# Patient Record
Sex: FEMALE | Race: BLACK OR AFRICAN AMERICAN | Employment: OTHER | URBAN - METROPOLITAN AREA
[De-identification: names, ages, dates, MRNs, and addresses within clinical notes are randomized per-mention and may not be internally consistent; named-entity substitution may affect disease eponyms.]

---

## 2017-02-24 ENCOUNTER — OFFICE VISIT (OUTPATIENT)
Dept: PRIMARY CARE CLINIC | Age: 57
End: 2017-02-24

## 2017-02-24 VITALS
HEART RATE: 89 BPM | OXYGEN SATURATION: 99 % | BODY MASS INDEX: 29.58 KG/M2 | SYSTOLIC BLOOD PRESSURE: 112 MMHG | WEIGHT: 167 LBS | DIASTOLIC BLOOD PRESSURE: 81 MMHG

## 2017-02-24 DIAGNOSIS — R11.0 NAUSEA: Primary | ICD-10-CM

## 2017-02-24 DIAGNOSIS — J45.40 MODERATE PERSISTENT ASTHMA WITHOUT COMPLICATION: ICD-10-CM

## 2017-02-24 DIAGNOSIS — E55.9 VITAMIN D DEFICIENCY: ICD-10-CM

## 2017-02-24 DIAGNOSIS — E87.6 HYPOKALEMIA: ICD-10-CM

## 2017-02-24 DIAGNOSIS — F17.201 TOBACCO ABUSE, IN REMISSION: ICD-10-CM

## 2017-02-24 DIAGNOSIS — N76.0 ACUTE VAGINITIS: ICD-10-CM

## 2017-02-24 PROCEDURE — 99214 OFFICE O/P EST MOD 30 MIN: CPT | Performed by: FAMILY MEDICINE

## 2017-02-24 RX ORDER — POTASSIUM CHLORIDE 20 MEQ/1
TABLET, EXTENDED RELEASE ORAL
Qty: 30 TABLET | Refills: 3 | Status: SHIPPED | OUTPATIENT
Start: 2017-02-24 | End: 2017-07-18 | Stop reason: SDUPTHER

## 2017-02-24 RX ORDER — ONDANSETRON 4 MG/1
4 TABLET, FILM COATED ORAL EVERY 12 HOURS PRN
Qty: 20 TABLET | Refills: 1 | Status: SHIPPED | OUTPATIENT
Start: 2017-02-24 | End: 2017-05-19 | Stop reason: SDUPTHER

## 2017-02-24 RX ORDER — ERGOCALCIFEROL 1.25 MG/1
CAPSULE ORAL
Qty: 8 CAPSULE | Refills: 2 | Status: SHIPPED | OUTPATIENT
Start: 2017-02-24 | End: 2017-08-17 | Stop reason: SDUPTHER

## 2017-02-24 ASSESSMENT — ENCOUNTER SYMPTOMS
TROUBLE SWALLOWING: 0
EYE REDNESS: 0
ABDOMINAL DISTENTION: 0
RHINORRHEA: 0
COUGH: 0
APNEA: 0
HEARTBURN: 0
DIARRHEA: 0
SHORTNESS OF BREATH: 0
STRIDOR: 0
RECTAL PAIN: 0
COLOR CHANGE: 0
EYE ITCHING: 0
CHOKING: 0
CHEST TIGHTNESS: 0
BLOOD IN STOOL: 0
EYE PAIN: 0
NAUSEA: 0
VOMITING: 0
BACK PAIN: 0
EYE DISCHARGE: 0
CONSTIPATION: 0
SINUS PRESSURE: 0
SORE THROAT: 0
PHOTOPHOBIA: 0
WHEEZING: 0

## 2017-02-28 DIAGNOSIS — E87.6 HYPOKALEMIA: ICD-10-CM

## 2017-02-28 LAB
ANION GAP SERPL CALCULATED.3IONS-SCNC: 18 MMOL/L (ref 3–16)
BUN BLDV-MCNC: 9 MG/DL (ref 7–20)
CALCIUM SERPL-MCNC: 9 MG/DL (ref 8.3–10.6)
CHLORIDE BLD-SCNC: 101 MMOL/L (ref 99–110)
CO2: 22 MMOL/L (ref 21–32)
CREAT SERPL-MCNC: 0.7 MG/DL (ref 0.6–1.1)
GFR AFRICAN AMERICAN: >60
GFR NON-AFRICAN AMERICAN: >60
GLUCOSE BLD-MCNC: 73 MG/DL (ref 70–99)
POTASSIUM SERPL-SCNC: 4.1 MMOL/L (ref 3.5–5.1)
SODIUM BLD-SCNC: 141 MMOL/L (ref 136–145)

## 2017-04-07 ENCOUNTER — OFFICE VISIT (OUTPATIENT)
Dept: PRIMARY CARE CLINIC | Age: 57
End: 2017-04-07

## 2017-04-07 VITALS
HEART RATE: 94 BPM | HEIGHT: 62 IN | OXYGEN SATURATION: 99 % | BODY MASS INDEX: 29.81 KG/M2 | SYSTOLIC BLOOD PRESSURE: 114 MMHG | TEMPERATURE: 97.6 F | WEIGHT: 162 LBS | DIASTOLIC BLOOD PRESSURE: 75 MMHG

## 2017-04-07 DIAGNOSIS — J30.9 ALLERGIC RHINITIS, UNSPECIFIED ALLERGIC RHINITIS TRIGGER, UNSPECIFIED RHINITIS SEASONALITY: Primary | ICD-10-CM

## 2017-04-07 PROCEDURE — 99213 OFFICE O/P EST LOW 20 MIN: CPT | Performed by: FAMILY MEDICINE

## 2017-04-07 RX ORDER — FLUTICASONE PROPIONATE 50 MCG
1 SPRAY, SUSPENSION (ML) NASAL DAILY
Qty: 1 BOTTLE | Refills: 3 | Status: SHIPPED | OUTPATIENT
Start: 2017-04-07 | End: 2017-04-19 | Stop reason: SDUPTHER

## 2017-04-07 ASSESSMENT — ENCOUNTER SYMPTOMS
NAUSEA: 0
SINUS PRESSURE: 0
EYE ITCHING: 0
ABDOMINAL DISTENTION: 0
COUGH: 0
PHOTOPHOBIA: 0
COLOR CHANGE: 0
BACK PAIN: 0
RECTAL PAIN: 0
EYE REDNESS: 0
SHORTNESS OF BREATH: 0
APNEA: 0
TROUBLE SWALLOWING: 0
VOMITING: 0
BLOOD IN STOOL: 0
SORE THROAT: 0
CHEST TIGHTNESS: 0
CHOKING: 0
CONSTIPATION: 0
STRIDOR: 0
DIARRHEA: 0
EYE PAIN: 0
RHINORRHEA: 1
EYE DISCHARGE: 0
WHEEZING: 0

## 2017-04-07 ASSESSMENT — PATIENT HEALTH QUESTIONNAIRE - PHQ9
2. FEELING DOWN, DEPRESSED OR HOPELESS: 0
SUM OF ALL RESPONSES TO PHQ QUESTIONS 1-9: 0
1. LITTLE INTEREST OR PLEASURE IN DOING THINGS: 0
SUM OF ALL RESPONSES TO PHQ9 QUESTIONS 1 & 2: 0

## 2017-04-19 RX ORDER — FLUTICASONE PROPIONATE 50 MCG
1 SPRAY, SUSPENSION (ML) NASAL DAILY
Qty: 1 BOTTLE | Refills: 3 | Status: SHIPPED | OUTPATIENT
Start: 2017-04-19 | End: 2018-02-02 | Stop reason: SDUPTHER

## 2017-04-19 RX ORDER — FLUTICASONE PROPIONATE 50 MCG
1 SPRAY, SUSPENSION (ML) NASAL DAILY
Qty: 1 BOTTLE | Refills: 3 | Status: CANCELLED | OUTPATIENT
Start: 2017-04-19

## 2017-05-18 DIAGNOSIS — R11.0 NAUSEA: ICD-10-CM

## 2017-05-19 ENCOUNTER — OFFICE VISIT (OUTPATIENT)
Dept: PRIMARY CARE CLINIC | Age: 57
End: 2017-05-19

## 2017-05-19 VITALS
SYSTOLIC BLOOD PRESSURE: 104 MMHG | OXYGEN SATURATION: 99 % | HEIGHT: 62 IN | HEART RATE: 90 BPM | WEIGHT: 156.8 LBS | BODY MASS INDEX: 28.85 KG/M2 | DIASTOLIC BLOOD PRESSURE: 79 MMHG

## 2017-05-19 DIAGNOSIS — R11.0 NAUSEA: ICD-10-CM

## 2017-05-19 DIAGNOSIS — N39.0 URINARY TRACT INFECTION, SITE UNSPECIFIED: Primary | ICD-10-CM

## 2017-05-19 LAB
BACTERIA: ABNORMAL /HPF
BILIRUBIN URINE: NEGATIVE
BLOOD, URINE: ABNORMAL
CLARITY: ABNORMAL
COLOR: ABNORMAL
EPITHELIAL CELLS, UA: 2 /HPF (ref 0–5)
GLUCOSE URINE: NEGATIVE MG/DL
HYALINE CASTS: 4 /LPF (ref 0–8)
KETONES, URINE: NEGATIVE MG/DL
LEUKOCYTE ESTERASE, URINE: ABNORMAL
MICROSCOPIC EXAMINATION: YES
NITRITE, URINE: POSITIVE
PH UA: 6
PROTEIN UA: 100 MG/DL
RBC UA: 30 /HPF (ref 0–4)
SPECIFIC GRAVITY UA: 1.02
URINE TYPE: ABNORMAL
UROBILINOGEN, URINE: 0.2 E.U./DL
WBC UA: 676 /HPF (ref 0–5)

## 2017-05-19 PROCEDURE — 99214 OFFICE O/P EST MOD 30 MIN: CPT | Performed by: FAMILY MEDICINE

## 2017-05-19 RX ORDER — ONDANSETRON 4 MG/1
4 TABLET, FILM COATED ORAL EVERY 12 HOURS PRN
Qty: 20 TABLET | Refills: 1 | Status: SHIPPED | OUTPATIENT
Start: 2017-05-19 | End: 2017-09-23 | Stop reason: SDUPTHER

## 2017-05-19 RX ORDER — NITROFURANTOIN MACROCRYSTALS 100 MG/1
CAPSULE ORAL
Qty: 20 CAPSULE | Refills: 0 | Status: SHIPPED | OUTPATIENT
Start: 2017-05-19 | End: 2018-01-12

## 2017-05-19 RX ORDER — ONDANSETRON 4 MG/1
4 TABLET, FILM COATED ORAL EVERY 12 HOURS PRN
Qty: 20 TABLET | Refills: 1 | Status: SHIPPED | OUTPATIENT
Start: 2017-05-19 | End: 2017-05-19 | Stop reason: SDUPTHER

## 2017-05-19 ASSESSMENT — ENCOUNTER SYMPTOMS
COLOR CHANGE: 0
RECTAL PAIN: 0
EYE ITCHING: 0
EYE PAIN: 0
PHOTOPHOBIA: 0
CHOKING: 0
COUGH: 0
CONSTIPATION: 0
TROUBLE SWALLOWING: 0
APNEA: 0
VOMITING: 0
RHINORRHEA: 0
NAUSEA: 0
SINUS PRESSURE: 0
SHORTNESS OF BREATH: 0
SORE THROAT: 0
EYE DISCHARGE: 0
DIARRHEA: 0
WHEEZING: 0
ABDOMINAL DISTENTION: 0
EYE REDNESS: 0
STRIDOR: 0
CHEST TIGHTNESS: 0
BACK PAIN: 0
BLOOD IN STOOL: 0

## 2017-05-21 LAB
ORGANISM: ABNORMAL
URINE CULTURE, ROUTINE: ABNORMAL

## 2017-06-07 RX ORDER — HYDROCHLOROTHIAZIDE 25 MG/1
TABLET ORAL
Qty: 30 TABLET | Refills: 5 | Status: SHIPPED | OUTPATIENT
Start: 2017-06-07 | End: 2018-01-12 | Stop reason: SDUPTHER

## 2017-06-27 RX ORDER — MONTELUKAST SODIUM 10 MG/1
10 TABLET ORAL NIGHTLY
Qty: 30 TABLET | Refills: 6 | Status: SHIPPED | OUTPATIENT
Start: 2017-06-27 | End: 2018-01-12 | Stop reason: SDUPTHER

## 2017-07-18 DIAGNOSIS — E87.6 HYPOKALEMIA: ICD-10-CM

## 2017-07-19 RX ORDER — POTASSIUM CHLORIDE 1500 MG/1
TABLET, EXTENDED RELEASE ORAL
Qty: 30 TABLET | Refills: 3 | Status: SHIPPED | OUTPATIENT
Start: 2017-07-19 | End: 2017-11-18 | Stop reason: SDUPTHER

## 2017-08-15 ENCOUNTER — TELEPHONE (OUTPATIENT)
Dept: PRIMARY CARE CLINIC | Age: 57
End: 2017-08-15

## 2017-08-17 ENCOUNTER — OFFICE VISIT (OUTPATIENT)
Dept: PRIMARY CARE CLINIC | Age: 57
End: 2017-08-17

## 2017-08-17 VITALS
TEMPERATURE: 97.3 F | WEIGHT: 153 LBS | OXYGEN SATURATION: 100 % | HEART RATE: 79 BPM | BODY MASS INDEX: 28.16 KG/M2 | HEIGHT: 62 IN | SYSTOLIC BLOOD PRESSURE: 107 MMHG | RESPIRATION RATE: 18 BRPM | DIASTOLIC BLOOD PRESSURE: 72 MMHG

## 2017-08-17 DIAGNOSIS — E55.9 VITAMIN D DEFICIENCY: ICD-10-CM

## 2017-08-17 DIAGNOSIS — B96.89 ACUTE BACTERIAL SINUSITIS: Primary | ICD-10-CM

## 2017-08-17 DIAGNOSIS — J01.90 ACUTE BACTERIAL SINUSITIS: Primary | ICD-10-CM

## 2017-08-17 PROCEDURE — 99212 OFFICE O/P EST SF 10 MIN: CPT | Performed by: NURSE PRACTITIONER

## 2017-08-17 RX ORDER — AMOXICILLIN AND CLAVULANATE POTASSIUM 875; 125 MG/1; MG/1
1 TABLET, FILM COATED ORAL 2 TIMES DAILY
Qty: 20 TABLET | Refills: 0 | Status: SHIPPED | OUTPATIENT
Start: 2017-08-17 | End: 2017-08-27

## 2017-08-17 RX ORDER — ERGOCALCIFEROL 1.25 MG/1
CAPSULE ORAL
Qty: 8 CAPSULE | Refills: 2 | Status: SHIPPED | OUTPATIENT
Start: 2017-08-17 | End: 2017-11-11 | Stop reason: SDUPTHER

## 2017-08-17 RX ORDER — PROMETHAZINE HYDROCHLORIDE AND CODEINE PHOSPHATE 6.25; 1 MG/5ML; MG/5ML
5 SYRUP ORAL EVERY 4 HOURS PRN
Qty: 120 ML | Refills: 0 | Status: SHIPPED | OUTPATIENT
Start: 2017-08-17 | End: 2017-08-27 | Stop reason: SDUPTHER

## 2017-08-17 RX ORDER — ALBUTEROL SULFATE 90 UG/1
2 AEROSOL, METERED RESPIRATORY (INHALATION) EVERY 6 HOURS PRN
Qty: 1 INHALER | Refills: 5 | Status: SHIPPED | OUTPATIENT
Start: 2017-08-17 | End: 2018-04-27 | Stop reason: SDUPTHER

## 2017-08-17 ASSESSMENT — ENCOUNTER SYMPTOMS
TROUBLE SWALLOWING: 0
STRIDOR: 0
FACIAL SWELLING: 0
SORE THROAT: 1
RHINORRHEA: 0
EYE REDNESS: 0
EYE DISCHARGE: 0
CHOKING: 0
SHORTNESS OF BREATH: 0
EYE ITCHING: 0
CHEST TIGHTNESS: 0
COUGH: 1
SINUS PRESSURE: 1
PHOTOPHOBIA: 0
APNEA: 0
NAUSEA: 0
WHEEZING: 0
VOICE CHANGE: 0
COLOR CHANGE: 0
VOMITING: 0
EYE PAIN: 0

## 2017-08-21 RX ORDER — LISINOPRIL 40 MG/1
TABLET ORAL
Qty: 30 TABLET | Refills: 6 | Status: CANCELLED | OUTPATIENT
Start: 2017-08-21

## 2017-08-25 DIAGNOSIS — I10 ESSENTIAL HYPERTENSION: Primary | ICD-10-CM

## 2017-08-25 RX ORDER — LISINOPRIL 40 MG/1
TABLET ORAL
Qty: 30 TABLET | Refills: 6 | Status: SHIPPED | OUTPATIENT
Start: 2017-08-25 | End: 2018-01-12 | Stop reason: SDUPTHER

## 2017-08-29 RX ORDER — PROMETHAZINE HYDROCHLORIDE AND CODEINE PHOSPHATE 6.25; 1 MG/5ML; MG/5ML
SYRUP ORAL
Qty: 120 ML | Refills: 0 | Status: SHIPPED | OUTPATIENT
Start: 2017-08-29 | End: 2018-04-27 | Stop reason: ALTCHOICE

## 2017-09-23 DIAGNOSIS — R11.0 NAUSEA: ICD-10-CM

## 2017-09-26 RX ORDER — ONDANSETRON 4 MG/1
TABLET, FILM COATED ORAL
Qty: 20 TABLET | Refills: 1 | Status: SHIPPED | OUTPATIENT
Start: 2017-09-26 | End: 2017-11-16 | Stop reason: SDUPTHER

## 2017-10-29 RX ORDER — OMEPRAZOLE 40 MG/1
40 CAPSULE, DELAYED RELEASE ORAL DAILY
Qty: 30 CAPSULE | Refills: 5 | Status: SHIPPED | OUTPATIENT
Start: 2017-10-29 | End: 2017-10-29 | Stop reason: SDUPTHER

## 2017-11-11 DIAGNOSIS — E55.9 VITAMIN D DEFICIENCY: ICD-10-CM

## 2017-11-13 RX ORDER — ERGOCALCIFEROL 1.25 MG/1
CAPSULE ORAL
Qty: 8 CAPSULE | Refills: 2 | Status: SHIPPED | OUTPATIENT
Start: 2017-11-13 | End: 2018-01-12 | Stop reason: SDUPTHER

## 2017-11-16 DIAGNOSIS — R11.0 NAUSEA: ICD-10-CM

## 2017-11-18 DIAGNOSIS — E87.6 HYPOKALEMIA: ICD-10-CM

## 2017-11-21 NOTE — TELEPHONE ENCOUNTER
Pt called in for refill of Klor-con (last refill 07/19/17) and Zofran ( last refill 09/26/17) yesterday. She still has not gotten the refill. [facility]       LOV:  08-17--17   No future visit    [facility]ACY:  [facility] [address]  Phone:   [phone]

## 2017-11-22 RX ORDER — POTASSIUM CHLORIDE 1500 MG/1
TABLET, EXTENDED RELEASE ORAL
Qty: 30 TABLET | Refills: 3 | Status: SHIPPED | OUTPATIENT
Start: 2017-11-22 | End: 2018-01-12 | Stop reason: SDUPTHER

## 2017-11-22 RX ORDER — ONDANSETRON 4 MG/1
TABLET, FILM COATED ORAL
Qty: 20 TABLET | Refills: 1 | Status: SHIPPED | OUTPATIENT
Start: 2017-11-22 | End: 2018-01-12 | Stop reason: SDUPTHER

## 2018-01-10 DIAGNOSIS — R11.0 NAUSEA: ICD-10-CM

## 2018-01-10 RX ORDER — ONDANSETRON 4 MG/1
TABLET, FILM COATED ORAL
Qty: 20 TABLET | Refills: 1 | OUTPATIENT
Start: 2018-01-10

## 2018-01-10 NOTE — TELEPHONE ENCOUNTER
I spoke with pt. She states she take 1 zofran everyday for nausea. She states she had gastric bypass several years ago, had hernia repair last January and has been taking ever since.   Pt hasn't seen Dr. Sindi Jordan since 5/2017 so appt scheduled for Friday 1/12/18

## 2018-01-10 NOTE — TELEPHONE ENCOUNTER
Vanessa Cummins is in need of a refill on her Zofran and she has one pill left. She only received 20 pills not 30 according to label on bottle.     Vanessa Cummins - [address]

## 2018-01-12 ENCOUNTER — OFFICE VISIT (OUTPATIENT)
Dept: PRIMARY CARE CLINIC | Age: 58
End: 2018-01-12

## 2018-01-12 VITALS
HEART RATE: 88 BPM | DIASTOLIC BLOOD PRESSURE: 71 MMHG | BODY MASS INDEX: 28.16 KG/M2 | TEMPERATURE: 98.1 F | HEIGHT: 62 IN | OXYGEN SATURATION: 99 % | WEIGHT: 153 LBS | SYSTOLIC BLOOD PRESSURE: 103 MMHG

## 2018-01-12 DIAGNOSIS — J45.40 MODERATE PERSISTENT ASTHMA WITHOUT COMPLICATION: ICD-10-CM

## 2018-01-12 DIAGNOSIS — K21.9 GASTROESOPHAGEAL REFLUX DISEASE WITHOUT ESOPHAGITIS: ICD-10-CM

## 2018-01-12 DIAGNOSIS — I10 ESSENTIAL HYPERTENSION: ICD-10-CM

## 2018-01-12 DIAGNOSIS — R11.0 NAUSEA: ICD-10-CM

## 2018-01-12 DIAGNOSIS — Z23 NEED FOR VACCINATION FOR STREP PNEUMONIAE: ICD-10-CM

## 2018-01-12 DIAGNOSIS — E55.9 VITAMIN D DEFICIENCY: Primary | ICD-10-CM

## 2018-01-12 DIAGNOSIS — E87.6 HYPOKALEMIA: ICD-10-CM

## 2018-01-12 DIAGNOSIS — Z23 NEEDS FLU SHOT: ICD-10-CM

## 2018-01-12 PROCEDURE — 99214 OFFICE O/P EST MOD 30 MIN: CPT | Performed by: FAMILY MEDICINE

## 2018-01-12 PROCEDURE — 90732 PPSV23 VACC 2 YRS+ SUBQ/IM: CPT | Performed by: FAMILY MEDICINE

## 2018-01-12 PROCEDURE — 90471 IMMUNIZATION ADMIN: CPT | Performed by: FAMILY MEDICINE

## 2018-01-12 PROCEDURE — 90472 IMMUNIZATION ADMIN EACH ADD: CPT | Performed by: FAMILY MEDICINE

## 2018-01-12 PROCEDURE — 90630 INFLUENZA, QUADV, 18-64 YRS, ID, PF, MICRO INJ, 0.1ML (FLUZONE QUADV, PF): CPT | Performed by: FAMILY MEDICINE

## 2018-01-12 RX ORDER — BUDESONIDE AND FORMOTEROL FUMARATE DIHYDRATE 80; 4.5 UG/1; UG/1
2 AEROSOL RESPIRATORY (INHALATION) DAILY
Qty: 1 INHALER | Refills: 5 | Status: SHIPPED | OUTPATIENT
Start: 2018-01-12 | End: 2018-09-30 | Stop reason: SDUPTHER

## 2018-01-12 RX ORDER — HYDROCHLOROTHIAZIDE 25 MG/1
TABLET ORAL
Qty: 30 TABLET | Refills: 5 | Status: SHIPPED | OUTPATIENT
Start: 2018-01-12 | End: 2018-08-06 | Stop reason: SDUPTHER

## 2018-01-12 RX ORDER — ONDANSETRON 4 MG/1
TABLET, FILM COATED ORAL
Qty: 30 TABLET | Refills: 12 | Status: SHIPPED | OUTPATIENT
Start: 2018-01-12 | End: 2020-02-19 | Stop reason: SDUPTHER

## 2018-01-12 RX ORDER — MONTELUKAST SODIUM 10 MG/1
10 TABLET ORAL NIGHTLY
Qty: 30 TABLET | Refills: 6 | Status: SHIPPED | OUTPATIENT
Start: 2018-01-12 | End: 2018-09-04 | Stop reason: SDUPTHER

## 2018-01-12 RX ORDER — ESTRADIOL 10 UG/1
TABLET VAGINAL
COMMUNITY
Start: 2017-12-23 | End: 2018-10-16

## 2018-01-12 RX ORDER — LISINOPRIL 40 MG/1
TABLET ORAL
Qty: 30 TABLET | Refills: 6 | Status: SHIPPED | OUTPATIENT
Start: 2018-01-12 | End: 2018-10-30 | Stop reason: SDUPTHER

## 2018-01-12 RX ORDER — POTASSIUM CHLORIDE 20 MEQ/1
TABLET, EXTENDED RELEASE ORAL
Qty: 30 TABLET | Refills: 5 | Status: SHIPPED | OUTPATIENT
Start: 2018-01-12 | End: 2018-01-19 | Stop reason: DRUGHIGH

## 2018-01-12 RX ORDER — ALBUTEROL SULFATE 90 UG/1
2 AEROSOL, METERED RESPIRATORY (INHALATION) EVERY 6 HOURS PRN
Qty: 1 INHALER | Refills: 5 | Status: SHIPPED | OUTPATIENT
Start: 2018-01-12 | End: 2019-05-20 | Stop reason: SDUPTHER

## 2018-01-12 RX ORDER — OMEPRAZOLE 40 MG/1
CAPSULE, DELAYED RELEASE ORAL
Qty: 90 CAPSULE | Refills: 2 | Status: SHIPPED | OUTPATIENT
Start: 2018-01-12 | End: 2019-02-25 | Stop reason: SDUPTHER

## 2018-01-12 ASSESSMENT — ENCOUNTER SYMPTOMS
COUGH: 0
PHOTOPHOBIA: 0
SHORTNESS OF BREATH: 0
DIFFICULTY BREATHING: 0
GLOBUS SENSATION: 0
ABDOMINAL DISTENTION: 0
WATER BRASH: 0
SINUS PRESSURE: 0
COLOR CHANGE: 0
EYE PAIN: 0
EYE ITCHING: 0
EYE REDNESS: 0
BELCHING: 0
WHEEZING: 0
DIARRHEA: 0
RHINORRHEA: 0
NAUSEA: 1
CHEST TIGHTNESS: 0
STRIDOR: 0
ABDOMINAL PAIN: 0
HEARTBURN: 0
BACK PAIN: 0
TROUBLE SWALLOWING: 0
HOARSE VOICE: 0
APNEA: 0
HEMOPTYSIS: 0
EYE DISCHARGE: 0
RECTAL PAIN: 0
BLURRED VISION: 0
SPUTUM PRODUCTION: 0
ORTHOPNEA: 0
SORE THROAT: 0
VOMITING: 0
CONSTIPATION: 0
CHOKING: 0
FREQUENT THROAT CLEARING: 0
BLOOD IN STOOL: 0

## 2018-01-12 NOTE — PATIENT INSTRUCTIONS
Patient Education          influenza virus vaccine (injection)  Pronunciation:  in machelle VILLASEÑORK seen  Brand:  Afluria [id number], Afluria Preservative-Free [id number], Fluad 1145-5575, Fluarix Quadrivalent 6742-7096, Flublok 4307-1664, Flucelvax 6610-4526, FluLaval Preservative-Free Quadrivalent 1214-8009, FluLaval Quadrivalent 0774-2833, Fluvirin 0250-2665, Fluvirin Preservative-Free 1434-2907, Fluzone High-Dose 3149-2995, Fluzone Preservative-Free Pediatric Quadrivalent 2593-0112, Fluzone Preservative-Free Quadrivalent 9714-1166, Fluzone Quadrivalent 6227-0277, Fluzone Quadrivalent Intradermal 6726-8153  What is the most important information I should know about this vaccine? The injectable influenza virus vaccine (flu shot) is a \"killed virus\" vaccine. Influenza virus vaccine is also available in a nasal spray form, which is a \"live virus\" vaccine. This medication guide addresses only the injectable form of this vaccine. Becoming infected with influenza is much more dangerous to your health than receiving this vaccine. However, like any medicine, this vaccine can cause side effects but the risk of serious side effects is extremely low. What is influenza virus vaccine? Influenza virus (commonly known as \"the flu\") is a serious disease caused by a virus. Influenza virus can spread from one person to another through small droplets of saliva that are expelled into the air when an infected person coughs or sneezes. The virus can also be passed through contact with objects the infected person has touched, such as a door handle or other surfaces. Influenza virus vaccine is used to prevent infection caused by influenza virus. The vaccine is redeveloped each year to contain specific strains of inactivated (killed) flu virus that are recommended by public health officials for that year. The injectable influenza virus vaccine (flu shot) is a \"killed virus\" vaccine.  Influenza virus vaccine is also available shot during any trimester of pregnancy to protect themselves and their  babies from flu. The nasal spray form of influenza vaccine is not recommended for use in pregnant women. It is not known whether influenza virus vaccine passes into breast milk or if it could harm a nursing baby. Do not receive this vaccine without telling your doctor if you are breast-feeding a baby. This vaccine should not be given to a child younger than 7 months old. How is this vaccine given? Some brands of this vaccine are made for use in adults and not in children. Your child's doctor can recommend the best influenza virus vaccine for your child. This vaccine is given as an injection (shot) into a muscle. You will receive this injection in a doctor's office or other clinic setting. You should receive a flu vaccine every year. Your immunity will gradually decrease over the 12 months after you receive the influenza virus vaccine. Children receiving this vaccine may need a booster shot one month after receiving the first vaccine. The influenza virus vaccine is usually given in October or November. Some people may need to have their vaccines earlier or later. Follow your doctor's instructions. Your doctor may recommend treating fever and pain with an aspirin-free pain reliever such as acetaminophen (Tylenol) or ibuprofen (Motrin, Advil, and others) when the shot is given and for the next 24 hours. Follow the label directions or your doctor's instructions about how much of this medicine to give your child. It is especially important to prevent fever from occurring in a child who has a seizure disorder such as epilepsy. What happens if I miss a dose? Since flu shots are usually given only one time per year, you will most likely not be on a dosing schedule. Call your doctor if you forget to receive your yearly flu shot in October or November.   If your child misses a booster dose of this vaccine, call your doctor for blood thinner such as warfarin, Coumadin. Also tell the doctor if you have recently received drugs or treatments that can weaken the immune system, including:  · an oral, nasal, inhaled, or injectable steroid medicine;  · medications to treat psoriasis, rheumatoid arthritis, or other autoimmune disorders--azathioprine, etanercept, leflunomide, and others; or  · medicines to treat or prevent organ transplant rejection--basiliximab, cyclosporine, muromonab-CD3, mycophenolate mofetil, sirolimus, tacrolimus. If you are using any of these medications, you may not be able to receive the vaccine, or may need to wait until the other treatments are finished. This list is not complete. Other drugs may affect influenza virus injectable vaccine, including prescription and over-the-counter medicines, vitamins, and herbal products. Not all possible interactions are listed in this medication guide. Where can I get more information? Your doctor or pharmacist can provide more information about this vaccine. Additional information is available from your local health department or the Centers for Disease Control and Prevention. Remember, keep this and all other medicines out of the reach of children, never share your medicines with others, and use this medication only for the indication prescribed. Every effort has been made to ensure that the information provided by [facility] is accurate, up-to-date, and complete, but no guarantee is made to that effect. Drug information contained herein may be time sensitive. [facility] information has been compiled for use by healthcare practitioners and consumers in the [place] and therefore [employer] does not warrant that uses outside of the [place] are appropriate, unless specifically indicated otherwise. [facility]'s drug information does not endorse drugs, diagnose patients or recommend therapy.  [employer]'s drug information is an informational resource designed to as hemophilia, or easy bruising. The timing and number of PPSV doses you receive will depend on whether you have any of these other conditions:  · cancer, leukemia, lymphoma, or multiple myeloma;  · HIV or AIDS;  · sickle cell disease;  · a kidney condition called nephrotic syndrome;  · a history of organ or bone marrow transplant;  · if you are receiving chemotherapy;  · if you have been using steroid medication for a long period of time;  · if you are scheduled to have your spleen removed (splenectomy); or  · if you have received a pneumococcal vaccine within the past 3 to 5 years. You can still receive a vaccine if you have a cold or fever. In the case of a more severe illness with a fever or any type of infection, wait until you get better before receiving this vaccine. Vaccines may be harmful to an unborn baby and generally should not be given to a pregnant woman. However, not vaccinating the mother could be more harmful to the baby if the mother becomes infected with a disease that this vaccine could prevent. Your doctor will decide whether you should receive this vaccine, especially if you have a high risk of infection with pneumococcal disease. It is not known whether PPSV passes into breast milk or if it could harm a nursing baby. Do not use this medication without telling your doctor if you are breast-feeding a baby. How is this vaccine given? PPSV is given as an injection (shot) under the skin or into a muscle of your arm or thigh. You will receive this injection in a doctor's office or other clinic setting. PPSV is usually given as a routine vaccination in adults who are 72 years and older.   PPSV may also be given to people between the ages 3and 59years old who have:  · heart disease, lung disease, or diabetes;  · a cerebrospinal fluid leak, or a cochlear implant (an electronic hearing device);  · alcoholism or liver disease (including cirrhosis);  · sickle cell disease or a disorder of the spleen;  · a weak immune system caused by HIV, AIDS, cancer, kidney failure, organ transplantation, or a damaged spleen; or  · a weak immune system caused by taking steroids or receiving chemotherapy or radiation treatment. PPSV may also be given to people between the ages 23and 59years old who smoke or have asthma. PPSV should be given at least 2 weeks before the start of any treatment that can weaken your immune system. PPSV is also given at least 2 weeks before you undergo a splenectomy (surgical removal of the spleen). The timing of this vaccination is very important for it to be effective. Follow your doctor's instructions. Your doctor may recommend treating fever and pain with an aspirin-free pain reliever such as acetaminophen (Tylenol) or ibuprofen (Motrin, Advil, and others) when the shot is given and for the next 24 hours. Follow the label directions or your doctor's instructions about how much of this medicine to take. If your doctor has prescribed an antibiotic (such as penicillin) to help prevent infection with pneumococcal bacteria, do not stop using the antibiotic after you receive the PPSV. Take the antibiotic for the entire length of time prescribed by your doctor. Most people receive only one PPSV shot during their lifetime. However, people in certain age groups or with certain disease conditions that put them at risk of infection may need to receive more than one vaccine. Before receiving this vaccine, tell your doctor if you have received a pneumococcal vaccine within the past 3 to 5 years. What happens if I miss a dose? Since PPSV is usually given only one time, you will most likely not be on a dosing schedule. If you are receiving a repeat PPSV shot, be sure to tell your doctor if it has been less than 5 years since you last received a pneumococcal vaccine. What happens if I overdose? An overdose of this vaccine is not likely to occur.   What should I avoid before or after receiving effect. Drug information contained herein may be time sensitive. [employer] information has been compiled for use by healthcare practitioners and consumers in the [place] and therefore [employer] does not warrant that uses outside of the [place] are appropriate, unless specifically indicated otherwise. [facility]'s drug information does not endorse drugs, diagnose patients or recommend therapy. [facility] drug information is an informational resource designed to assist licensed healthcare practitioners in caring for their patients and/or to serve consumers viewing this service as a supplement to, and not a substitute for, the expertise, skill, knowledge and judgment of healthcare practitioners. The absence of a warning for a given drug or drug combination in no way should be construed to indicate that the drug or drug combination is safe, effective or appropriate for any given patient. [facility] does not assume any responsibility for any aspect of healthcare administered with the aid of information [facility] provides. The information contained herein is not intended to cover all possible uses, directions, precautions, warnings, drug interactions, allergic reactions, or adverse effects. If you have questions about the drugs you are taking, check with your doctor, nurse or pharmacist.  Copyright 2363-4459 [address]: 5.02. Revision date: 10/17/2012. Care instructions adapted under license by [facility] ([facility]). If you have questions about a medical condition or this instruction, always ask your healthcare professional. [place] [address] any warranty or liability for your use of this information.

## 2018-01-17 DIAGNOSIS — E55.9 VITAMIN D DEFICIENCY: ICD-10-CM

## 2018-01-17 DIAGNOSIS — I10 ESSENTIAL HYPERTENSION: ICD-10-CM

## 2018-01-17 DIAGNOSIS — E87.6 HYPOKALEMIA: ICD-10-CM

## 2018-01-17 LAB
ANION GAP SERPL CALCULATED.3IONS-SCNC: 17 MMOL/L (ref 3–16)
BUN BLDV-MCNC: 9 MG/DL (ref 7–20)
CALCIUM SERPL-MCNC: 9.3 MG/DL (ref 8.3–10.6)
CHLORIDE BLD-SCNC: 100 MMOL/L (ref 99–110)
CO2: 23 MMOL/L (ref 21–32)
CREAT SERPL-MCNC: 0.7 MG/DL (ref 0.6–1.1)
GFR AFRICAN AMERICAN: >60
GFR NON-AFRICAN AMERICAN: >60
GLUCOSE BLD-MCNC: 84 MG/DL (ref 70–99)
POTASSIUM SERPL-SCNC: 3.4 MMOL/L (ref 3.5–5.1)
SODIUM BLD-SCNC: 140 MMOL/L (ref 136–145)

## 2018-01-19 DIAGNOSIS — E87.6 HYPOKALEMIA: ICD-10-CM

## 2018-01-19 DIAGNOSIS — I10 ESSENTIAL HYPERTENSION: ICD-10-CM

## 2018-01-19 RX ORDER — POTASSIUM CHLORIDE 20 MEQ/1
TABLET, EXTENDED RELEASE ORAL
Qty: 60 TABLET | Refills: 5 | Status: SHIPPED | OUTPATIENT
Start: 2018-01-19 | End: 2018-08-07 | Stop reason: SDUPTHER

## 2018-01-20 LAB
VITAMIN D2 AND D3, TOTAL: 61.4 NG/ML (ref 30–80)
VITAMIN D2, 25 HYDROXY: 57.2 NG/ML
VITAMIN D3,25 HYDROXY: 4.2 NG/ML

## 2018-02-02 ENCOUNTER — OFFICE VISIT (OUTPATIENT)
Dept: PRIMARY CARE CLINIC | Age: 58
End: 2018-02-02

## 2018-02-02 VITALS
TEMPERATURE: 97.4 F | SYSTOLIC BLOOD PRESSURE: 130 MMHG | OXYGEN SATURATION: 98 % | WEIGHT: 155 LBS | HEART RATE: 80 BPM | HEIGHT: 62 IN | BODY MASS INDEX: 28.52 KG/M2 | DIASTOLIC BLOOD PRESSURE: 80 MMHG

## 2018-02-02 DIAGNOSIS — J45.40 MODERATE PERSISTENT ASTHMA WITHOUT COMPLICATION: ICD-10-CM

## 2018-02-02 DIAGNOSIS — J30.9 ALLERGIC RHINITIS, UNSPECIFIED CHRONICITY, UNSPECIFIED SEASONALITY, UNSPECIFIED TRIGGER: Primary | ICD-10-CM

## 2018-02-02 DIAGNOSIS — E87.6 HYPOKALEMIA: ICD-10-CM

## 2018-02-02 LAB
ANION GAP SERPL CALCULATED.3IONS-SCNC: 13 MMOL/L (ref 3–16)
BUN BLDV-MCNC: 17 MG/DL (ref 7–20)
CALCIUM SERPL-MCNC: 9.2 MG/DL (ref 8.3–10.6)
CHLORIDE BLD-SCNC: 103 MMOL/L (ref 99–110)
CO2: 26 MMOL/L (ref 21–32)
CREAT SERPL-MCNC: 0.7 MG/DL (ref 0.6–1.1)
GFR AFRICAN AMERICAN: >60
GFR NON-AFRICAN AMERICAN: >60
GLUCOSE BLD-MCNC: 89 MG/DL (ref 70–99)
POTASSIUM SERPL-SCNC: 4 MMOL/L (ref 3.5–5.1)
SODIUM BLD-SCNC: 142 MMOL/L (ref 136–145)

## 2018-02-02 PROCEDURE — 99213 OFFICE O/P EST LOW 20 MIN: CPT | Performed by: FAMILY MEDICINE

## 2018-02-02 RX ORDER — FLUTICASONE PROPIONATE 50 MCG
1 SPRAY, SUSPENSION (ML) NASAL DAILY
Qty: 1 BOTTLE | Refills: 3 | Status: SHIPPED | OUTPATIENT
Start: 2018-02-02 | End: 2019-10-30 | Stop reason: SDUPTHER

## 2018-02-02 ASSESSMENT — ENCOUNTER SYMPTOMS
DIARRHEA: 0
SORE THROAT: 0
CHEST TIGHTNESS: 0
STRIDOR: 0
EYE ITCHING: 0
BACK PAIN: 0
WHEEZING: 0
VOMITING: 0
NAUSEA: 0
BLOOD IN STOOL: 0
CHOKING: 0
COUGH: 0
EYE DISCHARGE: 0
RECTAL PAIN: 0
ABDOMINAL DISTENTION: 0
SHORTNESS OF BREATH: 0
RHINORRHEA: 0
EYE REDNESS: 0
PHOTOPHOBIA: 0
TROUBLE SWALLOWING: 0
APNEA: 0
EYE PAIN: 0
SINUS PRESSURE: 0
COLOR CHANGE: 0
CONSTIPATION: 0

## 2018-02-02 NOTE — PROGRESS NOTES
nervous/anxious and is not hyperactive. Objective:   Physical Exam   Constitutional: She is oriented to person, place, and time. She appears well-developed and well-nourished. No distress. HENT:   Head: Normocephalic and atraumatic. Right Ear: External ear normal.   Left Ear: External ear normal.   Nose: Nose normal.   Mouth/Throat: Oropharynx is clear and moist. No oropharyngeal exudate. Eyes: Conjunctivae and EOM are normal. Pupils are equal, round, and reactive to light. Left eye exhibits no discharge. No scleral icterus. Neck: Normal range of motion. Neck supple. No JVD present. No tracheal deviation present. No thyromegaly present. Cardiovascular: Normal rate, regular rhythm, normal heart sounds and intact distal pulses. Exam reveals no gallop and no friction rub. No murmur heard. Pulmonary/Chest: Effort normal and breath sounds normal. No stridor. No respiratory distress. She has no wheezes. She has no rales. She exhibits no tenderness. Abdominal: Soft. Bowel sounds are normal. She exhibits no distension and no mass. There is no tenderness. There is no rebound and no guarding. Musculoskeletal: Normal range of motion. She exhibits no edema or tenderness. Lymphadenopathy:     She has no cervical adenopathy. Neurological: She is alert and oriented to person, place, and time. She has normal reflexes. No cranial nerve deficit. Coordination normal.   Skin: Skin is warm and dry. No rash noted. She is not diaphoretic. No erythema. No pallor. Psychiatric: She has a normal mood and affect. Her behavior is normal. Judgment and thought content normal.   Nursing note and vitals reviewed. Assessment:      1. Allergic rhinitis, unspecified chronicity, unspecified seasonality, unspecified trigger  sxs controlled   refil  - fluticasone (FLONASE) 50 MCG/ACT nasal spray; 1 spray by Nasal route daily  Dispense: 1 Bottle; Refill: 3    2.  Hypokalemia  Last K 3.4  On replacement  Check K today  3.  Moderate persistent asthma without complication  Give co pay card symbicort  Needs to get albuterol inhaler    Pt apparently has not met deductible yet          Plan:          See me in 3 months

## 2018-02-05 DIAGNOSIS — E55.9 VITAMIN D DEFICIENCY: ICD-10-CM

## 2018-02-06 DIAGNOSIS — E55.9 VITAMIN D DEFICIENCY: ICD-10-CM

## 2018-02-06 RX ORDER — ERGOCALCIFEROL 1.25 MG/1
50000 CAPSULE ORAL
Qty: 4 CAPSULE | Refills: 3 | Status: SHIPPED | OUTPATIENT
Start: 2018-02-06 | End: 2018-08-13 | Stop reason: ALTCHOICE

## 2018-02-06 RX ORDER — ERGOCALCIFEROL 1.25 MG/1
CAPSULE ORAL
Qty: 8 CAPSULE | Refills: 2 | OUTPATIENT
Start: 2018-02-06

## 2018-04-27 ENCOUNTER — OFFICE VISIT (OUTPATIENT)
Dept: PRIMARY CARE CLINIC | Age: 58
End: 2018-04-27

## 2018-04-27 VITALS
OXYGEN SATURATION: 98 % | DIASTOLIC BLOOD PRESSURE: 80 MMHG | SYSTOLIC BLOOD PRESSURE: 121 MMHG | BODY MASS INDEX: 29.81 KG/M2 | TEMPERATURE: 97.4 F | HEIGHT: 62 IN | HEART RATE: 94 BPM | WEIGHT: 162 LBS

## 2018-04-27 DIAGNOSIS — K59.00 CONSTIPATION, UNSPECIFIED CONSTIPATION TYPE: ICD-10-CM

## 2018-04-27 DIAGNOSIS — Z23 NEED FOR HEPATITIS A VACCINATION: ICD-10-CM

## 2018-04-27 DIAGNOSIS — Z23 NEED FOR HEPATITIS B VACCINATION: ICD-10-CM

## 2018-04-27 DIAGNOSIS — Z78.0 MENOPAUSE: ICD-10-CM

## 2018-04-27 DIAGNOSIS — I10 ESSENTIAL HYPERTENSION: Primary | ICD-10-CM

## 2018-04-27 PROCEDURE — 90471 IMMUNIZATION ADMIN: CPT | Performed by: FAMILY MEDICINE

## 2018-04-27 PROCEDURE — 90472 IMMUNIZATION ADMIN EACH ADD: CPT | Performed by: FAMILY MEDICINE

## 2018-04-27 PROCEDURE — 90632 HEPA VACCINE ADULT IM: CPT | Performed by: FAMILY MEDICINE

## 2018-04-27 PROCEDURE — 99214 OFFICE O/P EST MOD 30 MIN: CPT | Performed by: FAMILY MEDICINE

## 2018-04-27 PROCEDURE — 90746 HEPB VACCINE 3 DOSE ADULT IM: CPT | Performed by: FAMILY MEDICINE

## 2018-04-27 RX ORDER — CONJUGATED ESTROGENS 0.62 MG/1
1 TABLET, FILM COATED ORAL DAILY
COMMUNITY
Start: 2018-04-17 | End: 2018-10-16

## 2018-04-27 RX ORDER — POLYETHYLENE GLYCOL 3350 17 G/17G
17 POWDER, FOR SOLUTION ORAL DAILY
Qty: 510 G | Refills: 5 | Status: SHIPPED | OUTPATIENT
Start: 2018-04-27 | End: 2018-05-27

## 2018-04-27 ASSESSMENT — ENCOUNTER SYMPTOMS
BACK PAIN: 0
CHEST TIGHTNESS: 0
BLURRED VISION: 0
BLOOD IN STOOL: 0
CONSTIPATION: 0
EYE ITCHING: 0
WHEEZING: 0
EYE DISCHARGE: 0
ABDOMINAL DISTENTION: 0
RECTAL PAIN: 0
ORTHOPNEA: 0
SORE THROAT: 0
APNEA: 0
TROUBLE SWALLOWING: 0
COUGH: 0
RHINORRHEA: 0
COLOR CHANGE: 0
DIARRHEA: 0
PHOTOPHOBIA: 0
SINUS PRESSURE: 0
CHOKING: 0
EYE REDNESS: 0
SHORTNESS OF BREATH: 0
EYE PAIN: 0
STRIDOR: 0

## 2018-04-27 ASSESSMENT — PATIENT HEALTH QUESTIONNAIRE - PHQ9
1. LITTLE INTEREST OR PLEASURE IN DOING THINGS: 0
SUM OF ALL RESPONSES TO PHQ9 QUESTIONS 1 & 2: 0
2. FEELING DOWN, DEPRESSED OR HOPELESS: 0
SUM OF ALL RESPONSES TO PHQ QUESTIONS 1-9: 0

## 2018-04-30 ASSESSMENT — ENCOUNTER SYMPTOMS
BLOATING: 0
ABDOMINAL PAIN: 0
HEMATOCHEZIA: 0
FLATUS: 0

## 2018-05-10 RX ORDER — CELECOXIB 100 MG/1
100 CAPSULE ORAL DAILY
Qty: 30 CAPSULE | Refills: 5 | Status: SHIPPED | OUTPATIENT
Start: 2018-05-10 | End: 2020-12-28 | Stop reason: ALTCHOICE

## 2018-05-11 DIAGNOSIS — I10 ESSENTIAL HYPERTENSION: ICD-10-CM

## 2018-05-11 DIAGNOSIS — K59.00 CONSTIPATION, UNSPECIFIED CONSTIPATION TYPE: ICD-10-CM

## 2018-05-11 LAB
A/G RATIO: 1.7 (ref 1.1–2.2)
ALBUMIN SERPL-MCNC: 3.9 G/DL (ref 3.4–5)
ALP BLD-CCNC: 64 U/L (ref 40–129)
ALT SERPL-CCNC: 23 U/L (ref 10–40)
ANION GAP SERPL CALCULATED.3IONS-SCNC: 14 MMOL/L (ref 3–16)
AST SERPL-CCNC: 27 U/L (ref 15–37)
BASOPHILS ABSOLUTE: 0.1 K/UL (ref 0–0.2)
BASOPHILS RELATIVE PERCENT: 1.1 %
BILIRUB SERPL-MCNC: 0.4 MG/DL (ref 0–1)
BILIRUBIN URINE: NEGATIVE
BLOOD, URINE: NEGATIVE
BUN BLDV-MCNC: 10 MG/DL (ref 7–20)
CALCIUM SERPL-MCNC: 8.9 MG/DL (ref 8.3–10.6)
CHLORIDE BLD-SCNC: 104 MMOL/L (ref 99–110)
CLARITY: ABNORMAL
CO2: 23 MMOL/L (ref 21–32)
COLOR: ABNORMAL
CREAT SERPL-MCNC: 0.6 MG/DL (ref 0.6–1.1)
EOSINOPHILS ABSOLUTE: 0.1 K/UL (ref 0–0.6)
EOSINOPHILS RELATIVE PERCENT: 2.8 %
EPITHELIAL CELLS, UA: 7 /HPF (ref 0–5)
GFR AFRICAN AMERICAN: >60
GFR NON-AFRICAN AMERICAN: >60
GLOBULIN: 2.3 G/DL
GLUCOSE BLD-MCNC: 82 MG/DL (ref 70–99)
GLUCOSE URINE: NEGATIVE MG/DL
HCT VFR BLD CALC: 36.1 % (ref 36–48)
HEMOGLOBIN: 11.9 G/DL (ref 12–16)
HYALINE CASTS: 2 /LPF (ref 0–8)
KETONES, URINE: NEGATIVE MG/DL
LEUKOCYTE ESTERASE, URINE: NEGATIVE
LYMPHOCYTES ABSOLUTE: 2.7 K/UL (ref 1–5.1)
LYMPHOCYTES RELATIVE PERCENT: 55.3 %
MCH RBC QN AUTO: 28.7 PG (ref 26–34)
MCHC RBC AUTO-ENTMCNC: 32.9 G/DL (ref 31–36)
MCV RBC AUTO: 87.2 FL (ref 80–100)
MICROSCOPIC EXAMINATION: YES
MONOCYTES ABSOLUTE: 0.2 K/UL (ref 0–1.3)
MONOCYTES RELATIVE PERCENT: 4.6 %
NEUTROPHILS ABSOLUTE: 1.8 K/UL (ref 1.7–7.7)
NEUTROPHILS RELATIVE PERCENT: 36.2 %
NITRITE, URINE: NEGATIVE
PDW BLD-RTO: 15.9 % (ref 12.4–15.4)
PH UA: 6
PLATELET # BLD: 255 K/UL (ref 135–450)
PMV BLD AUTO: 7.7 FL (ref 5–10.5)
POTASSIUM SERPL-SCNC: 3.9 MMOL/L (ref 3.5–5.1)
PROTEIN UA: NEGATIVE MG/DL
RBC # BLD: 4.14 M/UL (ref 4–5.2)
RBC UA: 4 /HPF (ref 0–4)
SODIUM BLD-SCNC: 141 MMOL/L (ref 136–145)
SPECIFIC GRAVITY UA: 1.02
TOTAL PROTEIN: 6.2 G/DL (ref 6.4–8.2)
URINE TYPE: ABNORMAL
UROBILINOGEN, URINE: 0.2 E.U./DL
WBC # BLD: 4.9 K/UL (ref 4–11)
WBC UA: 1 /HPF (ref 0–5)

## 2018-05-12 ENCOUNTER — TELEPHONE (OUTPATIENT)
Dept: PRIMARY CARE CLINIC | Age: 58
End: 2018-05-12

## 2018-05-17 ENCOUNTER — TELEPHONE (OUTPATIENT)
Dept: PRIMARY CARE CLINIC | Age: 58
End: 2018-05-17

## 2018-05-29 ENCOUNTER — TELEPHONE (OUTPATIENT)
Dept: PRIMARY CARE CLINIC | Age: 58
End: 2018-05-29

## 2018-08-06 DIAGNOSIS — I10 ESSENTIAL HYPERTENSION: ICD-10-CM

## 2018-08-06 RX ORDER — HYDROCHLOROTHIAZIDE 25 MG/1
TABLET ORAL
Qty: 30 TABLET | Refills: 0 | Status: SHIPPED | OUTPATIENT
Start: 2018-08-06 | End: 2018-09-04 | Stop reason: SDUPTHER

## 2018-08-06 NOTE — TELEPHONE ENCOUNTER
Requested Prescriptions     Pending Prescriptions Disp Refills    hydrochlorothiazide (HYDRODIURIL) 25 MG tablet [Pharmacy Med Name: HYDROCHLOROTHIAZIDE 25 MG TAB] 30 tablet 0     Sig: TAKE 1 TABLET BY MOUTH DAILY.      Last ov 4/27/18

## 2018-08-07 DIAGNOSIS — E87.6 HYPOKALEMIA: ICD-10-CM

## 2018-08-07 DIAGNOSIS — I10 ESSENTIAL HYPERTENSION: ICD-10-CM

## 2018-08-07 RX ORDER — POTASSIUM CHLORIDE 20 MEQ/1
TABLET, EXTENDED RELEASE ORAL
Qty: 60 TABLET | Refills: 5 | Status: SHIPPED | OUTPATIENT
Start: 2018-08-07 | End: 2019-05-20 | Stop reason: SDUPTHER

## 2018-08-13 ENCOUNTER — OFFICE VISIT (OUTPATIENT)
Dept: PRIMARY CARE CLINIC | Age: 58
End: 2018-08-13

## 2018-08-13 VITALS
DIASTOLIC BLOOD PRESSURE: 80 MMHG | RESPIRATION RATE: 18 BRPM | BODY MASS INDEX: 30.18 KG/M2 | HEART RATE: 54 BPM | TEMPERATURE: 97.7 F | SYSTOLIC BLOOD PRESSURE: 128 MMHG | OXYGEN SATURATION: 98 % | WEIGHT: 165 LBS

## 2018-08-13 DIAGNOSIS — Z11.59 NEED FOR HEPATITIS C SCREENING TEST: ICD-10-CM

## 2018-08-13 DIAGNOSIS — Z11.4 SCREENING FOR HIV (HUMAN IMMUNODEFICIENCY VIRUS): ICD-10-CM

## 2018-08-13 DIAGNOSIS — Z23 NEED FOR SHINGLES VACCINE: ICD-10-CM

## 2018-08-13 DIAGNOSIS — Z13.220 SCREENING, LIPID: ICD-10-CM

## 2018-08-13 DIAGNOSIS — I10 ESSENTIAL HYPERTENSION: Primary | ICD-10-CM

## 2018-08-13 DIAGNOSIS — I10 ESSENTIAL HYPERTENSION: ICD-10-CM

## 2018-08-13 PROBLEM — K45.8 INTERNAL HERNIA: Status: ACTIVE | Noted: 2017-01-20

## 2018-08-13 PROBLEM — R07.9 CHEST PAIN: Status: ACTIVE | Noted: 2018-08-13

## 2018-08-13 LAB
A/G RATIO: 1.5 (ref 1.1–2.2)
ALBUMIN SERPL-MCNC: 4 G/DL (ref 3.4–5)
ALP BLD-CCNC: 76 U/L (ref 40–129)
ALT SERPL-CCNC: 26 U/L (ref 10–40)
ANION GAP SERPL CALCULATED.3IONS-SCNC: 13 MMOL/L (ref 3–16)
AST SERPL-CCNC: 33 U/L (ref 15–37)
BILIRUB SERPL-MCNC: <0.2 MG/DL (ref 0–1)
BUN BLDV-MCNC: 15 MG/DL (ref 7–20)
CALCIUM SERPL-MCNC: 9.1 MG/DL (ref 8.3–10.6)
CHLORIDE BLD-SCNC: 110 MMOL/L (ref 99–110)
CO2: 22 MMOL/L (ref 21–32)
CREAT SERPL-MCNC: 0.7 MG/DL (ref 0.6–1.1)
GFR AFRICAN AMERICAN: >60
GFR NON-AFRICAN AMERICAN: >60
GLOBULIN: 2.6 G/DL
GLUCOSE BLD-MCNC: 82 MG/DL (ref 70–99)
HEPATITIS C ANTIBODY INTERPRETATION: NORMAL
POTASSIUM SERPL-SCNC: 4.9 MMOL/L (ref 3.5–5.1)
SODIUM BLD-SCNC: 145 MMOL/L (ref 136–145)
TOTAL PROTEIN: 6.6 G/DL (ref 6.4–8.2)

## 2018-08-13 PROCEDURE — 99213 OFFICE O/P EST LOW 20 MIN: CPT | Performed by: FAMILY MEDICINE

## 2018-08-13 ASSESSMENT — ENCOUNTER SYMPTOMS
RECTAL PAIN: 0
NAUSEA: 0
WHEEZING: 0
DIARRHEA: 0
ABDOMINAL DISTENTION: 0
COUGH: 0
SINUS PRESSURE: 0
BACK PAIN: 0
ORTHOPNEA: 0
BLURRED VISION: 0
SORE THROAT: 0
VOMITING: 0
EYE ITCHING: 0
TROUBLE SWALLOWING: 0
CONSTIPATION: 0
CHEST TIGHTNESS: 0
BLOOD IN STOOL: 0
APNEA: 0
SHORTNESS OF BREATH: 0
STRIDOR: 0
CHOKING: 0
EYE PAIN: 0
EYE REDNESS: 0
COLOR CHANGE: 0
RHINORRHEA: 0
PHOTOPHOBIA: 0
EYE DISCHARGE: 0

## 2018-08-13 NOTE — PROGRESS NOTES
pelvic pain, urgency, vaginal bleeding and vaginal pain. Musculoskeletal: Negative for arthralgias, back pain, gait problem, joint swelling, myalgias, neck pain and neck stiffness. Skin: Negative for color change, pallor, rash and wound. Neurological: Negative for dizziness, tremors, seizures, syncope, facial asymmetry, speech difficulty, weakness, light-headedness, numbness and headaches. Hematological: Negative for adenopathy. Does not bruise/bleed easily. Psychiatric/Behavioral: Negative for agitation, behavioral problems, confusion, decreased concentration, dysphoric mood, hallucinations, self-injury, sleep disturbance and suicidal ideas. The patient is not nervous/anxious and is not hyperactive. Objective:   Physical Exam   Constitutional: She is oriented to person, place, and time. She appears well-developed and well-nourished. No distress. HENT:   Head: Normocephalic and atraumatic. Right Ear: External ear normal.   Left Ear: External ear normal.   Nose: Nose normal.   Mouth/Throat: Oropharynx is clear and moist. No oropharyngeal exudate. Eyes: Pupils are equal, round, and reactive to light. Conjunctivae and EOM are normal. Left eye exhibits no discharge. No scleral icterus. Neck: Normal range of motion. Neck supple. No JVD present. No tracheal deviation present. No thyromegaly present. Cardiovascular: Normal rate, regular rhythm, normal heart sounds and intact distal pulses. Exam reveals no gallop and no friction rub. No murmur heard. Pulmonary/Chest: Effort normal and breath sounds normal. No stridor. No respiratory distress. She has no wheezes. She has no rales. She exhibits no tenderness. Abdominal: Soft. Bowel sounds are normal. She exhibits no distension and no mass. There is no tenderness. There is no rebound and no guarding. Musculoskeletal: Normal range of motion. She exhibits no edema or tenderness. Lymphadenopathy:     She has no cervical adenopathy. Neurological: She is alert and oriented to person, place, and time. She has normal reflexes. No cranial nerve deficit. Coordination normal.   Skin: Skin is warm and dry. No rash noted. She is not diaphoretic. No erythema. No pallor. Psychiatric: She has a normal mood and affect. Her behavior is normal. Judgment and thought content normal.   Nursing note and vitals reviewed. Assessment:      1. Essential hypertension  BP is at goal with current  Treatment  Will check her renal status  And cont with current bp meds  Comprehensive metabolic profine    2. Screening, lipid      3. Need for hepatitis C screening test    - Hepatitis C Antibody; Future    4. Screening for HIV (human immunodeficiency virus)    - HIV Screen; Future    5. Need for shingles vaccine    - zoster recombinant adjuvanted vaccine (SHINGRIX) 50 MCG SUSR injection;  Inject 0.5 mLs into the muscle once for 1 dose 2 doses  by 6 months  Dispense: 0.5 mL; Refill: 0          Plan:         see me in 3 months     Teressa Cantu MD

## 2018-08-14 LAB
HIV AG/AB: NORMAL
HIV ANTIGEN: NORMAL
HIV-1 ANTIBODY: NORMAL
HIV-2 AB: NORMAL

## 2018-09-02 DIAGNOSIS — J45.40 MODERATE PERSISTENT ASTHMA WITHOUT COMPLICATION: ICD-10-CM

## 2018-09-02 RX ORDER — MONTELUKAST SODIUM 10 MG/1
10 TABLET ORAL NIGHTLY
Qty: 30 TABLET | Refills: 6 | Status: CANCELLED | OUTPATIENT
Start: 2018-09-02

## 2018-09-04 DIAGNOSIS — J45.40 MODERATE PERSISTENT ASTHMA WITHOUT COMPLICATION: ICD-10-CM

## 2018-09-04 DIAGNOSIS — I10 ESSENTIAL HYPERTENSION: ICD-10-CM

## 2018-09-04 RX ORDER — MONTELUKAST SODIUM 10 MG/1
10 TABLET ORAL NIGHTLY
Qty: 30 TABLET | Refills: 6 | Status: SHIPPED | OUTPATIENT
Start: 2018-09-04 | End: 2019-04-10 | Stop reason: SDUPTHER

## 2018-09-04 RX ORDER — HYDROCHLOROTHIAZIDE 25 MG/1
TABLET ORAL
Qty: 30 TABLET | Refills: 5 | Status: SHIPPED | OUTPATIENT
Start: 2018-09-04 | End: 2019-02-27 | Stop reason: SDUPTHER

## 2018-09-19 DIAGNOSIS — R05.9 COUGH: Primary | ICD-10-CM

## 2018-09-19 RX ORDER — BENZONATATE 100 MG/1
100 CAPSULE ORAL 3 TIMES DAILY PRN
Qty: 40 CAPSULE | Refills: 0 | Status: SHIPPED | OUTPATIENT
Start: 2018-09-19 | End: 2018-09-26

## 2018-09-30 DIAGNOSIS — J45.40 MODERATE PERSISTENT ASTHMA WITHOUT COMPLICATION: ICD-10-CM

## 2018-10-01 RX ORDER — DILTIAZEM HYDROCHLORIDE 60 MG/1
2 TABLET, FILM COATED ORAL DAILY
Qty: 10.2 INHALER | Refills: 5 | Status: SHIPPED | OUTPATIENT
Start: 2018-10-01 | End: 2018-10-16

## 2018-10-15 NOTE — TELEPHONE ENCOUNTER
About a month ago pt was out of town and developed sore throat, cough and fever. She was prescribed tessalon perles over the phone. The feveris gone and sore throat gone, but the cough is still keeping her up at night. She would like something else for the cough. Pt said she will be in town until Friday. [facility]  [address] PATIENT:  [phone]    Please let pt know.

## 2018-10-16 ENCOUNTER — APPOINTMENT (OUTPATIENT)
Dept: GENERAL RADIOLOGY | Age: 58
End: 2018-10-16
Payer: COMMERCIAL

## 2018-10-16 ENCOUNTER — APPOINTMENT (OUTPATIENT)
Dept: CT IMAGING | Age: 58
End: 2018-10-16
Payer: COMMERCIAL

## 2018-10-16 ENCOUNTER — HOSPITAL ENCOUNTER (EMERGENCY)
Age: 58
Discharge: HOME | End: 2018-10-16
Attending: EMERGENCY MEDICINE
Payer: COMMERCIAL

## 2018-10-16 VITALS
BODY MASS INDEX: 31.81 KG/M2 | HEART RATE: 98 BPM | TEMPERATURE: 98.2 F | SYSTOLIC BLOOD PRESSURE: 138 MMHG | DIASTOLIC BLOOD PRESSURE: 82 MMHG | WEIGHT: 172.84 LBS | OXYGEN SATURATION: 95 % | RESPIRATION RATE: 18 BRPM | HEIGHT: 62 IN

## 2018-10-16 DIAGNOSIS — J06.9 ACUTE UPPER RESPIRATORY INFECTION: Primary | ICD-10-CM

## 2018-10-16 DIAGNOSIS — R05.9 COUGH: ICD-10-CM

## 2018-10-16 LAB
A/G RATIO: 1.6 (ref 1.1–2.2)
ALBUMIN SERPL-MCNC: 3.9 G/DL (ref 3.4–5)
ALP BLD-CCNC: 70 U/L (ref 40–129)
ALT SERPL-CCNC: 23 U/L (ref 10–40)
ANION GAP SERPL CALCULATED.3IONS-SCNC: 12 MMOL/L (ref 3–16)
AST SERPL-CCNC: 27 U/L (ref 15–37)
BASOPHILS ABSOLUTE: 0 K/UL (ref 0–0.2)
BASOPHILS RELATIVE PERCENT: 0.6 %
BILIRUB SERPL-MCNC: 0.3 MG/DL (ref 0–1)
BUN BLDV-MCNC: 14 MG/DL (ref 7–20)
CALCIUM SERPL-MCNC: 9.4 MG/DL (ref 8.3–10.6)
CHLORIDE BLD-SCNC: 105 MMOL/L (ref 99–110)
CO2: 26 MMOL/L (ref 21–32)
CREAT SERPL-MCNC: 0.6 MG/DL (ref 0.6–1.1)
D DIMER: 273 NG/ML DDU (ref 0–229)
EOSINOPHILS ABSOLUTE: 0.1 K/UL (ref 0–0.6)
EOSINOPHILS RELATIVE PERCENT: 1.6 %
GFR AFRICAN AMERICAN: >60
GFR NON-AFRICAN AMERICAN: >60
GLOBULIN: 2.5 G/DL
GLUCOSE BLD-MCNC: 82 MG/DL (ref 70–99)
HCT VFR BLD CALC: 39.7 % (ref 36–48)
HEMOGLOBIN: 13 G/DL (ref 12–16)
LYMPHOCYTES ABSOLUTE: 2.3 K/UL (ref 1–5.1)
LYMPHOCYTES RELATIVE PERCENT: 49.1 %
MCH RBC QN AUTO: 28.7 PG (ref 26–34)
MCHC RBC AUTO-ENTMCNC: 32.7 G/DL (ref 31–36)
MCV RBC AUTO: 87.8 FL (ref 80–100)
MONOCYTES ABSOLUTE: 0.2 K/UL (ref 0–1.3)
MONOCYTES RELATIVE PERCENT: 5.2 %
NEUTROPHILS ABSOLUTE: 2.1 K/UL (ref 1.7–7.7)
NEUTROPHILS RELATIVE PERCENT: 43.5 %
PDW BLD-RTO: 17.9 % (ref 12.4–15.4)
PLATELET # BLD: 251 K/UL (ref 135–450)
PMV BLD AUTO: 7.3 FL (ref 5–10.5)
POTASSIUM REFLEX MAGNESIUM: 3.6 MMOL/L (ref 3.5–5.1)
PRO-BNP: 59 PG/ML (ref 0–124)
RBC # BLD: 4.52 M/UL (ref 4–5.2)
SODIUM BLD-SCNC: 143 MMOL/L (ref 136–145)
TOTAL PROTEIN: 6.4 G/DL (ref 6.4–8.2)
TROPONIN: <0.01 NG/ML
WBC # BLD: 4.7 K/UL (ref 4–11)

## 2018-10-16 PROCEDURE — 85025 COMPLETE CBC W/AUTO DIFF WBC: CPT

## 2018-10-16 PROCEDURE — 99285 EMERGENCY DEPT VISIT HI MDM: CPT

## 2018-10-16 PROCEDURE — 80053 COMPREHEN METABOLIC PANEL: CPT

## 2018-10-16 PROCEDURE — 94762 N-INVAS EAR/PLS OXIMTRY CONT: CPT

## 2018-10-16 PROCEDURE — 85379 FIBRIN DEGRADATION QUANT: CPT

## 2018-10-16 PROCEDURE — 6360000004 HC RX CONTRAST MEDICATION: Performed by: PHYSICIAN ASSISTANT

## 2018-10-16 PROCEDURE — 71260 CT THORAX DX C+: CPT

## 2018-10-16 PROCEDURE — 93005 ELECTROCARDIOGRAM TRACING: CPT | Performed by: PHYSICIAN ASSISTANT

## 2018-10-16 PROCEDURE — 96374 THER/PROPH/DIAG INJ IV PUSH: CPT

## 2018-10-16 PROCEDURE — 96375 TX/PRO/DX INJ NEW DRUG ADDON: CPT

## 2018-10-16 PROCEDURE — 84484 ASSAY OF TROPONIN QUANT: CPT

## 2018-10-16 PROCEDURE — 6360000002 HC RX W HCPCS: Performed by: PHYSICIAN ASSISTANT

## 2018-10-16 PROCEDURE — 94664 DEMO&/EVAL PT USE INHALER: CPT

## 2018-10-16 PROCEDURE — 94640 AIRWAY INHALATION TREATMENT: CPT

## 2018-10-16 PROCEDURE — 71046 X-RAY EXAM CHEST 2 VIEWS: CPT

## 2018-10-16 PROCEDURE — 83880 ASSAY OF NATRIURETIC PEPTIDE: CPT

## 2018-10-16 RX ORDER — ALBUTEROL SULFATE 2.5 MG/3ML
5 SOLUTION RESPIRATORY (INHALATION) ONCE
Status: COMPLETED | OUTPATIENT
Start: 2018-10-16 | End: 2018-10-16

## 2018-10-16 RX ORDER — AZITHROMYCIN 250 MG/1
TABLET, FILM COATED ORAL
Qty: 1 PACKET | Refills: 0 | Status: SHIPPED | OUTPATIENT
Start: 2018-10-16 | End: 2018-10-26

## 2018-10-16 RX ORDER — GUAIFENESIN AND CODEINE PHOSPHATE 100; 10 MG/5ML; MG/5ML
5 SOLUTION ORAL 3 TIMES DAILY PRN
Qty: 100 ML | Refills: 0 | Status: SHIPPED | OUTPATIENT
Start: 2018-10-16 | End: 2018-10-23

## 2018-10-16 RX ORDER — DIPHENHYDRAMINE HYDROCHLORIDE 50 MG/ML
50 INJECTION INTRAMUSCULAR; INTRAVENOUS ONCE
Status: COMPLETED | OUTPATIENT
Start: 2018-10-16 | End: 2018-10-16

## 2018-10-16 RX ORDER — METHYLPREDNISOLONE SODIUM SUCCINATE 40 MG/ML
40 INJECTION, POWDER, LYOPHILIZED, FOR SOLUTION INTRAMUSCULAR; INTRAVENOUS ONCE
Status: COMPLETED | OUTPATIENT
Start: 2018-10-16 | End: 2018-10-16

## 2018-10-16 RX ADMIN — DIPHENHYDRAMINE HYDROCHLORIDE 50 MG: 50 INJECTION, SOLUTION INTRAMUSCULAR; INTRAVENOUS at 09:21

## 2018-10-16 RX ADMIN — ALBUTEROL SULFATE 5 MG: 2.5 SOLUTION RESPIRATORY (INHALATION) at 08:48

## 2018-10-16 RX ADMIN — METHYLPREDNISOLONE SODIUM SUCCINATE 40 MG: 40 INJECTION, POWDER, FOR SOLUTION INTRAMUSCULAR; INTRAVENOUS at 09:22

## 2018-10-16 RX ADMIN — IOPAMIDOL 75 ML: 755 INJECTION, SOLUTION INTRAVENOUS at 09:49

## 2018-10-16 NOTE — ED PROVIDER NOTES
1 CAPSULE BY MOUTH DAILY 30 capsule 5    EPINEPHrine (EPIPEN) 0.3 MG/0.3ML DELPHINE injection Use as directed for allergic reaction 2 Device 3       ALLERGIES    Allergies   Allergen Reactions    Shellfish-Derived Products Shortness Of Breath and Palpitations       FAMILY AND SOCIAL HISTORY    Family History   Problem Relation Age of Onset    Diabetes Father     Rheum Arthritis Neg Hx     Osteoarthritis Neg Hx     Asthma Neg Hx     Breast Cancer Neg Hx     Cancer Neg Hx     Heart Failure Neg Hx     High Cholesterol Neg Hx     Hypertension Neg Hx     Migraines Neg Hx     Ovarian Cancer Neg Hx     Rashes/Skin Problems Neg Hx     Seizures Neg Hx     Stroke Neg Hx     Thyroid Disease Neg Hx      Social History     Social History    Marital status:      Spouse name: N/A    Number of children: 1    Years of education: N/A     Occupational History    /[employer]      Social History Main Topics    Smoking status: Former Smoker     Types: Cigarettes     Quit date: 7/1/1980    Smokeless tobacco: Former User     Quit date: 12/10/1994    Alcohol use 0.0 oz/week      Comment: socially-wine    Drug use: No    Sexual activity: Yes     Partners: Male     Other Topics Concern    None     Social History Narrative    None       PHYSICAL EXAM    VITAL SIGNS: /82   Pulse 98   Temp 98.2 °F (36.8 °C) (Oral)   Resp 18   Ht 5' 2\" (1.575 m)   Wt 172 lb 13.5 oz (78.4 kg)   SpO2 95%   BMI 31.61 kg/m²   Constitutional:  Well developed, well nourished, no acute distress  Eyes: Sclera nonicteric, conjunctiva normal   Ears: bilateral ear canal appears nonerythematous and the ipsilateral TM appears grey and nonbulging, the mastoid and pinna are without redness or swelling   Throat/Face:  nonerythematous throat, no exudates, no trismus  Neck: Supple, no enlarged tonsillar LAD  Respiratory:  Lungs with mild expiratory wheezing bilaterally, no retractions  Cardiovascular:  Regular rate, no murmurs  Musculoskeletal:  No edema, normal gait  Neurologic: Awake alert and oriented, and no slurred speech  Integument:  Skin is warm and dry, no rash    RADIOLOGY/PROCEDURES    CT CHEST PULMONARY EMBOLISM W CONTRAST   Final Result   No evidence of pulmonary embolism or acute pulmonary abnormality. XR CHEST STANDARD (2 VW)   Final Result   No acute cardiopulmonary disease. ED COURSE & MEDICAL DECISION MAKING    See chart for details of medications given during the ED stay. Differential diagnoses: Airway Obstruction, Epiglottitis, Retropharyngeal Abscess, Parapharyngeal Abscess, Pneumonia, Hypoxemia, Dehydration, other. Patient is afebrile and nontoxic in appearance. Labs show no leukocytosis or anemia, there are no concerning electrolyte abnormalities, her troponin is not elevated, her BNP is 59, her d-dimer slightly elevated at 273. Plain films as above, Showed no acute cardiopulmonary process. Due to the elevated d-dimer and tachycardia, CT scan is ordered and shows no evidence for acute pulmonary emboli. She has an allergy to shellfish listed, but record review indicates that she has a CT with contrast in the past (August 2013) without any adverse reaction. I did discuss this with her, we discussed that she does not remember getting premedicated in the past but would not be opposed to being premedicated this time. I did premedicate her with Solu-Medrol and Benadryl, and she had no adverse reaction to IV contrast. Please see attending physician's note for EKG interpretation. She feels improved after the nebulized albuterol. She will be discharged home with prescription for Robitussin with codeine, she is given sedation precautions related to this. She will also be given broad-spectrum coverage with a Z-Gunnar. I instructed the patient to follow up as an outpatient in 1-2 days. I instructed the patient to return to the ED immediately for any new or worsening symptoms.   The patient verbalizes understanding. I have evaluated this patient. My supervising physician Also saw and evaluated this patient, please see his note for additional details regarding this patient encounter. FINAL IMPRESSION    1. Acute upper respiratory infection    2.  Cough        PLAN  Outpatient treatment, discharge instructions, and follow-up (see EMR)    (Please note that this note was completed with a voice recognition program.  Every attempt was made to edit the dictations, but inevitably there remain words that are mis-transcribed.)       Felecia Mckay  10/16/18 1119

## 2018-10-18 LAB
EKG ATRIAL RATE: 95 BPM
EKG DIAGNOSIS: NORMAL
EKG P AXIS: 50 DEGREES
EKG P-R INTERVAL: 134 MS
EKG Q-T INTERVAL: 370 MS
EKG QRS DURATION: 76 MS
EKG QTC CALCULATION (BAZETT): 464 MS
EKG R AXIS: 66 DEGREES
EKG T AXIS: 68 DEGREES
EKG VENTRICULAR RATE: 95 BPM

## 2018-10-30 DIAGNOSIS — I10 ESSENTIAL HYPERTENSION: ICD-10-CM

## 2018-10-31 RX ORDER — LISINOPRIL 40 MG/1
TABLET ORAL
Qty: 30 TABLET | Refills: 3 | Status: SHIPPED | OUTPATIENT
Start: 2018-10-31 | End: 2018-11-08 | Stop reason: SINTOL

## 2018-11-08 ENCOUNTER — TELEPHONE (OUTPATIENT)
Dept: PRIMARY CARE CLINIC | Age: 58
End: 2018-11-08

## 2018-11-08 DIAGNOSIS — B36.9 FUNGAL DERMATITIS: ICD-10-CM

## 2018-11-08 DIAGNOSIS — I10 ESSENTIAL HYPERTENSION: Primary | ICD-10-CM

## 2018-11-08 RX ORDER — LOSARTAN POTASSIUM 100 MG/1
100 TABLET ORAL DAILY
Qty: 90 TABLET | Refills: 1 | Status: SHIPPED | OUTPATIENT
Start: 2018-11-08 | End: 2019-01-11 | Stop reason: SINTOL

## 2018-11-08 RX ORDER — CLOTRIMAZOLE AND BETAMETHASONE DIPROPIONATE 10; .64 MG/G; MG/G
CREAM TOPICAL
Qty: 15 G | Refills: 1 | Status: SHIPPED | OUTPATIENT
Start: 2018-11-08 | End: 2020-02-19 | Stop reason: SDUPTHER

## 2018-11-08 NOTE — TELEPHONE ENCOUNTER
Dry cough for week.s, no lip swelling or resp sxs. o  Acei.noticed ringworm abdomen. Stop acei  Start osartan      Add lotrisone ce for fungal infect.  She will be out of town until dec 2018

## 2018-11-27 RX ORDER — ERGOCALCIFEROL 1.25 MG/1
CAPSULE ORAL
Qty: 4 CAPSULE | Refills: 1 | Status: SHIPPED | OUTPATIENT
Start: 2018-11-27 | End: 2019-01-11 | Stop reason: SDUPTHER

## 2018-11-30 RX ORDER — ONDANSETRON 4 MG/1
TABLET, ORALLY DISINTEGRATING ORAL
Qty: 30 TABLET | Refills: 5 | Status: SHIPPED | OUTPATIENT
Start: 2018-11-30 | End: 2019-05-17 | Stop reason: SDUPTHER

## 2019-01-11 ENCOUNTER — OFFICE VISIT (OUTPATIENT)
Dept: PRIMARY CARE CLINIC | Age: 59
End: 2019-01-11
Payer: COMMERCIAL

## 2019-01-11 VITALS
WEIGHT: 172.6 LBS | RESPIRATION RATE: 16 BRPM | TEMPERATURE: 97 F | BODY MASS INDEX: 31.76 KG/M2 | HEIGHT: 62 IN | HEART RATE: 85 BPM | DIASTOLIC BLOOD PRESSURE: 89 MMHG | OXYGEN SATURATION: 98 % | SYSTOLIC BLOOD PRESSURE: 129 MMHG

## 2019-01-11 DIAGNOSIS — R52 ACHING: ICD-10-CM

## 2019-01-11 DIAGNOSIS — T78.40XA ALLERGIC REACTION, INITIAL ENCOUNTER: ICD-10-CM

## 2019-01-11 DIAGNOSIS — I10 ESSENTIAL HYPERTENSION: ICD-10-CM

## 2019-01-11 DIAGNOSIS — R52 ACHING: Primary | ICD-10-CM

## 2019-01-11 LAB
A/G RATIO: 1.5 (ref 1.1–2.2)
ALBUMIN SERPL-MCNC: 4.2 G/DL (ref 3.4–5)
ALP BLD-CCNC: 81 U/L (ref 40–129)
ALT SERPL-CCNC: 25 U/L (ref 10–40)
ANION GAP SERPL CALCULATED.3IONS-SCNC: 14 MMOL/L (ref 3–16)
AST SERPL-CCNC: 34 U/L (ref 15–37)
BILIRUB SERPL-MCNC: <0.2 MG/DL (ref 0–1)
BUN BLDV-MCNC: 11 MG/DL (ref 7–20)
CALCIUM SERPL-MCNC: 9.4 MG/DL (ref 8.3–10.6)
CHLORIDE BLD-SCNC: 106 MMOL/L (ref 99–110)
CO2: 24 MMOL/L (ref 21–32)
CREAT SERPL-MCNC: 0.7 MG/DL (ref 0.6–1.1)
GFR AFRICAN AMERICAN: >60
GFR NON-AFRICAN AMERICAN: >60
GLOBULIN: 2.8 G/DL
GLUCOSE BLD-MCNC: 99 MG/DL (ref 70–99)
HCT VFR BLD CALC: 41.9 % (ref 36–48)
HEMOGLOBIN: 13.8 G/DL (ref 12–16)
MCH RBC QN AUTO: 30.3 PG (ref 26–34)
MCHC RBC AUTO-ENTMCNC: 33 G/DL (ref 31–36)
MCV RBC AUTO: 92 FL (ref 80–100)
PDW BLD-RTO: 14 % (ref 12.4–15.4)
PLATELET # BLD: 235 K/UL (ref 135–450)
PMV BLD AUTO: 8.3 FL (ref 5–10.5)
POTASSIUM SERPL-SCNC: 3.4 MMOL/L (ref 3.5–5.1)
RBC # BLD: 4.56 M/UL (ref 4–5.2)
SODIUM BLD-SCNC: 144 MMOL/L (ref 136–145)
TOTAL CK: 127 U/L (ref 26–192)
TOTAL PROTEIN: 7 G/DL (ref 6.4–8.2)
WBC # BLD: 5.4 K/UL (ref 4–11)

## 2019-01-11 PROCEDURE — 99214 OFFICE O/P EST MOD 30 MIN: CPT | Performed by: FAMILY MEDICINE

## 2019-01-11 RX ORDER — OMEPRAZOLE 40 MG/1
40 CAPSULE, DELAYED RELEASE ORAL
COMMUNITY
End: 2019-01-31 | Stop reason: SDUPTHER

## 2019-01-11 RX ORDER — AMLODIPINE BESYLATE 5 MG/1
TABLET ORAL
Qty: 30 TABLET | Refills: 2 | Status: SHIPPED | OUTPATIENT
Start: 2019-01-11 | End: 2019-02-25 | Stop reason: DRUGHIGH

## 2019-01-11 RX ORDER — ONDANSETRON 4 MG/1
4 TABLET, ORALLY DISINTEGRATING ORAL
COMMUNITY
Start: 2017-02-19 | End: 2019-01-31 | Stop reason: SDUPTHER

## 2019-01-11 RX ORDER — MONTELUKAST SODIUM 10 MG/1
10 TABLET ORAL
COMMUNITY
End: 2019-01-31 | Stop reason: SDUPTHER

## 2019-01-11 RX ORDER — LISINOPRIL 40 MG/1
40 TABLET ORAL
COMMUNITY
End: 2019-01-11 | Stop reason: SINTOL

## 2019-01-11 RX ORDER — FEXOFENADINE HCL 180 MG/1
180 TABLET ORAL DAILY
Qty: 30 TABLET | Refills: 5 | Status: SHIPPED | OUTPATIENT
Start: 2019-01-11 | End: 2019-02-10

## 2019-01-11 RX ORDER — FLUTICASONE PROPIONATE 50 MCG
1 SPRAY, SUSPENSION (ML) NASAL
COMMUNITY
End: 2019-01-31 | Stop reason: SDUPTHER

## 2019-01-11 RX ORDER — HYDROCHLOROTHIAZIDE 25 MG/1
25 TABLET ORAL
COMMUNITY
End: 2019-01-31 | Stop reason: SDUPTHER

## 2019-01-11 RX ORDER — ESTRADIOL 10 UG/1
INSERT VAGINAL
COMMUNITY
Start: 2018-01-16 | End: 2019-08-23 | Stop reason: ALTCHOICE

## 2019-01-11 RX ORDER — FLUCONAZOLE 150 MG/1
TABLET ORAL
COMMUNITY
Start: 2017-06-20 | End: 2019-08-23 | Stop reason: ALTCHOICE

## 2019-01-11 RX ORDER — ERGOCALCIFEROL 1.25 MG/1
CAPSULE ORAL
COMMUNITY
Start: 2017-02-17 | End: 2019-07-06 | Stop reason: SDUPTHER

## 2019-01-11 RX ORDER — ACETAMINOPHEN 500 MG
1000 TABLET ORAL PRN
COMMUNITY
Start: 2017-01-21 | End: 2020-12-28 | Stop reason: ALTCHOICE

## 2019-01-11 RX ORDER — ALBUTEROL SULFATE 90 UG/1
2 AEROSOL, METERED RESPIRATORY (INHALATION)
COMMUNITY
End: 2019-01-31 | Stop reason: SDUPTHER

## 2019-01-11 ASSESSMENT — ENCOUNTER SYMPTOMS
CONSTIPATION: 0
SORE THROAT: 0
COUGH: 0
EYE PAIN: 0
BACK PAIN: 0
CHEST TIGHTNESS: 0
VOMITING: 0
PHOTOPHOBIA: 0
RECTAL PAIN: 0
SINUS PRESSURE: 0
DIARRHEA: 0
STRIDOR: 0
SWOLLEN GLANDS: 0
SHORTNESS OF BREATH: 0
RHINORRHEA: 0
EYE REDNESS: 0
APNEA: 0
EYE DISCHARGE: 0
NAUSEA: 0
BLOOD IN STOOL: 0
CHOKING: 0
WHEEZING: 0
TROUBLE SWALLOWING: 0
VISUAL CHANGE: 0
ABDOMINAL DISTENTION: 0
EYE ITCHING: 0
COLOR CHANGE: 0

## 2019-01-12 LAB — SEDIMENTATION RATE, ERYTHROCYTE: 10 MM/HR (ref 0–30)

## 2019-01-18 ENCOUNTER — TELEPHONE (OUTPATIENT)
Dept: PRIMARY CARE CLINIC | Age: 59
End: 2019-01-18

## 2019-01-31 ENCOUNTER — TELEPHONE (OUTPATIENT)
Dept: PRIMARY CARE CLINIC | Age: 59
End: 2019-01-31

## 2019-01-31 RX ORDER — BUTALBITAL, ACETAMINOPHEN AND CAFFEINE 50; 325; 40 MG/1; MG/1; MG/1
TABLET ORAL
Qty: 30 TABLET | Refills: 1 | Status: SHIPPED | OUTPATIENT
Start: 2019-01-31 | End: 2019-05-20 | Stop reason: SDUPTHER

## 2019-02-25 ENCOUNTER — TELEPHONE (OUTPATIENT)
Dept: PRIMARY CARE CLINIC | Age: 59
End: 2019-02-25

## 2019-02-25 DIAGNOSIS — K21.9 GASTROESOPHAGEAL REFLUX DISEASE WITHOUT ESOPHAGITIS: ICD-10-CM

## 2019-02-25 RX ORDER — TRIAMCINOLONE ACETONIDE 1 MG/G
CREAM TOPICAL
Qty: 45 G | Refills: 0 | Status: SHIPPED | OUTPATIENT
Start: 2019-02-25 | End: 2019-05-20 | Stop reason: SDUPTHER

## 2019-02-25 RX ORDER — OMEPRAZOLE 40 MG/1
CAPSULE, DELAYED RELEASE ORAL
Qty: 90 CAPSULE | Refills: 0 | Status: SHIPPED | OUTPATIENT
Start: 2019-02-25 | End: 2019-05-17 | Stop reason: SDUPTHER

## 2019-02-25 RX ORDER — AMLODIPINE BESYLATE 10 MG/1
TABLET ORAL
Qty: 90 TABLET | Refills: 1 | Status: SHIPPED | OUTPATIENT
Start: 2019-02-25 | End: 2019-05-20 | Stop reason: SDUPTHER

## 2019-02-27 DIAGNOSIS — I10 ESSENTIAL HYPERTENSION: ICD-10-CM

## 2019-02-27 RX ORDER — HYDROCHLOROTHIAZIDE 25 MG/1
TABLET ORAL
Qty: 30 TABLET | Refills: 1 | Status: SHIPPED | OUTPATIENT
Start: 2019-02-27 | End: 2019-05-17 | Stop reason: SDUPTHER

## 2019-04-10 DIAGNOSIS — J45.40 MODERATE PERSISTENT ASTHMA WITHOUT COMPLICATION: ICD-10-CM

## 2019-04-10 RX ORDER — MONTELUKAST SODIUM 10 MG/1
TABLET ORAL
Qty: 30 TABLET | Refills: 2 | Status: SHIPPED | OUTPATIENT
Start: 2019-04-10 | End: 2019-05-17 | Stop reason: SDUPTHER

## 2019-05-17 DIAGNOSIS — K21.9 GASTROESOPHAGEAL REFLUX DISEASE WITHOUT ESOPHAGITIS: ICD-10-CM

## 2019-05-17 DIAGNOSIS — I10 ESSENTIAL HYPERTENSION: ICD-10-CM

## 2019-05-17 DIAGNOSIS — E87.6 HYPOKALEMIA: ICD-10-CM

## 2019-05-17 DIAGNOSIS — J45.40 MODERATE PERSISTENT ASTHMA WITHOUT COMPLICATION: ICD-10-CM

## 2019-05-17 NOTE — TELEPHONE ENCOUNTER
Patient requesting a medication refill.   Medication montelukast (SINGULAIR) 10 MG tablet    hydrochlorothiazide (HYDRODIURIL) 25 MG tablet    omeprazole (PRILOSEC) 40 MG delayed release capsule    amLODIPine (NORVASC) 10 MG tablet  triamcinolone (KENALOG) 0.1 % cream  butalbital-acetaminophen-caffeine (FIORICET, ESGIC) -40 MG per tablet  acetaminophen (TYLENOL) 500 MG tablet   fluconazole (DIFLUCAN) 150 MG tablet  ondansetron (ZOFRAN-ODT) 4 MG disintegrating tablet  potassium chloride (KLOR-CON M20) 20 MEQ extended release tablet  albuterol sulfate HFA (PROAIR HFA) 108 (90 Base) MCG/ACT inhaler  ibuprofen (ADVIL;MOTRIN) 800 MG tablet  fluticasone-salmeterol (ADVAIR) 500-50 MCG/DOSE diskus inhaler  EPINEPHrine (EPIPEN) 0.3 MG/0.3ML DELPHINE injection           Pharmacy: [facility]/pharmacy [address] [place] [address] Jocelyn Munozhaydee [phone]  Last office visit: 01/11/2019  Next office visit: Visit date not found      Verified meds with pt

## 2019-05-20 RX ORDER — TRIAMCINOLONE ACETONIDE 1 MG/G
CREAM TOPICAL
Qty: 45 G | Refills: 0 | Status: SHIPPED | OUTPATIENT
Start: 2019-05-20 | End: 2020-05-20

## 2019-05-20 RX ORDER — IBUPROFEN 800 MG/1
TABLET ORAL
Qty: 100 TABLET | Refills: 3 | Status: SHIPPED | OUTPATIENT
Start: 2019-05-20 | End: 2020-05-09

## 2019-05-20 RX ORDER — BUTALBITAL, ACETAMINOPHEN AND CAFFEINE 50; 325; 40 MG/1; MG/1; MG/1
TABLET ORAL
Qty: 30 TABLET | Refills: 1 | Status: SHIPPED | OUTPATIENT
Start: 2019-05-20 | End: 2020-02-19 | Stop reason: SDUPTHER

## 2019-05-20 RX ORDER — MONTELUKAST SODIUM 10 MG/1
TABLET ORAL
Qty: 30 TABLET | Refills: 2 | Status: SHIPPED | OUTPATIENT
Start: 2019-05-20 | End: 2019-08-23 | Stop reason: SDUPTHER

## 2019-05-20 RX ORDER — EPINEPHRINE 0.3 MG/.3ML
0.3 INJECTION SUBCUTANEOUS ONCE
Qty: 0.3 ML | Refills: 0 | Status: SHIPPED | OUTPATIENT
Start: 2019-05-20 | End: 2019-09-24 | Stop reason: SDUPTHER

## 2019-05-20 RX ORDER — ONDANSETRON 4 MG/1
TABLET, ORALLY DISINTEGRATING ORAL
Qty: 30 TABLET | Refills: 5 | Status: SHIPPED | OUTPATIENT
Start: 2019-05-20 | End: 2019-12-29

## 2019-05-20 RX ORDER — POTASSIUM CHLORIDE 20 MEQ/1
TABLET, EXTENDED RELEASE ORAL
Qty: 60 TABLET | Refills: 5 | Status: SHIPPED | OUTPATIENT
Start: 2019-05-20 | End: 2020-01-06

## 2019-05-20 RX ORDER — HYDROCHLOROTHIAZIDE 25 MG/1
TABLET ORAL
Qty: 30 TABLET | Refills: 1 | Status: SHIPPED | OUTPATIENT
Start: 2019-05-20 | End: 2019-06-20 | Stop reason: SDUPTHER

## 2019-05-20 RX ORDER — AMLODIPINE BESYLATE 10 MG/1
TABLET ORAL
Qty: 90 TABLET | Refills: 1 | Status: SHIPPED | OUTPATIENT
Start: 2019-05-20 | End: 2020-01-30

## 2019-05-20 RX ORDER — OMEPRAZOLE 40 MG/1
CAPSULE, DELAYED RELEASE ORAL
Qty: 90 CAPSULE | Refills: 1 | Status: SHIPPED | OUTPATIENT
Start: 2019-05-20 | End: 2019-11-29 | Stop reason: SDUPTHER

## 2019-05-20 RX ORDER — ALBUTEROL SULFATE 90 UG/1
2 AEROSOL, METERED RESPIRATORY (INHALATION) EVERY 6 HOURS PRN
Qty: 1 INHALER | Refills: 5 | Status: SHIPPED | OUTPATIENT
Start: 2019-05-20 | End: 2019-08-23 | Stop reason: SDUPTHER

## 2019-06-03 ENCOUNTER — TELEPHONE (OUTPATIENT)
Dept: PRIMARY CARE CLINIC | Age: 59
End: 2019-06-03

## 2019-06-18 ENCOUNTER — TELEPHONE (OUTPATIENT)
Dept: PRIMARY CARE CLINIC | Age: 59
End: 2019-06-18

## 2019-06-18 NOTE — TELEPHONE ENCOUNTER
Patient called to see if she could get in for a F/U this week with PCP, Harikal Leisure a lot and was Diagnosed with Pleurisy a few weeks ago, still has bad cough. Please advise if she can be fit in.

## 2019-06-20 DIAGNOSIS — I10 ESSENTIAL HYPERTENSION: ICD-10-CM

## 2019-06-21 ENCOUNTER — HOSPITAL ENCOUNTER (OUTPATIENT)
Dept: CT IMAGING | Age: 59
Discharge: HOME | End: 2019-06-21
Payer: COMMERCIAL

## 2019-06-21 ENCOUNTER — HOSPITAL ENCOUNTER (OUTPATIENT)
Age: 59
Discharge: HOME | End: 2019-06-21
Payer: COMMERCIAL

## 2019-06-21 ENCOUNTER — OFFICE VISIT (OUTPATIENT)
Dept: PRIMARY CARE CLINIC | Age: 59
End: 2019-06-21
Payer: COMMERCIAL

## 2019-06-21 ENCOUNTER — TELEPHONE (OUTPATIENT)
Dept: PRIMARY CARE CLINIC | Age: 59
End: 2019-06-21

## 2019-06-21 VITALS
DIASTOLIC BLOOD PRESSURE: 79 MMHG | OXYGEN SATURATION: 99 % | WEIGHT: 179 LBS | HEART RATE: 105 BPM | SYSTOLIC BLOOD PRESSURE: 114 MMHG | HEIGHT: 62 IN | BODY MASS INDEX: 32.94 KG/M2

## 2019-06-21 DIAGNOSIS — R05.9 COUGH: ICD-10-CM

## 2019-06-21 DIAGNOSIS — R00.0 TACHYCARDIA: ICD-10-CM

## 2019-06-21 DIAGNOSIS — R06.02 SOB (SHORTNESS OF BREATH): ICD-10-CM

## 2019-06-21 DIAGNOSIS — I25.2 OLD MI (MYOCARDIAL INFARCTION): ICD-10-CM

## 2019-06-21 DIAGNOSIS — R06.02 SOB (SHORTNESS OF BREATH): Primary | ICD-10-CM

## 2019-06-21 LAB
A/G RATIO: 1.3 (ref 1.1–2.2)
ALBUMIN SERPL-MCNC: 3.8 G/DL (ref 3.4–5)
ALP BLD-CCNC: 84 U/L (ref 40–129)
ALT SERPL-CCNC: 26 U/L (ref 10–40)
ANION GAP SERPL CALCULATED.3IONS-SCNC: 12 MMOL/L (ref 3–16)
AST SERPL-CCNC: 26 U/L (ref 15–37)
BILIRUB SERPL-MCNC: 0.3 MG/DL (ref 0–1)
BUN BLDV-MCNC: 14 MG/DL (ref 7–20)
CALCIUM SERPL-MCNC: 8.8 MG/DL (ref 8.3–10.6)
CHLORIDE BLD-SCNC: 105 MMOL/L (ref 99–110)
CO2: 21 MMOL/L (ref 21–32)
CREAT SERPL-MCNC: 0.9 MG/DL (ref 0.6–1.1)
GFR AFRICAN AMERICAN: >60
GFR NON-AFRICAN AMERICAN: >60
GLOBULIN: 3 G/DL
GLUCOSE BLD-MCNC: 87 MG/DL (ref 70–99)
HCT VFR BLD CALC: 37.7 % (ref 36–48)
HEMOGLOBIN: 12.3 G/DL (ref 12–16)
MCH RBC QN AUTO: 30 PG (ref 26–34)
MCHC RBC AUTO-ENTMCNC: 32.6 G/DL (ref 31–36)
MCV RBC AUTO: 91.9 FL (ref 80–100)
PDW BLD-RTO: 14 % (ref 12.4–15.4)
PLATELET # BLD: 271 K/UL (ref 135–450)
PMV BLD AUTO: 7.2 FL (ref 5–10.5)
POTASSIUM SERPL-SCNC: 3.5 MMOL/L (ref 3.5–5.1)
RBC # BLD: 4.1 M/UL (ref 4–5.2)
SODIUM BLD-SCNC: 138 MMOL/L (ref 136–145)
TOTAL PROTEIN: 6.8 G/DL (ref 6.4–8.2)
TSH SERPL DL<=0.05 MIU/L-ACNC: 1.59 UIU/ML (ref 0.27–4.2)
WBC # BLD: 10 K/UL (ref 4–11)

## 2019-06-21 PROCEDURE — 84443 ASSAY THYROID STIM HORMONE: CPT

## 2019-06-21 PROCEDURE — 36415 COLL VENOUS BLD VENIPUNCTURE: CPT

## 2019-06-21 PROCEDURE — 6360000004 HC RX CONTRAST MEDICATION: Performed by: FAMILY MEDICINE

## 2019-06-21 PROCEDURE — 93000 ELECTROCARDIOGRAM COMPLETE: CPT | Performed by: FAMILY MEDICINE

## 2019-06-21 PROCEDURE — 71260 CT THORAX DX C+: CPT

## 2019-06-21 PROCEDURE — 85027 COMPLETE CBC AUTOMATED: CPT

## 2019-06-21 PROCEDURE — 99214 OFFICE O/P EST MOD 30 MIN: CPT | Performed by: FAMILY MEDICINE

## 2019-06-21 PROCEDURE — 80053 COMPREHEN METABOLIC PANEL: CPT

## 2019-06-21 RX ORDER — CITALOPRAM 10 MG/1
10 TABLET ORAL DAILY
COMMUNITY
End: 2019-09-24

## 2019-06-21 RX ORDER — CLEMASTINE FUMARATE 2.68 MG
2.68 TABLET ORAL
COMMUNITY
Start: 2019-06-05 | End: 2019-08-23 | Stop reason: ALTCHOICE

## 2019-06-21 RX ORDER — HYDROCODONE POLISTIREX AND CHLORPHENIRAMINE POLISTIREX 10; 8 MG/5ML; MG/5ML
5 SUSPENSION, EXTENDED RELEASE ORAL EVERY 12 HOURS PRN
Qty: 60 ML | Refills: 0 | Status: SHIPPED | OUTPATIENT
Start: 2019-06-21 | End: 2019-06-27

## 2019-06-21 RX ADMIN — IOPAMIDOL 75 ML: 755 INJECTION, SOLUTION INTRAVENOUS at 14:14

## 2019-06-21 ASSESSMENT — ENCOUNTER SYMPTOMS
RECTAL PAIN: 0
BLOOD IN STOOL: 0
EYE PAIN: 0
APNEA: 0
CONSTIPATION: 0
CHEST TIGHTNESS: 0
EYE ITCHING: 0
SHORTNESS OF BREATH: 0
PHOTOPHOBIA: 0
CHOKING: 0
COLOR CHANGE: 0
ABDOMINAL DISTENTION: 0
SORE THROAT: 0
COUGH: 0
NAUSEA: 0
BACK PAIN: 0
DIARRHEA: 0
EYE DISCHARGE: 0
VOMITING: 0
TROUBLE SWALLOWING: 0
EYE REDNESS: 0
SINUS PRESSURE: 0
STRIDOR: 0
WHEEZING: 0
RHINORRHEA: 0

## 2019-06-21 ASSESSMENT — PATIENT HEALTH QUESTIONNAIRE - PHQ9
SUM OF ALL RESPONSES TO PHQ QUESTIONS 1-9: 0
SUM OF ALL RESPONSES TO PHQ9 QUESTIONS 1 & 2: 0
SUM OF ALL RESPONSES TO PHQ QUESTIONS 1-9: 0
1. LITTLE INTEREST OR PLEASURE IN DOING THINGS: 0
2. FEELING DOWN, DEPRESSED OR HOPELESS: 0

## 2019-06-21 NOTE — PROGRESS NOTES
Subjective:      Patient ID: John Schmidt is a 66 y.o. female. Cough and congestion  HPI 67 y/o female c/o congestion, chest congestion, sore throat, diarrhea , vomiting 3 weeks. Seen in er in [facility], given 5 days tapered prednisone and cough and clemastine fum.  No real improvement    Seen in urgent [place] 2 days ago  cxr taken, told she has bronchitis and enlarged heart(official reading no acute cardiopulmonary disease)  Placed septra ds & benzonatate capules not seem to be helping at this time    Currently continues to cough, no fever, appetite ok, does get sob after walking a couple  Of blocks  No relieving factors at this time    No chest pain  She admits to some palpitations  Travels constantly  No current leg pain    Under treatment for current sxs for 3 weeks with no improvement    Recent cxr done 2 days ago was normal    Past Medical History:   Diagnosis Date    Allergic rhinitis     Arthritis     left thumb    Asthma 4/10/2012    Bilateral carpal tunnel syndrome 2016    Depression     Dysmenorrhea     Fibroid     GERD (gastroesophageal reflux disease)     Headache(784.0) 4/10/2012    Hyperlipidemia     Hypertension     S/P gastric bypass     Status post coronary angiogram 2015    Wears glasses      Social History     Socioeconomic History    Marital status:      Spouse name: Not on file    Number of children: 1    Years of education: Not on file    Highest education level: Not on file   Occupational History    Occupation: /Cook   Social Needs    Financial resource strain: Not on file    Food insecurity:     Worry: Not on file     Inability: Not on file   iAgree needs:     Medical: Not on file     Non-medical: Not on file   Tobacco Use    Smoking status: Former Smoker     Packs/day: 0.25     Years: 5.00     Pack years: 1.25     Types: Cigarettes     Last attempt to quit: 1980     Years since quittin.9    Smokeless tobacco: Former User     Quit date: 12/10/1994   Substance and Sexual Activity    Alcohol use: Yes     Alcohol/week: 0.0 oz     Comment: socially-wine    Drug use: No    Sexual activity: Yes     Partners: Male   Lifestyle    Physical activity:     Days per week: Not on file     Minutes per session: Not on file    Stress: Not on file   Relationships    Social connections:     Talks on phone: Not on file     Gets together: Not on file     Attends Voodoo service: Not on file     Active member of club or organization: Not on file     Attends meetings of clubs or organizations: Not on file     Relationship status: Not on file    Intimate partner violence:     Fear of current or ex partner: Not on file     Emotionally abused: Not on file     Physically abused: Not on file     Forced sexual activity: Not on file   Other Topics Concern    Not on file   Social History Narrative    Not on file     Past Surgical History:   Procedure Laterality Date    BREAST SURGERY      Breast reduction    CARPAL TUNNEL RELEASE Left 2016     Left carpal tunnel release      CARPAL TUNNEL RELEASE Right 10/06/2016     SECTION      x1    COLONOSCOPY      HYSTERECTOMY      complete-ovaries out         Review of Systems   Constitutional: Negative for activity change, appetite change, chills, diaphoresis, fatigue and fever. HENT: Negative for congestion, dental problem, drooling, ear discharge, ear pain, hearing loss, mouth sores, nosebleeds, postnasal drip, rhinorrhea, sinus pressure, sneezing, sore throat, tinnitus and trouble swallowing. Eyes: Negative for photophobia, pain, discharge, redness, itching and visual disturbance. Respiratory: Negative for apnea, cough, choking, chest tightness, shortness of breath, wheezing and stridor. Cardiovascular: Negative for chest pain, palpitations and leg swelling.    Gastrointestinal: Negative for abdominal distention, blood in stool, constipation, diarrhea, nausea, rectal pain and vomiting. Genitourinary: Negative for decreased urine volume, difficulty urinating, dyspareunia, dysuria, enuresis, flank pain, frequency, genital sores, hematuria, menstrual problem, pelvic pain, urgency, vaginal bleeding and vaginal pain. Musculoskeletal: Negative for arthralgias, back pain, gait problem, joint swelling, myalgias, neck pain and neck stiffness. Skin: Negative for color change, pallor, rash and wound. Neurological: Negative for dizziness, tremors, seizures, syncope, facial asymmetry, speech difficulty, weakness, light-headedness, numbness and headaches. Hematological: Negative for adenopathy. Does not bruise/bleed easily. Psychiatric/Behavioral: Negative for agitation, behavioral problems, confusion, decreased concentration, dysphoric mood, hallucinations, self-injury, sleep disturbance and suicidal ideas. The patient is not nervous/anxious and is not hyperactive. Objective:   Physical Exam   Constitutional: She is oriented to person, place, and time. She appears well-developed and well-nourished. No distress. HENT:   Head: Normocephalic and atraumatic. Right Ear: External ear normal.   Left Ear: External ear normal.   Nose: Nose normal.   Mouth/Throat: Oropharynx is clear and moist. No oropharyngeal exudate. Eyes: Pupils are equal, round, and reactive to light. Conjunctivae and EOM are normal. Left eye exhibits no discharge. No scleral icterus. Neck: Normal range of motion. Neck supple. No JVD present. No tracheal deviation present. No thyromegaly present. Cardiovascular: Normal rate, regular rhythm, normal heart sounds and intact distal pulses. Exam reveals no gallop and no friction rub. No murmur heard. Pulmonary/Chest: Effort normal and breath sounds normal. No stridor. No respiratory distress. She has no wheezes. She has no rales. She exhibits no tenderness. Abdominal: Soft. Bowel sounds are normal. She exhibits no distension and no mass.  There is no tenderness. There is no rebound and no guarding. Musculoskeletal: Normal range of motion. She exhibits no edema or tenderness. Lymphadenopathy:     She has no cervical adenopathy. Neurological: She is alert and oriented to person, place, and time. She has normal reflexes. She displays normal reflexes. No cranial nerve deficit. Coordination normal.   Skin: Skin is warm and dry. No rash noted. She is not diaphoretic. No erythema. No pallor. Psychiatric: She has a normal mood and affect. Her behavior is normal. Judgment and thought content normal.       Assessment:      1. SOB (shortness of breath)  This patient has had sob for 3 weeks duration. 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 Tachycardia    - EKG 12 Lead sinus tachycardia poor R wave progression, consider old anterior infarct  - CBC; Future  - Comprehensive Metabolic Panel; Future  - TSH without Reflex; Future  - CT CHEST PULMONARY EMBOLISM W CONTRAST;  Future          Plan:              Karlee Don MD

## 2019-06-22 RX ORDER — HYDROCHLOROTHIAZIDE 25 MG/1
TABLET ORAL
Qty: 30 TABLET | Refills: 0 | Status: SHIPPED | OUTPATIENT
Start: 2019-06-22 | End: 2019-07-19 | Stop reason: SDUPTHER

## 2019-06-27 ENCOUNTER — TELEPHONE (OUTPATIENT)
Dept: PRIMARY CARE CLINIC | Age: 59
End: 2019-06-27

## 2019-06-27 NOTE — TELEPHONE ENCOUNTER
Pt requesting a return phone call. Stated that she is having the same symptoms that she was seen for on Friday 6/21/2019. Pt stated that her cough is becoming worse.

## 2019-07-08 RX ORDER — ERGOCALCIFEROL 1.25 MG/1
CAPSULE ORAL
Qty: 3 CAPSULE | Refills: 1 | Status: SHIPPED | OUTPATIENT
Start: 2019-07-08 | End: 2019-09-24 | Stop reason: ALTCHOICE

## 2019-08-23 ENCOUNTER — OFFICE VISIT (OUTPATIENT)
Dept: PRIMARY CARE CLINIC | Age: 59
End: 2019-08-23
Payer: COMMERCIAL

## 2019-08-23 VITALS
HEIGHT: 62 IN | OXYGEN SATURATION: 98 % | SYSTOLIC BLOOD PRESSURE: 108 MMHG | DIASTOLIC BLOOD PRESSURE: 78 MMHG | WEIGHT: 183.6 LBS | HEART RATE: 97 BPM | BODY MASS INDEX: 33.79 KG/M2

## 2019-08-23 DIAGNOSIS — R05.3 CHRONIC COUGH: Primary | ICD-10-CM

## 2019-08-23 DIAGNOSIS — J45.40 MODERATE PERSISTENT ASTHMA WITHOUT COMPLICATION: ICD-10-CM

## 2019-08-23 PROCEDURE — 99213 OFFICE O/P EST LOW 20 MIN: CPT | Performed by: FAMILY MEDICINE

## 2019-08-23 RX ORDER — PREDNISONE 10 MG/1
TABLET ORAL
Refills: 0 | COMMUNITY
Start: 2019-08-14 | End: 2019-09-24

## 2019-08-23 RX ORDER — PROMETHAZINE HYDROCHLORIDE 6.25 MG/5ML
25 SYRUP ORAL
COMMUNITY
Start: 2019-08-14 | End: 2020-12-28

## 2019-08-23 RX ORDER — ALBUTEROL SULFATE 90 UG/1
2 AEROSOL, METERED RESPIRATORY (INHALATION) EVERY 6 HOURS PRN
Qty: 1 INHALER | Refills: 5 | Status: SHIPPED | OUTPATIENT
Start: 2019-08-23 | End: 2020-03-31 | Stop reason: SDUPTHER

## 2019-08-23 RX ORDER — MONTELUKAST SODIUM 10 MG/1
TABLET ORAL
Qty: 30 TABLET | Refills: 2 | Status: SHIPPED | OUTPATIENT
Start: 2019-08-23 | End: 2019-10-16 | Stop reason: SDUPTHER

## 2019-08-23 ASSESSMENT — ENCOUNTER SYMPTOMS
CHOKING: 0
BACK PAIN: 0
PHOTOPHOBIA: 0
EYE ITCHING: 0
COUGH: 1
CONSTIPATION: 0
ABDOMINAL DISTENTION: 0
SINUS PRESSURE: 0
APNEA: 0
NAUSEA: 0
RECTAL PAIN: 0
EYE REDNESS: 0
COLOR CHANGE: 0
STRIDOR: 0
EYE DISCHARGE: 0
WHEEZING: 0
EYE PAIN: 0
CHEST TIGHTNESS: 0
VOMITING: 0
DIARRHEA: 0
RHINORRHEA: 0
SORE THROAT: 0
BLOOD IN STOOL: 0
SHORTNESS OF BREATH: 0
TROUBLE SWALLOWING: 0

## 2019-09-05 DIAGNOSIS — J45.909 UNCOMPLICATED ASTHMA, UNSPECIFIED ASTHMA SEVERITY, UNSPECIFIED WHETHER PERSISTENT: Primary | ICD-10-CM

## 2019-09-13 ENCOUNTER — HOSPITAL ENCOUNTER (OUTPATIENT)
Dept: PULMONOLOGY | Age: 59
Discharge: HOME | End: 2019-09-13
Payer: COMMERCIAL

## 2019-09-13 DIAGNOSIS — J45.909 UNCOMPLICATED ASTHMA, UNSPECIFIED ASTHMA SEVERITY, UNSPECIFIED WHETHER PERSISTENT: ICD-10-CM

## 2019-09-13 PROCEDURE — 94640 AIRWAY INHALATION TREATMENT: CPT

## 2019-09-13 PROCEDURE — 94060 EVALUATION OF WHEEZING: CPT | Performed by: INTERNAL MEDICINE

## 2019-09-13 PROCEDURE — 6370000000 HC RX 637 (ALT 250 FOR IP): Performed by: INTERNAL MEDICINE

## 2019-09-13 PROCEDURE — 94729 DIFFUSING CAPACITY: CPT

## 2019-09-13 PROCEDURE — 94060 EVALUATION OF WHEEZING: CPT

## 2019-09-13 PROCEDURE — 94726 PLETHYSMOGRAPHY LUNG VOLUMES: CPT

## 2019-09-13 RX ORDER — ALBUTEROL SULFATE 90 UG/1
4 AEROSOL, METERED RESPIRATORY (INHALATION) ONCE
Status: COMPLETED | OUTPATIENT
Start: 2019-09-13 | End: 2019-09-13

## 2019-09-13 RX ADMIN — ALBUTEROL SULFATE 4 PUFF: 90 AEROSOL, METERED RESPIRATORY (INHALATION) at 10:32

## 2019-09-13 NOTE — PROCEDURES
Reason for PFT:  Uncomplicated asthma    Spirometry  1. Spirometry shows moderately severe obstructive defect. 2. The FVC is diminished suggesting a possible restrictive defect; suggest lung volumes if clinically indicated. Lung Volumes  3. Patient was unable to perform lung volumes. Bronchodilator  1. There is a response to bronchodilator. Flow-Volume Loop  4. The flow-volume loop is normal.    Diffusing Capacity  5. Unable to be completed. Overall Interpretation  Moderately-severe obstruction is present    Unable to perform lung volumes    There is a bronchodilator response present    Diffusion capacity was unable to be completed    FEV1 is 1.11 L at 51% predicted. FVC is 1.68 L at 62% predicted    FEV1/FVC ratio is 66    Moderately severe obstruction with bronchodilator response. Flows significantly improve with bronchodilator but do not normalize. Unable to perform lung volumes or diffusing capacity. Findings likely consistent with asthma. Correlate clinically. OBSTRUCTION % Predicted FEV1   MILD >70%   MODERATE 60-69%   MODERATELY-SEVERE 50-59%   SEVERE 35-49%   VERY SEVERE <35%         RESTRICTION % Predicted TLC   MILD Less than LLN but > than 70%   MODERATE 60-69%   MODERATELY-SEVERE <60%                 DIFFUSION CAPACITY DL,CO % Pred   MILD >60% AND < LLN   MODERATE 40-60%   SEVERE <40%       PFT data will be scanned into the media tab in [facility].     Shayla Dexter [facility] Pulmonology

## 2019-09-16 LAB
DLCO %PRED: 86 %
DLCO PRED: NORMAL ML/MIN/MMHG
DLCO/VA %PRED: NORMAL %
DLCO/VA PRED: NORMAL ML/MIN/MMHG
DLCO/VA: NORMAL ML/MIN/MMHG
DLCO: NORMAL ML/MIN/MMHG
EXPIRATORY TIME-POST: NORMAL SEC
EXPIRATORY TIME: NORMAL SEC
FEF 25-75% %CHNG: NORMAL
FEF 25-75% %PRED-POST: NORMAL %
FEF 25-75% %PRED-PRE: NORMAL L/SEC
FEF 25-75% PRED: NORMAL L/SEC
FEF 25-75%-POST: NORMAL L/SEC
FEF 25-75%-PRE: NORMAL L/SEC
FEV1 %PRED-POST: 77 %
FEV1 %PRED-PRE: 51 %
FEV1 PRED: NORMAL L
FEV1-POST: NORMAL L
FEV1-PRE: NORMAL L
FEV1/FVC %PRED-POST: NORMAL %
FEV1/FVC %PRED-PRE: NORMAL %
FEV1/FVC PRED: NORMAL %
FEV1/FVC-POST: 94 %
FEV1/FVC-PRE: 82 %
FVC %PRED-POST: NORMAL L
FVC %PRED-PRE: NORMAL %
FVC PRED: NORMAL L
FVC-POST: NORMAL L
FVC-PRE: NORMAL L
GAW %PRED: NORMAL %
GAW PRED: NORMAL L/S/CMH2O
GAW: NORMAL L/S/CMH2O
IC %PRED: NORMAL %
IC PRED: NORMAL L
IC: NORMAL L
MEP: NORMAL
MIP: NORMAL
MVV %PRED-PRE: NORMAL %
MVV PRED: NORMAL L/MIN
MVV-PRE: NORMAL L/MIN
PEF %PRED-POST: NORMAL %
PEF %PRED-PRE: NORMAL L/SEC
PEF PRED: NORMAL L/SEC
PEF%CHNG: NORMAL
PEF-POST: NORMAL L/SEC
PEF-PRE: NORMAL L/SEC
RAW %PRED: NORMAL %
RAW PRED: NORMAL CMH2O/L/S
RAW: NORMAL CMH2O/L/S
RV %PRED: NORMAL %
RV PRED: NORMAL L
RV: NORMAL L
SVC %PRED: NORMAL %
SVC PRED: NORMAL L
SVC: NORMAL L
TLC %PRED: 0 %
TLC PRED: NORMAL L
TLC: NORMAL L
VA %PRED: NORMAL %
VA PRED: NORMAL L
VA: NORMAL L
VTG %PRED: NORMAL %
VTG PRED: NORMAL L
VTG: NORMAL L

## 2019-09-16 ASSESSMENT — PULMONARY FUNCTION TESTS
FEV1/FVC_PRE: 82
FEV1_PERCENT_PREDICTED_PRE: 51
FEV1/FVC_POST: 94
FEV1_PERCENT_PREDICTED_POST: 77

## 2019-09-24 ENCOUNTER — OFFICE VISIT (OUTPATIENT)
Dept: PULMONOLOGY | Age: 59
End: 2019-09-24
Payer: COMMERCIAL

## 2019-09-24 VITALS
OXYGEN SATURATION: 98 % | TEMPERATURE: 98.3 F | WEIGHT: 183.6 LBS | HEART RATE: 89 BPM | RESPIRATION RATE: 16 BRPM | BODY MASS INDEX: 33.79 KG/M2 | DIASTOLIC BLOOD PRESSURE: 80 MMHG | SYSTOLIC BLOOD PRESSURE: 124 MMHG | HEIGHT: 62 IN

## 2019-09-24 DIAGNOSIS — J44.9 ASTHMA-CHRONIC OBSTRUCTIVE PULMONARY DISEASE OVERLAP SYNDROME (HCC): Primary | ICD-10-CM

## 2019-09-24 DIAGNOSIS — Z72.0 TOBACCO ABUSE: ICD-10-CM

## 2019-09-24 DIAGNOSIS — J45.909 UNCOMPLICATED ASTHMA, UNSPECIFIED ASTHMA SEVERITY, UNSPECIFIED WHETHER PERSISTENT: ICD-10-CM

## 2019-09-24 DIAGNOSIS — R05.3 CHRONIC COUGH: ICD-10-CM

## 2019-09-24 LAB
BASOPHILS ABSOLUTE: 0 K/UL (ref 0–0.2)
BASOPHILS RELATIVE PERCENT: 0.7 %
EOSINOPHILS ABSOLUTE: 0 K/UL (ref 0–0.6)
EOSINOPHILS RELATIVE PERCENT: 0.8 %
HCT VFR BLD CALC: 40.4 % (ref 36–48)
HEMOGLOBIN: 13.2 G/DL (ref 12–16)
LYMPHOCYTES ABSOLUTE: 2.4 K/UL (ref 1–5.1)
LYMPHOCYTES RELATIVE PERCENT: 42.9 %
MCH RBC QN AUTO: 30.4 PG (ref 26–34)
MCHC RBC AUTO-ENTMCNC: 32.6 G/DL (ref 31–36)
MCV RBC AUTO: 93.1 FL (ref 80–100)
MONOCYTES ABSOLUTE: 0.3 K/UL (ref 0–1.3)
MONOCYTES RELATIVE PERCENT: 4.9 %
NEUTROPHILS ABSOLUTE: 2.8 K/UL (ref 1.7–7.7)
NEUTROPHILS RELATIVE PERCENT: 50.7 %
PDW BLD-RTO: 14.4 % (ref 12.4–15.4)
PLATELET # BLD: 246 K/UL (ref 135–450)
PMV BLD AUTO: 8.1 FL (ref 5–10.5)
RBC # BLD: 4.33 M/UL (ref 4–5.2)
WBC # BLD: 5.6 K/UL (ref 4–11)

## 2019-09-24 PROCEDURE — 99244 OFF/OP CNSLTJ NEW/EST MOD 40: CPT | Performed by: INTERNAL MEDICINE

## 2019-09-24 RX ORDER — BUDESONIDE AND FORMOTEROL FUMARATE DIHYDRATE 160; 4.5 UG/1; UG/1
2 AEROSOL RESPIRATORY (INHALATION) 2 TIMES DAILY
COMMUNITY
End: 2019-09-27 | Stop reason: SDUPTHER

## 2019-09-24 RX ORDER — BENZONATATE 100 MG/1
100 CAPSULE ORAL 3 TIMES DAILY PRN
Qty: 90 CAPSULE | Refills: 3 | Status: SHIPPED | OUTPATIENT
Start: 2019-09-24 | End: 2020-03-18

## 2019-09-24 RX ORDER — ALBUTEROL SULFATE 90 UG/1
2 AEROSOL, METERED RESPIRATORY (INHALATION) EVERY 6 HOURS PRN
Qty: 1 INHALER | Refills: 5 | Status: SHIPPED | OUTPATIENT
Start: 2019-09-24 | End: 2021-01-25

## 2019-09-24 RX ORDER — FLUTICASONE FUROATE AND VILANTEROL 200; 25 UG/1; UG/1
1 POWDER RESPIRATORY (INHALATION) DAILY
Qty: 1 EACH | Refills: 0 | Status: CANCELLED | COMMUNITY
Start: 2019-09-24

## 2019-09-24 NOTE — ASSESSMENT & PLAN NOTE
-Many possible etiologies. Given her underlying asthma-COPD most likely due to poorly controlled reactive airway disease. Will address this first and foremost.  -She has already discontinued ACE inhibitor  -denies post nasal drip, infrequent heartburn.

## 2019-09-24 NOTE — PROGRESS NOTES
differently: 1 spray by Nasal route as needed ) 1 Bottle 3    ondansetron (ZOFRAN) 4 MG tablet TAKE 1 TABLET BY MOUTH EVERY 12 HOURS AS NEEDED or nausea or vomiting 30 tablet 12    Multiple Vitamins-Minerals (THERAPEUTIC MULTIVITAMIN-MINERALS) tablet Take 1 tablet by mouth daily      EPINEPHrine (EPIPEN) 0.3 MG/0.3ML DELPIHNE injection Use as directed for allergic reaction 2 Device 3     No current facility-administered medications for this visit. Data Reviewed:   CBC and Renal reviewed  Last CBC  Lab Results   Component Value Date    WBC 10.0 06/21/2019    RBC 4.10 06/21/2019    HGB 12.3 06/21/2019    MCV 91.9 06/21/2019     06/21/2019     Last Renal  Lab Results   Component Value Date     06/21/2019    K 3.5 06/21/2019    K 3.6 10/16/2018     06/21/2019    CO2 21 06/21/2019    CO2 24 01/11/2019    CO2 26 10/16/2018    BUN 14 06/21/2019    CREATININE 0.9 06/21/2019    GLUCOSE 87 06/21/2019    CALCIUM 8.8 06/21/2019       Last ABG  POC Blood Gas: No results found for: POCPH, POCPCO2, POCPO2, POCHCO3, NBEA, EAJP9ZCR  No results for input(s): PH, PCO2, PO2, HCO3, BE, O2SAT in the last 72 hours. Radiology Review:  Pertinent images / reports were reviewed as a part of this visit. CT Chest w/ contrast: Images reviewed personally by me, interpretation as follows:  -No acute airspace disease bilaterally. No evidence of emphysema or masses. There is no pleural effusion. CTPA:   Results for orders placed during the hospital encounter of 06/21/19   CT CHEST PULMONARY EMBOLISM W CONTRAST    Narrative EXAMINATION:  CTA OF THE CHEST 6/21/2019 2:13 pm    TECHNIQUE:  CTA of the chest was performed after the administration of intravenous  contrast.  Multiplanar reformatted images are provided for review. MIP  images are provided for review.  Dose modulation, iterative reconstruction,  and/or weight based adjustment of the mA/kV was utilized to reduce the  radiation dose to as low as reasonably achievable. COMPARISON:  10/16/2018    HISTORY:  ORDERING SYSTEM PROVIDED HISTORY: SOB (shortness of breath)  TECHNOLOGIST PROVIDED HISTORY:  Reason for exam:->sob for 3 weeks, cough for 3 weeks, normal chest xray,  constantly over the road truck riding  Ordering Physician Provided Reason for Exam: sob x 3 weeks, driving in the  car alot lately, normal cxr  Acuity: Acute  Type of Exam: Initial    FINDINGS:  Pulmonary Arteries: Pulmonary arteries are adequately opacified for  evaluation. No evidence of intraluminal filling defect to suggest pulmonary  embolism. Main pulmonary artery is normal in caliber. Mediastinum: No evidence of mediastinal lymphadenopathy. The heart and  pericardium demonstrate no acute abnormality. There is no acute abnormality  of the thoracic aorta. Lungs/pleura: The lungs are without acute process. No focal consolidation or  pulmonary edema. No evidence of pleural effusion or pneumothorax. Upper Abdomen: Limited images of the upper abdomen are unremarkable. Soft Tissues/Bones: No acute bone or soft tissue abnormality. Impression No evidence of pulmonary embolism or acute pulmonary abnormality. CXR PA/LAT:   Results for orders placed in visit on 08/14/19   XR CHEST STANDARD (2 VW)    Narrative Site: [facility] #: [id number] #: [id number]: Carolynjaylen Robertscrystal #: [de-identified] #: [id number] #: [id number]: XR CHEST PA AND LATERALExam Date/Time: 08/14/2019 08:10 AMAdmitting Diagnosis: coughReason for Exam: cough  Dictated by: Lupe Walker KRISTA: 08/14/2019 08:27 AMT: This document is confidential medical information. Unauthorized disclosure or use of this information is prohibited by law. If you are not the intended recipient of this document, please advise us   by calling immediately [phone].   Impression/Conclusion below    HISTORY:   cough chest pain, sob  COMPARISON: CXR 4/26/2015  NOTE:  If there are questions about the overlap syndrome. She seems to favor the asthma side of the spectrum, but given her extensive smoking history cannot rule out some degree of underlying chronic airflow obstruction.  -Agree with twice daily Symbicort, Singulair  -We will check CBC with differential to evaluate for eosinophilia  -Respiratory allergy profile to evaluate for IgE and possible asthma triggers.  -We will prescribe her a rescue inhaler to be used as needed. She should carry this with her we have discussed this. Relevant Medications    budesonide-formoterol (SYMBICORT) 160-4.5 MCG/ACT AERO    albuterol sulfate  (90 Base) MCG/ACT inhaler    benzonatate (TESSALON PERLES) 100 MG capsule       Other    Tobacco abuse     -Current everyday smoker. Unfortunately she started smoking when she was about 41years old and smoked for a pack a day until recently she has cut down to 4 cigarettes a day. -We have discussed the risk of continue to smoke especially in the setting of underlying reactive airways disease and the likelihood that continual smoke inhalation is irritating her airways leading to worsening cough. Chronic cough     -Many possible etiologies. Given her underlying asthma-COPD most likely due to poorly controlled reactive airway disease. Will address this first and foremost.  -She has already discontinued ACE inhibitor  -denies post nasal drip, infrequent heartburn. Relevant Medications    benzonatate (TESSALON PERLES) 100 MG capsule               This note was transcribed using [id number] Datezr. Please disregard any translational errors.     Angelo Galvin [address] [place] [address] Pulmonary, Sleep and Critical Care

## 2019-09-27 LAB
ALLERGEN ASPERGILLUS FUMIGATUS IGE: 0.24 KU/L
ALLERGEN BERMUDA GRASS IGE: 0.54 KU/L
ALLERGEN BIRCH IGE: 0.21 KU/L
ALLERGEN CAT DANDER IGE: 0.3 KU/L
ALLERGEN COMMON SHORT RAGWEED IGE: 2.25 KU/L
ALLERGEN COTTONWOOD: 0.64 KU/L
ALLERGEN DOG DANDER IGE: 0.16 KU/L
ALLERGEN ELM IGE: 0.66 KU/L
ALLERGEN FUNGI/MOLD M.RACEMOSUS IGE: 0.21 KU/L
ALLERGEN GERMAN COCKROACH IGE: 3.37 KU/L
ALLERGEN HORMODENDRUM HORDEI IGE: <0.1 KU/L
ALLERGEN MAPLE/BOX ELDER IGE: 0.32 KU/L
ALLERGEN MITE DUST FARINAE IGE: 3.5 KU/L
ALLERGEN MITE DUST PTERONYSSINUS IGE: 2.8 KU/L
ALLERGEN MOUNTAIN CEDAR: 0.35 KU/L
ALLERGEN MOUSE EPITHELIA IGE: <0.1 KU/L
ALLERGEN OAK TREE IGE: 0.58 KU/L
ALLERGEN PECAN TREE IGE: 0.24 KU/L
ALLERGEN PENICILLIUM NOTATUM: <0.1 KU/L
ALLERGEN ROUGH PIGWEED (W14) IGE: 0.37 KU/L
ALLERGEN RUSSIAN THISTLE IGE: 0.63 KU/L
ALLERGEN SEE NOTE: ABNORMAL
ALLERGEN SHEEP SORREL (W18) IGE: 0.42 KU/L
ALLERGEN TIMOTHY GRASS: 2.79 KU/L
ALLERGEN TREE SYCAMORE: 0.47 KU/L
ALLERGEN WALNUT TREE IGE: 0.89 KU/L
ALLERGEN WHITE MULBERRY TREE, IGE: <0.1 KU/L
ALLERGEN, TREE, WHITE ASH IGE: 2.83 KU/L
IGE: 301 KU/L

## 2019-09-27 RX ORDER — BUDESONIDE AND FORMOTEROL FUMARATE DIHYDRATE 160; 4.5 UG/1; UG/1
2 AEROSOL RESPIRATORY (INHALATION) 2 TIMES DAILY
Qty: 1 INHALER | Refills: 0 | COMMUNITY
Start: 2019-09-27 | End: 2021-05-10

## 2019-10-07 ENCOUNTER — TELEPHONE (OUTPATIENT)
Dept: PRIMARY CARE CLINIC | Age: 59
End: 2019-10-07

## 2019-10-16 DIAGNOSIS — R05.3 CHRONIC COUGH: ICD-10-CM

## 2019-10-16 DIAGNOSIS — J45.40 MODERATE PERSISTENT ASTHMA WITHOUT COMPLICATION: ICD-10-CM

## 2019-10-16 RX ORDER — MONTELUKAST SODIUM 10 MG/1
TABLET ORAL
Qty: 30 TABLET | Refills: 2 | Status: SHIPPED | OUTPATIENT
Start: 2019-10-16 | End: 2019-10-23

## 2019-10-23 ENCOUNTER — TELEPHONE (OUTPATIENT)
Dept: PRIMARY CARE CLINIC | Age: 59
End: 2019-10-23

## 2019-10-23 DIAGNOSIS — R05.3 CHRONIC COUGH: ICD-10-CM

## 2019-10-23 DIAGNOSIS — J45.40 MODERATE PERSISTENT ASTHMA WITHOUT COMPLICATION: ICD-10-CM

## 2019-10-23 RX ORDER — MONTELUKAST SODIUM 10 MG/1
TABLET ORAL
Qty: 30 TABLET | Refills: 2 | Status: SHIPPED | OUTPATIENT
Start: 2019-10-23 | End: 2020-01-16 | Stop reason: SDUPTHER

## 2019-10-23 RX ORDER — CLEMASTINE FUMARATE 2.68 MG
2.68 TABLET ORAL DAILY
Qty: 60 TABLET | Refills: 2 | Status: SHIPPED | OUTPATIENT
Start: 2019-10-23 | End: 2019-10-28

## 2019-10-28 RX ORDER — FEXOFENADINE HCL 180 MG/1
180 TABLET ORAL DAILY
Qty: 30 TABLET | Refills: 0 | Status: SHIPPED | OUTPATIENT
Start: 2019-10-28 | End: 2019-11-27

## 2019-10-30 DIAGNOSIS — J30.9 ALLERGIC RHINITIS: ICD-10-CM

## 2019-10-30 RX ORDER — FLUTICASONE PROPIONATE 50 MCG
SPRAY, SUSPENSION (ML) NASAL
Qty: 16 G | Refills: 1 | Status: SHIPPED | OUTPATIENT
Start: 2019-10-30 | End: 2020-01-08

## 2019-11-01 ENCOUNTER — TELEPHONE (OUTPATIENT)
Dept: PULMONOLOGY | Age: 59
End: 2019-11-01

## 2019-11-01 DIAGNOSIS — J45.909 UNCOMPLICATED ASTHMA, UNSPECIFIED ASTHMA SEVERITY, UNSPECIFIED WHETHER PERSISTENT: Primary | ICD-10-CM

## 2019-11-02 ENCOUNTER — TELEPHONE (OUTPATIENT)
Dept: PRIMARY CARE CLINIC | Age: 59
End: 2019-11-02

## 2019-11-04 ENCOUNTER — TELEPHONE (OUTPATIENT)
Dept: PRIMARY CARE CLINIC | Age: 59
End: 2019-11-04

## 2019-11-04 DIAGNOSIS — J45.41 MODERATE PERSISTENT ASTHMA WITH ACUTE EXACERBATION: Primary | ICD-10-CM

## 2019-11-04 RX ORDER — PREDNISONE 20 MG/1
40 TABLET ORAL DAILY
Qty: 10 TABLET | Refills: 0 | Status: SHIPPED | OUTPATIENT
Start: 2019-11-04 | End: 2019-11-09

## 2019-11-04 RX ORDER — METHYLPREDNISOLONE 4 MG/1
TABLET ORAL
Qty: 21 TABLET | Refills: 0 | Status: SHIPPED | OUTPATIENT
Start: 2019-11-04 | End: 2020-02-19 | Stop reason: ALTCHOICE

## 2019-11-04 RX ORDER — AZITHROMYCIN 250 MG/1
TABLET, FILM COATED ORAL
Qty: 1 PACKET | Refills: 0 | Status: SHIPPED | OUTPATIENT
Start: 2019-11-04 | End: 2020-02-19 | Stop reason: ALTCHOICE

## 2019-11-29 DIAGNOSIS — K21.9 GASTROESOPHAGEAL REFLUX DISEASE WITHOUT ESOPHAGITIS: ICD-10-CM

## 2019-11-29 RX ORDER — OMEPRAZOLE 40 MG/1
CAPSULE, DELAYED RELEASE ORAL
Qty: 90 CAPSULE | Refills: 1 | Status: SHIPPED | OUTPATIENT
Start: 2019-11-29 | End: 2020-02-19 | Stop reason: SDUPTHER

## 2019-12-29 RX ORDER — ONDANSETRON 4 MG/1
TABLET, ORALLY DISINTEGRATING ORAL
Qty: 30 TABLET | Refills: 5 | Status: SHIPPED | OUTPATIENT
Start: 2019-12-29 | End: 2020-12-28

## 2020-01-06 RX ORDER — POTASSIUM CHLORIDE 20 MEQ/1
TABLET, EXTENDED RELEASE ORAL
Qty: 180 TABLET | Refills: 1 | Status: SHIPPED | OUTPATIENT
Start: 2020-01-06 | End: 2020-07-30

## 2020-01-08 RX ORDER — FLUTICASONE PROPIONATE 50 MCG
SPRAY, SUSPENSION (ML) NASAL
Qty: 1 BOTTLE | Refills: 1 | Status: SHIPPED | OUTPATIENT
Start: 2020-01-08 | End: 2020-02-19 | Stop reason: SDUPTHER

## 2020-01-16 ENCOUNTER — OFFICE VISIT (OUTPATIENT)
Dept: PRIMARY CARE CLINIC | Age: 60
End: 2020-01-16
Payer: COMMERCIAL

## 2020-01-16 VITALS
OXYGEN SATURATION: 99 % | TEMPERATURE: 97.2 F | HEIGHT: 62 IN | DIASTOLIC BLOOD PRESSURE: 87 MMHG | BODY MASS INDEX: 34.78 KG/M2 | SYSTOLIC BLOOD PRESSURE: 129 MMHG | WEIGHT: 189 LBS | HEART RATE: 71 BPM

## 2020-01-16 PROBLEM — F41.9 ANXIETY: Status: ACTIVE | Noted: 2020-01-16

## 2020-01-16 PROBLEM — J45.40 MODERATE PERSISTENT ASTHMA WITHOUT COMPLICATION: Status: ACTIVE | Noted: 2020-01-16

## 2020-01-16 PROBLEM — R21 RASH: Status: ACTIVE | Noted: 2020-01-16

## 2020-01-16 PROCEDURE — 99214 OFFICE O/P EST MOD 30 MIN: CPT | Performed by: INTERNAL MEDICINE

## 2020-01-16 RX ORDER — MONTELUKAST SODIUM 10 MG/1
TABLET ORAL
Qty: 30 TABLET | Refills: 2 | Status: SHIPPED | OUTPATIENT
Start: 2020-01-16 | End: 2020-02-19 | Stop reason: SDUPTHER

## 2020-01-16 RX ORDER — HYDROCHLOROTHIAZIDE 25 MG/1
TABLET ORAL
Qty: 30 TABLET | Refills: 5 | Status: SHIPPED | OUTPATIENT
Start: 2020-01-16 | End: 2020-01-16 | Stop reason: SDUPTHER

## 2020-01-16 RX ORDER — MONTELUKAST SODIUM 10 MG/1
TABLET ORAL
Qty: 90 TABLET | Refills: 0 | Status: SHIPPED | OUTPATIENT
Start: 2020-01-16 | End: 2020-02-19 | Stop reason: SDUPTHER

## 2020-01-16 RX ORDER — GRISEOFULVIN 500 MG/1
500 TABLET ORAL DAILY
Qty: 30 TABLET | Refills: 0 | Status: SHIPPED | OUTPATIENT
Start: 2020-01-16 | End: 2020-02-15

## 2020-01-16 RX ORDER — HYDROCHLOROTHIAZIDE 25 MG/1
TABLET ORAL
Qty: 90 TABLET | Refills: 1 | Status: SHIPPED | OUTPATIENT
Start: 2020-01-16 | End: 2020-02-19 | Stop reason: SDUPTHER

## 2020-01-16 RX ORDER — PAROXETINE HYDROCHLORIDE 20 MG/1
20 TABLET, FILM COATED ORAL DAILY
Qty: 30 TABLET | Refills: 3 | Status: SHIPPED | OUTPATIENT
Start: 2020-01-16 | End: 2020-05-13

## 2020-01-16 ASSESSMENT — ENCOUNTER SYMPTOMS
EYE DISCHARGE: 0
EYES NEGATIVE: 1
RESPIRATORY NEGATIVE: 1

## 2020-01-16 ASSESSMENT — PATIENT HEALTH QUESTIONNAIRE - PHQ9
2. FEELING DOWN, DEPRESSED OR HOPELESS: 0
SUM OF ALL RESPONSES TO PHQ QUESTIONS 1-9: 0
SUM OF ALL RESPONSES TO PHQ QUESTIONS 1-9: 0
SUM OF ALL RESPONSES TO PHQ9 QUESTIONS 1 & 2: 0
1. LITTLE INTEREST OR PLEASURE IN DOING THINGS: 0

## 2020-01-16 NOTE — PROGRESS NOTES
Praveen Montalvo  YOB: 1960    Date of Service:  1/16/2020    Chief Complaint   Patient presents with    Anxiety    Memory Loss    Sweats       Patient is here for worsening anxiety and for a rash on her left elbow. Her requested medications will be renewed. See orders below. Rash   This is a new problem. The problem is unchanged. Location: Elbow. The rash is characterized by dryness and itchiness. It is unknown if there was an exposure to a precipitant. Past treatments include nothing. The treatment provided no relief. Hypertension   This is a chronic problem. The current episode started more than 1 year ago. The problem is unchanged. The problem is controlled.        Allergies   Allergen Reactions    Shellfish Allergy Anaphylaxis    Shellfish-Derived Products Shortness Of Breath and Palpitations    Lisinopril      Dry cough, no lip swelling or resp. sxs     Outpatient Medications Marked as Taking for the 1/16/20 encounter (Office Visit) with Bobo Turner MD   Medication Sig Dispense Refill    hydrochlorothiazide (HYDRODIURIL) 25 MG tablet 1 daily 30 tablet 5    montelukast (SINGULAIR) 10 MG tablet TAKE 1 TABLET BY MOUTH EVERY DAY AT NIGHT 30 tablet 2    PARoxetine (PAXIL) 20 MG tablet Take 1 tablet by mouth daily 30 tablet 3    griseofulvin (GRIFULVIN V) 500 MG tablet Take 1 tablet by mouth daily 30 tablet 0    fluticasone (FLONASE) 50 MCG/ACT nasal spray INHALE 1 SPRAY NASALLY EVERY DAY 1 Bottle 1    potassium chloride (KLOR-CON M20) 20 MEQ extended release tablet TAKE 1 TABLET BY MOUTH TWICE A  tablet 1    ondansetron (ZOFRAN-ODT) 4 MG disintegrating tablet TAKE 1 TABLET BY MOUTH EVERY 12 HOURS AS NEEDED OR NAUSEA OR VOMITING 30 tablet 5    omeprazole (PRILOSEC) 40 MG delayed release capsule TAKE 1 CAPSULE BY MOUTH EVERY DAY 90 capsule 1    azithromycin (ZITHROMAX) 250 MG tablet Take 2 tabs (500 mg) on Day 1, and take 1 tab (250 mg) on days 2 through 5. 1 Hepatitis A Adult (Vaqta) 2018    Hepatitis B Adult (Engerix-B) 2018    Influenza Vaccine, unspecified formulation 2017    Influenza Virus Vaccine 10/16/2013, 2017, 2019    Influenza, Intradermal, Preservative free 12/10/2014    Influenza, Intradermal, Quadrivalent, Preservative Free 2018    PPD Test 2012    Pneumococcal Polysaccharide (Gdrriwqve41) 2017, 2018    Td, unspecified formulation 2016    Zoster Recombinant (Shingrix) 2018       Past Medical History:   Diagnosis Date    Allergic rhinitis     Anxiety 2020    Arthritis     left thumb    Asthma 4/10/2012    Bilateral carpal tunnel syndrome 2016    Depression     Dysmenorrhea     Fibroid     GERD (gastroesophageal reflux disease)     Headache(784.0) 4/10/2012    Hyperlipidemia     Hypertension     S/P gastric bypass     Status post coronary angiogram 2015    Wears glasses      Past Surgical History:   Procedure Laterality Date    BREAST SURGERY      Breast reduction    CARPAL TUNNEL RELEASE Left 2016     Left carpal tunnel release      CARPAL TUNNEL RELEASE Right 10/06/2016     SECTION      x1    COLONOSCOPY      HYSTERECTOMY      complete-ovaries out     Family History   Problem Relation Age of Onset    Diabetes Father     Rheum Arthritis Neg Hx     Osteoarthritis Neg Hx     Asthma Neg Hx     Breast Cancer Neg Hx     Cancer Neg Hx     Heart Failure Neg Hx     High Cholesterol Neg Hx     Hypertension Neg Hx     Migraines Neg Hx     Ovarian Cancer Neg Hx     Rashes/Skin Problems Neg Hx     Seizures Neg Hx     Stroke Neg Hx     Thyroid Disease Neg Hx        Review of Systems:  Review of Systems   Constitutional: Negative for activity change and appetite change. HENT: Negative. Eyes: Negative. Negative for discharge. Respiratory: Negative. Genitourinary: Negative for difficulty urinating.    Musculoskeletal: Negative for arthralgias. Skin: Positive for rash. Neurological: Negative. Psychiatric/Behavioral: Negative. Negative for agitation and confusion. The patient is not nervous/anxious. All other systems reviewed and are negative. Vitals:    01/16/20 1020   BP: 129/87   Pulse: 71   Temp: 97.2 °F (36.2 °C)   TempSrc: Oral   SpO2: 99%   Weight: 189 lb (85.7 kg)   Height: 5' 2\" (1.575 m)     Body mass index is 34.57 kg/m². Wt Readings from Last 3 Encounters:   01/16/20 189 lb (85.7 kg)   09/24/19 183 lb 9.6 oz (83.3 kg)   08/23/19 183 lb 9.6 oz (83.3 kg)     BP Readings from Last 3 Encounters:   01/16/20 129/87   09/24/19 124/80   08/23/19 108/78         Physical Exam  Constitutional:       Appearance: She is well-developed. HENT:      Head: Normocephalic and atraumatic. Eyes:      Conjunctiva/sclera: Conjunctivae normal.      Pupils: Pupils are equal, round, and reactive to light. Neck:      Musculoskeletal: Normal range of motion and neck supple. Cardiovascular:      Rate and Rhythm: Normal rate and regular rhythm. Heart sounds: Normal heart sounds. Pulmonary:      Effort: Pulmonary effort is normal.      Breath sounds: Normal breath sounds. Musculoskeletal: Normal range of motion. Skin:     General: Skin is warm and dry. Findings: Rash present. Comments: Faint ringworm rash on the   Neurological:      Mental Status: She is alert and oriented to person, place, and time. Deep Tendon Reflexes: Reflexes are normal and symmetric. Psychiatric:         Mood and Affect: Mood normal.         Behavior: Behavior normal.         Thought Content:  Thought content normal.         Judgment: Judgment normal.         Lab Review   Orders Only on 09/24/2019   Component Date Value    IgE 09/24/2019 301*    Cat Dander IgE 09/24/2019 0.30     Dog Dander IgE 09/24/2019 0.16     Mouse Epithelial 09/24/2019 <0.10     Aspergillus fumigatus IgE 09/24/2019 0.24     Hormodendrum Hordei IgE 09/24/2019 <0.10     Allergen Fungi/Mold, M. * 09/24/2019 0.21     ALLERGEN PENICILLIUM NOT* 09/24/2019 <0.10     Bermuda Grass IgE 09/24/2019 0.54*    ALLERGEN MERLY GRASS 09/24/2019 2.79*    Cockroach IgE 09/24/2019 3.37*    D. farinae IgE 09/24/2019 3.50*    Mite Dust Pteronyssinus * 09/24/2019 2.80*    ALLERGEN BIRCH IgE 09/24/2019 0.21     Box Elder IgE 09/24/2019 0.32     ALLERGEN COTTONWOOD IGE 09/24/2019 0.64*    Elm IgE 09/24/2019 0.66*    ALLERGEN MOUNTAIN CEDAR 09/24/2019 0.35*    [place] Tree IgE 09/24/2019 0.58*    Pecan Tree IgE 09/24/2019 0.24     Allergen Tree [place] 09/24/2019 0.47*    [place] Tree IgE 09/24/2019 0.89*    Allergen, Tree, White As* 09/24/2019 2.83*    Allergen White [place] * 09/24/2019 <0.10     Common-Short Ragweed IgE 09/24/2019 2.25*    Rough Pigweed  IgE 09/24/2019 0.37*    Russian Thistle IgE 09/24/2019 0.63*    Sheep Sorrel IgE 09/24/2019 0.42*    ALLERGEN SEE NOTE 09/24/2019 See Note     WBC 09/24/2019 5.6     RBC 09/24/2019 4.33     Hemoglobin 09/24/2019 13.2     Hematocrit 09/24/2019 40.4     MCV 09/24/2019 93.1     MCH 09/24/2019 30.4     MCHC 09/24/2019 32.6     RDW 09/24/2019 14.4     Platelets 66/14/8330 246     MPV 09/24/2019 8.1     Neutrophils % 09/24/2019 50.7     Lymphocytes % 09/24/2019 42.9     Monocytes % 09/24/2019 4.9     Eosinophils % 09/24/2019 0.8     Basophils % 09/24/2019 0.7     Neutrophils Absolute 09/24/2019 2.8     Lymphocytes Absolute 09/24/2019 2.4     Monocytes Absolute 09/24/2019 0.3     Eosinophils Absolute 09/24/2019 0.0     Basophils Absolute 09/24/2019 0.0    Hospital Outpatient Visit on 09/13/2019   Component Date Value    FEV1 %Pred-Pre 09/16/2019 51     FEV1 %Pred-Post 09/16/2019 77     FEV1/FVC-Pre 09/16/2019 82     FEV1/FVC-Post 09/16/2019 94     DLCO %Pred 09/16/2019 86     TLC %Pred 47/33/4718 0.0      notapplicable      Health Maintenance   Topic Date Due    DTaP/Tdap/Td vaccine (1 - Tdap) 12/06/1973    Cervical cancer screen  09/08/2017    Lipid screen  03/26/2018    Potassium monitoring  06/21/2020    Creatinine monitoring  06/21/2020    Breast cancer screen  09/30/2021    Colon cancer screen colonoscopy  01/11/2026    Flu vaccine  Completed    Shingles Vaccine  Completed    Pneumococcal 0-64 years Vaccine  Completed    Hepatitis C screen  Completed    HIV screen  Completed          Assessment/Plan:    Rash  Ringworm left elbow. Start on griseofulvin as directed    Hypertension  Renew current medications    Anxiety  Started on Paxil 20 mg daily for general anxiety disorder      1. Essential hypertension    - hydrochlorothiazide (HYDRODIURIL) 25 MG tablet; 1 daily  Dispense: 30 tablet; Refill: 5    2. Moderate persistent asthma without complication    - montelukast (SINGULAIR) 10 MG tablet; TAKE 1 TABLET BY MOUTH EVERY DAY AT NIGHT  Dispense: 30 tablet; Refill: 2    3. Chronic cough    - montelukast (SINGULAIR) 10 MG tablet; TAKE 1 TABLET BY MOUTH EVERY DAY AT NIGHT  Dispense: 30 tablet; Refill: 2    4. Rash      5. Anxiety         No follow-ups on file.

## 2020-01-16 NOTE — PATIENT INSTRUCTIONS
Take the griseofulvin as directed for ringworm on your left elbow. Start on Paxil (paroxetine) at night for anxiety. The Singulair and hydrochlorothiazide have been refilled. Follow-up with Dr. Cookie Rasmussen in 1 month to monitor progress.

## 2020-01-30 RX ORDER — AMLODIPINE BESYLATE 10 MG/1
TABLET ORAL
Qty: 90 TABLET | Refills: 0 | Status: SHIPPED | OUTPATIENT
Start: 2020-01-30 | End: 2020-02-19 | Stop reason: SDUPTHER

## 2020-02-08 RX ORDER — FLUCONAZOLE 150 MG/1
150 TABLET ORAL ONCE
Qty: 1 TABLET | Refills: 0 | Status: SHIPPED | OUTPATIENT
Start: 2020-02-08 | End: 2020-02-08

## 2020-02-19 ENCOUNTER — OFFICE VISIT (OUTPATIENT)
Dept: PRIMARY CARE CLINIC | Age: 60
End: 2020-02-19
Payer: COMMERCIAL

## 2020-02-19 VITALS
SYSTOLIC BLOOD PRESSURE: 127 MMHG | HEART RATE: 86 BPM | HEIGHT: 62 IN | WEIGHT: 187.8 LBS | BODY MASS INDEX: 34.56 KG/M2 | OXYGEN SATURATION: 99 % | DIASTOLIC BLOOD PRESSURE: 85 MMHG

## 2020-02-19 LAB
A/G RATIO: 2.3 (ref 1.1–2.2)
ALBUMIN SERPL-MCNC: 4.5 G/DL (ref 3.4–5)
ALP BLD-CCNC: 103 U/L (ref 40–129)
ALT SERPL-CCNC: 28 U/L (ref 10–40)
ANION GAP SERPL CALCULATED.3IONS-SCNC: 15 MMOL/L (ref 3–16)
AST SERPL-CCNC: 34 U/L (ref 15–37)
BILIRUB SERPL-MCNC: <0.2 MG/DL (ref 0–1)
BUN BLDV-MCNC: 12 MG/DL (ref 7–20)
CALCIUM SERPL-MCNC: 9.6 MG/DL (ref 8.3–10.6)
CHLORIDE BLD-SCNC: 105 MMOL/L (ref 99–110)
CHOLESTEROL, TOTAL: 185 MG/DL (ref 0–199)
CO2: 24 MMOL/L (ref 21–32)
CREAT SERPL-MCNC: 0.7 MG/DL (ref 0.6–1.1)
GFR AFRICAN AMERICAN: >60
GFR NON-AFRICAN AMERICAN: >60
GLOBULIN: 2 G/DL
GLUCOSE BLD-MCNC: 111 MG/DL (ref 70–99)
HDLC SERPL-MCNC: 73 MG/DL (ref 40–60)
LDL CHOLESTEROL CALCULATED: 71 MG/DL
POTASSIUM SERPL-SCNC: 3.7 MMOL/L (ref 3.5–5.1)
SODIUM BLD-SCNC: 144 MMOL/L (ref 136–145)
TOTAL PROTEIN: 6.5 G/DL (ref 6.4–8.2)
TRIGL SERPL-MCNC: 207 MG/DL (ref 0–150)
VLDLC SERPL CALC-MCNC: 41 MG/DL

## 2020-02-19 PROCEDURE — 99214 OFFICE O/P EST MOD 30 MIN: CPT | Performed by: FAMILY MEDICINE

## 2020-02-19 RX ORDER — BUTALBITAL, ACETAMINOPHEN AND CAFFEINE 50; 325; 40 MG/1; MG/1; MG/1
TABLET ORAL
Qty: 30 TABLET | Refills: 1 | Status: SHIPPED | OUTPATIENT
Start: 2020-02-19 | End: 2020-10-02

## 2020-02-19 RX ORDER — CLOTRIMAZOLE AND BETAMETHASONE DIPROPIONATE 10; .64 MG/G; MG/G
CREAM TOPICAL
Qty: 15 G | Refills: 1 | Status: SHIPPED | OUTPATIENT
Start: 2020-02-19 | End: 2020-04-24

## 2020-02-19 RX ORDER — MONTELUKAST SODIUM 10 MG/1
TABLET ORAL
Qty: 90 TABLET | Refills: 0 | Status: SHIPPED | OUTPATIENT
Start: 2020-02-19 | End: 2020-04-17 | Stop reason: SDUPTHER

## 2020-02-19 RX ORDER — HYDROCHLOROTHIAZIDE 25 MG/1
TABLET ORAL
Qty: 90 TABLET | Refills: 1 | Status: SHIPPED | OUTPATIENT
Start: 2020-02-19 | End: 2021-01-25

## 2020-02-19 RX ORDER — AMLODIPINE BESYLATE 10 MG/1
TABLET ORAL
Qty: 90 TABLET | Refills: 0 | Status: SHIPPED | OUTPATIENT
Start: 2020-02-19 | End: 2020-07-15

## 2020-02-19 RX ORDER — FLUTICASONE PROPIONATE 50 MCG
SPRAY, SUSPENSION (ML) NASAL
Qty: 1 BOTTLE | Refills: 1 | Status: SHIPPED | OUTPATIENT
Start: 2020-02-19 | End: 2020-04-23

## 2020-02-19 RX ORDER — OMEPRAZOLE 40 MG/1
CAPSULE, DELAYED RELEASE ORAL
Qty: 90 CAPSULE | Refills: 1 | Status: SHIPPED | OUTPATIENT
Start: 2020-02-19 | End: 2020-10-20

## 2020-02-19 RX ORDER — ONDANSETRON 4 MG/1
TABLET, FILM COATED ORAL
Qty: 30 TABLET | Refills: 12 | Status: SHIPPED | OUTPATIENT
Start: 2020-02-19 | End: 2020-12-03 | Stop reason: SDUPTHER

## 2020-02-19 ASSESSMENT — ENCOUNTER SYMPTOMS
SHORTNESS OF BREATH: 0
DIFFICULTY BREATHING: 0
EYE PAIN: 0
HEMOPTYSIS: 0
WHEEZING: 0
BLURRED VISION: 0
ORTHOPNEA: 0
SPUTUM PRODUCTION: 0
FREQUENT THROAT CLEARING: 0
RHINORRHEA: 0
HOARSE VOICE: 0
COUGH: 0
NAIL CHANGES: 0
DIARRHEA: 0
HEARTBURN: 0
TROUBLE SWALLOWING: 0
CHEST TIGHTNESS: 0
SORE THROAT: 0

## 2020-02-19 NOTE — PROGRESS NOTES
Subjective:      Patient ID: Clifford Petit is a 64 y.o. female. Fu for multiple stable problems   Hypertension   This is a chronic problem. The current episode started more than 1 year ago. The problem is controlled. Pertinent negatives include no anxiety, blurred vision, chest pain, headaches, malaise/fatigue, neck pain, orthopnea, palpitations, peripheral edema, PND, shortness of breath or sweats. There are no associated agents to hypertension. Past treatments include diuretics and calcium channel blockers. The current treatment provides significant improvement. There are no compliance problems. There is no history of angina, kidney disease, CAD/MI, CVA, heart failure, left ventricular hypertrophy, PVD or retinopathy. There is no history of chronic renal disease, coarctation of the aorta, hyperaldosteronism, hypercortisolism, hyperparathyroidism, a hypertension causing med, pheochromocytoma, renovascular disease, sleep apnea or a thyroid problem. Asthma   There is no chest tightness, cough, difficulty breathing, frequent throat clearing, hemoptysis, hoarse voice, shortness of breath, sputum production or wheezing. This is a chronic problem. The current episode started more than 1 year ago. The problem occurs intermittently. Pertinent negatives include no chest pain, dyspnea on exertion, ear congestion, ear pain, fever, headaches, heartburn, malaise/fatigue, myalgias, nasal congestion, orthopnea, PND, postnasal drip, rhinorrhea, sneezing, sore throat, sweats, trouble swallowing or weight loss. Her symptoms are aggravated by strenuous activity. Her symptoms are alleviated by beta-agonist, steroid inhaler and leukotriene antagonist. She reports significant improvement on treatment. Her past medical history is significant for asthma and bronchitis. There is no history of bronchiectasis, COPD, emphysema or pneumonia. Rash   This is a chronic problem. The current episode started more than 1 month ago.  The problem has been waxing and waning since onset. Location: left forearm. Pertinent negatives include no anorexia, congestion, cough, diarrhea, eye pain, facial edema, fatigue, fever, joint pain, nail changes, rhinorrhea, shortness of breath or sore throat. Treatments tried: lotrisone cream. The treatment provided significant relief. Her past medical history is significant for asthma. There is no history of allergies or varicella.     gerd sxs, allergic rhinitis, nausea, headaches, are stable  Review of Systems   Constitutional: Negative for fatigue, fever, malaise/fatigue and weight loss. HENT: Negative for congestion, ear pain, hoarse voice, postnasal drip, rhinorrhea, sneezing, sore throat and trouble swallowing. Eyes: Negative for blurred vision and pain. Respiratory: Negative for cough, hemoptysis, sputum production, shortness of breath and wheezing. Cardiovascular: Negative for chest pain, dyspnea on exertion, palpitations, orthopnea and PND. Gastrointestinal: Negative for anorexia, diarrhea and heartburn. Musculoskeletal: Negative for joint pain, myalgias and neck pain. Skin: Positive for rash. Negative for nail changes. Neurological: Negative for headaches. Objective:   Physical Exam  Vitals signs and nursing note reviewed. Constitutional:       General: She is not in acute distress. Appearance: She is well-developed. She is not diaphoretic. HENT:      Head: Normocephalic and atraumatic. Right Ear: External ear normal.      Left Ear: External ear normal.      Nose: Nose normal.      Mouth/Throat:      Pharynx: No oropharyngeal exudate. Eyes:      General: No scleral icterus. Left eye: No discharge. Conjunctiva/sclera: Conjunctivae normal.      Pupils: Pupils are equal, round, and reactive to light. Neck:      Musculoskeletal: Normal range of motion and neck supple. Thyroid: No thyromegaly. Vascular: No JVD. Trachea: No tracheal deviation. Cardiovascular:      Rate and Rhythm: Normal rate and regular rhythm. Heart sounds: Normal heart sounds. No murmur. No friction rub. No gallop. Pulmonary:      Effort: Pulmonary effort is normal. No respiratory distress. Breath sounds: Normal breath sounds. No stridor. No wheezing or rales. Chest:      Chest wall: No tenderness. Abdominal:      General: Bowel sounds are normal. There is no distension. Palpations: Abdomen is soft. There is no mass. Tenderness: There is no abdominal tenderness. There is no guarding or rebound. Musculoskeletal: Normal range of motion. General: No tenderness. Lymphadenopathy:      Cervical: No cervical adenopathy. Skin:     General: Skin is warm and dry. Coloration: Skin is not pale. Findings: No erythema or rash. Comments: Ringworm rash left elbow   Neurological:      Mental Status: She is alert and oriented to person, place, and time. Cranial Nerves: No cranial nerve deficit. Coordination: Coordination normal.      Deep Tendon Reflexes: Reflexes are normal and symmetric. Reflexes normal.   Psychiatric:         Behavior: Behavior normal.         Thought Content: Thought content normal.         Judgment: Judgment normal.         Assessment:      1. Essential hypertension  BP is at goal <140/90. Will continue current medication and low salt diet. Has not had recent renal function testing  Low salt diet  refill  - amLODIPine (NORVASC) 10 MG tablet; TAKE 1 TABLET BY MOUTH EVERY DAY  Dispense: 90 tablet; Refill: 0  - hydroCHLOROthiazide (HYDRODIURIL) 25 MG tablet; TAKE 1 TABLET BY MOUTH EVERY DAY  Dispense: 90 tablet; Refill: 1  ck  - Basic Metabolic Panel; Future    2. Moderate persistent asthma without complication  sxs controlled with  Montelukast, and her current inhalers  - montelukast (SINGULAIR) 10 MG tablet; TAKE 1 TABLET BY MOUTH EVERY DAY EVERY NIGHT  Dispense: 90 tablet; Refill: 0    3.  Skin rash  Rash improved

## 2020-03-18 RX ORDER — BENZONATATE 100 MG/1
CAPSULE ORAL
Qty: 90 CAPSULE | Refills: 3 | Status: SHIPPED | OUTPATIENT
Start: 2020-03-18 | End: 2020-10-29 | Stop reason: SDUPTHER

## 2020-03-31 ENCOUNTER — TELEPHONE (OUTPATIENT)
Dept: PRIMARY CARE CLINIC | Age: 60
End: 2020-03-31

## 2020-03-31 RX ORDER — ALBUTEROL SULFATE 90 UG/1
2 AEROSOL, METERED RESPIRATORY (INHALATION) EVERY 6 HOURS PRN
Qty: 1 INHALER | Refills: 5 | Status: SHIPPED | OUTPATIENT
Start: 2020-03-31 | End: 2021-12-20 | Stop reason: SDUPTHER

## 2020-03-31 NOTE — TELEPHONE ENCOUNTER
Patient called to say that the pharmacy stated that the current RX for her inhaler is not being paid for thru her insurance    They advised that she get another Rx for the 108 mcg generic inhaler. Please give the patient a call for any further information if needed.

## 2020-04-17 ENCOUNTER — TELEPHONE (OUTPATIENT)
Dept: PRIMARY CARE CLINIC | Age: 60
End: 2020-04-17

## 2020-04-17 RX ORDER — MONTELUKAST SODIUM 10 MG/1
TABLET ORAL
Qty: 90 TABLET | Refills: 0 | Status: SHIPPED | OUTPATIENT
Start: 2020-04-17 | End: 2020-05-14

## 2020-04-23 ENCOUNTER — TELEPHONE (OUTPATIENT)
Dept: PRIMARY CARE CLINIC | Age: 60
End: 2020-04-23

## 2020-04-23 ENCOUNTER — NURSE TRIAGE (OUTPATIENT)
Dept: OTHER | Facility: CLINIC | Age: 60
End: 2020-04-23

## 2020-04-23 RX ORDER — FLUTICASONE PROPIONATE 50 MCG
SPRAY, SUSPENSION (ML) NASAL
Qty: 1 BOTTLE | Refills: 1 | Status: SHIPPED | OUTPATIENT
Start: 2020-04-23 | End: 2020-06-30

## 2020-04-23 NOTE — TELEPHONE ENCOUNTER
\"Describe the rash. \" (e.g., spots, blisters, raised areas, skin peeling, scaly)      Scattered on back, forearm. Raised/ red. No pus noted  2. SIZE: \"How big are the spots? \" (e.g., tip of pen, eraser, coin; inches, centimeters)      Size of peas  3. LOCATION: \"Where is the rash located? \"      Back and forearms with 1 spot on belly and 2 spots on legs  4. COLOR: \"What color is the rash? \" (Note: It is difficult to assess rash color in people with darker-colored skin. When this situation occurs, simply ask the caller to describe what they see.)      Red/ no pus  5. ONSET: \"When did the rash begin? \"      2-3 weeks  6. FEVER: \"Do you have a fever? \" If so, ask: \"What is your temperature, how was it measured, and when did it start? \"      none  7. ITCHING: \"Does the rash itch? \" If so, ask: \"How bad is the itch? \" (Scale 1-10; or mild, moderate, severe)      Yes itching and burnng 20/10  8. CAUSE: \"What do you think is causing the rash?\"      ? ?shingles  9. MEDICATION FACTORS: \"Have you started any new medications within the last 2 weeks? \" (e.g., antibiotics)       none  10. OTHER SYMPTOMS: \"Do you have any other symptoms? \" (e.g., dizziness, headache, sore throat, joint pain)        Cough after asthma attack yesterday  11. PREGNANCY: \"Is there any chance you are pregnant? \" \"When was your last menstrual period? \"        no    Protocols used: COUGH-ADULT-OH, RASH OR REDNESS - [facility]    Call received through [employer]. Patient called c/o cough for past 24 hrs, but during call noted bigger concern of growing rash that has been present for 2+ weeks. On her back, her forearms and now her stomach and legs. She reports this rash hurts/ itches and is very painful. No pus noted but states they look like blisters. Will follow up with PCP within 24 hours  Care instructions given per disposition via protocol. Discussed using the [employer] virtual visit, coupon code: UIUU0213.   Assisted patient in finding the

## 2020-04-24 ENCOUNTER — OFFICE VISIT (OUTPATIENT)
Dept: FAMILY MEDICINE CLINIC | Age: 60
End: 2020-04-24
Payer: COMMERCIAL

## 2020-04-24 VITALS
BODY MASS INDEX: 34.04 KG/M2 | DIASTOLIC BLOOD PRESSURE: 70 MMHG | SYSTOLIC BLOOD PRESSURE: 134 MMHG | HEIGHT: 62 IN | WEIGHT: 185 LBS

## 2020-04-24 PROCEDURE — 99213 OFFICE O/P EST LOW 20 MIN: CPT | Performed by: FAMILY MEDICINE

## 2020-04-24 RX ORDER — CHLORHEXIDINE GLUCONATE 2% 2 G/100ML
SOLUTION TOPICAL
Qty: 250 ML | Refills: 1 | Status: SHIPPED | OUTPATIENT
Start: 2020-04-24 | End: 2021-12-20 | Stop reason: SDUPTHER

## 2020-04-24 RX ORDER — PREDNISONE 10 MG/1
10 TABLET ORAL DAILY
Qty: 18 TABLET | Refills: 0 | Status: SHIPPED | OUTPATIENT
Start: 2020-04-24 | End: 2020-12-15

## 2020-04-24 RX ORDER — HYDROXYZINE HYDROCHLORIDE 25 MG/1
25 TABLET, FILM COATED ORAL EVERY 8 HOURS PRN
Qty: 30 TABLET | Refills: 0 | Status: SHIPPED | OUTPATIENT
Start: 2020-04-24 | End: 2020-05-04

## 2020-04-24 NOTE — LETTER
[address] [facility] [address] [address]  Phone: [phone]  Fax: [phone]    Erna He DO        April 24, 2020     Patient: Kal Camacho   YOB: 1960   Date of Visit: 4/24/2020       To Whom it May Concern:    Inder Puentes was seen in my clinic on 4/24/2020. She may return to work on 4/27/20. If you have any questions or concerns, please don't hesitate to call.     Sincerely,         Erna He DO

## 2020-04-26 ASSESSMENT — ENCOUNTER SYMPTOMS
VOMITING: 0
SORE THROAT: 0
DIARRHEA: 0
COUGH: 0
SHORTNESS OF BREATH: 0
NAIL CHANGES: 0
RHINORRHEA: 0
EYE PAIN: 0

## 2020-04-26 NOTE — PROGRESS NOTES
Subjective:      Patient ID: Jeremy Horn is a 64 y.o. female. Rash   This is a new problem. The current episode started 1 to 4 weeks ago. The problem has been gradually worsening since onset. The affected locations include the torso, left arm and right arm. The rash is characterized by itchiness and redness. She was exposed to nothing. Pertinent negatives include no anorexia, congestion, cough, diarrhea, eye pain, facial edema, fatigue, fever, joint pain, nail changes, rhinorrhea, shortness of breath, sore throat or vomiting. Past treatments include anti-itch cream and antihistamine. The treatment provided no relief. Review of Systems   Constitutional: Negative for fatigue and fever. HENT: Negative for congestion, rhinorrhea and sore throat. Eyes: Negative for pain. Respiratory: Negative for cough and shortness of breath. Gastrointestinal: Negative for anorexia, diarrhea and vomiting. Musculoskeletal: Negative for joint pain. Skin: Positive for rash. Negative for nail changes.      YOB: 1960    Date of Visit:  4/24/2020    Allergies   Allergen Reactions    Shellfish Allergy Anaphylaxis    Shellfish-Derived Products Shortness Of Breath and Palpitations    Lisinopril      Dry cough, no lip swelling or resp. sxs       Outpatient Medications Marked as Taking for the 4/24/20 encounter (Office Visit) with Fatemeh Silverman, DO   Medication Sig Dispense Refill    predniSONE (DELTASONE) 10 MG tablet Take 1 tablet by mouth daily 3 pills q days 1-3  2 pills q days 4-6 1 pill q days 7-9 18 tablet 0    hydrOXYzine (ATARAX) 25 MG tablet Take 1 tablet by mouth every 8 hours as needed for Itching 30 tablet 0    Chlorhexidine Gluconate 2 % SOLN Apply to skin once daily prn 250 mL 1    fluticasone (FLONASE) 50 MCG/ACT nasal spray INHALE 1 SPRAY NASALLY EVERY DAY 1 Bottle 1    montelukast (SINGULAIR) 10 MG tablet TAKE 1 TABLET BY MOUTH EVERY DAY EVERY NIGHT 90 tablet 0    albuterol for allergic reaction 2 Device 3       Vitals:    04/24/20 1407   BP: 134/70   Weight: 185 lb (83.9 kg)   Height: 5' 2\" (1.575 m)     Body mass index is 33.84 kg/m². Wt Readings from Last 3 Encounters:   04/24/20 185 lb (83.9 kg)   02/19/20 187 lb 12.8 oz (85.2 kg)   01/16/20 189 lb (85.7 kg)     BP Readings from Last 3 Encounters:   04/24/20 134/70   02/19/20 127/85   01/16/20 129/87       Objective:   Physical Exam  Constitutional:       General: She is not in acute distress. Appearance: She is well-developed. HENT:      Head: Normocephalic. Skin:     Findings: Rash present. Comments: Upper back and upper arms with red raised bumps and several excoriated area   Neurological:      Mental Status: She is alert and oriented to person, place, and time. Psychiatric:         Behavior: Behavior normal.         Thought Content: Thought content normal.         Judgment: Judgment normal.         Assessment:      Assessment/plan;  Tarun Phan was seen today for rash. Diagnoses and all orders for this visit:    Contact dermatitis, unspecified contact dermatitis type, unspecified trigger  -     predniSONE (DELTASONE) 10 MG tablet; Take 1 tablet by mouth daily 3 pills q days 1-3  2 pills q days 4-6 1 pill q days 7-9  -     hydrOXYzine (ATARAX) 25 MG tablet;  Take 1 tablet by mouth every 8 hours as needed for Itching  -     Chlorhexidine Gluconate 2 % SOLN; Apply to skin once daily prn    followup with her pcp if not improving   Tank Chen DO

## 2020-05-09 RX ORDER — IBUPROFEN 800 MG/1
TABLET ORAL
Qty: 100 TABLET | Refills: 3 | Status: SHIPPED | OUTPATIENT
Start: 2020-05-09 | End: 2020-12-28 | Stop reason: ALTCHOICE

## 2020-05-13 RX ORDER — PAROXETINE HYDROCHLORIDE 20 MG/1
TABLET, FILM COATED ORAL
Qty: 30 TABLET | Refills: 3 | Status: SHIPPED | OUTPATIENT
Start: 2020-05-13 | End: 2020-12-28 | Stop reason: ALTCHOICE

## 2020-05-14 RX ORDER — MONTELUKAST SODIUM 10 MG/1
TABLET ORAL
Qty: 90 TABLET | Refills: 0 | Status: SHIPPED | OUTPATIENT
Start: 2020-05-14 | End: 2020-05-20 | Stop reason: SDUPTHER

## 2020-06-10 ENCOUNTER — NURSE TRIAGE (OUTPATIENT)
Dept: OTHER | Facility: CLINIC | Age: 60
End: 2020-06-10

## 2020-06-11 ENCOUNTER — TELEPHONE (OUTPATIENT)
Dept: PRIMARY CARE CLINIC | Age: 60
End: 2020-06-11

## 2020-06-15 ENCOUNTER — TELEPHONE (OUTPATIENT)
Dept: PRIMARY CARE CLINIC | Age: 60
End: 2020-06-15

## 2020-06-23 ENCOUNTER — HOSPITAL ENCOUNTER (EMERGENCY)
Age: 60
Discharge: LEFT BEFORE DISCHARGE | End: 2020-06-23
Payer: COMMERCIAL

## 2020-07-15 RX ORDER — AMLODIPINE BESYLATE 10 MG/1
TABLET ORAL
Qty: 30 TABLET | Refills: 2 | Status: SHIPPED | OUTPATIENT
Start: 2020-07-15 | End: 2020-09-28

## 2020-07-30 RX ORDER — POTASSIUM CHLORIDE 20 MEQ/1
TABLET, EXTENDED RELEASE ORAL
Qty: 60 TABLET | Refills: 5 | Status: SHIPPED | OUTPATIENT
Start: 2020-07-30 | End: 2021-03-08

## 2020-08-06 ENCOUNTER — TELEPHONE (OUTPATIENT)
Dept: PRIMARY CARE CLINIC | Age: 60
End: 2020-08-06

## 2020-08-06 NOTE — TELEPHONE ENCOUNTER
Filippo Galarzal   2020 3:44 PM   Telephone   MRN:  <[id number]>   Description: Female : 1960  Provider: Sharif Tam MD  [facility]    Call Documentation     Chucho Gallardo MA at 2020  3:44 PM     Status: Signed       Patient states she has yeast infection onset yesterday please send something to the pharmacy           Arrange video visit, need more information.

## 2020-09-04 ENCOUNTER — TELEPHONE (OUTPATIENT)
Dept: PRIMARY CARE CLINIC | Age: 60
End: 2020-09-04

## 2020-09-04 RX ORDER — MECLIZINE HCL 12.5 MG/1
12.5 TABLET ORAL 3 TIMES DAILY PRN
Qty: 15 TABLET | Refills: 0 | Status: SHIPPED | OUTPATIENT
Start: 2020-09-04 | End: 2020-10-04

## 2020-09-04 NOTE — TELEPHONE ENCOUNTER
Pt states that Dr Elena Zavala wants her to come in for a follow within the next 2 weeks, however there are no in the office opening until 10/5/20. Please advise if pt needs to be seen sooner then 10/5/20.  thanks    Caryl: [phone]

## 2020-09-04 NOTE — PROGRESS NOTES
Recent ER visit dizzy, headaaches    Told to get meds for same  And with PCP       Add meclizine ,make apppointment.   Spoke with patient

## 2020-09-21 ENCOUNTER — TELEPHONE (OUTPATIENT)
Dept: PRIMARY CARE CLINIC | Age: 60
End: 2020-09-21

## 2020-09-21 NOTE — TELEPHONE ENCOUNTER
----- Message from Twin Dolan sent at 9/21/2020  8:19 AM EDT -----  Subject: Message to Provider    QUESTIONS  Information for Provider? Pt request a call back from Marques Zaidi to discuss a   back to work note   it has to be specific per pt. That's all she would disclose. Please   advise.   ---------------------------------------------------------------------------  --------------  CALL BACK INFO  What is the best way for the office to contact you? OK to leave message on   voicemail  Preferred Call Back Phone Number? [phone]  ---------------------------------------------------------------------------  --------------  SCRIPT ANSWERS  Relationship to Patient?  Self

## 2020-09-28 RX ORDER — AMLODIPINE BESYLATE 10 MG/1
TABLET ORAL
Qty: 30 TABLET | Refills: 2 | Status: SHIPPED | OUTPATIENT
Start: 2020-09-28 | End: 2020-12-16

## 2020-10-02 RX ORDER — BUTALBITAL, ACETAMINOPHEN AND CAFFEINE 50; 325; 40 MG/1; MG/1; MG/1
TABLET ORAL
Qty: 30 TABLET | Refills: 1 | Status: SHIPPED | OUTPATIENT
Start: 2020-10-02 | End: 2021-04-15

## 2020-10-02 NOTE — TELEPHONE ENCOUNTER
Medication:   Requested Prescriptions     Pending Prescriptions Disp Refills    butalbital-acetaminophen-caffeine (FIORICET, ESGIC) -40 MG per tablet [Pharmacy Med Name: IGNZBF-RCKGUJIC-AWSB -40] 30 tablet 1     Sig: TAKE 1 TABLET BY MOUTH EVERY 4 HOURS AS NEEDED FOR PAIN        Last Filled:      Patient Phone Number: [phone] (home) [phone] (work)    Last appt: 6/11/2020   Next appt: 10/5/2020    Last OARRS:   RX Monitoring 8/17/2017   Attestation The Prescription Monitoring Report for this patient was reviewed today.

## 2020-10-02 NOTE — PROGRESS NOTES
Patient called requesting medication for yeast infection. No history of diabetes. Symptoms of that you know itching and sharp pains that she says she gets occasionally if she gets a lot of acid like orange juice and recently she is been trying to eat more healthy exercising going to the gym. She is not having a discharge and having a lot of menopausal symptoms in hot flashes. I explained that it might not be a yeast infection it would not be good to take the medication without going to her GYN to be thoroughly evaluated. Patient agrees to make an appointment with GYN as soon as possible since no pelvic exam for over two years. Referral given to GYN at [facility]. Will call in one Diflucan tablet. Good

## 2020-10-05 ENCOUNTER — OFFICE VISIT (OUTPATIENT)
Dept: PRIMARY CARE CLINIC | Age: 60
End: 2020-10-05
Payer: COMMERCIAL

## 2020-10-05 VITALS
BODY MASS INDEX: 34.78 KG/M2 | HEART RATE: 89 BPM | DIASTOLIC BLOOD PRESSURE: 96 MMHG | SYSTOLIC BLOOD PRESSURE: 146 MMHG | WEIGHT: 189 LBS | TEMPERATURE: 97 F | HEIGHT: 62 IN

## 2020-10-05 PROBLEM — Z98.84 GASTRIC BYPASS STATUS FOR OBESITY: Status: ACTIVE | Noted: 2020-10-05

## 2020-10-05 PROBLEM — K21.9 GERD (GASTROESOPHAGEAL REFLUX DISEASE): Status: ACTIVE | Noted: 2020-10-05

## 2020-10-05 PROCEDURE — 99214 OFFICE O/P EST MOD 30 MIN: CPT | Performed by: FAMILY MEDICINE

## 2020-10-05 RX ORDER — AMOXICILLIN 500 MG/1
TABLET, FILM COATED ORAL
Qty: 30 TABLET | Refills: 0 | Status: SHIPPED | OUTPATIENT
Start: 2020-10-05 | End: 2020-12-28 | Stop reason: ALTCHOICE

## 2020-10-05 RX ORDER — HYDRALAZINE HYDROCHLORIDE 50 MG/1
TABLET, FILM COATED ORAL
Qty: 60 TABLET | Refills: 3 | Status: SHIPPED | OUTPATIENT
Start: 2020-10-05 | End: 2021-04-09

## 2020-10-05 RX ORDER — HYDROXYZINE HYDROCHLORIDE 25 MG/1
25 TABLET, FILM COATED ORAL EVERY 8 HOURS PRN
Qty: 30 TABLET | Refills: 2 | Status: SHIPPED | OUTPATIENT
Start: 2020-10-05 | End: 2020-10-15

## 2020-10-05 RX ORDER — TRIAMCINOLONE ACETONIDE 1 MG/G
CREAM TOPICAL
Qty: 28.4 G | Refills: 1 | Status: SHIPPED | OUTPATIENT
Start: 2020-10-05 | End: 2021-07-05

## 2020-10-05 ASSESSMENT — ENCOUNTER SYMPTOMS
VOMITING: 0
EYE REDNESS: 0
RHINORRHEA: 0
ABDOMINAL DISTENTION: 0
EYE ITCHING: 0
SHORTNESS OF BREATH: 0
BACK PAIN: 0
STRIDOR: 0
ORTHOPNEA: 0
EYE PAIN: 0
WHEEZING: 0
TROUBLE SWALLOWING: 0
COLOR CHANGE: 0
BLOOD IN STOOL: 0
SINUS PRESSURE: 0
DIARRHEA: 0
RECTAL PAIN: 0
PHOTOPHOBIA: 0
NAUSEA: 0
EYE DISCHARGE: 0
SORE THROAT: 0
CHEST TIGHTNESS: 0
CHOKING: 0
CONSTIPATION: 0
APNEA: 0
BLURRED VISION: 0
COUGH: 0

## 2020-10-19 NOTE — TELEPHONE ENCOUNTER
Medication:   Requested Prescriptions     Pending Prescriptions Disp Refills    omeprazole (PRILOSEC) 40 MG delayed release capsule [Pharmacy Med Name: OMEPRAZOLE DR 40 MG CAPSULE] 30 capsule 5     Sig: TAKE 1 CAPSULE BY MOUTH EVERY DAY        Last Filled:      Patient Phone Number: [phone] (home) [phone] (work)    Last appt: 10/5/2020   Next appt: Visit date not found    Last OARRS:   RX Monitoring 8/17/2017   Attestation The Prescription Monitoring Report for this patient was reviewed today.

## 2020-10-20 RX ORDER — OMEPRAZOLE 40 MG/1
CAPSULE, DELAYED RELEASE ORAL
Qty: 30 CAPSULE | Refills: 5 | Status: SHIPPED | OUTPATIENT
Start: 2020-10-20 | End: 2021-05-07

## 2020-10-22 NOTE — TELEPHONE ENCOUNTER
Patient's pharmacy sent an RX approval for Benzonatate 100 mg capsule. Medication was denied with the reason patient needs an appt.

## 2020-10-29 ENCOUNTER — TELEPHONE (OUTPATIENT)
Dept: PRIMARY CARE CLINIC | Age: 60
End: 2020-10-29

## 2020-10-29 RX ORDER — BENZONATATE 100 MG/1
100 CAPSULE ORAL 3 TIMES DAILY PRN
Qty: 90 CAPSULE | Refills: 3 | Status: SHIPPED | OUTPATIENT
Start: 2020-10-29 | End: 2021-03-22

## 2020-10-29 NOTE — TELEPHONE ENCOUNTER
Medication:   Requested Prescriptions     Pending Prescriptions Disp Refills    benzonatate (TESSALON) 100 MG capsule 90 capsule 3        Last Filled:      Patient Phone Number: [phone] (home) [phone] (work)    Last appt: 10/5/2020   Next appt: Visit date not found    Last OARRS:   RX Monitoring 8/17/2017   Attestation The Prescription Monitoring Report for this patient was reviewed today.

## 2020-10-29 NOTE — TELEPHONE ENCOUNTER
----- Message from Maame Dye sent at 10/28/2020  6:39 PM EDT -----  Subject: Refill Request    QUESTIONS  Name of Medication? benzonatate (TESSALON) 100 MG capsule  Patient-reported dosage and instructions? TAKE 1 CAPSULE BY MOUTH THREE   TIMES A DAY AS NEEDED FOR COUGH  How many days do you have left? 0  Preferred Pharmacy? [id number]- [address] [facility] [phone]  Pharmacy phone number (if available)? [phone]  Additional Information for Provider?   ---------------------------------------------------------------------------  --------------  CALL BACK INFO  What is the best way for the office to contact you? OK to leave message on   voicemail  Preferred Call Back Phone Number?  [phone]

## 2020-10-31 RX ORDER — ERGOCALCIFEROL 1.25 MG/1
CAPSULE ORAL
Qty: 4 CAPSULE | Refills: 3 | Status: SHIPPED | OUTPATIENT
Start: 2020-10-31 | End: 2021-11-17

## 2020-11-25 ENCOUNTER — TELEPHONE (OUTPATIENT)
Dept: PRIMARY CARE CLINIC | Age: 60
End: 2020-11-25

## 2020-11-25 ENCOUNTER — OFFICE VISIT (OUTPATIENT)
Dept: PRIMARY CARE CLINIC | Age: 60
End: 2020-11-25
Payer: COMMERCIAL

## 2020-11-25 ENCOUNTER — HOSPITAL ENCOUNTER (OUTPATIENT)
Dept: CT IMAGING | Age: 60
Discharge: HOME | End: 2020-11-25
Payer: COMMERCIAL

## 2020-11-25 VITALS
SYSTOLIC BLOOD PRESSURE: 130 MMHG | HEART RATE: 76 BPM | TEMPERATURE: 96.4 F | WEIGHT: 194 LBS | DIASTOLIC BLOOD PRESSURE: 80 MMHG | BODY MASS INDEX: 35.7 KG/M2 | HEIGHT: 62 IN | OXYGEN SATURATION: 98 %

## 2020-11-25 PROCEDURE — 93000 ELECTROCARDIOGRAM COMPLETE: CPT | Performed by: FAMILY MEDICINE

## 2020-11-25 PROCEDURE — 99214 OFFICE O/P EST MOD 30 MIN: CPT | Performed by: FAMILY MEDICINE

## 2020-11-25 PROCEDURE — 70450 CT HEAD/BRAIN W/O DYE: CPT

## 2020-11-25 ASSESSMENT — ENCOUNTER SYMPTOMS
EYE PAIN: 0
APNEA: 0
NAUSEA: 0
WHEEZING: 0
BACK PAIN: 0
EYE DISCHARGE: 0
DIARRHEA: 0
CHOKING: 0
RECTAL PAIN: 0
VOMITING: 0
CONSTIPATION: 0
SORE THROAT: 0
CHEST TIGHTNESS: 0
TROUBLE SWALLOWING: 0
COLOR CHANGE: 0
ABDOMINAL DISTENTION: 0
EYE ITCHING: 0
PHOTOPHOBIA: 0
COUGH: 0
SINUS PRESSURE: 0
BLOOD IN STOOL: 0
STRIDOR: 0
RHINORRHEA: 0
SHORTNESS OF BREATH: 0
EYE REDNESS: 0

## 2020-11-25 NOTE — PROGRESS NOTES
Subjective:      Patient ID: Sophia Doherty is a 64 y.o. female. 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. She has been having blurred vision, dizzy spells, lightheadedness since  Sept. 2020. Bilateral blurred vision especially when ever she is dizzy. Gets vertigo and room feels like it is spinning also. Gets diplopia. The episodes seem  Like they are getting worse. Some times turning the head can cause dizzy spells. She has noted laps in her memory at work. she gets head aches intermittently  At times she makes frequent mistakes when taking and sending messages  On the phone at work. Sometimes she can be walking along and feels like  She will fall \"until I catch myself\". Did actually fall 1 1/2 months ago no apparent injuries. She has been treated in past for vertigo.     Did have recent eye exam at [facility]  Has dry eyes only and has eye drops    She knows today's date/year  Oriented x 3 person,place, thing        Recent wt gain    Wt Readings from Last 3 Encounters:   11/25/20 194 lb (88 kg)   10/05/20 189 lb (85.7 kg)   06/11/20 185 lb (83.9 kg)         Past Medical History:   Diagnosis Date    Allergic rhinitis     Anxiety 1/16/2020    Arthritis     left thumb    Asthma 4/10/2012    Bilateral carpal tunnel syndrome 8/19/2016    Depression     Dysmenorrhea     Fibroid     GERD (gastroesophageal reflux disease)     Headache(784.0) 4/10/2012    Hyperlipidemia     Hypertension     S/P gastric bypass     Status post coronary angiogram  Negative cardiac cath 2015 2015    Wears glasses      Social History     Socioeconomic History    Marital status:      Spouse name: Not on file    Number of children: 1    Years of education: Not on file    Highest education level: Not on file   Occupational History    Occupation: /Cook   Social Needs    Financial resource strain: Not on file   Waldo-Megan insecurity     Worry: Not on file     Inability: Not on file    Transportation needs     Medical: Not on file     Non-medical: Not on file   Tobacco Use    Smoking status: Former Smoker     Packs/day: 0.25     Years: 5.00     Pack years: 1.25     Types: Cigarettes     Last attempt to quit: 1980     Years since quittin.4    Smokeless tobacco: Former User     Quit date: 12/10/1994   Substance and Sexual Activity    Alcohol use: Yes     Alcohol/week: 0.0 standard drinks     Comment: socially-wine    Drug use: No    Sexual activity: Yes     Partners: Male   Lifestyle    Physical activity     Days per week: Not on file     Minutes per session: Not on file    Stress: Not on file   Relationships    Social connections     Talks on phone: Not on file     Gets together: Not on file     Attends Congregational service: Not on file     Active member of club or organization: Not on file     Attends meetings of clubs or organizations: Not on file     Relationship status: Not on file    Intimate partner violence     Fear of current or ex partner: Not on file     Emotionally abused: Not on file     Physically abused: Not on file     Forced sexual activity: Not on file   Other Topics Concern    Not on file   Social History Narrative    Not on file     Past Surgical History:   Procedure Laterality Date    BREAST SURGERY      Breast reduction    CARPAL TUNNEL RELEASE Left 2016     Left carpal tunnel release      CARPAL TUNNEL RELEASE Right 10/06/2016     SECTION      x1    COLONOSCOPY      HYSTERECTOMY      complete-ovaries out           Review of Systems   Constitutional: Negative for activity change, appetite change, chills, diaphoresis, fatigue and fever. HENT: Negative for congestion, dental problem, drooling, ear discharge, ear pain, hearing loss, mouth sores, nosebleeds, postnasal drip, rhinorrhea, sinus pressure, sneezing, sore throat, tinnitus and trouble swallowing.     Eyes: Negative for photophobia, pain, discharge, redness, itching and visual disturbance. Respiratory: Negative for apnea, cough, choking, chest tightness, shortness of breath, wheezing and stridor. Cardiovascular: Negative for chest pain, palpitations and leg swelling. Gastrointestinal: Negative for abdominal distention, blood in stool, constipation, diarrhea, nausea, rectal pain and vomiting. Genitourinary: Negative for decreased urine volume, difficulty urinating, dyspareunia, dysuria, enuresis, flank pain, frequency, genital sores, hematuria, menstrual problem, pelvic pain, urgency, vaginal bleeding and vaginal pain. Musculoskeletal: Negative for arthralgias, back pain, gait problem, joint swelling, myalgias, neck pain and neck stiffness. Skin: Negative for color change, pallor, rash and wound. Neurological: Positive for dizziness, light-headedness and headaches. Negative for tremors, seizures, syncope, facial asymmetry, speech difficulty, weakness and numbness. Hematological: Negative for adenopathy. Does not bruise/bleed easily. Psychiatric/Behavioral: Negative for agitation, behavioral problems, confusion, decreased concentration, dysphoric mood, hallucinations, self-injury, sleep disturbance and suicidal ideas. The patient is not nervous/anxious and is not hyperactive. Objective:   Physical Exam    Assessment:      1. Essential hypertension  Re ck bp improved  Ck labs imaging CT initially  - CBC; Future  - Comprehensive Metabolic Panel; Future  - Lipid, Fasting; Future  - CT HEAD WO CONTRAST    2. Dizziness  R/o stroke, TIA  - CBC; Future  - Comprehensive Metabolic Panel; Future  - C-REACTIVE PROTEIN; Future  - SEDIMENTATION RATE; Future  - EKG 12 Lead  - CT HEAD WO CONTRAST    3. Blurred vision  R/o Stroke , TIA  - CBC; Future  - Comprehensive Metabolic Panel; Future  - C-REACTIVE PROTEIN; Future  - SEDIMENTATION RATE; Future  - CT HEAD WO CONTRAST    4. Memory change  - CBC;  Future  - Comprehensive Metabolic Panel; Future  - C-REACTIVE PROTEIN; Future  - SEDIMENTATION RATE;  Future  - CT HEAD WO CONTRAST          Plan:   Discussed with pt, will need further imaging likely  If no source for problems identified on CT head  Including echo , mri    She is on ASA  bp improved on rechecking    ekg SR, poss pulmonary disease         Amarjit Rios MD

## 2020-11-27 ENCOUNTER — TELEPHONE (OUTPATIENT)
Dept: PRIMARY CARE CLINIC | Age: 60
End: 2020-11-27

## 2020-11-27 NOTE — TELEPHONE ENCOUNTER
----- Message from Jackie Cleaning sent at 11/25/2020  5:40 PM EST -----  Subject: Message to Provider    QUESTIONS  Information for Provider? [facility] [facility] expert instructed pt that a message was sent   to the office for Friday 111/27 and also instructed pt to leave a message   for the physician through [address] and explained that it is possible still   working on messages for the evening.   ---------------------------------------------------------------------------  --------------  Dale Pichardo INFO  What is the best way for the office to contact you? OK to leave message on   voicemail  Preferred Call Back Phone Number? [phone]  ---------------------------------------------------------------------------  --------------  SCRIPT ANSWERS  Relationship to Patient?  Self

## 2020-11-27 NOTE — TELEPHONE ENCOUNTER
Printed result and reviewed with Dr Maria L Roger. He states that he already spoke with both the radiologist and the patient. No further action required.

## 2020-11-30 DIAGNOSIS — H53.8 BLURRED VISION: ICD-10-CM

## 2020-11-30 DIAGNOSIS — R42 DIZZINESS: ICD-10-CM

## 2020-11-30 DIAGNOSIS — R41.3 MEMORY CHANGE: ICD-10-CM

## 2020-11-30 DIAGNOSIS — I10 ESSENTIAL HYPERTENSION: ICD-10-CM

## 2020-11-30 LAB
A/G RATIO: 1.6 (ref 1.1–2.2)
ALBUMIN SERPL-MCNC: 4.1 G/DL (ref 3.4–5)
ALP BLD-CCNC: 104 U/L (ref 40–129)
ALT SERPL-CCNC: 35 U/L (ref 10–40)
ANION GAP SERPL CALCULATED.3IONS-SCNC: 11 MMOL/L (ref 3–16)
AST SERPL-CCNC: 27 U/L (ref 15–37)
BILIRUB SERPL-MCNC: 0.3 MG/DL (ref 0–1)
BUN BLDV-MCNC: 12 MG/DL (ref 7–20)
C-REACTIVE PROTEIN: 2.5 MG/L (ref 0–5.1)
CALCIUM SERPL-MCNC: 9 MG/DL (ref 8.3–10.6)
CHLORIDE BLD-SCNC: 106 MMOL/L (ref 99–110)
CHOLESTEROL, FASTING: 184 MG/DL (ref 0–199)
CO2: 26 MMOL/L (ref 21–32)
CREAT SERPL-MCNC: 0.7 MG/DL (ref 0.6–1.1)
GFR AFRICAN AMERICAN: >60
GFR NON-AFRICAN AMERICAN: >60
GLOBULIN: 2.6 G/DL
GLUCOSE BLD-MCNC: 93 MG/DL (ref 70–99)
HCT VFR BLD CALC: 41.9 % (ref 36–48)
HDLC SERPL-MCNC: 66 MG/DL (ref 40–60)
HEMOGLOBIN: 13.6 G/DL (ref 12–16)
LDL CHOLESTEROL CALCULATED: 77 MG/DL
MCH RBC QN AUTO: 30 PG (ref 26–34)
MCHC RBC AUTO-ENTMCNC: 32.4 G/DL (ref 31–36)
MCV RBC AUTO: 92.5 FL (ref 80–100)
PDW BLD-RTO: 14.4 % (ref 12.4–15.4)
PLATELET # BLD: 268 K/UL (ref 135–450)
PMV BLD AUTO: 8.3 FL (ref 5–10.5)
POTASSIUM SERPL-SCNC: 3.4 MMOL/L (ref 3.5–5.1)
RBC # BLD: 4.53 M/UL (ref 4–5.2)
SEDIMENTATION RATE, ERYTHROCYTE: 7 MM/HR (ref 0–30)
SODIUM BLD-SCNC: 143 MMOL/L (ref 136–145)
TOTAL PROTEIN: 6.7 G/DL (ref 6.4–8.2)
TRIGLYCERIDE, FASTING: 204 MG/DL (ref 0–150)
VLDLC SERPL CALC-MCNC: 41 MG/DL
WBC # BLD: 5.6 K/UL (ref 4–11)

## 2020-12-03 ENCOUNTER — TELEPHONE (OUTPATIENT)
Dept: PRIMARY CARE CLINIC | Age: 60
End: 2020-12-03

## 2020-12-03 RX ORDER — ONDANSETRON 4 MG/1
TABLET, FILM COATED ORAL
Qty: 30 TABLET | Refills: 12 | Status: SHIPPED | OUTPATIENT
Start: 2020-12-03 | End: 2021-12-20 | Stop reason: SDUPTHER

## 2020-12-03 NOTE — TELEPHONE ENCOUNTER
Pt needs ondansetron. Pt said pharmacy says she does not have refills. Pt states someone on our office said they were going to call the pharmacy because our office says pt does have refills. Pt does not know who she spoke to in our office. Please return her call.

## 2020-12-07 ENCOUNTER — HOSPITAL ENCOUNTER (OUTPATIENT)
Dept: CT IMAGING | Age: 60
Discharge: HOME | End: 2020-12-07
Payer: COMMERCIAL

## 2020-12-07 PROCEDURE — 70498 CT ANGIOGRAPHY NECK: CPT

## 2020-12-07 PROCEDURE — 6360000004 HC RX CONTRAST MEDICATION: Performed by: FAMILY MEDICINE

## 2020-12-07 PROCEDURE — 70450 CT HEAD/BRAIN W/O DYE: CPT

## 2020-12-07 RX ADMIN — IOPAMIDOL 75 ML: 755 INJECTION, SOLUTION INTRAVENOUS at 08:20

## 2020-12-08 ENCOUNTER — TELEPHONE (OUTPATIENT)
Dept: PRIMARY CARE CLINIC | Age: 60
End: 2020-12-08

## 2020-12-08 NOTE — TELEPHONE ENCOUNTER
----- Message from Praveen Soler sent at 12/8/2020  9:58 AM EST -----  Subject: Message to Provider    QUESTIONS  Information for Provider? Patient has an order for an MRI brain scan w/   contrast. She needs this corrected to [facility] brain scan with or without   contrast\" She has an appt with an neurologist(12/15) and needs this done   ASAP.  ---------------------------------------------------------------------------  --------------  CALL BACK INFO  What is the best way for the office to contact you? OK to leave message on   voicemail  Preferred Call Back Phone Number? [phone]  ---------------------------------------------------------------------------  --------------  SCRIPT ANSWERS  Relationship to Patient?  Self

## 2020-12-11 ENCOUNTER — NURSE TRIAGE (OUTPATIENT)
Dept: OTHER | Facility: CLINIC | Age: 60
End: 2020-12-11

## 2020-12-11 ENCOUNTER — TELEPHONE (OUTPATIENT)
Dept: PRIMARY CARE CLINIC | Age: 60
End: 2020-12-11

## 2020-12-11 ENCOUNTER — HOSPITAL ENCOUNTER (OUTPATIENT)
Dept: MRI IMAGING | Age: 60
Discharge: HOME | End: 2020-12-11
Payer: COMMERCIAL

## 2020-12-11 ENCOUNTER — OFFICE VISIT (OUTPATIENT)
Dept: PRIMARY CARE CLINIC | Age: 60
End: 2020-12-11
Payer: COMMERCIAL

## 2020-12-11 VITALS
OXYGEN SATURATION: 98 % | HEIGHT: 62 IN | BODY MASS INDEX: 36.07 KG/M2 | HEART RATE: 88 BPM | SYSTOLIC BLOOD PRESSURE: 134 MMHG | DIASTOLIC BLOOD PRESSURE: 89 MMHG | TEMPERATURE: 97.4 F | WEIGHT: 196 LBS

## 2020-12-11 PROCEDURE — 6360000004 HC RX CONTRAST MEDICATION: Performed by: FAMILY MEDICINE

## 2020-12-11 PROCEDURE — A9577 INJ MULTIHANCE: HCPCS | Performed by: FAMILY MEDICINE

## 2020-12-11 PROCEDURE — 70553 MRI BRAIN STEM W/O & W/DYE: CPT

## 2020-12-11 PROCEDURE — 99214 OFFICE O/P EST MOD 30 MIN: CPT | Performed by: FAMILY MEDICINE

## 2020-12-11 RX ORDER — LORAZEPAM 1 MG/1
TABLET ORAL
Qty: 6 TABLET | Refills: 0 | Status: SHIPPED | OUTPATIENT
Start: 2020-12-11 | End: 2020-12-28 | Stop reason: ALTCHOICE

## 2020-12-11 RX ADMIN — GADOBENATE DIMEGLUMINE 17 ML: 529 INJECTION, SOLUTION INTRAVENOUS at 07:59

## 2020-12-11 ASSESSMENT — ENCOUNTER SYMPTOMS
APNEA: 0
PHOTOPHOBIA: 0
SORE THROAT: 0
SHORTNESS OF BREATH: 0
CONSTIPATION: 0
DIARRHEA: 0
ABDOMINAL DISTENTION: 0
EYE REDNESS: 0
BLOOD IN STOOL: 0
COLOR CHANGE: 0
EYE ITCHING: 0
STRIDOR: 0
VOMITING: 0
EYE DISCHARGE: 0
WHEEZING: 0
NAUSEA: 0
RECTAL PAIN: 0
SINUS PRESSURE: 0
TROUBLE SWALLOWING: 0
RHINORRHEA: 0
CHEST TIGHTNESS: 0
EYE PAIN: 0
COUGH: 0
CHOKING: 0
BACK PAIN: 0

## 2020-12-11 NOTE — TELEPHONE ENCOUNTER
Reason for Disposition   SEVERE headache, sudden-onset (i.e., reaching maximum intensity within seconds to 1 hour)    Answer Assessment - Initial Assessment Questions  1. LOCATION: \"Where does it hurt? \"       Front of head- more on the left. Pt reports a \"wave of pain\"     2. ONSET: \"When did the headache start? \" (Minutes, hours or days)       Started after pt had her MRI at 0720 this morning    3. PATTERN: \"Does the pain come and go, or has it been constant since it started? \"      Constant    4. SEVERITY: \"How bad is the pain? \" and \"What does it keep you from doing? \"  (e.g., Scale 1-10; mild, moderate, or severe)    - MILD (1-3): doesn't interfere with normal activities     - MODERATE (4-7): interferes with normal activities or awakens from sleep     - SEVERE (8-10): excruciating pain, unable to do any normal activities         Severe    5. RECURRENT SYMPTOM: \"Have you ever had headaches before? \" If so, ask: \"When was the last time? \" and \"What happened that time? \"       Yes- pt hasmigraines    6. CAUSE: \"What do you think is causing the headache? \"      Possibly related to MRI this morning    7. MIGRAINE: \"Have you been diagnosed with migraine headaches? \" If so, ask: \"Is this headache similar? \"       Yes - pt reports she has taken her migraine medication and it is not helping. Butyl 50/325/40 pt took 45 minutes ago    8. HEAD INJURY: \"Has there been any recent injury to the head? \"       Denies    9. OTHER SYMPTOMS: \"Do you have any other symptoms? \" (fever, stiff neck, eye pain, sore throat, cold symptoms)      Sweating, pt reports she is experiencing vertigo \"rocking\" (part of reason for MRI),nausea    10. PREGNANCY: \"Is there any chance you are pregnant? \" \"When was your last menstrual period? \"        Not asked. Pt reports that she had an MRI this morning for vertigo, memory loss, blurred vision and was given dye and had to fast since midnight. Pt has had some orange juice since getting home.     Protocols used:  HEADACHE-ADULT-[place]

## 2020-12-11 NOTE — PROGRESS NOTES
Subjective:      Patient ID: Sharon Loja is a 64 y.o. female. Did have headaches and nausea after she had MRI brain earlier  HPI 65 y/o female fu  She did have nausea/headaches after her mri of brain earlier  Today has taken butabarbital which helped a little. She is anxious and nervous     She has not had double vision since last visit  She has had occasional dizzy spells since last visit. She has not fallen since last visit    She has gained weight 50 lb over past 3 weeks due to in activity(stopped going   To gyn due to Covid)     And always feels sleepy in daytime/SNORES PER  COMMENTS  Goes to work  12:30 pm  Get off 10:30 pm  Goes to bed 11:00 pm  Falls to sleep 12 midnight      No radiation information found for this patient    Narrative    EXAMINATION:    MRI OF THE BRAIN WITHOUT AND WITH CONTRAST  12/11/2020 7:10 am         TECHNIQUE:    Multiplanar multisequence MRI of the head/brain was performed without and    with the administration of intravenous contrast.         COMPARISON:    None.         HISTORY:    ORDERING SYSTEM PROVIDED HISTORY: Vertigo    TECHNOLOGIST PROVIDED HISTORY:    Reason for Exam: She has been having blurred vision, dizzy spells,    lightheadedness since Sept. 2020. Bilateral blurred vision especially when    ever she is dizzy. Gets vertigo and room feels like it is spinning also. Gets    diplopia. The episodes seem. Like they are getting worse. Some times turning    the head can cause dizzy spells. She has noted laps in her memory at work. she    gets head aches intermittently. At times she makes frequent mistakes when    taking and sending messages. On the phone at work. Sometimes she can be    walking along and feels like. She will fall \"until I catch myself\".  Did    actually fall 1 1/2 months ago no apparent. injuries. She has been treated in    past for vertigo.     Acuity: Acute    Type of Exam: Initial         FINDINGS:    INTRACRANIAL STRUCTURES/VENTRICLES: Mega Salguero are no areas of restricted    diffusion identified to suggest an acute infarct. Dean Temitope is no acute    intracranial hemorrhage.  No mass effect or midline shift is present. There    are multiple foci of abnormal increased T2/FLAIR signal intensity within the    periventricular, subcortical and deep white matter of both hemispheres. There is no abnormal enhancement.  There is no cerebellopontine angle mass.         There is no ventriculomegaly or abnormal extra-axial fluid collection    present.  The proximal portions of the [place] demonstrate normal    flow voids.         There is a suggestion of a hypoenhancing lesion within the left half of the    pituitary gland measuring approximately 6 mm.  There is no suprasellar    extension.  Cavernous sinuses are normal in appearance.  The optic chiasm is    normal.         ORBITS: Limited evaluation of the orbits is unremarkable.         SINUSES: The paranasal sinuses and mastoid air cells are clear.         BONES/SOFT TISSUES: Bone marrow signal intensity is normal.              Impression    1. No acute intracranial process identified. 2. Mild chronic small vessel ischemic changes. 3. Possible 6 mm pituitary microadenoma.  A dedicated MRI of the pituitary    with and without contrast is recommended for further evaluation.                      Review of Systems   Constitutional: Negative for activity change, appetite change, chills, diaphoresis, fatigue and fever. HENT: Negative for congestion, dental problem, drooling, ear discharge, ear pain, hearing loss, mouth sores, nosebleeds, postnasal drip, rhinorrhea, sinus pressure, sneezing, sore throat, tinnitus and trouble swallowing. Eyes: Negative for photophobia, pain, discharge, redness, itching and visual disturbance. Respiratory: Negative for apnea, cough, choking, chest tightness, shortness of breath, wheezing and stridor.     Cardiovascular: Negative for chest pain, palpitations and leg swelling. Gastrointestinal: Negative for abdominal distention, blood in stool, constipation, diarrhea, nausea, rectal pain and vomiting. Genitourinary: Negative for decreased urine volume, difficulty urinating, dyspareunia, dysuria, enuresis, flank pain, frequency, genital sores, hematuria, menstrual problem, pelvic pain, urgency, vaginal bleeding and vaginal pain. Musculoskeletal: Negative for arthralgias, back pain, gait problem, joint swelling, myalgias, neck pain and neck stiffness. Skin: Negative for color change, pallor, rash and wound. Neurological: Positive for headaches. Negative for dizziness, tremors, seizures, syncope, facial asymmetry, speech difficulty, weakness, light-headedness and numbness. Hematological: Negative for adenopathy. Does not bruise/bleed easily. Psychiatric/Behavioral: Negative for agitation, behavioral problems, confusion, decreased concentration, dysphoric mood, hallucinations, self-injury, sleep disturbance and suicidal ideas. The patient is not nervous/anxious and is not hyperactive. Objective:   Physical Exam  Constitutional:       General: She is not in acute distress. Appearance: She is well-developed. She is not diaphoretic. HENT:      Head: Normocephalic and atraumatic. Right Ear: External ear normal.      Left Ear: External ear normal.      Nose: Nose normal.      Mouth/Throat:      Pharynx: No oropharyngeal exudate. Eyes:      General: No scleral icterus. Left eye: No discharge. Conjunctiva/sclera: Conjunctivae normal.      Pupils: Pupils are equal, round, and reactive to light. Neck:      Musculoskeletal: Normal range of motion and neck supple. Thyroid: No thyromegaly. Vascular: No JVD. Trachea: No tracheal deviation. Cardiovascular:      Rate and Rhythm: Normal rate and regular rhythm. Heart sounds: Normal heart sounds. No murmur. No friction rub. No gallop.     Pulmonary:      Effort: Pulmonary effort is normal. No respiratory distress. Breath sounds: Normal breath sounds. No stridor. No wheezing or rales. Chest:      Chest wall: No tenderness. Abdominal:      General: Bowel sounds are normal. There is no distension. Palpations: Abdomen is soft. There is no mass. Tenderness: There is no abdominal tenderness. There is no guarding or rebound. Musculoskeletal: Normal range of motion. General: No tenderness. Lymphadenopathy:      Cervical: No cervical adenopathy. Skin:     General: Skin is warm and dry. Coloration: Skin is not pale. Findings: No erythema or rash. Neurological:      Mental Status: She is alert and oriented to person, place, and time. Cranial Nerves: No cranial nerve deficit. Coordination: Coordination normal.      Deep Tendon Reflexes: Reflexes are normal and symmetric. Reflexes normal.   Psychiatric:         Behavior: Behavior normal.         Thought Content: Thought content normal.         Judgment: Judgment normal.         Assessment:     1. FEDERICO (obstructive sleep apnea)  Needs sleep study  - Maninder Felder MD, Sleep Medicine, [facility]    2. Pituitary mass [facility])  Will see neurology first  - MRI PITUITARY W WO CONTRAST; Future    3. Abnormal brain MRI  Will see neurology first  - LORazepam (ATIVAN) 1 MG tablet; Take one tab every 6 hrs as needed for anxiety  Dispense: 6 tablet; Refill: 0  - MRI PITUITARY W WO CONTRAST; Future    4. Anxiety  Short term use  - LORazepam (ATIVAN) 1 MG tablet; Take one tab every 6 hrs as needed for anxiety  Dispense: 6 tablet; Refill: 0    5. Weight gain  Discussed exercise, decrease food portions    - TSH without Reflex;  Future  - HEMOGLOBIN A1C; Future            Plan:      Tylenol for headaches        Ketty Franks MD

## 2020-12-15 ENCOUNTER — OFFICE VISIT (OUTPATIENT)
Dept: NEUROLOGY | Age: 60
End: 2020-12-15
Payer: COMMERCIAL

## 2020-12-15 VITALS
BODY MASS INDEX: 34.96 KG/M2 | SYSTOLIC BLOOD PRESSURE: 143 MMHG | DIASTOLIC BLOOD PRESSURE: 95 MMHG | HEIGHT: 62 IN | WEIGHT: 190 LBS | HEART RATE: 83 BPM

## 2020-12-15 PROCEDURE — 99205 OFFICE O/P NEW HI 60 MIN: CPT | Performed by: PSYCHIATRY & NEUROLOGY

## 2020-12-15 RX ORDER — TOPIRAMATE 25 MG/1
TABLET ORAL
Qty: 60 TABLET | Refills: 1 | Status: SHIPPED | OUTPATIENT
Start: 2020-12-15 | End: 2021-02-15 | Stop reason: SDUPTHER

## 2020-12-15 NOTE — PROGRESS NOTES
NEUROLOGY CONSULTATION     Chief Complaint   Patient presents with   [facility] Patient     dr Cari Haskins for mental confusion, eyes are crossing alot and writes information down wrong, having increase in migraines. happening for a while now but more frequently       HISTORY OF PRESENT ILLNESS :    Jose Cruz Silva is a 64 y.o. female who is referred by Dr. Agus London   History was obtained from patient  Patient was referred for evaluation of several neurological symptoms. Patient states that she started having headaches about 2 years ago. Now recently they have become more frequent and sometimes she gets a daily headache. She takes butalbital for these headaches. Headaches are described as throbbing. Bright light and loud noise may make it worse. She has visual scintillating scotoma with these headaches. She also has nausea with these headaches. Patient also complains of confusion and memory impairment. Sometimes she would write down the wrong things. She feels foggy at times. MRI brain was recently done and suggested a possible pituitary adenoma. Patient snores at night. She does not feel rested when she wakes up in the morning and takes daytime naps. She has generalized fatigue. Patient had gastric bypass surgery done more than 20 years ago. Comorbidities include hypertension. Patient smokes 1 or 2 cigarettes a day. Patient states that she has had episodic diplopia. The 2 images are one about the other. Sometimes closing 1 eye would not resolve the diplopia. She was seen at the [facility] and they were not able to find any definite diagnosis.     REVIEW OF SYSTEMS    Constitutional:  []   Chills   [x]  Fatigue   []  Fevers   []  Malaise   []  Weight loss     [] Denies all of the above    Eyes:  [x]  Double vision   [x]  Blurry vision     [] Denies all of the above    Ears, nose, mouth, throat, and face:   [] Hearing loss    []   Hoarseness      [x]  Snoring    [x]  Tinnitus       [] Denies all of the above     Respiratory:   []  Cough    []  Shortness of breath         [x] Denies all of the above     Cardiovascular:   []  Chest pain    []  Exertional chest pressure/discomfort           [x] Palpitations    []  Syncope     [] Denies all of the above    Gastrointestinal:   []  Abdominal pain   []  Constipation    []  Diarrhea    []   Dysphagia                      [x] Denies all of the above    Genitourinary:      []  Frequency   []  Hematuria     []  Urinary incontinence           [x] Denies all of the above     Hematologic/lymphatic:  []  Bleeding    []  Easy bruising   []  Anemia  [x] Denies all of the above     Musculoskeletal:   [] Back pain       []  Myalgias    [x]  Neck pain           [] Denies all of the above    Neurological: As noted in HPI    Behavioral/Psych:   [x] Anxiety    []  Depression     []  Mood swings     [] Denies all of the above     Endocrine:   []  Temperature intolerance     [x] Fatigue      [] Denies all of the above     Allergic/Immunologic:   [x] Hay fever    [] Denies all of the above     Past Medical History:   Diagnosis Date    Allergic rhinitis     Anxiety 1/16/2020    Arthritis     left thumb    Asthma 4/10/2012    Bilateral carpal tunnel syndrome 8/19/2016    Depression     Dysmenorrhea     Fibroid     GERD (gastroesophageal reflux disease)     Headache(784.0) 4/10/2012    Hyperlipidemia     Hypertension     S/P gastric bypass     Status post coronary angiogram 2015    Wears glasses      Family History   Problem Relation Age of Onset    Diabetes Father     Rheum Arthritis Neg Hx     Osteoarthritis Neg Hx     Asthma Neg Hx     Breast Cancer Neg Hx     Cancer Neg Hx     Heart Failure Neg Hx     High Cholesterol Neg Hx     Hypertension Neg Hx     Migraines Neg Hx     Ovarian Cancer Neg Hx     Rashes/Skin Problems Neg Hx     Seizures Neg Hx     Stroke Neg Hx     Thyroid Disease Neg Hx      Social History     Socioeconomic History    Marital status:      Spouse name: None    Number of children: 1    Years of education: None    Highest education level: None   Occupational History    Occupation: /Cook   Social Needs    Financial resource strain: None    Food insecurity     Worry: None     Inability: None    Transportation needs     Medical: None     Non-medical: None   Tobacco Use    Smoking status: Former Smoker     Packs/day: 0.25     Years: 5.00     Pack years: 1.25     Types: Cigarettes     Quit date: 1980     Years since quittin.4    Smokeless tobacco: Former User     Quit date: 12/10/1994   Substance and Sexual Activity    Alcohol use: Yes     Alcohol/week: 0.0 standard drinks     Comment: socially-wine    Drug use: No    Sexual activity: Yes     Partners: Male   Lifestyle    Physical activity     Days per week: None     Minutes per session: None    Stress: None   Relationships    Social connections     Talks on phone: None     Gets together: None     Attends Shinto service: None     Active member of club or organization: None     Attends meetings of clubs or organizations: None     Relationship status: None    Intimate partner violence     Fear of current or ex partner: None     Emotionally abused: None     Physically abused: None     Forced sexual activity: None   Other Topics Concern    None   Social History Narrative    None       PHYSICAL EXAMINATION:  BP (!) 143/95   Pulse 83   Ht 5' 2\" (1.575 m)   Wt 190 lb (86.2 kg)   BMI 34.75 kg/m²   Appearance: Well appearing, well nourished and in no distress  Mental Status Exam: Patient is alert, oriented to person, place and time.    Recent and remote memory is normal  Fund of Knowledge is normal  Attention/concentration is normal.   Speech : No dysarthria  Language : No aphasia  Funduscopic Exam: sharp disc margins  Cranial Nerves:   II: Visual fields:  Full to confrontation  III: Pupils:  equal, round, reactive to light  III,IV,VI: Extra Ocular Movements are intact. No nystagmus  V: Facial sensation is intact to pin prick and light touch  VII: Facial strength and movements: intact and symmetric smile,cheek puffing and eyebrow elevation  VIII: Hearing:  Intact to finger rub bilaterally  IX: Palate  elevation is symmetric  XI: Shoulder shrug is intact  XII: Tongue movements are normal  Motor:  Muscle tone and bulk are normal.   Strength is symmetrical 5/5 in all four extremities. Sensory: Intact to light touch and  pin prick in all four extremities  Coordination:  Normal  Finger to Nose and Heel to Shin bilaterally    . Reflexes:  DTR +2 and symmetric bilaterally  Plantar response: Flexor bilaterally  Gait: Gait and station is normal.   Romberg: negative  Vascular: No carotid bruit bilaterally        DATA:  LABS:  General Labs:    CBC:   Lab Results   Component Value Date    WBC 5.6 11/30/2020    RBC 4.53 11/30/2020    HGB 13.6 11/30/2020    HCT 41.9 11/30/2020    MCV 92.5 11/30/2020    MCH 30.0 11/30/2020    MCHC 32.4 11/30/2020    RDW 14.4 11/30/2020     11/30/2020    MPV 8.3 11/30/2020     BMP:    Lab Results   Component Value Date     11/30/2020    K 3.4 11/30/2020    K 3.6 10/16/2018     11/30/2020    CO2 26 11/30/2020    BUN 12 11/30/2020    LABALBU 4.1 11/30/2020    CREATININE 0.7 11/30/2020    CALCIUM 9.0 11/30/2020    GFRAA >60 11/30/2020    GFRAA >60 03/26/2013    LABGLOM >60 11/30/2020    GLUCOSE 93 11/30/2020     RADIOLOGY REVIEW:  I have reviewed radiology image(s) and reports(s) of: MRI brain    IMPRESSION :  1.  Pituitary adenoma. -MRI brain images were independently reviewed with the patient. There seems to be a small 6 mm pituitary adenoma present. I agree with plans to get a dedicated MRI of the pituitary gland. If it confirms this lesion patient may need endocrinology evaluation as well as neurosurgical evaluation.     2.  Migraine

## 2020-12-16 RX ORDER — AMLODIPINE BESYLATE 10 MG/1
TABLET ORAL
Qty: 30 TABLET | Refills: 2 | Status: SHIPPED | OUTPATIENT
Start: 2020-12-16 | End: 2021-04-05

## 2020-12-18 ENCOUNTER — TELEPHONE (OUTPATIENT)
Dept: PRIMARY CARE CLINIC | Age: 60
End: 2020-12-18

## 2020-12-18 RX ORDER — MONTELUKAST SODIUM 10 MG/1
TABLET ORAL
Qty: 30 TABLET | Refills: 5 | Status: SHIPPED | OUTPATIENT
Start: 2020-12-18 | End: 2021-06-29

## 2020-12-18 NOTE — TELEPHONE ENCOUNTER
Patient has UTI  It's a shooting pain could you call in something to [facility] on [address]

## 2020-12-18 NOTE — TELEPHONE ENCOUNTER
----- Message from Susanne Liu sent at 12/18/2020  7:27 AM EST -----  Subject: Message to Provider    QUESTIONS  Information for Provider? Patient called to advise that she has a yeast   infection & is requesting Dr. Markel Mcleod to call in a prescription for her.   ---------------------------------------------------------------------------  --------------  [address]  What is the best way for the office to contact you? OK to leave message on   voicemail  Preferred Call Back Phone Number? [phone]  ---------------------------------------------------------------------------  --------------  SCRIPT ANSWERS  Relationship to Patient?  Self

## 2020-12-19 RX ORDER — FLUCONAZOLE 150 MG/1
150 TABLET ORAL
Qty: 2 TABLET | Refills: 0 | Status: SHIPPED | OUTPATIENT
Start: 2020-12-19 | End: 2020-12-25

## 2020-12-21 ENCOUNTER — OFFICE VISIT (OUTPATIENT)
Dept: SLEEP MEDICINE | Age: 60
End: 2020-12-21
Payer: COMMERCIAL

## 2020-12-21 VITALS
HEIGHT: 62 IN | HEART RATE: 88 BPM | BODY MASS INDEX: 35.88 KG/M2 | DIASTOLIC BLOOD PRESSURE: 78 MMHG | RESPIRATION RATE: 16 BRPM | WEIGHT: 195 LBS | TEMPERATURE: 98.4 F | SYSTOLIC BLOOD PRESSURE: 122 MMHG | OXYGEN SATURATION: 97 %

## 2020-12-21 PROCEDURE — 99204 OFFICE O/P NEW MOD 45 MIN: CPT | Performed by: PSYCHIATRY & NEUROLOGY

## 2020-12-21 ASSESSMENT — SLEEP AND FATIGUE QUESTIONNAIRES
NECK CIRCUMFERENCE (INCHES): 13.5
ESS TOTAL SCORE: 11
HOW LIKELY ARE YOU TO NOD OFF OR FALL ASLEEP WHILE SITTING AND TALKING TO SOMEONE: 0
HOW LIKELY ARE YOU TO NOD OFF OR FALL ASLEEP WHILE SITTING QUIETLY AFTER LUNCH WITHOUT ALCOHOL: 0
HOW LIKELY ARE YOU TO NOD OFF OR FALL ASLEEP WHILE SITTING INACTIVE IN A PUBLIC PLACE: 2
HOW LIKELY ARE YOU TO NOD OFF OR FALL ASLEEP WHILE SITTING AND READING: 3
HOW LIKELY ARE YOU TO NOD OFF OR FALL ASLEEP WHILE LYING DOWN TO REST IN THE AFTERNOON WHEN CIRCUMSTANCES PERMIT: 3
HOW LIKELY ARE YOU TO NOD OFF OR FALL ASLEEP IN A CAR, WHILE STOPPED FOR A FEW MINUTES IN TRAFFIC: 0
HOW LIKELY ARE YOU TO NOD OFF OR FALL ASLEEP WHILE WATCHING TV: 2
HOW LIKELY ARE YOU TO NOD OFF OR FALL ASLEEP WHEN YOU ARE A PASSENGER IN A CAR FOR AN HOUR WITHOUT A BREAK: 1

## 2020-12-21 ASSESSMENT — ENCOUNTER SYMPTOMS
EYES NEGATIVE: 1
WHEEZING: 1
ALLERGIC/IMMUNOLOGIC NEGATIVE: 1
COUGH: 1
CHOKING: 1
GASTROINTESTINAL NEGATIVE: 1
APNEA: 1

## 2020-12-21 NOTE — PROGRESS NOTES
MD DAVE Duarte Board Certified in Sleep Medicine  Certified [facility] Sleep Medicine  Board Certified in Neurology [address]  1000 [facility] [address] [facility], R Kei Gomez [address]  [address] [facility]2[address] [facility]  [address], [address]           [address]  382 [facility] [address] [phone]    Subjective:     Patient ID: Klaudia Madrigal is a 64 y.o. female. Chief Complaint   Patient presents with    Sleep Apnea       HPI:        Klaudia Madrigal is a 64 y.o. female referred by Dr. Jayshree Lezama for a sleep evaluation. She complains of snoring, snorting, choking, periods of not breathing, tossing and turning, decreased concentration, excessive daytime sleepiness, feels sleepy during the day, take naps during the day but she denies knees buckling with laughing, completely or partially paralyzed while falling asleep or waking up, noisy environment, uncomfortable room temperature, uncomfortable bedding. Symptoms began several years ago, gradually worsening since that time. The patient's bed-partner confirmed the snoring and stopped breathing at night  SLEEP SCHEDULE: Goes to bed around 11:30 PM in the weekdays and 9:30 PM in the weekends. It usually takes the patient 120 minutes to fall asleep. The patient gets up 2-3 per night to go to the bathroom. The Patient finally gets up at 4:30 AM during the weekdays and 4:40 AM in the weekends. patient wakes up with dry mouth and sometimes morning headache. . the headache usually dull headache lasts 30-60 minutes. The patient has restless sleep with frequent arousals in addition to the Patient has significant daytime sleepiness. The Patient scored Total score: 11 on [place] State Sleepiness Scale ( more than 10 is indicative of daytime sleepiness)and 39 in fatigue scale ( more than 36 is indicative of daytime fatigue). The patient takes daily nap for 180 minutes and usually is not refreshing nap, sleeps between     Previous evaluation and treatment has included- none. Insomnia with sleep initiation and maintaining, usually takes the patient 2-3 hours to fall in sleep, wakes up 0ne times from   minutes eachtime to fall back in sleep, usually stays in bed trying to fall in sleep, this might happened 7 nights a week. The Patient has been obese for many years and tried, has gained 50 in the last 5 years, S/P Gastric sleeve 20 years ago. DOT/CDL - N/A  FAA/'keila - N/A  The patient HTN and asthma are stable. Previous Report(s) Reviewed: historical medical records       Social History     Socioeconomic History    Marital status:      Spouse name: Not on file    Number of children: 1    Years of education: Not on file    Highest education level: Not on file   Occupational History    Occupation: /Cook   Social Needs    Financial resource strain: Not on file    Food insecurity     Worry: Not on file     Inability: Not on file   mYwindow needs     Medical: Not on file     Non-medical: Not on file   Tobacco Use    Smoking status: Former Smoker     Packs/day: 0.25     Years: 5.00     Pack years: 1.25     Types: Cigarettes     Quit date: 1980     Years since quittin.5    Smokeless tobacco: Former User     Quit date: 12/10/1994   Substance and Sexual Activity    Alcohol use:  Yes     Alcohol/week: 0.0 standard drinks     Comment: socially-wine    Drug use: No    Sexual activity: Yes     Partners: Male   Lifestyle    Physical activity     Days per week: Not on file Minutes per session: Not on file    Stress: Not on file   Relationships    Social connections     Talks on phone: Not on file     Gets together: Not on file     Attends Confucianist service: Not on file     Active member of club or organization: Not on file     Attends meetings of clubs or organizations: Not on file     Relationship status: Not on file    Intimate partner violence     Fear of current or ex partner: Not on file     Emotionally abused: Not on file     Physically abused: Not on file     Forced sexual activity: Not on file   Other Topics Concern    Not on file   Social History Narrative    Not on file       Prior to Admission medications    Medication Sig Start Date End Date Taking? Authorizing Provider   fluconazole (DIFLUCAN) 150 MG tablet Take 1 tablet by mouth every 72 hours for 6 days 12/19/20 12/25/20 Yes Nidia Quiroz MD   montelukast (SINGULAIR) 10 MG tablet TAKE 1 TABLET BY MOUTH EVERY DAY 12/18/20  Yes Nidia Quiroz MD   amLODIPine (NORVASC) 10 MG tablet TAKE 1 TABLET BY MOUTH EVERY DAY 12/16/20  Yes Nidia Quiroz MD   topiramate (TOPAMAX) 25 MG tablet Take 1 tablet daily for 1 week and then 1 tablet by mouth twice daily 12/15/20  Yes Konrad Whiting MD   LORazepam (ATIVAN) 1 MG tablet Take one tab every 6 hrs as needed for anxiety 12/11/20 1/11/21 Yes Nidia Quiroz MD   ondansetron (ZOFRAN) 4 MG tablet TAKE 1 TABLET BY MOUTH EVERY 12 HOURS AS NEEDED or nausea or vomiting 12/3/20  Yes Nidia Quiroz MD   vitamin D (ERGOCALCIFEROL) 1.25 MG (56497 UT) CAPS capsule TAKE 1 CAPSULE BY MOUTH EVERY 30 DAYS 10/31/20  Yes Nidia Quiroz MD   benzonatate (TESSALON) 100 MG capsule Take 1 capsule by mouth 3 times daily as needed for Cough 10/29/20  Yes Nidia Quiroz MD   omeprazole (PRILOSEC) 40 MG delayed release capsule TAKE 1 CAPSULE BY MOUTH EVERY DAY 10/20/20  Yes Nidia Quiroz MD   triamcinolone (KENALOG) 0.1 % cream Apply topically 2 times daily.  10/5/20  Yes Nidia Quiroz MD Amoxicillin 500 MG TABS Take one tablet 3 times a day for 10 days 10/5/20  Yes William Mackay MD   hydrALAZINE (APRESOLINE) 50 MG tablet One tab twice a day 10/5/20  Yes William Mackay MD   butalbital-acetaminophen-caffeine (FIORICET, ESGIC) -40 MG per tablet TAKE 1 TABLET BY MOUTH EVERY 4 HOURS AS NEEDED FOR PAIN 10/2/20  Yes JAYSON Corado   potassium chloride (KLOR-CON M20) 20 MEQ extended release tablet TAKE 1 TABLET BY MOUTH TWICE A DAY 7/30/20  Yes William Mackay MD   fluticasone [facility]) 50 MCG/ACT nasal spray INHALE 1 SPRAY NASALLY EVERY DAY 6/30/20  Yes William Mackay MD   Nebulizers (NEBULIZER COMPRESSOR) MISC Use every 6 hours as needed with duoneb 6/11/20  Yes William Mackay MD   budesonide (PULMICORT) 0.5 MG/2ML nebulizer suspension Take 2 mLs by nebulization 2 times daily 6/11/20  Yes William Mackay MD   albuterol sulfate HFA (VENTOLIN HFA) 108 (90 Base) MCG/ACT inhaler Inhale 2 puffs into the lungs 4 times daily as needed for Wheezing 6/11/20  Yes William Mackay MD   PARoxetine (PAXIL) 20 MG tablet TAKE 1 TABLET BY MOUTH EVERY DAY 5/13/20  Yes William Mackay MD   ibuprofen (ADVIL;MOTRIN) 800 MG tablet TAKE ONE TABLET 3 TIMES A DAY AS NEEDED FOR PAIN 5/9/20  Yes William Mackay MD   Chlorhexidine Gluconate 2 % SOLN Apply to skin once daily prn 4/24/20  Yes Fransisco Yuan DO   albuterol sulfate HFA (PROAIR HFA) 108 (90 Base) MCG/ACT inhaler Inhale 2 puffs into the lungs every 6 hours as needed for Wheezing 3/31/20  Yes William Mackay MD   hydroCHLOROthiazide (HYDRODIURIL) 25 MG tablet TAKE 1 TABLET BY MOUTH EVERY DAY 2/19/20  Yes William Mackay MD   ondansetron (ZOFRAN-ODT) 4 MG disintegrating tablet TAKE 1 TABLET BY MOUTH EVERY 12 HOURS AS NEEDED OR NAUSEA OR VOMITING 12/29/19  Yes William Mackay MD   budesonide-formoterol [facility]) 160-4.5 MCG/ACT AERO Inhale 2 puffs into the lungs 2 times daily 9/27/19  Yes Etta Harrell MD  S/P gastric bypass     Status post coronary angiogram     Wears glasses        Past Surgical History:   Procedure Laterality Date    BREAST SURGERY      Breast reduction    CARPAL TUNNEL RELEASE Left 2016     Left carpal tunnel release      CARPAL TUNNEL RELEASE Right 10/06/2016     SECTION      x1    COLONOSCOPY      HYSTERECTOMY      complete-ovaries out       Family History   Problem Relation Age of Onset    Diabetes Father     Rheum Arthritis Neg Hx     Osteoarthritis Neg Hx     Asthma Neg Hx     Breast Cancer Neg Hx     Cancer Neg Hx     Heart Failure Neg Hx     High Cholesterol Neg Hx     Hypertension Neg Hx     Migraines Neg Hx     Ovarian Cancer Neg Hx     Rashes/Skin Problems Neg Hx     Seizures Neg Hx     Stroke Neg Hx     Thyroid Disease Neg Hx        Review of Systems   Constitutional: Positive for diaphoresis and fatigue. HENT: Positive for congestion. Eyes: Negative. Respiratory: Positive for apnea, cough, choking and wheezing. Cardiovascular: Negative. Gastrointestinal: Negative. Genitourinary: Positive for frequency. Musculoskeletal: Positive for arthralgias and myalgias. Skin: Negative. Allergic/Immunologic: Negative. Neurological: Positive for headaches (AM). Hematological: Negative. Psychiatric/Behavioral: Positive for agitation and dysphoric mood. The patient is nervous/anxious. Objective:     Vitals:  Weight BMI Neck circumference    Wt Readings from Last 3 Encounters:   20 195 lb (88.5 kg)   12/15/20 190 lb (86.2 kg)   20 196 lb (88.9 kg)    Body mass index is 35.67 kg/m².  Neck circumference: 13.5     BP HR SaO2   BP Readings from Last 3 Encounters:   20 122/78   12/15/20 (!) 143/95   20 134/89    Pulse Readings from Last 3 Encounters:   20 88   12/15/20 83   20 88    SpO2 Readings from Last 3 Encounters:   20 97%   20 98%   20 98% The mandibular molar Class :   [x]1 []2 []3      Mallampati I Airway Classification:   []1 []2 []3 [x]4        Physical Exam  Vitals signs and nursing note reviewed. Constitutional:       Appearance: Normal appearance. HENT:      Head: Atraumatic. Nose: Nose normal.      Mouth/Throat:      Comments: Mallampati class 4, no retrognathia or hypognathia , normal airflow in bilateral nostrils, no septum deviation , crowded oropharynx with low soft palate, high arched hard palate,no tonsils enlargement. Neck:      Musculoskeletal: Normal range of motion and neck supple. Cardiovascular:      Rate and Rhythm: Normal rate and regular rhythm. Heart sounds: Normal heart sounds. Pulmonary:      Effort: Pulmonary effort is normal.      Breath sounds: Normal breath sounds. Musculoskeletal: Normal range of motion. Skin:     General: Skin is warm. Neurological:      General: No focal deficit present. Psychiatric:         Mood and Affect: Mood normal.         Assessment:    Obstructive sleep apnea especially with snoring, snorting,  observed apnea, daytime sleepiness,  Mallampati class of 4 and obesity. Diagnosis Orders   1. Obstructive sleep apnea  Baseline Diagnostic Sleep Study    Sleep Study with PAP Titration   2. Benign essential HTN  Baseline Diagnostic Sleep Study   3. Class 2 severe obesity due to excess calories with serious comorbidity and body mass index (BMI) of 35.0 to 35.9 in adult [facility])  Baseline Diagnostic Sleep Study   4. H/O extrinsic asthma  Baseline Diagnostic Sleep Study   5. Insomnia, psychophysiological  Baseline Diagnostic Sleep Study     Plan:   Sleep compression between 11:30 PM till 7-7:30 AM, no naps. Patient was counseled about the pathophysiology of obstructive sleep apnea syndrome and the methods for evaluating its presence and severity. Patient was counseled to avoid driving and other potentially hazardous circumstances if the patient is experiencing excessive sleepiness. Treatment considerations include the use of nasal CPAP, oral dental appliance or a surgical intervention, which should be based on otolarygologic findings, In the meantime, the patient should be cautioned to avoid the use of alcohol or other depressant medications because of potential for increasing the duration and severity of apnea and cautioned regarding driving or operating and dangerous equipment if the patient is experiencing daytime sleepiness. .      Most likely treating the FEDERICO will have position impact on HTN and asthma control. We discussed the proportionality between weight and AHI. With 10% weight change, the AHI has a 27% proportionate change. With 20% weight change, the AHI has a 45-50% proportionate change. Orders Placed This Encounter   Procedures    Baseline Diagnostic Sleep Study    Sleep Study with PAP Titration       Return in about 3 months (around 3/21/2021) for insomnia, to review the PSG and CPAP usage, Reveiwing CPAP usage and compliance report and tro.     Faiza Anton MD  Medical Director [address] [facility]

## 2020-12-22 ENCOUNTER — HOSPITAL ENCOUNTER (OUTPATIENT)
Dept: MRI IMAGING | Age: 60
Discharge: HOME | End: 2020-12-22
Payer: COMMERCIAL

## 2020-12-22 DIAGNOSIS — R41.0 EPISODIC CONFUSION: ICD-10-CM

## 2020-12-22 DIAGNOSIS — R63.5 WEIGHT GAIN: ICD-10-CM

## 2020-12-22 LAB
TSH SERPL DL<=0.05 MIU/L-ACNC: 2.24 UIU/ML (ref 0.27–4.2)
VITAMIN B-12: 366 PG/ML (ref 211–911)

## 2020-12-22 PROCEDURE — 6360000004 HC RX CONTRAST MEDICATION: Performed by: FAMILY MEDICINE

## 2020-12-22 PROCEDURE — 70553 MRI BRAIN STEM W/O & W/DYE: CPT

## 2020-12-22 PROCEDURE — A9577 INJ MULTIHANCE: HCPCS | Performed by: FAMILY MEDICINE

## 2020-12-22 RX ADMIN — GADOBENATE DIMEGLUMINE 18 ML: 529 INJECTION, SOLUTION INTRAVENOUS at 08:17

## 2020-12-23 LAB
ESTIMATED AVERAGE GLUCOSE: 111.2 MG/DL
HBA1C MFR BLD: 5.5 %

## 2020-12-28 ENCOUNTER — VIRTUAL VISIT (OUTPATIENT)
Dept: ENDOCRINOLOGY | Age: 60
End: 2020-12-28
Payer: COMMERCIAL

## 2020-12-28 PROCEDURE — 99203 OFFICE O/P NEW LOW 30 MIN: CPT | Performed by: INTERNAL MEDICINE

## 2020-12-28 NOTE — PROGRESS NOTES
Patient ID:   Regis Means is a 64 y.o. female    Chief Complaint:   Regis Means is seen for initial evaluation of pituitary mass. Referred by Dr. Elis Denis MD    Pursuant to the emergency declaration under the [facility], [facility] waiver authority and the Coronavirus Preparedness and Response Supplemental Appropriations Act this visit was conducted after obtaining verbal consent, with the use of D[employer]. me interactive audio and video telecommunications system that permits real time communication between the patient and provider to substitute for an in-person clinic visit to reduce the patient's risk of exposure to COVID-19 and provide necessary medical care. Because this was a Telehealth visit, evaluation of the following organ systems was limited: Vitals, Constitutional, EENT, Resp, CV, GI, , MS, Neuro, Skin. Heme. Lymph, Imm. Regis Means was at home. Provider was at [facility] office. No one else was involved. The patient has also been advised to contact this office for worsening conditions or problems, and seek emergency medical treatment and/or call 911 if deemed necessary. Subjective:     MRI in Dec 2020 showed 6 mm pituitary adenoma. MRI pituitary protocol showed a nonenhancing/hypoenhancing lesion along the left sella measuring 6 x 8 x 5 mm (AP x TRANS x CC)- pituitary adenoma vs Rathke's cyst. There is slight rightward deviation of the infundibulum. No suprasellar mass. There is slight elevation of the left aspect of the optic chiasm due to the left internal carotid artery. She has been scheduled with Neurosurgeon on Jan 12 th 2021.      She has one kid   Denies breast tenderness, had reduction surgery in her 20's due to cysts   Menopause around 2016   No changes in ring size, shoe size   Denies easy bruisability or bleeding   She has daily headaches     Not on antipsychotics, SSRIs, estrogen supplements, antiemetics, Ca channel blockers, methyldopa, opioids  Denies drugs (heroine)     No family h/o endocrine tumors     The following portions of the patient's history were reviewed and updated as appropriate:        Family History   Problem Relation Age of Onset    Diabetes Father     Rheum Arthritis Neg Hx     Osteoarthritis Neg Hx     Asthma Neg Hx     Breast Cancer Neg Hx     Cancer Neg Hx     Heart Failure Neg Hx     High Cholesterol Neg Hx     Hypertension Neg Hx     Migraines Neg Hx     Ovarian Cancer Neg Hx     Rashes/Skin Problems Neg Hx     Seizures Neg Hx     Stroke Neg Hx     Thyroid Disease Neg Hx          Social History     Socioeconomic History    Marital status:      Spouse name: Not on file    Number of children: 1    Years of education: Not on file    Highest education level: Not on file   Occupational History    Occupation: /[employer]   Social Needs    Financial resource strain: Not on file    Food insecurity     Worry: Not on file     Inability: Not on file   Augment needs     Medical: Not on file     Non-medical: Not on file   Tobacco Use    Smoking status: Former Smoker     Packs/day: 0.25     Years: 5.00     Pack years: 1.25     Types: Cigarettes     Quit date: 1980     Years since quittin.5    Smokeless tobacco: Former User     Quit date: 12/10/1994   Substance and Sexual Activity    Alcohol use:  Yes     Alcohol/week: 0.0 standard drinks     Comment: socially-wine    Drug use: No    Sexual activity: Yes     Partners: Male   Lifestyle    Physical activity     Days per week: Not on file     Minutes per session: Not on file    Stress: Not on file   Relationships    Social connections     Talks on phone: Not on file     Gets together: Not on file     Attends Yarsanism service: Not on file     Active member of club or organization: Not on file     Attends meetings of clubs or organizations: Not on file     Relationship status: Not on file    Intimate partner violence     Fear of current or ex partner: Not on file     Emotionally abused: Not on file     Physically abused: Not on file     Forced sexual activity: Not on file   Other Topics Concern    Not on file   Social History Narrative    Not on file         Past Medical History:   Diagnosis Date    Allergic rhinitis     Anxiety 2020    Arthritis     left thumb    Asthma 4/10/2012    Asthma-chronic obstructive pulmonary disease overlap syndrome (HCC)     Bilateral carpal tunnel syndrome 2016    Depression     Dysmenorrhea     Fibroid     GERD (gastroesophageal reflux disease)     Headache(784.0) 4/10/2012    Hyperlipidemia     Hypertension     S/P gastric bypass     Status post coronary angiogram 2015    Wears glasses          Past Surgical History:   Procedure Laterality Date    BREAST SURGERY      Breast reduction    CARPAL TUNNEL RELEASE Left 2016     Left carpal tunnel release      CARPAL TUNNEL RELEASE Right 10/06/2016     SECTION      x1    COLONOSCOPY      HYSTERECTOMY      complete-ovaries out         Allergies   Allergen Reactions    Shellfish Allergy Anaphylaxis    Shellfish-Derived Products Shortness Of Breath and Palpitations    Lisinopril      Dry cough, no lip swelling or resp. sxs         Current Outpatient Medications:     montelukast (SINGULAIR) 10 MG tablet, TAKE 1 TABLET BY MOUTH EVERY DAY, Disp: 30 tablet, Rfl: 5    amLODIPine (NORVASC) 10 MG tablet, TAKE 1 TABLET BY MOUTH EVERY DAY, Disp: 30 tablet, Rfl: 2    topiramate (TOPAMAX) 25 MG tablet, Take 1 tablet daily for 1 week and then 1 tablet by mouth twice daily (Patient taking differently: Take 25 mg by mouth daily ), Disp: 60 tablet, Rfl: 1    ondansetron (ZOFRAN) 4 MG tablet, TAKE 1 TABLET BY MOUTH EVERY 12 HOURS AS NEEDED or nausea or vomiting, Disp: 30 tablet, Rfl: 12    vitamin D (ERGOCALCIFEROL) 1.25 MG (73756 UT) CAPS capsule, TAKE 1 CAPSULE BY MOUTH EVERY 30 DAYS, Disp: 4 capsule, Rfl: MULTIVITAMIN-MINERALS) tablet, Take 1 tablet by mouth daily, Disp: , Rfl:     EPINEPHrine (EPIPEN) 0.3 MG/0.3ML DELPHINE injection, Use as directed for allergic reaction, Disp: 2 Device, Rfl: 3      Review of Systems:  Constitutional: Negative for fever, chills, and unexpected weight change. HENT: Negative for congestion, ear pain, rhinorrhea,  sore throat and trouble swallowing. Eyes: Negative for photophobia, redness, itching. Respiratory: Negative for cough, shortness of breath and sputum. Cardiovascular: Negative for chest pain, palpitations and leg swelling. Gastrointestinal: Negative for nausea, vomiting, abdominal pain, diarrhea, constipation. Endocrine: Negative for cold intolerance, heat intolerance, polydipsia, polyphagia and polyuria. Genitourinary: Negative for dysuria, urgency, frequency, hematuria and flank pain. Musculoskeletal: Negative for myalgias, back pain, arthralgias and neck pain. Skin/Nail: Negative for rash, itching. Normal nails. Neurological: Negative for seizures, weakness, light-headedness, numbness and headaches. Hematological/ Lymph nodes: Negative for adenopathy. Does not bruise/bleed easily. Psychiatric/Behavioral: Negative for suicidal ideas, depression, anxiety, sleep disturbance and decreased concentration. Objective:   Physical Exam:  There were no vitals taken for this visit. Constitutional: Patient is oriented to person, place, and time. Patient appears well-developed and well-nourished. Pulmonary/Chest: Effort normal  Neurological: Patient is alert and oriented to person, place, and time. Psychiatric: Patient has a normal mood and affect.  Patient behavior is normal.     Lab Review:    Orders Only on 12/22/2020   Component Date Value Ref Range Status    Vitamin B-12 12/22/2020 366  211 - 911 pg/mL Final    Hemoglobin A1C 12/22/2020 5.5  See comment % Final    eAG 12/22/2020 111.2  mg/dL Final    TSH 12/22/2020 2.24  0.27 - 4.20 uIU/mL Final   Orders Only on 11/30/2020   Component Date Value Ref Range Status    Cholesterol, Fasting 11/30/2020 184  0 - 199 mg/dL Final    Triglyceride, Fasting 11/30/2020 204* 0 - 150 mg/dL Final    HDL 11/30/2020 66* 40 - 60 mg/dL Final    LDL Calculated 11/30/2020 77  <100 mg/dL Final    VLDL Cholesterol Calculated 11/30/2020 41  Not Established mg/dL Final    Sed Rate 11/30/2020 7  0 - 30 mm/Hr Final    CRP 11/30/2020 2.5  0.0 - 5.1 mg/L Final    Sodium 11/30/2020 143  136 - 145 mmol/L Final    Potassium 11/30/2020 3.4* 3.5 - 5.1 mmol/L Final    Chloride 11/30/2020 106  99 - 110 mmol/L Final    CO2 11/30/2020 26  21 - 32 mmol/L Final    Anion Gap 11/30/2020 11  3 - 16 Final    Glucose 11/30/2020 93  70 - 99 mg/dL Final    BUN 11/30/2020 12  7 - 20 mg/dL Final    CREATININE 11/30/2020 0.7  0.6 - 1.1 mg/dL Final    GFR Non- 11/30/2020 >60  >60 Final    GFR  11/30/2020 >60  >60 Final    Calcium 11/30/2020 9.0  8.3 - 10.6 mg/dL Final    Total Protein 11/30/2020 6.7  6.4 - 8.2 g/dL Final    Alb 11/30/2020 4.1  3.4 - 5.0 g/dL Final    Albumin/Globulin Ratio 11/30/2020 1.6  1.1 - 2.2 Final    Total Bilirubin 11/30/2020 0.3  0.0 - 1.0 mg/dL Final    Alkaline Phosphatase 11/30/2020 104  40 - 129 U/L Final    ALT 11/30/2020 35  10 - 40 U/L Final    AST 11/30/2020 27  15 - 37 U/L Final    Globulin 11/30/2020 2.6  g/dL Final    WBC 11/30/2020 5.6  4.0 - 11.0 K/uL Final    RBC 11/30/2020 4.53  4.00 - 5.20 M/uL Final    Hemoglobin 11/30/2020 13.6  12.0 - 16.0 g/dL Final    Hematocrit 11/30/2020 41.9  36.0 - 48.0 % Final    MCV 11/30/2020 92.5  80.0 - 100.0 fL Final    MCH 11/30/2020 30.0  26.0 - 34.0 pg Final    MCHC 11/30/2020 32.4  31.0 - 36.0 g/dL Final    RDW 11/30/2020 14.4  12.4 - 15.4 % Final    Platelets 60/66/5458 268  135 - 450 K/uL Final    MPV 11/30/2020 8.3  5.0 - 10.5 fL Final   Orders Only on 02/19/2020   Component Date Value Ref Range Status    Sodium 02/19/2020 144  136 - 145 mmol/L Final    Potassium 02/19/2020 3.7  3.5 - 5.1 mmol/L Final    Chloride 02/19/2020 105  99 - 110 mmol/L Final    CO2 02/19/2020 24  21 - 32 mmol/L Final    Anion Gap 02/19/2020 15  3 - 16 Final    Glucose 02/19/2020 111* 70 - 99 mg/dL Final    BUN 02/19/2020 12  7 - 20 mg/dL Final    CREATININE 02/19/2020 0.7  0.6 - 1.1 mg/dL Final    GFR Non- 02/19/2020 >60  >60 Final    GFR  02/19/2020 >60  >60 Final    Calcium 02/19/2020 9.6  8.3 - 10.6 mg/dL Final    Total Protein 02/19/2020 6.5  6.4 - 8.2 g/dL Final    Alb 02/19/2020 4.5  3.4 - 5.0 g/dL Final    Albumin/Globulin Ratio 02/19/2020 2.3* 1.1 - 2.2 Final    Total Bilirubin 02/19/2020 <0.2  0.0 - 1.0 mg/dL Final    Alkaline Phosphatase 02/19/2020 103  40 - 129 U/L Final    ALT 02/19/2020 28  10 - 40 U/L Final    AST 02/19/2020 34  15 - 37 U/L Final    Globulin 02/19/2020 2.0  g/dL Final    Cholesterol, Total 02/19/2020 185  0 - 199 mg/dL Final    Triglycerides 02/19/2020 207* 0 - 150 mg/dL Final    HDL 02/19/2020 73* 40 - 60 mg/dL Final    LDL Calculated 02/19/2020 71  <100 mg/dL Final    VLDL Cholesterol Calculated 02/19/2020 41  Not Established mg/dL Final           Assessment and Plan     Navjot Olguin was seen today for consultation. Diagnoses and all orders for this visit:    Pituitary adenoma ([facility] [address]  -     TSH without Reflex; Future  -     T4, Free; Future  -     T3, Free; Future  -     Comprehensive Metabolic Panel; Future  -     Prolactin; Future  -     Miscellaneous Sendout 1; Future  -     SALIVARY CORTISOL; Standing  -     Estrogens, total; Future  -     Follicle Stimulating Hormone; Future  -     Luteinizing Hormone; Future  -     ACTH;  Future        1: Pituitary microadenoma     Likely benign     Will do functional assessment     Check for CCP, TSH, FT4, FT3, Prolactin, macroprolactin, ACTH, IGF-1, LH, FSH, estradiol     Saliva cortisol x 3      Repeat MRI in 6-9 months or as per NS (she has an appointment on Jan 11th 2021)    RTC in 4 weeks     Electronically signed by Amelia Weber MD on 12/28/2020 at 11:43 AM

## 2020-12-29 ENCOUNTER — TELEPHONE (OUTPATIENT)
Dept: NEUROLOGY | Age: 60
End: 2020-12-29

## 2020-12-29 DIAGNOSIS — D35.2 PITUITARY ADENOMA (HCC): ICD-10-CM

## 2020-12-29 LAB
A/G RATIO: 1.8 (ref 1.1–2.2)
ALBUMIN SERPL-MCNC: 4.2 G/DL (ref 3.4–5)
ALP BLD-CCNC: 117 U/L (ref 40–129)
ALT SERPL-CCNC: 33 U/L (ref 10–40)
ANION GAP SERPL CALCULATED.3IONS-SCNC: 14 MMOL/L (ref 3–16)
AST SERPL-CCNC: 37 U/L (ref 15–37)
BILIRUB SERPL-MCNC: <0.2 MG/DL (ref 0–1)
BUN BLDV-MCNC: 11 MG/DL (ref 7–20)
CALCIUM SERPL-MCNC: 9.6 MG/DL (ref 8.3–10.6)
CHLORIDE BLD-SCNC: 106 MMOL/L (ref 99–110)
CO2: 26 MMOL/L (ref 21–32)
CREAT SERPL-MCNC: 0.7 MG/DL (ref 0.6–1.1)
FOLLICLE STIMULATING HORMONE: 64.2 MIU/ML
GFR AFRICAN AMERICAN: >60
GFR NON-AFRICAN AMERICAN: >60
GLOBULIN: 2.3 G/DL
GLUCOSE BLD-MCNC: 92 MG/DL (ref 70–99)
LUTEINIZING HORMONE: 36.7 MIU/ML
POTASSIUM SERPL-SCNC: 3.7 MMOL/L (ref 3.5–5.1)
PROLACTIN: 9.8 NG/ML
SODIUM BLD-SCNC: 146 MMOL/L (ref 136–145)
T3 FREE: 2.9 PG/ML (ref 2.3–4.2)
T4 FREE: 0.9 NG/DL (ref 0.9–1.8)
TOTAL PROTEIN: 6.5 G/DL (ref 6.4–8.2)
TSH SERPL DL<=0.05 MIU/L-ACNC: 1.99 UIU/ML (ref 0.27–4.2)

## 2020-12-31 LAB
ADRENOCORTICOTROPIC HORMONE: 12 PG/ML (ref 6–58)
MISCELLANEOUS LAB TEST ORDER: NORMAL

## 2021-01-04 DIAGNOSIS — D35.2 PITUITARY ADENOMA (HCC): ICD-10-CM

## 2021-01-04 LAB
ESTRADIOL LEVEL: 3.4 PG/ML
ESTROGEN TOTAL: 17.4 PG/ML
ESTRONE: 14 PG/ML

## 2021-01-06 LAB
CORTISOL SALIVARY: 0.28 UG/DL
MISCELLANEOUS LAB TEST ORDER: NORMAL

## 2021-01-09 LAB
CORTISOL SALIVARY: 0.27 UG/DL
CORTISOL SALIVARY: 0.29 UG/DL

## 2021-01-11 ENCOUNTER — TELEPHONE (OUTPATIENT)
Dept: PRIMARY CARE CLINIC | Age: 61
End: 2021-01-11

## 2021-01-11 ENCOUNTER — TELEPHONE (OUTPATIENT)
Dept: ENDOCRINOLOGY | Age: 61
End: 2021-01-11

## 2021-01-11 NOTE — TELEPHONE ENCOUNTER
----- Message from Harika Negrete sent at 1/8/2021  1:32 PM EST -----  Subject: Medication Problem    QUESTIONS  Name of Medication? fluconazole (DIFLUCAN) 150 MG tablet  Patient-reported dosage and instructions? Take 1 tablet by mouth every 72   hours for 6 days  What question or problem do you have with the medication? Pt stated that   PCP prescribed Diflucan to treat a yeast infection   but it's not working. Pt stated that it went away for a few days   but came back. Pt wants to know if PCP can prescribe something else. Preferred Pharmacy? [id number] [address] [phone]  Pharmacy phone number (if available)? [phone]  Additional Information for Provider?   ---------------------------------------------------------------------------  --------------  CALL BACK INFO  What is the best way for the office to contact you? OK to leave message on   voicemail  Preferred Call Back Phone Number? [phone]  ---------------------------------------------------------------------------  --------------  SCRIPT ANSWERS  Relationship to Patient?  Self

## 2021-01-11 NOTE — TELEPHONE ENCOUNTER
----- Message from Marina Diaz sent at 1/4/2021 10:43 AM EST -----  Subject: Message to Provider    QUESTIONS  Information for Provider? Pt needs FMLA paperwork \"updated\" and wants to   know the next time Vera Nickerson will be in office so she can get it certified   and  in person. ---------------------------------------------------------------------------  --------------  Neela Can INFO  What is the best way for the office to contact you? OK to leave message on   voicemail  Preferred Call Back Phone Number? [phone]  ---------------------------------------------------------------------------  --------------  SCRIPT ANSWERS  Relationship to Patient?  Self

## 2021-01-14 ENCOUNTER — HOSPITAL ENCOUNTER (OUTPATIENT)
Dept: SLEEP CENTER | Age: 61
Discharge: HOME | End: 2021-01-14
Payer: COMMERCIAL

## 2021-01-14 DIAGNOSIS — G47.33 OBSTRUCTIVE SLEEP APNEA: ICD-10-CM

## 2021-01-14 DIAGNOSIS — Z87.09 H/O EXTRINSIC ASTHMA: ICD-10-CM

## 2021-01-14 DIAGNOSIS — F51.04 INSOMNIA, PSYCHOPHYSIOLOGICAL: ICD-10-CM

## 2021-01-14 DIAGNOSIS — E66.01 CLASS 2 SEVERE OBESITY DUE TO EXCESS CALORIES WITH SERIOUS COMORBIDITY AND BODY MASS INDEX (BMI) OF 35.0 TO 35.9 IN ADULT (HCC): ICD-10-CM

## 2021-01-14 DIAGNOSIS — I10 BENIGN ESSENTIAL HTN: ICD-10-CM

## 2021-01-14 PROCEDURE — 95810 POLYSOM 6/> YRS 4/> PARAM: CPT | Performed by: PSYCHIATRY & NEUROLOGY

## 2021-01-14 PROCEDURE — 95810 POLYSOM 6/> YRS 4/> PARAM: CPT

## 2021-01-15 ENCOUNTER — VIRTUAL VISIT (OUTPATIENT)
Dept: PRIMARY CARE CLINIC | Age: 61
End: 2021-01-15
Payer: COMMERCIAL

## 2021-01-15 VITALS
BODY MASS INDEX: 35.88 KG/M2 | HEIGHT: 62 IN | SYSTOLIC BLOOD PRESSURE: 122 MMHG | WEIGHT: 195 LBS | DIASTOLIC BLOOD PRESSURE: 78 MMHG

## 2021-01-15 DIAGNOSIS — H53.8 BLURRED VISION: ICD-10-CM

## 2021-01-15 DIAGNOSIS — G47.33 OSA (OBSTRUCTIVE SLEEP APNEA): Primary | ICD-10-CM

## 2021-01-15 DIAGNOSIS — E23.6 PITUITARY MASS (HCC): ICD-10-CM

## 2021-01-15 PROCEDURE — 99212 OFFICE O/P EST SF 10 MIN: CPT | Performed by: FAMILY MEDICINE

## 2021-01-15 ASSESSMENT — PATIENT HEALTH QUESTIONNAIRE - PHQ9
SUM OF ALL RESPONSES TO PHQ QUESTIONS 1-9: 0
1. LITTLE INTEREST OR PLEASURE IN DOING THINGS: 0
2. FEELING DOWN, DEPRESSED OR HOPELESS: 0
SUM OF ALL RESPONSES TO PHQ9 QUESTIONS 1 & 2: 0

## 2021-01-15 NOTE — PROGRESS NOTES
HPI 65 y/o female who has been having excessive  Sleeping episodes, dizzy spells, memory loss, black out,blurred vision and headaches. She has had sleep study(results pending), MRI suggests pituitary mass likely  Pituitary microadenoma. Has IBS syndrome with diarrhea & recent colonoscopy    Requests FLMA form update    Onset sxs [id number]  FLMA  11/25/2020 approved    Need updated    With limitation  sxs persist    May miss up to 5 days every 30 calender days  It is necessary to be absent during flares                1. FEDERICO (obstructive sleep apnea)  Sleep study done results pendng      2. Pituitary mass [facility])  Has seen neurology, endocrinology, and neurosurgeon  Neurosurgeon awaiting results from labs ordered per  Endo      3.  Blurred vision    See above plans

## 2021-01-18 ENCOUNTER — TELEPHONE (OUTPATIENT)
Dept: PULMONOLOGY | Age: 61
End: 2021-01-18

## 2021-01-18 NOTE — TELEPHONE ENCOUNTER
Sleep study showed mild FEDERICO. AHI was 9.1  per hr. And O2 Desaturations to 80%. Dr Amanda Liu titration.     Message left for patient to call back

## 2021-01-18 NOTE — TELEPHONE ENCOUNTER
Provided results to patient, she expressed understanding. Transferred her to Sleep lab to schedule titration.

## 2021-01-19 ENCOUNTER — TELEPHONE (OUTPATIENT)
Dept: PULMONOLOGY | Age: 61
End: 2021-01-19

## 2021-01-19 NOTE — TELEPHONE ENCOUNTER
----- Message from Yolande Scott MA sent at 1/19/2021  8:52 AM EST -----  Regarding: Need PSG report  Please scan the patient's PSG report into Epic to ensure submission of the titration in a timely manner.     Thank you

## 2021-01-25 ENCOUNTER — VIRTUAL VISIT (OUTPATIENT)
Dept: ENDOCRINOLOGY | Age: 61
End: 2021-01-25
Payer: COMMERCIAL

## 2021-01-25 ENCOUNTER — OFFICE VISIT (OUTPATIENT)
Dept: PRIMARY CARE CLINIC | Age: 61
End: 2021-01-25
Payer: COMMERCIAL

## 2021-01-25 DIAGNOSIS — D35.2 PITUITARY ADENOMA (HCC): Primary | ICD-10-CM

## 2021-01-25 DIAGNOSIS — I10 ESSENTIAL HYPERTENSION: ICD-10-CM

## 2021-01-25 DIAGNOSIS — Z01.812 PRE-PROCEDURAL LABORATORY EXAMINATIONS: Primary | ICD-10-CM

## 2021-01-25 PROCEDURE — 99213 OFFICE O/P EST LOW 20 MIN: CPT | Performed by: INTERNAL MEDICINE

## 2021-01-25 PROCEDURE — 99211 OFF/OP EST MAY X REQ PHY/QHP: CPT | Performed by: NURSE PRACTITIONER

## 2021-01-25 RX ORDER — HYDROCHLOROTHIAZIDE 25 MG/1
TABLET ORAL
Qty: 30 TABLET | Refills: 5 | Status: SHIPPED | OUTPATIENT
Start: 2021-01-25 | End: 2021-06-29

## 2021-01-25 NOTE — PROGRESS NOTES
Patient presented to [facility] drive up clinic for preop testing. Patient was swabbed and given information advising them to remain isolated until procedure date.

## 2021-01-25 NOTE — PROGRESS NOTES
Patient ID:   Jessica Arce is a 64 y.o. female    Chief Complaint:   Jessica Arce is seen for an evaluation of pituitary microadenoma. Pursuant to the emergency declaration under the [address] and the [email or web address] and [employer] Act this visit was conducted after obtaining verbal consent, with the use of [employer]. me interactive audio and video telecommunications system that permits real time communication between the patient and provider to substitute for an in-person clinic visit to reduce the patient's risk of exposure to COVID-19 and provide necessary medical care. Because this was a Telehealth visit, evaluation of the following organ systems was limited: Vitals, Constitutional, EENT, Resp, CV, GI, , MS, Neuro, Skin. Heme. Lymph, Imm. Jessica Arce was at home. Provider was at [facility] office. No one else was involved. The patient has also been advised to contact this office for worsening conditions or problems, and seek emergency medical treatment and/or call 911 if deemed necessary. Neurosurgeon: Dr. Fatou Roque MD     Subjective:   MRI in Dec 2020 showed 6 mm pituitary adenoma.      Dec 2020: MRI pituitary protocol showed a nonenhancing/hypoenhancing lesion along the left sella measuring 6 x 8 x 5 mm (AP x TRANS x CC)- pituitary adenoma vs Rathke's cyst. There is slight rightward deviation of the infundibulum.  No suprasellar mass.  There is slight elevation of the left aspect of the optic chiasm due to the left internal carotid artery.         She gave birth to one child    Denies breast tenderness, had reduction surgery in her 20's due to cysts   Menopause around 2016   No changes in ring size, shoe size   Denies easy bruisability or bleeding   She has daily headaches      Not on antipsychotics, SSRIs, estrogen supplements, antiemetics, Ca channel blockers, methyldopa, opioids  Denies drugs (heroine)      No family h/o endocrine tumors         The following portions of the patient's history were reviewed and updated as appropriate:      Family History   Problem Relation Age of Onset    Diabetes Father     Rheum Arthritis Neg Hx     Osteoarthritis Neg Hx     Asthma Neg Hx     Breast Cancer Neg Hx     Cancer Neg Hx     Heart Failure Neg Hx     High Cholesterol Neg Hx     Hypertension Neg Hx     Migraines Neg Hx     Ovarian Cancer Neg Hx     Rashes/Skin Problems Neg Hx     Seizures Neg Hx     Stroke Neg Hx     Thyroid Disease Neg Hx          Social History     Socioeconomic History    Marital status:      Spouse name: Not on file    Number of children: 1    Years of education: Not on file    Highest education level: Not on file   Occupational History    Occupation: /[employer]   Social Needs    Financial resource strain: Not on file    Food insecurity     Worry: Not on file     Inability: Not on file   [email or web address] needs     Medical: Not on file     Non-medical: Not on file   Tobacco Use    Smoking status: Former Smoker     Packs/day: 0.25     Years: 5.00     Pack years: 1.25     Types: Cigarettes     Quit date: 1980     Years since quittin.5    Smokeless tobacco: Former User     Quit date: 12/10/1994   Substance and Sexual Activity    Alcohol use:  Yes     Alcohol/week: 0.0 standard drinks     Comment: socially-wine    Drug use: No    Sexual activity: Yes     Partners: Male   Lifestyle    Physical activity     Days per week: Not on file     Minutes per session: Not on file    Stress: Not on file   Relationships    Social connections     Talks on phone: Not on file     Gets together: Not on file     Attends Buddhist service: Not on file     Active member of club or organization: Not on file     Attends meetings of clubs or organizations: Not on file     Relationship status: Not on file    Intimate partner violence     Fear of current or ex partner: Not on file     Emotionally abused: Not on file     Physically abused: Not on file     Forced sexual activity: Not on file   Other Topics Concern    Not on file   Social History Narrative    Not on file         Past Medical History:   Diagnosis Date    Allergic rhinitis     Anxiety 2020    Arthritis     left thumb    Asthma 4/10/2012    Asthma-chronic obstructive pulmonary disease overlap syndrome (HCC)     Bilateral carpal tunnel syndrome 2016    Depression     Dysmenorrhea     Fibroid     GERD (gastroesophageal reflux disease)     Headache(784.0) 4/10/2012    Hyperlipidemia     Hypertension     S/P gastric bypass     Status post coronary angiogram 2015    Wears glasses          Past Surgical History:   Procedure Laterality Date    BREAST SURGERY      Breast reduction    CARPAL TUNNEL RELEASE Left 2016     Left carpal tunnel release      CARPAL TUNNEL RELEASE Right 10/06/2016     SECTION      x1    COLONOSCOPY      HYSTERECTOMY      complete-ovaries out         Allergies   Allergen Reactions    Shellfish Allergy Anaphylaxis    Shellfish-Derived Products Shortness Of Breath and Palpitations    Lisinopril      Dry cough, no lip swelling or resp. sxs         Current Outpatient Medications:     hydroCHLOROthiazide (HYDRODIURIL) 25 MG tablet, TAKE 1 TABLET BY MOUTH EVERY DAY, Disp: 30 tablet, Rfl: 5    Prenatal Vit-Fe Fumarate-FA (PRENATAL COMPLETE PO), Take by mouth, Disp: , Rfl:     montelukast (SINGULAIR) 10 MG tablet, TAKE 1 TABLET BY MOUTH EVERY DAY, Disp: 30 tablet, Rfl: 5    amLODIPine (NORVASC) 10 MG tablet, TAKE 1 TABLET BY MOUTH EVERY DAY, Disp: 30 tablet, Rfl: 2    topiramate (TOPAMAX) 25 MG tablet, Take 1 tablet daily for 1 week and then 1 tablet by mouth twice daily (Patient taking differently: Take 25 mg by mouth daily ), Disp: 60 tablet, Rfl: 1    ondansetron (ZOFRAN) 4 MG tablet, TAKE 1 TABLET BY MOUTH EVERY 12 HOURS AS NEEDED or nausea or vomiting, Disp: 30 tablet, Rfl: 12    vitamin D (ERGOCALCIFEROL) 1.25 MG (34577 UT) CAPS capsule, TAKE 1 CAPSULE BY MOUTH EVERY 30 DAYS, Disp: 4 capsule, Rfl: 3    benzonatate (TESSALON) 100 MG capsule, Take 1 capsule by mouth 3 times daily as needed for Cough, Disp: 90 capsule, Rfl: 3    omeprazole (PRILOSEC) 40 MG delayed release capsule, TAKE 1 CAPSULE BY MOUTH EVERY DAY, Disp: 30 capsule, Rfl: 5    triamcinolone (KENALOG) 0.1 % cream, Apply topically 2 times daily. , Disp: 28.4 g, Rfl: 1    hydrALAZINE (APRESOLINE) 50 MG tablet, One tab twice a day, Disp: 60 tablet, Rfl: 3    butalbital-acetaminophen-caffeine (FIORICET, ESGIC) -40 MG per tablet, TAKE 1 TABLET BY MOUTH EVERY 4 HOURS AS NEEDED FOR PAIN, Disp: 30 tablet, Rfl: 1    potassium chloride (KLOR-CON M20) 20 MEQ extended release tablet, TAKE 1 TABLET BY MOUTH TWICE A DAY, Disp: 60 tablet, Rfl: 5    fluticasone (FLONASE) 50 MCG/ACT nasal spray, INHALE 1 SPRAY NASALLY EVERY DAY, Disp: 1 Bottle, Rfl: 5    Nebulizers (NEBULIZER COMPRESSOR) MISC, Use every 6 hours as needed with duoneb, Disp: 1 each, Rfl: 0    budesonide (PULMICORT) 0.5 MG/2ML nebulizer suspension, Take 2 mLs by nebulization 2 times daily, Disp: 60 ampule, Rfl: 3    Chlorhexidine Gluconate 2 % SOLN, Apply to skin once daily prn, Disp: 250 mL, Rfl: 1    albuterol sulfate HFA (PROAIR HFA) 108 (90 Base) MCG/ACT inhaler, Inhale 2 puffs into the lungs every 6 hours as needed for Wheezing, Disp: 1 Inhaler, Rfl: 5    budesonide-formoterol (SYMBICORT) 160-4.5 MCG/ACT AERO, Inhale 2 puffs into the lungs 2 times daily, Disp: 1 Inhaler, Rfl: 0    EPINEPHrine (EPIPEN) 0.3 MG/0.3ML DELPHINE injection, Use as directed for allergic reaction, Disp: 2 Device, Rfl: 3    Review of Systems:    Constitutional: Negative for fever, chills, and unexpected weight change. HENT: Negative for congestion, ear pain, rhinorrhea,  sore throat and trouble swallowing.    Eyes: Negative for photophobia, redness, itching. Respiratory: Negative for cough, shortness of breath and sputum. Cardiovascular: Negative for chest pain, palpitations and leg swelling. Gastrointestinal: Negative for nausea, vomiting, abdominal pain, diarrhea, constipation. Endocrine: Negative for cold intolerance, heat intolerance, polydipsia, polyphagia and polyuria. Genitourinary: Negative for dysuria, urgency, frequency, hematuria and flank pain. Musculoskeletal: Negative for myalgias, back pain, arthralgias and neck pain. Skin/Nail: Negative for rash, itching. Normal nails. Neurological: Negative for seizures, weakness, light-headedness, numbness and headaches. Hematological/ Lymph nodes: Negative for adenopathy. Does not bruise/bleed easily. Psychiatric/Behavioral: Negative for suicidal ideas, depression, anxiety, sleep disturbance and decreased concentration. Objective:   Physical Exam:    There were no vitals taken for this visit. Constitutional: Patient is oriented to person, place, and time. Patient appears well-developed and well-nourished. Pulmonary/Chest: Effort normal    Musculoskeletal: Normal range of motion. Psychiatric: Patient has a normal mood and affect.  Patient behavior is normal.     Lab Review:      Orders Only on 01/04/2021   Component Date Value Ref Range Status    Cortisol, Saliva 01/04/2021 0.265  ug/dL Final    Cortisol, Saliva 01/04/2021 0.293  ug/dL Final    Cortisol, Saliva 01/04/2021 0.275  ug/dL Final   Orders Only on 12/29/2020   Component Date Value Ref Range Status    ACTH 12/29/2020 12  6 - 58 pg/mL Final    LH 12/29/2020 36.7  mIU/mL Final    FSH 12/29/2020 64.2  mIU/mL Final    Prolactin 12/29/2020 9.8  ng/mL Final    Sodium 12/29/2020 146* 136 - 145 mmol/L Final    Potassium 12/29/2020 3.7  3.5 - 5.1 mmol/L Final    Chloride 12/29/2020 106  99 - 110 mmol/L Final    CO2 12/29/2020 26  21 - 32 mmol/L Final    Anion Gap 12/29/2020 14  3 - 16 Final    CO2 11/30/2020 26  21 - 32 mmol/L Final    Anion Gap 11/30/2020 11  3 - 16 Final    Glucose 11/30/2020 93  70 - 99 mg/dL Final    BUN 11/30/2020 12  7 - 20 mg/dL Final    CREATININE 11/30/2020 0.7  0.6 - 1.1 mg/dL Final    GFR Non- 11/30/2020 >60  >60 Final    GFR  11/30/2020 >60  >60 Final    Calcium 11/30/2020 9.0  8.3 - 10.6 mg/dL Final    Total Protein 11/30/2020 6.7  6.4 - 8.2 g/dL Final    Alb 11/30/2020 4.1  3.4 - 5.0 g/dL Final    Albumin/Globulin Ratio 11/30/2020 1.6  1.1 - 2.2 Final    Total Bilirubin 11/30/2020 0.3  0.0 - 1.0 mg/dL Final    Alkaline Phosphatase 11/30/2020 104  40 - 129 U/L Final    ALT 11/30/2020 35  10 - 40 U/L Final    AST 11/30/2020 27  15 - 37 U/L Final    Globulin 11/30/2020 2.6  g/dL Final    WBC 11/30/2020 5.6  4.0 - 11.0 K/uL Final    RBC 11/30/2020 4.53  4.00 - 5.20 M/uL Final    Hemoglobin 11/30/2020 13.6  12.0 - 16.0 g/dL Final    Hematocrit 11/30/2020 41.9  36.0 - 48.0 % Final    MCV 11/30/2020 92.5  80.0 - 100.0 fL Final    MCH 11/30/2020 30.0  26.0 - 34.0 pg Final    MCHC 11/30/2020 32.4  31.0 - 36.0 g/dL Final    RDW 11/30/2020 14.4  12.4 - 15.4 % Final    Platelets 53/21/9367 268  135 - 450 K/uL Final    MPV 11/30/2020 8.3  5.0 - 10.5 fL Final   Orders Only on 02/19/2020   Component Date Value Ref Range Status    Sodium 02/19/2020 144  136 - 145 mmol/L Final    Potassium 02/19/2020 3.7  3.5 - 5.1 mmol/L Final    Chloride 02/19/2020 105  99 - 110 mmol/L Final    CO2 02/19/2020 24  21 - 32 mmol/L Final    Anion Gap 02/19/2020 15  3 - 16 Final    Glucose 02/19/2020 111* 70 - 99 mg/dL Final    BUN 02/19/2020 12  7 - 20 mg/dL Final    CREATININE 02/19/2020 0.7  0.6 - 1.1 mg/dL Final    GFR Non- 02/19/2020 >60  >60 Final    GFR  02/19/2020 >60  >60 Final    Calcium 02/19/2020 9.6  8.3 - 10.6 mg/dL Final    Total Protein 02/19/2020 6.5  6.4 - 8.2 g/dL Final    Alb 02/19/2020 4.5  3.4 - 5.0 g/dL Final    Albumin/Globulin Ratio 02/19/2020 2.3* 1.1 - 2.2 Final    Total Bilirubin 02/19/2020 <0.2  0.0 - 1.0 mg/dL Final    Alkaline Phosphatase 02/19/2020 103  40 - 129 U/L Final    ALT 02/19/2020 28  10 - 40 U/L Final    AST 02/19/2020 34  15 - 37 U/L Final    Globulin 02/19/2020 2.0  g/dL Final    Cholesterol, Total 02/19/2020 185  0 - 199 mg/dL Final    Triglycerides 02/19/2020 207* 0 - 150 mg/dL Final    HDL 02/19/2020 73* 40 - 60 mg/dL Final    LDL Calculated 02/19/2020 71  <100 mg/dL Final    VLDL Cholesterol Calculated 02/19/2020 41  Not Established mg/dL Final           Assessment and Plan       Alecia Burrell was seen today for results. Diagnoses and all orders for this visit:    Pituitary adenoma ([facility] [address]  -     Cortisol, urine, 24 hour; Future  -     MRI PITUITARY W WO CONTRAST;  Future          1: Pituitary microadenoma   Non functional   Likely benign      Initial hormonal work up normal Dec 2020      Saliva cortisol x 3 contaminated     Will do 24 hour urine cortisol     Repeat MRI in  9 months      RTC in 9 months        Electronically signed by Arlen Odom MD on 1/25/2021 at 10:54 AM

## 2021-01-26 LAB — SARS-COV-2: NOT DETECTED

## 2021-01-29 ENCOUNTER — HOSPITAL ENCOUNTER (OUTPATIENT)
Dept: SLEEP CENTER | Age: 61
Discharge: HOME | End: 2021-01-29
Payer: COMMERCIAL

## 2021-01-29 DIAGNOSIS — G47.33 OBSTRUCTIVE SLEEP APNEA: ICD-10-CM

## 2021-01-29 PROCEDURE — 95811 POLYSOM 6/>YRS CPAP 4/> PARM: CPT | Performed by: PSYCHIATRY & NEUROLOGY

## 2021-01-29 PROCEDURE — 95811 POLYSOM 6/>YRS CPAP 4/> PARM: CPT

## 2021-02-01 NOTE — PROGRESS NOTES
Sg Edwards         : 1960  [] [address]     [] [address]      [] [address]     [][facility]    [] [employer]  [] [facility]   [] Other:  Diagnosis: [x] FEDERICO (G47.33) [] CSA (G47.31) [] Apnea (G47.30)   Length of Need: [] 12 Months [x] 99 Months [] Other:    Machine (ELLEN!): [x] Respironics Dream Station      Auto [x] ResMed AirSense     Auto [] Other:     [x]  CPAP () [] Bilevel ()   Mode: [x] Auto [] Spontaneous    Mode: [] Auto [] Spontaneous           7 cm                 Comfort Settings:   - Ramp Pressure: 5 cmH2O                                        - Ramp time: 15 min                                     -  Flex/EPR - 3 full time                                    - For ResMed Bilevel (TiMax-4 sec   TiMin- 0.2 sec)     Humidifier: [x] Heated ()        [x] Water chamber replacement ()/ 1 per 6 months        Mask:  Please always start with the mask the patient used during the titraion   [x] Nasal () /1 per 3 months [x] Full Face () /1 per 3 months   [x] Patient choice -Size and fit mask [x] Patient Choice - Size and fit mask   [] Dispense:  [] Dispense:    [x] Headgear () / 1 per 3 months [x] Headgear () / 1 per 3 months   [x] Replacement Nasal Cushion ()/2 per month [x] Interface Replacement ()/1 per month   [x] Replacement Nasal Pillows ()/2 per month         Tubing: [x] Heated ()/1 per 3 months    [] Standard ()/1 per 3 months [] Other:           Filters: [x] Non-disposable ()/1 per 6 months     [x] Ultra-Fine, Disposable ()/2 per month        Miscellaneous: [x] Chin Strap ()/ 1 per 6 months [] O2 bleed-in:       LPM   [] Oximetry on CPAP/Bilevel []  Other:          Start Order Date: 21    MEDICAL JUSTIFICATION:  I, the undersigned, certify that the above prescribed supplies are medically necessary for this patients wellbeing.   In my opinion, the supplies are both reasonable and necessary in reference to accepted standards of medicalpractice in treatment of this patients condition.     Jagdish Carlson MD      NPI: [id number]       Order Signed Date: 02/01/21    Electronically signed by Jagdish Carlson MD on 2/1/2021 at 9:29 AM

## 2021-02-02 ENCOUNTER — TELEPHONE (OUTPATIENT)
Dept: PULMONOLOGY | Age: 61
End: 2021-02-02

## 2021-02-03 DIAGNOSIS — D35.2 PITUITARY ADENOMA (HCC): ICD-10-CM

## 2021-02-07 LAB
CORTISOL (UR), FREE: 5.4 UG/D
CORTISOL URINE, FREE (/G CRT): 4.83 UG/G CRT
CORTISOL,F,UG/L,U: 5.22 UG/L
CREATININE 24 HOUR URINE: 1127 MG/D (ref 500–1400)
CREATININE URINE: 108 MG/DL
HOURS COLLECTED: NORMAL
INTERPRETATION: NORMAL
URINE TOTAL VOLUME: 1000

## 2021-02-15 ENCOUNTER — TELEPHONE (OUTPATIENT)
Dept: PRIMARY CARE CLINIC | Age: 61
End: 2021-02-15

## 2021-02-15 ENCOUNTER — VIRTUAL VISIT (OUTPATIENT)
Dept: NEUROLOGY | Age: 61
End: 2021-02-15
Payer: COMMERCIAL

## 2021-02-15 DIAGNOSIS — J01.91 ACUTE RECURRENT SINUSITIS, UNSPECIFIED LOCATION: Primary | ICD-10-CM

## 2021-02-15 DIAGNOSIS — G47.33 OBSTRUCTIVE SLEEP APNEA: ICD-10-CM

## 2021-02-15 DIAGNOSIS — D35.2 PITUITARY ADENOMA (HCC): ICD-10-CM

## 2021-02-15 PROCEDURE — 99442 PR PHYS/QHP TELEPHONE EVALUATION 11-20 MIN: CPT | Performed by: PSYCHIATRY & NEUROLOGY

## 2021-02-15 RX ORDER — TOPIRAMATE 25 MG/1
TABLET ORAL
Qty: 30 TABLET | Refills: 2 | Status: SHIPPED | OUTPATIENT
Start: 2021-02-15 | End: 2021-09-09 | Stop reason: SDUPTHER

## 2021-02-15 RX ORDER — AZITHROMYCIN 250 MG/1
TABLET, FILM COATED ORAL
Qty: 1 PACKET | Refills: 0 | Status: SHIPPED | OUTPATIENT
Start: 2021-02-15 | End: 2021-03-22

## 2021-02-15 NOTE — TELEPHONE ENCOUNTER
Patient has runny nose and slight cough. Has received both Covid vaccines about three weeks ago. She wants Dr. Wendy Gordon to call in a Z-pack. Please call and advise.

## 2021-02-15 NOTE — PROGRESS NOTES
TELEHEALTH EVALUATION -- Audio/telephone evaluation (During WJFCA-29 public health emergency)    Due to COVID 19 outbreak, patient's office visit was converted to a virtual visit. Patient was contacted and agreed to proceed with a virtual visit via   Telephone Visit   The risks and benefits of converting to a virtual visit were discussed in light of the current infectious disease epidemic. Patient also understood that insurance coverage and co-pays are up to their individual insurance plans. Inderjit Lanza   Neurology followup    Subjective:   CC/HP  History was obtained from the patient. Patient states that the headaches are better on the topiramate. She had a sleep study done which confirmed obstructive sleep apnea. She is waiting to get the CPAP machine. MRI dedicated pituitary imaging showed pituitary adenoma versus Rathke's pouch. She was seen by the neurosurgeon who is recommended endocrinology work-up which has been done all the labs were normal and they are planning to repeat the MRI pituitary in 9 months to a year.     REVIEW OF SYSTEMS    Constitutional:  []   Chills   []  Fatigue   []  Fevers   []  Malaise   []  Weight loss     [x] Denies all of the above    Respiratory:   []  Cough    []  Shortness of breath         [x] Denies all of the above     Cardiovascular:   []  Chest pain    []  Exertional chest pressure/discomfort           [] Palpitations    []  Syncope     [x] Denies all of the above        Past Medical History:   Diagnosis Date    Allergic rhinitis     Anxiety 1/16/2020    Arthritis     left thumb    Asthma 4/10/2012    Asthma-chronic obstructive pulmonary disease overlap syndrome (HCC)     Bilateral carpal tunnel syndrome 8/19/2016    Depression     Dysmenorrhea     Fibroid     GERD (gastroesophageal reflux disease)     Headache(784.0) 4/10/2012    Hyperlipidemia     Hypertension     S/P gastric bypass     Status post coronary angiogram 2015    Wears glasses Not on file   Social History Narrative    Not on file        Objective:  Exam:  There were no vitals taken for this visit. Physical examination was not performed since this was a telephone visit      Data :  LABS:  General Labs:    CBC:   Lab Results   Component Value Date    WBC 5.6 11/30/2020    RBC 4.53 11/30/2020    HGB 13.6 11/30/2020    HCT 41.9 11/30/2020    MCV 92.5 11/30/2020    MCH 30.0 11/30/2020    MCHC 32.4 11/30/2020    RDW 14.4 11/30/2020     11/30/2020    MPV 8.3 11/30/2020     BMP:    Lab Results   Component Value Date     12/29/2020    K 3.7 12/29/2020    K 3.6 10/16/2018     12/29/2020    CO2 26 12/29/2020    BUN 11 12/29/2020    LABALBU 4.2 12/29/2020    CREATININE 0.7 12/29/2020    CALCIUM 9.6 12/29/2020    GFRAA >60 12/29/2020    GFRAA >60 03/26/2013    LABGLOM >60 12/29/2020    GLUCOSE 92 12/29/2020     RADIOLOGY REVIEW:  I have reviewed radiology report(s) of: MRI pituitary    Impression :  Migraine headache improved  Obstructive sleep apnea, waiting to get CPAP  Incidental pituitary adenoma versus Rathke's pouch    Plan :  Discussed with patient  Patient is taking topiramate 25 mg only once daily. I will continue the same dose for now. Use the least amount of Fioricet  Return in 3 months  Start using CPAP  Time spent with patient during this telephone visit was: 11 to 20 minutes      Please note a portion of  this chart was generated using dragon dictation software. Although every effort was made to ensure the accuracy of this automated transcription, some errors in transcription may have occurred. Pursuant to the emergency declaration under the [facility] waiver authority and the [employer] Act, this Virtual  Visit was conducted, with patient's consent, to reduce the patient's risk of exposure to COVID-19 and provide continuity of care for an established patient.

## 2021-03-02 DIAGNOSIS — R21 SKIN RASH: ICD-10-CM

## 2021-03-02 RX ORDER — CLOTRIMAZOLE AND BETAMETHASONE DIPROPIONATE 10; .64 MG/G; MG/G
CREAM TOPICAL
Qty: 15 G | Refills: 1 | Status: SHIPPED | OUTPATIENT
Start: 2021-03-02

## 2021-03-02 NOTE — TELEPHONE ENCOUNTER
Medication:   Requested Prescriptions     Pending Prescriptions Disp Refills    clotrimazole-betamethasone (LOTRISONE) 1-0.05 % cream [Pharmacy Med Name: CLOTRIMAZOLE-BETAMETHASONE CRM] 15 g 1     Sig: APPLY TO AFFECTED AREA TWICE A DAY        Last Filled:      Patient Phone Number: [phone] (home)     Last appt: 1/15/2021   Next appt: Visit date not found    Last OARRS:   RX Monitoring 8/17/2017   Attestation The Prescription Monitoring Report for this patient was reviewed today.

## 2021-03-06 ENCOUNTER — TELEPHONE (OUTPATIENT)
Dept: PRIMARY CARE CLINIC | Age: 61
End: 2021-03-06

## 2021-03-07 DIAGNOSIS — I10 ESSENTIAL HYPERTENSION: ICD-10-CM

## 2021-03-07 DIAGNOSIS — E87.6 HYPOKALEMIA: ICD-10-CM

## 2021-03-08 RX ORDER — POTASSIUM CHLORIDE 20 MEQ/1
20 TABLET, EXTENDED RELEASE ORAL 2 TIMES DAILY
Qty: 180 TABLET | Refills: 1 | Status: SHIPPED | OUTPATIENT
Start: 2021-03-08 | End: 2021-07-07

## 2021-03-22 ENCOUNTER — OFFICE VISIT (OUTPATIENT)
Dept: PRIMARY CARE CLINIC | Age: 61
End: 2021-03-22
Payer: COMMERCIAL

## 2021-03-22 ENCOUNTER — HOSPITAL ENCOUNTER (EMERGENCY)
Age: 61
Discharge: HOME | End: 2021-03-22
Attending: EMERGENCY MEDICINE
Payer: COMMERCIAL

## 2021-03-22 ENCOUNTER — APPOINTMENT (OUTPATIENT)
Dept: CT IMAGING | Age: 61
End: 2021-03-22
Payer: COMMERCIAL

## 2021-03-22 ENCOUNTER — NURSE TRIAGE (OUTPATIENT)
Dept: OTHER | Facility: CLINIC | Age: 61
End: 2021-03-22

## 2021-03-22 VITALS
HEIGHT: 62 IN | HEART RATE: 94 BPM | TEMPERATURE: 97.7 F | SYSTOLIC BLOOD PRESSURE: 109 MMHG | DIASTOLIC BLOOD PRESSURE: 71 MMHG | WEIGHT: 199 LBS | BODY MASS INDEX: 36.62 KG/M2

## 2021-03-22 VITALS
SYSTOLIC BLOOD PRESSURE: 131 MMHG | DIASTOLIC BLOOD PRESSURE: 74 MMHG | TEMPERATURE: 98.6 F | HEART RATE: 90 BPM | RESPIRATION RATE: 16 BRPM | HEIGHT: 62 IN | WEIGHT: 199.3 LBS | OXYGEN SATURATION: 96 % | BODY MASS INDEX: 36.67 KG/M2

## 2021-03-22 DIAGNOSIS — K11.20 SIALOADENITIS: Primary | ICD-10-CM

## 2021-03-22 DIAGNOSIS — R22.0 SUBMANDIBULAR SWELLING: Primary | ICD-10-CM

## 2021-03-22 DIAGNOSIS — R22.1 SUBMANDIBULAR SWELLING: Primary | ICD-10-CM

## 2021-03-22 LAB
ANION GAP SERPL CALCULATED.3IONS-SCNC: 13 MMOL/L (ref 3–16)
BASOPHILS ABSOLUTE: 0.1 K/UL (ref 0–0.2)
BASOPHILS RELATIVE PERCENT: 1 %
BUN BLDV-MCNC: 14 MG/DL (ref 7–20)
CALCIUM SERPL-MCNC: 8.9 MG/DL (ref 8.3–10.6)
CHLORIDE BLD-SCNC: 107 MMOL/L (ref 99–110)
CO2: 23 MMOL/L (ref 21–32)
CREAT SERPL-MCNC: 1 MG/DL (ref 0.6–1.1)
EOSINOPHILS ABSOLUTE: 0.1 K/UL (ref 0–0.6)
EOSINOPHILS RELATIVE PERCENT: 0.7 %
GFR AFRICAN AMERICAN: >60
GFR NON-AFRICAN AMERICAN: 57
GLUCOSE BLD-MCNC: 111 MG/DL (ref 70–99)
HCT VFR BLD CALC: 39.1 % (ref 36–48)
HEMOGLOBIN: 13 G/DL (ref 12–16)
LYMPHOCYTES ABSOLUTE: 3 K/UL (ref 1–5.1)
LYMPHOCYTES RELATIVE PERCENT: 36.9 %
MAGNESIUM: 2.5 MG/DL (ref 1.8–2.4)
MCH RBC QN AUTO: 30 PG (ref 26–34)
MCHC RBC AUTO-ENTMCNC: 33.1 G/DL (ref 31–36)
MCV RBC AUTO: 90.6 FL (ref 80–100)
MONOCYTES ABSOLUTE: 0.5 K/UL (ref 0–1.3)
MONOCYTES RELATIVE PERCENT: 6 %
NEUTROPHILS ABSOLUTE: 4.5 K/UL (ref 1.7–7.7)
NEUTROPHILS RELATIVE PERCENT: 55.4 %
PDW BLD-RTO: 13.9 % (ref 12.4–15.4)
PLATELET # BLD: 265 K/UL (ref 135–450)
PMV BLD AUTO: 7.7 FL (ref 5–10.5)
POTASSIUM REFLEX MAGNESIUM: 3.3 MMOL/L (ref 3.5–5.1)
RBC # BLD: 4.32 M/UL (ref 4–5.2)
SODIUM BLD-SCNC: 143 MMOL/L (ref 136–145)
WBC # BLD: 8.1 K/UL (ref 4–11)

## 2021-03-22 PROCEDURE — 85025 COMPLETE CBC W/AUTO DIFF WBC: CPT

## 2021-03-22 PROCEDURE — 6360000004 HC RX CONTRAST MEDICATION: Performed by: EMERGENCY MEDICINE

## 2021-03-22 PROCEDURE — 83735 ASSAY OF MAGNESIUM: CPT

## 2021-03-22 PROCEDURE — 96374 THER/PROPH/DIAG INJ IV PUSH: CPT

## 2021-03-22 PROCEDURE — 80048 BASIC METABOLIC PNL TOTAL CA: CPT

## 2021-03-22 PROCEDURE — 70491 CT SOFT TISSUE NECK W/DYE: CPT

## 2021-03-22 PROCEDURE — 99213 OFFICE O/P EST LOW 20 MIN: CPT | Performed by: NURSE PRACTITIONER

## 2021-03-22 PROCEDURE — 99284 EMERGENCY DEPT VISIT MOD MDM: CPT

## 2021-03-22 PROCEDURE — 6360000002 HC RX W HCPCS: Performed by: EMERGENCY MEDICINE

## 2021-03-22 RX ORDER — KETOROLAC TROMETHAMINE 30 MG/ML
15 INJECTION, SOLUTION INTRAMUSCULAR; INTRAVENOUS ONCE
Status: COMPLETED | OUTPATIENT
Start: 2021-03-22 | End: 2021-03-22

## 2021-03-22 RX ORDER — ESTRADIOL 0.04 MG/D
1 PATCH TRANSDERMAL WEEKLY
COMMUNITY
End: 2021-05-10

## 2021-03-22 RX ORDER — NAPROXEN 500 MG/1
500 TABLET ORAL 2 TIMES DAILY
Qty: 20 TABLET | Refills: 0 | Status: SHIPPED | OUTPATIENT
Start: 2021-03-22 | End: 2021-07-05

## 2021-03-22 RX ORDER — CEPHALEXIN 500 MG/1
500 CAPSULE ORAL 4 TIMES DAILY
Qty: 28 CAPSULE | Refills: 0 | Status: SHIPPED | OUTPATIENT
Start: 2021-03-22 | End: 2021-03-29

## 2021-03-22 RX ORDER — 0.9 % SODIUM CHLORIDE 0.9 %
1000 INTRAVENOUS SOLUTION INTRAVENOUS ONCE
Status: DISCONTINUED | OUTPATIENT
Start: 2021-03-22 | End: 2021-03-22 | Stop reason: HOSPADM

## 2021-03-22 RX ADMIN — IOPAMIDOL 75 ML: 755 INJECTION, SOLUTION INTRAVENOUS at 17:30

## 2021-03-22 RX ADMIN — KETOROLAC TROMETHAMINE 15 MG: 30 INJECTION, SOLUTION INTRAMUSCULAR at 17:17

## 2021-03-22 ASSESSMENT — PAIN SCALES - GENERAL
PAINLEVEL_OUTOF10: 0
PAINLEVEL_OUTOF10: 5
PAINLEVEL_OUTOF10: 0

## 2021-03-22 ASSESSMENT — PAIN DESCRIPTION - LOCATION: LOCATION: JAW

## 2021-03-22 ASSESSMENT — ENCOUNTER SYMPTOMS
TROUBLE SWALLOWING: 0
COUGH: 0
SHORTNESS OF BREATH: 0
FACIAL SWELLING: 1

## 2021-03-22 ASSESSMENT — PAIN DESCRIPTION - DESCRIPTORS: DESCRIPTORS: ACHING

## 2021-03-22 NOTE — TELEPHONE ENCOUNTER
Reason for Disposition   Swelling is painful to touch    Answer Assessment - Initial Assessment Questions  1. ONSET: \"When did the swelling start? \" (e.g., minutes, hours, days)      This AM    2. LOCATION: \"What part of the face is swollen? \"      Left side of face underneath chin    3. SEVERITY: \"How swollen is it? \"      Egg sized     4. ITCHING: \"Is there any itching? \" If so, ask: \"How much? \"   (Scale 1-10; mild, moderate or severe)      Yes and ears are itching too    5. PAIN: \"Is the swelling painful to touch? \" If so, ask: \"How painful is it? \"   (Scale 1-10; mild, moderate or severe)      Yes, 4/10, states it pulls on her ear    6. FEVER: \"Do you have a fever? \" If so, ask: \"What is it, how was it measured, and when did it start? \"       Denies    7. CAUSE: \"What do you think is causing the face swelling? \"      Unknown    8. RECURRENT SYMPTOM: \"Have you had face swelling before? \" If so, ask: \"When was the last time? \" \"What happened that time? \"      Denies    9. OTHER SYMPTOMS: \"Do you have any other symptoms? \" (e.g., toothache, leg swelling)      Denies    10. PREGNANCY: \"Is there any chance you are pregnant? \" \"When was your last menstrual period? \"        n/a    Protocols used: [facility]    Patient called starr at [facility])  with red flag complaint. Brief description of triage: see above    Triage indicates for patient to be seen today. Care advice provided, patient verbalizes understanding; denies any other questions or concerns; instructed to call back for any new or worsening symptoms. Writer provided warm transfer to heber Xiao at [facility] for appointment scheduling. Attention Provider: Thank you for allowing me to participate in the care of your patient. The patient was connected to triage in response to information provided to the [facility]. Please do not respond through this encounter as the response is not directed to a shared pool.

## 2021-03-22 NOTE — PROGRESS NOTES
Subjective     CC:   Chief Complaint   Patient presents with    Mass     left side under chin, very painful, difficult to chew also has ear pain       HPI    Lita Trevino is a 65 yo female, here for swelling under chin. Reports she woke up with lump on left jaw this morning. It is tender. When she chews the pain worsens and causes some ear pain. Denies fever/chills. Denies any pain or swelling in this area prior to this morning. Review of Systems   Constitutional: Negative for chills and fever. HENT: Positive for facial swelling. Negative for trouble swallowing. Respiratory: Negative for cough and shortness of breath. Cardiovascular: Negative for chest pain. Neurological: Negative for dizziness and headaches. Objective   Vitals:    03/22/21 1447   BP: 109/71   Pulse: 94   Temp: 97.7 °F (36.5 °C)   TempSrc: Temporal   Weight: 199 lb (90.3 kg)   Height: 5' 2\" (1.575 m)     Body mass index is 36.4 kg/m². Wt Readings from Last 3 Encounters:   03/22/21 199 lb (90.3 kg)   01/15/21 195 lb (88.5 kg)   12/21/20 195 lb (88.5 kg)     BP Readings from Last 3 Encounters:   03/22/21 109/71   01/15/21 122/78   12/21/20 122/78      Physical Exam  Constitutional:       General: She is not in acute distress. Appearance: Normal appearance. HENT:      Head: Normocephalic and atraumatic. Pulmonary:      Effort: Pulmonary effort is normal.   Skin:     General: Skin is warm and dry. Neurological:      General: No focal deficit present. Mental Status: She is alert and oriented to person, place, and time. Psychiatric:         Mood and Affect: Mood normal.         Behavior: Behavior normal.         Thought Content: Thought content normal.         Judgment: Judgment normal.          Diagnosis Orders   1. Submandibular swelling             Plan   1. Submandibular swelling: Firm and tender. Patient directed go to ER with concern for abscess. Patient agreeable with plan.     No follow-ups on file. OR sooner with questions, concerns, worsening symptoms      -Patient verbalized understanding and agreement to plan. Please note that this chart was generated using dragon dictation software. Although every effort was made to ensure the accuracy of this automated transcription, some errors in transcription may have occurred.

## 2021-03-22 NOTE — ED PROVIDER NOTES
[address] [facility]      Pt Name: Marlene Calles  MRN: [id number]  [place] 1960  Date of evaluation: 3/22/2021  Provider: Luis Antonio Khan MD    CHIEF COMPLAINT       Chief Complaint   Patient presents with    Mass     on left neck, possibly needs drainage, sent by PCP         HISTORY OF PRESENT ILLNESS   (Location/Symptom, Timing/Onset,Context/Setting, Quality, Duration, Modifying Factors, Severity)  Note limiting factors. Marlene Calles is a 64 y.o. female who presents to the emergency department for evaluation of left-sided neck swelling. Patient reports that she woke up this morning and had an area of swelling just below her jaw on the left side of her neck. Patient states that she does have some discomfort with swallowing, but she has been otherwise able to breathe and swallow normally today. She went to her primary care provider and was referred to the emergency department for concern for possible abscess. Patient states that she has poor dentition and most her upper teeth have been removed, but has had no increased tooth pain today. She denies any fever or chills. She has not had similar symptoms to this in the past.  No other complaints at the time of my assessment. NursingNotes were reviewed. REVIEW OF SYSTEMS    (2-9 systems for level 4, 10 or more for level 5)       Constitutional: No fever or chills. Ear/Nose/Mouth/Throat: Left-sided neck swelling  Respiratory: No cough, No shortness of breath, No sputum production. Cardiovascular: No chest pain. No palpitations. Gastrointestinal: No abdominal pain. No nausea or vomiting  Hematology/Lymphatics: No bleeding or bruising tendency. Immunologic: No malaise. No swollen glands. Musculoskeletal: No back pain. No joint pain. Integumentary: No rash. No abrasions. Neurologic: No headache. No focal numbness or weakness.       PAST MEDICAL HISTORY     Past Medical History:   Diagnosis Date    Allergic rhinitis     Anxiety 2020    Arthritis     left thumb    Asthma 4/10/2012    Asthma-chronic obstructive pulmonary disease overlap syndrome (HCC)     Bilateral carpal tunnel syndrome 2016    Depression     Dysmenorrhea     Fibroid     GERD (gastroesophageal reflux disease)     Headache(784.0) 4/10/2012    Hyperlipidemia     Hypertension     S/P gastric bypass     Status post coronary angiogram     Wears glasses          SURGICALHISTORY       Past Surgical History:   Procedure Laterality Date    BREAST SURGERY      Breast reduction    CARPAL TUNNEL RELEASE Left 2016     Left carpal tunnel release      CARPAL TUNNEL RELEASE Right 10/06/2016     SECTION      x1    COLONOSCOPY      HYSTERECTOMY      complete-ovaries out         CURRENT MEDICATIONS       Previous Medications    ALBUTEROL SULFATE HFA (PROAIR HFA) 108 (90 BASE) MCG/ACT INHALER    Inhale 2 puffs into the lungs every 6 hours as needed for Wheezing    AMLODIPINE (NORVASC) 10 MG TABLET    TAKE 1 TABLET BY MOUTH EVERY DAY    BUDESONIDE (PULMICORT) 0.5 MG/2ML NEBULIZER SUSPENSION    Take 2 mLs by nebulization 2 times daily    BUDESONIDE-FORMOTEROL (SYMBICORT) 160-4.5 MCG/ACT AERO    Inhale 2 puffs into the lungs 2 times daily    BUTALBITAL-ACETAMINOPHEN-CAFFEINE (FIORICET, ESGIC) -40 MG PER TABLET    TAKE 1 TABLET BY MOUTH EVERY 4 HOURS AS NEEDED FOR PAIN    CHLORHEXIDINE GLUCONATE 2 % SOLN    Apply to skin once daily prn    CLOTRIMAZOLE-BETAMETHASONE (LOTRISONE) 1-0.05 % CREAM    APPLY TO AFFECTED AREA TWICE A DAY    EPINEPHRINE (EPIPEN) 0.3 MG/0.3ML DELPHINE INJECTION    Use as directed for allergic reaction    ESTRADIOL (CLIMARA) 0.0375 MG/24HR    Place 1 patch onto the skin once a week    FLUTICASONE (FLONASE) 50 MCG/ACT NASAL SPRAY    INHALE 1 SPRAY NASALLY EVERY DAY    HYDRALAZINE (APRESOLINE) 50 MG TABLET    One tab twice a day    HYDROCHLOROTHIAZIDE (HYDRODIURIL) 25 MG TABLET    TAKE 1 TABLET BY MOUTH EVERY DAY    MONTELUKAST (SINGULAIR) 10 MG TABLET    TAKE 1 TABLET BY MOUTH EVERY DAY    NEBULIZERS (NEBULIZER COMPRESSOR) MISC    Use every 6 hours as needed with duoneb    OMEPRAZOLE (PRILOSEC) 40 MG DELAYED RELEASE CAPSULE    TAKE 1 CAPSULE BY MOUTH EVERY DAY    ONDANSETRON (ZOFRAN) 4 MG TABLET    TAKE 1 TABLET BY MOUTH EVERY 12 HOURS AS NEEDED or nausea or vomiting    POTASSIUM CHLORIDE (KLOR-CON M20) 20 MEQ EXTENDED RELEASE TABLET    Take 1 tablet by mouth 2 times daily    PRENATAL VIT-FE FUMARATE-FA (PRENATAL COMPLETE PO)    Take by mouth    TOPIRAMATE (TOPAMAX) 25 MG TABLET    Take 1 tablet daily    TRIAMCINOLONE (KENALOG) 0.1 % CREAM    Apply topically 2 times daily.     VITAMIN D (ERGOCALCIFEROL) 1.25 MG (18361 UT) CAPS CAPSULE    TAKE 1 CAPSULE BY MOUTH EVERY 30 DAYS       ALLERGIES     Shellfish allergy, Shellfish-derived products, and Lisinopril    FAMILY HISTORY       Family History   Problem Relation Age of Onset    Diabetes Father     Rheum Arthritis Neg Hx     Osteoarthritis Neg Hx     Asthma Neg Hx     Breast Cancer Neg Hx     Cancer Neg Hx     Heart Failure Neg Hx     High Cholesterol Neg Hx     Hypertension Neg Hx     Migraines Neg Hx     Ovarian Cancer Neg Hx     Rashes/Skin Problems Neg Hx     Seizures Neg Hx     Stroke Neg Hx     Thyroid Disease Neg Hx           SOCIAL HISTORY       Social History     Socioeconomic History    Marital status:      Spouse name: None    Number of children: 1    Years of education: None    Highest education level: None   Occupational History    Occupation: /Cook   Social Needs    Financial resource strain: None    Food insecurity     Worry: None     Inability: None    Transportation needs     Medical: None     Non-medical: None   Tobacco Use    Smoking status: Former Smoker     Packs/day: 0.25     Years: 5.00     Pack years: 1.25     Types: Cigarettes     Quit date: 7/1/1980     Years Ultrasound and MRI are read by the radiologist. Plain radiographic images are visualized and preliminarily interpreted by the emergency physician with the below findings:      Interpretation per the Radiologist below, if available at the time ofthis note:    CT SOFT TISSUE NECK W CONTRAST   Final Result   Mild intra glandular ductal dilatation the left submandibular with what   appears to be 0.5 mm proximal submandibular duct. No evidence for abscess               ED BEDSIDE ULTRASOUND:   Performed by ED Physician - none    LABS:  Labs Reviewed   BASIC METABOLIC PANEL W/ REFLEX TO MG FOR LOW K - Abnormal; Notable for the following components:       Result Value    Potassium reflex Magnesium 3.3 (*)     Glucose 111 (*)     GFR Non- 57 (*)     All other components within normal limits    Narrative:     Performed at:  [address] "[employer] [address]   Phone [phone]   MAGNESIUM - Abnormal; Notable for the following components:    Magnesium 2.50 (*)     All other components within normal limits    Narrative:     Performed at:  [address] [employer] [address]   Phone [phone]   CBC WITH AUTO DIFFERENTIAL    Narrative:     Performed at:  [address] "[employer] [address]   Phone [phone]       All other labs were within normal range or not returned as of this dictation. EMERGENCY DEPARTMENT COURSE and DIFFERENTIAL DIAGNOSIS/MDM:   Vitals:    Vitals:    03/22/21 1558   Pulse: 90   Resp: 16   Temp: 98.6 °F (37 °C)   TempSrc: Oral   SpO2: 96%   Weight: 199 lb 4.7 oz (90.4 kg)   Height: 5' 2\" (1.575 m)         Medical decision making: This is a 56-year-old female who presents for evaluation of left neck swelling just below the left mandible. On exam she is well-appearing, afebrile, with normal vital signs.   She is noted to have a masslike fullness just below the left mandible, without any evidence of airway impingement, or difficulty swallowing. No overlying erythema or warmth, but the area is tender to palpation. Differential diagnosis includes sallow phthisis, sial adenitis, neck abscess, periapical dental abscess. There is no intraoral tenderness or swelling noted. CBC and BMP are within normal limits. Magnesium was slightly high at 2.5, treated with IV fluids. CT of the soft tissue of neck with contrast was ordered. Patient has shellfish allergy, but states that she has tolerated contrast in the past without premedication. On review of medical record, patient had CT contrast both in June 2019, as well as December 2020 and had no reaction with either administration. CT shows mild intraglandular ductal dilatation of left submandibular gland with a 1.5 mm proximal submandibular duct stone. No concerning features for abscess or significant surrounding inflammation. Patient was counseled regarding diagnosis, will treat with naproxen as well as Keflex given increased tenderness palpation of the left neck. She is instructed to return if she does develop any difficulty breathing or swallowing, any midline anterior neck pain or swelling, or overlying erythema to the region. Recommended follow-up with primary care in 1 to 2 days if not improved follow-up with ENT this week as well. Patient is understanding and agreeable plan of care, discharged in stable condition. CRITICAL CARE TIME   Total Critical Care time was 0 minutes, excluding separately reportable procedures. There was a high probability of clinically significant/life threatening deterioration in the patient's condition which required my urgent intervention. CONSULTS:  None    PROCEDURES:  Unless otherwise noted below, none         FINAL IMPRESSION      1.  Sialoadenitis          DISPOSITION/PLAN   DISPOSITION Decision To Discharge 03/22/2021 06:13:19 PM PATIENT REFERRED TO:  Darrion Galindo MD  [facility]  [address] [facility] [address] [place] [address]    Schedule an appointment as soon as possible for a visit in 2 days      [address]  [address] [facility]  [phone]    Schedule an appointment as soon as possible for a visit in 3 days        DISCHARGE MEDICATIONS:  New Prescriptions    CEPHALEXIN (KEFLEX) 500 MG CAPSULE    Take 1 capsule by mouth 4 times daily for 7 days    NAPROXEN (NAPROSYN) 500 MG TABLET    Take 1 tablet by mouth 2 times daily for 10 days          (Please note that portions of this note were completed with a voice recognition program.Efforts were made to edit the dictations but occasionally words are mis-transcribed.)    Aristides Rizvi MD (electronically signed)  Attending Emergency Physician        Aristides Rizvi MD  03/22/21 7134

## 2021-03-23 ENCOUNTER — TELEPHONE (OUTPATIENT)
Dept: PULMONOLOGY | Age: 61
End: 2021-03-23

## 2021-03-23 NOTE — TELEPHONE ENCOUNTER
Message left for patient to return our call reagarding her CPAP order. She chose [facility] as her DME but haven't heard from her since sending the order. Wanted to see if she got the CPAP and if needs a f/up appt for compliancy or is she being followed by [facility].

## 2021-03-24 ENCOUNTER — TELEPHONE (OUTPATIENT)
Dept: ENT CLINIC | Age: 61
End: 2021-03-24

## 2021-03-25 ENCOUNTER — NURSE TRIAGE (OUTPATIENT)
Dept: OTHER | Facility: CLINIC | Age: 61
End: 2021-03-25

## 2021-03-29 ENCOUNTER — OFFICE VISIT (OUTPATIENT)
Dept: PRIMARY CARE CLINIC | Age: 61
End: 2021-03-29
Payer: COMMERCIAL

## 2021-03-29 VITALS
SYSTOLIC BLOOD PRESSURE: 123 MMHG | HEART RATE: 86 BPM | OXYGEN SATURATION: 98 % | BODY MASS INDEX: 37.24 KG/M2 | WEIGHT: 203.6 LBS | TEMPERATURE: 97.2 F | DIASTOLIC BLOOD PRESSURE: 85 MMHG

## 2021-03-29 DIAGNOSIS — G47.33 OSA (OBSTRUCTIVE SLEEP APNEA): ICD-10-CM

## 2021-03-29 DIAGNOSIS — K11.5 SIALOLITHIASIS OF SUBMANDIBULAR GLAND: Primary | ICD-10-CM

## 2021-03-29 DIAGNOSIS — D49.7 PITUITARY TUMOR: ICD-10-CM

## 2021-03-29 PROCEDURE — 99213 OFFICE O/P EST LOW 20 MIN: CPT | Performed by: FAMILY MEDICINE

## 2021-04-04 DIAGNOSIS — I10 ESSENTIAL HYPERTENSION: ICD-10-CM

## 2021-04-05 RX ORDER — AMLODIPINE BESYLATE 10 MG/1
TABLET ORAL
Qty: 30 TABLET | Refills: 2 | Status: SHIPPED | OUTPATIENT
Start: 2021-04-05 | End: 2021-06-29

## 2021-04-05 NOTE — TELEPHONE ENCOUNTER
Medication:   Requested Prescriptions     Pending Prescriptions Disp Refills    amLODIPine (NORVASC) 10 MG tablet [Pharmacy Med Name: AMLODIPINE BESYLATE 10 MG TAB] 30 tablet 2     Sig: TAKE 1 TABLET BY MOUTH EVERY DAY        Last Filled:      Patient Phone Number: [phone] (home)     Last appt: 3/29/2021   Next appt: Visit date not found    Last OARRS:   RX Monitoring 8/17/2017   Attestation The Prescription Monitoring Report for this patient was reviewed today.

## 2021-04-07 ENCOUNTER — OFFICE VISIT (OUTPATIENT)
Dept: ENT CLINIC | Age: 61
End: 2021-04-07
Payer: COMMERCIAL

## 2021-04-07 VITALS
BODY MASS INDEX: 36.8 KG/M2 | TEMPERATURE: 98.5 F | SYSTOLIC BLOOD PRESSURE: 116 MMHG | HEART RATE: 97 BPM | DIASTOLIC BLOOD PRESSURE: 69 MMHG | WEIGHT: 200 LBS | HEIGHT: 62 IN

## 2021-04-07 DIAGNOSIS — E04.1 THYROID NODULE: ICD-10-CM

## 2021-04-07 DIAGNOSIS — K11.20 SIALADENITIS: Primary | ICD-10-CM

## 2021-04-07 DIAGNOSIS — R22.1 NECK MASS: ICD-10-CM

## 2021-04-07 PROCEDURE — 99204 OFFICE O/P NEW MOD 45 MIN: CPT | Performed by: OTOLARYNGOLOGY

## 2021-04-07 NOTE — PROGRESS NOTES
[facility]      Patient Name: [facility] Record Number:  [id number]  Primary Care Physician:  Margo Bobby MD  Date of Consultation: 4/7/2021    Chief Complaint: Neck swelling        HISTORY OF PRESENT ILLNESS  [facility] is a(n) 64 y.o. female who presents for evaluation of left neck swelling. The patient says that about 3 weeks ago she suddenly had left-sided neck swelling and pain. She was referred to the emergency room by her primary care doctor and a CT scan showed evidence of sialoadenitis and possibly salivary stone of the left submandibular gland. She was treated with antibiotics. She thinks is a little bit better, but it still is tender and swollen. She is never had this happen. She is not diabetic. She does admit that she does not drink enough fluids. She does not have any rheumatologic disorders. She is a smoker, but is cutting back significantly. REVIEW OF SYSTEMS  As above    PHYSICAL EXAM  GENERAL: No Acute Distress, Alert and Oriented, no Hoarseness, strong voice  EYES: EOMI, Anti-icteric  HENT:   Head: Normocephalic and atraumatic. Face:  Symmetric, facial nerve intact, no sinus tenderness  Right Ear: Normal external ear, normal external auditory canal, intact tympanic membrane with normal mobility and aerated middle ear  Left Ear: Normal external ear, normal external auditory canal, intact tympanic membrane with normal mobility and aerated middle ear  Mouth/Oral Cavity: Patient seems to have fairly clear saliva coming from the left submandibular duct. I do not appreciate clear salivary stone in the floor mouth. Oropharynx/Larynx:  normal oropharynx, normal tonsils; normal larynx/nasopharynx on mirror exam  Nose:Normal external nasal appearance. Anterior rhinoscopy shows a normal septum. Normal turbinates.   Normal mucosa   NECK: Mild tenderness to palpation of the left submandibular gland without overlying skin changes. It does feel to be bigger than the right side. CHEST: Normal respiratory effort, no retractions, breathing comfortably  SKIN: No rashes, normal appearing skin, no evidence of skin lesions/tumors  Neuro:  cranial nerve II-XII intact; normal gait  Cardio:  no edema        RADIOLOGY  Summary of findings:  I reviewed the CT scan that the patient had of her neck on March 22, 2021. She has some swelling of the left submandibular gland consistent with a sialoadenitis. She has a small 2 mm stone adjacent to the submandibular gland. She has a couple of small posterior mandibular bony prominences that appear to be attached to the mandible do not appear to be stones in the anterior floor mouth submandibular stones. Also appears to have a hyperlucent nodule on the right side of the thyroid      ASSESSMENT/PLAN  1. Sialadenitis  Patient has sialoadenitis with a small stone. I do not think she has an active infection, but still has some sialostasis which is probably contributing to the ongoing swelling. I would like for her to increase her oral hydration as she does admit to intermittently not drinking a lot of fluids. I would like for her to massage the submandibular gland. I would like get a posttreatment scan in about 3 weeks. I am interested to see whether or not the submandibular stone is still there. A stone the size likely passed on its own. Even if it just moves forward some, I might be able to remove this transorally. If it remains in place and she still having symptoms, we would either have to remove the submandibular gland to take it out or have her see someone who does sialoendoscopy  - CT SOFT TISSUE NECK WO CONTRAST; Future    2. Neck mass  Large submandibular gland  - CT SOFT TISSUE NECK WO CONTRAST; Future    3. Thyroid nodule  When I reviewed the CT scan she appears to have a hyperlucency of the thyroid consistent with a nodule. It may be cystic.   We will get an ultrasound to stratify it further  - US THYROID; Future  - CT SOFT TISSUE NECK WO CONTRAST; Future             I have performed a head and neck physical exam personally or was physically present during the key or critical portions of the service. This note was generated completely or in part utilizing Dragon dictation speech recognition software. Occasionally, words are mistranscribed and despite editing, the text may contain inaccuracies due to incorrect word recognition. If further clarification is needed please contact the office at [phone].

## 2021-04-08 ENCOUNTER — TELEPHONE (OUTPATIENT)
Dept: PRIMARY CARE CLINIC | Age: 61
End: 2021-04-08

## 2021-04-08 NOTE — TELEPHONE ENCOUNTER
----- Message from Hilario Jameskimo sent at 4/8/2021  7:35 AM EDT -----  Subject: Message to Provider    QUESTIONS  Information for Provider? Pt had an appt with Dr. Tisha Bourgeois (ENT) on 4/7. Pt   would like Dr. Campbell Enter to know they found a tumor on her thyroid in   addition to the stone on saliva gland. Can be reached after 2 PM.   ---------------------------------------------------------------------------  --------------  CALL BACK INFO  What is the best way for the office to contact you? OK to leave message on   voicemail  Preferred Call Back Phone Number? [phone]  ---------------------------------------------------------------------------  --------------  SCRIPT ANSWERS  Relationship to Patient?  Self

## 2021-04-12 ENCOUNTER — TELEPHONE (OUTPATIENT)
Dept: PRIMARY CARE CLINIC | Age: 61
End: 2021-04-12

## 2021-04-12 ENCOUNTER — HOSPITAL ENCOUNTER (OUTPATIENT)
Dept: ULTRASOUND IMAGING | Age: 61
Discharge: HOME | End: 2021-04-12
Payer: COMMERCIAL

## 2021-04-12 ENCOUNTER — TELEPHONE (OUTPATIENT)
Dept: ENT CLINIC | Age: 61
End: 2021-04-12

## 2021-04-12 DIAGNOSIS — E04.1 THYROID NODULE: ICD-10-CM

## 2021-04-12 PROCEDURE — 76536 US EXAM OF HEAD AND NECK: CPT

## 2021-04-12 NOTE — TELEPHONE ENCOUNTER
----- Message from Jhonny Doyle sent at 4/12/2021  9:52 AM EDT -----  Subject: Message to Provider    QUESTIONS  Information for Provider? PT was advised by Dr Wendy Gordon to call him when   she received the result of thyroid scan   she has those results and would like a call back from Dr Wendy Gordon to   discuss them.   ---------------------------------------------------------------------------  --------------  CALL BACK INFO  What is the best way for the office to contact you? OK to leave message on   voicemail  Preferred Call Back Phone Number? [phone]  ---------------------------------------------------------------------------  --------------  SCRIPT ANSWERS  Relationship to Patient?  Self

## 2021-04-12 NOTE — TELEPHONE ENCOUNTER
I called the patient to let her know that her thyroid ultrasound showed only colloid cyst.  No further work-up would be needed for this.   She will follow-up with the CT scan to evaluate the submandibular salivary stone

## 2021-04-13 DIAGNOSIS — G44.229 CHRONIC TENSION-TYPE HEADACHE, NOT INTRACTABLE: ICD-10-CM

## 2021-04-15 ENCOUNTER — TELEPHONE (OUTPATIENT)
Dept: ENDOCRINOLOGY | Age: 61
End: 2021-04-15

## 2021-04-15 RX ORDER — BUTALBITAL, ACETAMINOPHEN AND CAFFEINE 50; 325; 40 MG/1; MG/1; MG/1
TABLET ORAL
Qty: 30 TABLET | Refills: 1 | Status: SHIPPED | OUTPATIENT
Start: 2021-04-15 | End: 2021-10-24 | Stop reason: SDUPTHER

## 2021-04-15 NOTE — TELEPHONE ENCOUNTER
PT lvm that she has received notice that she has a cyst on her thyroid, wanted to make Dr. Gutierrez Leyva aware

## 2021-04-22 DIAGNOSIS — R05.3 CHRONIC COUGH: ICD-10-CM

## 2021-04-26 RX ORDER — BENZONATATE 100 MG/1
CAPSULE ORAL
Qty: 90 CAPSULE | Refills: 3 | Status: SHIPPED | OUTPATIENT
Start: 2021-04-26 | End: 2021-09-07

## 2021-04-29 ENCOUNTER — APPOINTMENT (OUTPATIENT)
Dept: ULTRASOUND IMAGING | Age: 61
End: 2021-04-29
Payer: COMMERCIAL

## 2021-04-29 ENCOUNTER — OFFICE VISIT (OUTPATIENT)
Dept: ENT CLINIC | Age: 61
End: 2021-04-29
Payer: COMMERCIAL

## 2021-04-29 ENCOUNTER — HOSPITAL ENCOUNTER (OUTPATIENT)
Dept: CT IMAGING | Age: 61
Discharge: HOME | End: 2021-04-29
Payer: COMMERCIAL

## 2021-04-29 VITALS
WEIGHT: 198 LBS | BODY MASS INDEX: 36.21 KG/M2 | TEMPERATURE: 98 F | SYSTOLIC BLOOD PRESSURE: 139 MMHG | HEART RATE: 71 BPM | DIASTOLIC BLOOD PRESSURE: 90 MMHG

## 2021-04-29 DIAGNOSIS — E04.1 THYROID NODULE: ICD-10-CM

## 2021-04-29 DIAGNOSIS — K11.5 SIALOLITHIASIS: ICD-10-CM

## 2021-04-29 DIAGNOSIS — R22.1 NECK MASS: ICD-10-CM

## 2021-04-29 DIAGNOSIS — K11.20 SIALOADENITIS OF SUBMANDIBULAR GLAND: Primary | ICD-10-CM

## 2021-04-29 DIAGNOSIS — K11.20 SIALADENITIS: ICD-10-CM

## 2021-04-29 PROCEDURE — 70490 CT SOFT TISSUE NECK W/O DYE: CPT

## 2021-04-29 PROCEDURE — 99214 OFFICE O/P EST MOD 30 MIN: CPT | Performed by: OTOLARYNGOLOGY

## 2021-04-29 NOTE — PROGRESS NOTES
[address] [facility] SURGERY  Follow up      Patient Name: Kira [facility] Record Number:  [id number]  Primary Care Physician:  Carlos Smith MD  Date of Consultation: 4/29/2021    Chief Complaint: Left-sided submandibular swelling        Interval History  I saw the patient on April 7 and she has some left-sided sialoadenitis. Is a small salivary stone noted on CT scan. I recommended treating this conservatively and getting a posttreatment scan to see if the salivary stone was still there. Patient says that she still has a little bit of swelling of the left neck area. It is mildly uncomfortable. No fever chills or other signs of infection. REVIEW OF SYSTEMS  As above    PHYSICAL EXAM  GENERAL: No Acute Distress, Alert and Oriented, no Hoarseness, strong voice  EYES: EOMI, Anti-icteric  HENT:   Head: Normocephalic and atraumatic. Face:  Symmetric, facial nerve intact, no sinus tenderness  Right Ear: Normal external ear, normal external auditory canal, intact tympanic membrane with normal mobility and aerated middle ear  Left Ear: Normal external ear, normal external auditory canal, intact tympanic membrane with normal mobility and aerated middle ear  Mouth/Oral Cavity: When I palpate the left submandibular gland there appears to be drainage of saliva from fairly posteriorly in the sublingual region. I cannot palpate the small salivary stone. The saliva is clear  Oropharynx/Larynx:  normal oropharynx, normal tonsils; normal larynx/nasopharynx on mirror exam  Nose:Normal external nasal appearance. Anterior rhinoscopy shows a normal septum. Normal turbinates.   Normal mucosa   NECK: Normal range of motion, no thyromegaly, trachea is midline, no lymphadenopathy, no neck masses, no crepitus  CHEST: Normal respiratory effort, no retractions, breathing comfortably  SKIN: No rashes, normal appearing skin, no evidence of skin lesions/tumors  Neuro:  cranial nerve II-XII intact; normal gait  Cardio:  no edema        RADIOLOGY  Summary of findings:  Reviewed the CT scan. I can still see the 2 mm salivary stone in the left submandibular duct. I feel as though it moved anteriorly about a centimeter. ASSESSMENT/PLAN  1. Sialoadenitis of submandibular gland  The patient does not have a lot of symptoms, but is frustrated with the occasional swelling. She wants something done. I recommended we go to the operating room and try to remove the small salivary stone. She understands the risk would be that we were unable to find it as I cannot obviously palpate it. In addition I do think that she may have fistulized her submandibular duct a little bit more posteriorly in the oral cavity. This may be just proximal to the salivary stone. I would try to make a small incision over top of the duct intraorally and work salivary stone out. The patient asked about removing her submandibular gland. I do not think this is necessary yet as there are any been stones in the gland itself that I can see. We also briefly discussed sialoendoscopy. The patient would like to proceed with transoral move with a salivary stone. 2. Sialolithiasis  As above             I have performed a head and neck physical exam personally or was physically present during the key or critical portions of the service. This note was generated completely or in part utilizing Dragon dictation speech recognition software. Occasionally, words are mistranscribed and despite editing, the text may contain inaccuracies due to incorrect word recognition. If further clarification is needed please contact the office at [phone].

## 2021-05-07 DIAGNOSIS — K21.9 GASTROESOPHAGEAL REFLUX DISEASE WITHOUT ESOPHAGITIS: ICD-10-CM

## 2021-05-07 RX ORDER — OMEPRAZOLE 40 MG/1
CAPSULE, DELAYED RELEASE ORAL
Qty: 30 CAPSULE | Refills: 5 | Status: SHIPPED | OUTPATIENT
Start: 2021-05-07 | End: 2021-11-04

## 2021-05-10 NOTE — PROGRESS NOTES
C-Difficile admission screening and protocol:     * Admitted with diarrhea? n     *Prior history of C-Diff. In last 3 months?n     *Antibiotic use in the past 6-8 weeks?n     *Prior hospitalization or nursing home in the last month?n        [address] time__12____        Surgery time____________    Take the following medications with a sip of water:    Do not eat or drink anything after 12:00 midnight prior to your surgery. This includes water chewing gum, mints and ice chips. You may brush your teeth and gargle the morning of your surgery, but do not swallow the water     Please see your family doctor/pediatrician for a history and physical and/or concerning medications. Bring any test results/reports from your physicians office. If you are under the care of a heart doctor or specialist doctor, please be aware that you may be asked to them for clearance    You may be asked to stop blood thinners such as Coumadin, Plavix, Fragmin, Lovenox, etc., or any anti-inflammatories such as:  Aspirin, Ibuprofen, Advil, Naproxen prior to your surgery. We also ask that you stop any OTC medications such as fish oil, vitamin E, glucosamine, garlic, Multivitamins, COQ 10, etc.    We ask that you do not smoke 24 hours prior to surgery  We ask that you do not  drink any alcoholic beverages 24 hours prior to surgery     You must make arrangements for a responsible adult to take you home after your surgery. For your safety you will not be allowed to leave alone or drive yourself home. Your surgery will be cancelled if you do not have a ride home. Also for your safety, it is strongly suggested that someone stay with you the first 24 hours after your surgery. A parent or legal guardian must accompany a child scheduled for surgery and plan to stay at the hospital until the child is discharged. Please do not bring other children with you.     For your comfort, please wear simple loose fitting clothing to the hospital.  Please do not bring valuables. Do not wear any make-up or nail polish on your fingers or toes      For your safety, please do not wear any jewelry or body piercing's on the day of surgery. All jewelry must be removed. If you have dentures, they will be removed before going to operating room. For your convenience, we will provide you with a container. If you wear contact lenses or glasses, they will be removed, please bring a case for them. If you have a living will and a durable power of  for healthcare, please bring in a copy. As part of our patient safety program to minimize surgical site infections, we ask you to do the following:    · Please notify your surgeon if you develop any illness between         now and the  day of your surgery. · This includes a cough, cold, fever, sore throat, nausea,         or vomiting, and diarrhea, etc.  ·  Please notify your surgeon if you experience dizziness, shortness         of breath or blurred vision between now and the time of your surgery. Do not shave your operative site 96 hours prior to surgery. For face and neck surgery, men may use an electric razor 48 hours   prior to surgery. You may shower the night before surgery or the morning of   your surgery with an antibacterial soap. You will need to bring a photo ID and insurance card    [facility] has an onsite pharmacy, would you like to utilize our pharmacy     If you will be staying overnight and use a C-pap machine, please bring   your C-pap to hospital     Our goal is to provide you with excellent care, therefore, visitors will be limited to two(2) in the room at a time so that we may focus on providing this care for you. Please contact pre-admission testing if you have any further questions.                  [facility] phone number:  [phone] PAT fax number:  [phone]  Please note these are generalized instructions for all surgical cases, you may be provided with more specific instructions according to your surgery. Preoperative Screening for Elective Surgery/Invasive Procedures While COVID-19 present in the community     Have you tested positive or have been told to self-isolate for COVID-19 like symptoms within the past 28 days? n   Do you currently have any of the following symptoms? n  o Fever >100.0 F or 99.9 F in immunocompromised patients?n  o New onset cough, shortness of breath or difficulty breathing?n  o New onset sore throat, myalgia (muscle aches and pains), headache, loss of taste/smell or diarrhea?n   Have you had a potential exposure to COVID-19 within the past 14 days by:  o Close contact with a confirmed case?n  o Close contact with a healthcare worker,  or essential infrastructure worker (grocery store, [employer], gas station, public utilities or transportation)? yes  o Do you reside in a congregate setting such as; skilled nursing facility, adult home, correctional facility, homeless shelter or other institutional setting? n  o Have you had recent travel to a known COVID-19 hotspot?n    Indicate if the patient has a positive screen by answering yes to one or more of the above questions. Patients who test positive or screen positive prior to surgery or on the day of surgery should be evaluated in conjunction with the surgeon/proceduralist/anesthesiologist to determine the urgency of the procedure.

## 2021-05-17 ENCOUNTER — ANESTHESIA EVENT (OUTPATIENT)
Dept: OPERATING ROOM | Age: 61
End: 2021-05-17
Payer: COMMERCIAL

## 2021-05-18 ENCOUNTER — HOSPITAL ENCOUNTER (OUTPATIENT)
Age: 61
Setting detail: OUTPATIENT SURGERY
Discharge: HOME | End: 2021-05-18
Attending: OTOLARYNGOLOGY | Admitting: OTOLARYNGOLOGY
Payer: COMMERCIAL

## 2021-05-18 ENCOUNTER — ANESTHESIA (OUTPATIENT)
Dept: OPERATING ROOM | Age: 61
End: 2021-05-18
Payer: COMMERCIAL

## 2021-05-18 VITALS
TEMPERATURE: 97 F | SYSTOLIC BLOOD PRESSURE: 139 MMHG | BODY MASS INDEX: 36.43 KG/M2 | HEIGHT: 62 IN | WEIGHT: 197.97 LBS | OXYGEN SATURATION: 96 % | RESPIRATION RATE: 14 BRPM | HEART RATE: 66 BPM | DIASTOLIC BLOOD PRESSURE: 85 MMHG

## 2021-05-18 VITALS
OXYGEN SATURATION: 99 % | RESPIRATION RATE: 8 BRPM | DIASTOLIC BLOOD PRESSURE: 82 MMHG | SYSTOLIC BLOOD PRESSURE: 160 MMHG

## 2021-05-18 DIAGNOSIS — K11.20 SIALOADENITIS OF SUBMANDIBULAR GLAND: Primary | ICD-10-CM

## 2021-05-18 LAB — SARS-COV-2, NAAT: NOT DETECTED

## 2021-05-18 PROCEDURE — 2500000003 HC RX 250 WO HCPCS

## 2021-05-18 PROCEDURE — 3700000000 HC ANESTHESIA ATTENDED CARE: Performed by: OTOLARYNGOLOGY

## 2021-05-18 PROCEDURE — 7100000011 HC PHASE II RECOVERY - ADDTL 15 MIN: Performed by: OTOLARYNGOLOGY

## 2021-05-18 PROCEDURE — 7100000010 HC PHASE II RECOVERY - FIRST 15 MIN: Performed by: OTOLARYNGOLOGY

## 2021-05-18 PROCEDURE — 6360000002 HC RX W HCPCS: Performed by: ANESTHESIOLOGY

## 2021-05-18 PROCEDURE — 42330 REMOVAL OF SALIVARY STONE: CPT | Performed by: OTOLARYNGOLOGY

## 2021-05-18 PROCEDURE — 3700000001 HC ADD 15 MINUTES (ANESTHESIA): Performed by: OTOLARYNGOLOGY

## 2021-05-18 PROCEDURE — 6370000000 HC RX 637 (ALT 250 FOR IP): Performed by: ANESTHESIOLOGY

## 2021-05-18 PROCEDURE — 6360000002 HC RX W HCPCS

## 2021-05-18 PROCEDURE — 7100000000 HC PACU RECOVERY - FIRST 15 MIN: Performed by: OTOLARYNGOLOGY

## 2021-05-18 PROCEDURE — 3600000014 HC SURGERY LEVEL 4 ADDTL 15MIN: Performed by: OTOLARYNGOLOGY

## 2021-05-18 PROCEDURE — 2580000003 HC RX 258: Performed by: OTOLARYNGOLOGY

## 2021-05-18 PROCEDURE — 3600000004 HC SURGERY LEVEL 4 BASE: Performed by: OTOLARYNGOLOGY

## 2021-05-18 PROCEDURE — 2580000003 HC RX 258: Performed by: ANESTHESIOLOGY

## 2021-05-18 PROCEDURE — 2709999900 HC NON-CHARGEABLE SUPPLY: Performed by: OTOLARYNGOLOGY

## 2021-05-18 PROCEDURE — 87635 SARS-COV-2 COVID-19 AMP PRB: CPT

## 2021-05-18 PROCEDURE — 7100000001 HC PACU RECOVERY - ADDTL 15 MIN: Performed by: OTOLARYNGOLOGY

## 2021-05-18 RX ORDER — MAGNESIUM HYDROXIDE 1200 MG/15ML
LIQUID ORAL CONTINUOUS PRN
Status: COMPLETED | OUTPATIENT
Start: 2021-05-18 | End: 2021-05-18

## 2021-05-18 RX ORDER — METOPROLOL TARTRATE 5 MG/5ML
INJECTION INTRAVENOUS PRN
Status: DISCONTINUED | OUTPATIENT
Start: 2021-05-18 | End: 2021-05-18 | Stop reason: SDUPTHER

## 2021-05-18 RX ORDER — SODIUM CHLORIDE 9 MG/ML
INJECTION, SOLUTION INTRAVENOUS CONTINUOUS
Status: DISCONTINUED | OUTPATIENT
Start: 2021-05-18 | End: 2021-05-18 | Stop reason: HOSPADM

## 2021-05-18 RX ORDER — PROMETHAZINE HYDROCHLORIDE 25 MG/ML
6.25 INJECTION, SOLUTION INTRAMUSCULAR; INTRAVENOUS ONCE
Status: COMPLETED | OUTPATIENT
Start: 2021-05-18 | End: 2021-05-18

## 2021-05-18 RX ORDER — OXYCODONE HYDROCHLORIDE AND ACETAMINOPHEN 5; 325 MG/1; MG/1
2 TABLET ORAL PRN
Status: COMPLETED | OUTPATIENT
Start: 2021-05-18 | End: 2021-05-18

## 2021-05-18 RX ORDER — CLINDAMYCIN HYDROCHLORIDE 300 MG/1
300 CAPSULE ORAL 3 TIMES DAILY
Qty: 21 CAPSULE | Refills: 0 | Status: SHIPPED | OUTPATIENT
Start: 2021-05-18 | End: 2021-05-25

## 2021-05-18 RX ORDER — PROMETHAZINE HYDROCHLORIDE 25 MG/ML
6.25 INJECTION, SOLUTION INTRAMUSCULAR; INTRAVENOUS ONCE
Status: DISCONTINUED | OUTPATIENT
Start: 2021-05-18 | End: 2021-05-18 | Stop reason: HOSPADM

## 2021-05-18 RX ORDER — SODIUM CHLORIDE 9 MG/ML
25 INJECTION, SOLUTION INTRAVENOUS PRN
Status: DISCONTINUED | OUTPATIENT
Start: 2021-05-18 | End: 2021-05-18 | Stop reason: HOSPADM

## 2021-05-18 RX ORDER — SODIUM CHLORIDE 0.9 % (FLUSH) 0.9 %
10 SYRINGE (ML) INJECTION PRN
Status: DISCONTINUED | OUTPATIENT
Start: 2021-05-18 | End: 2021-05-18 | Stop reason: HOSPADM

## 2021-05-18 RX ORDER — LIDOCAINE HYDROCHLORIDE 20 MG/ML
INJECTION, SOLUTION EPIDURAL; INFILTRATION; INTRACAUDAL; PERINEURAL PRN
Status: DISCONTINUED | OUTPATIENT
Start: 2021-05-18 | End: 2021-05-18 | Stop reason: SDUPTHER

## 2021-05-18 RX ORDER — DEXAMETHASONE SODIUM PHOSPHATE 4 MG/ML
INJECTION, SOLUTION INTRA-ARTICULAR; INTRALESIONAL; INTRAMUSCULAR; INTRAVENOUS; SOFT TISSUE PRN
Status: DISCONTINUED | OUTPATIENT
Start: 2021-05-18 | End: 2021-05-18 | Stop reason: SDUPTHER

## 2021-05-18 RX ORDER — MIDAZOLAM HYDROCHLORIDE 1 MG/ML
INJECTION INTRAMUSCULAR; INTRAVENOUS PRN
Status: DISCONTINUED | OUTPATIENT
Start: 2021-05-18 | End: 2021-05-18 | Stop reason: SDUPTHER

## 2021-05-18 RX ORDER — FENTANYL CITRATE 50 UG/ML
25 INJECTION, SOLUTION INTRAMUSCULAR; INTRAVENOUS EVERY 5 MIN PRN
Status: DISCONTINUED | OUTPATIENT
Start: 2021-05-18 | End: 2021-05-18 | Stop reason: HOSPADM

## 2021-05-18 RX ORDER — OXYCODONE HYDROCHLORIDE AND ACETAMINOPHEN 5; 325 MG/1; MG/1
1 TABLET ORAL PRN
Status: COMPLETED | OUTPATIENT
Start: 2021-05-18 | End: 2021-05-18

## 2021-05-18 RX ORDER — SODIUM CHLORIDE 0.9 % (FLUSH) 0.9 %
10 SYRINGE (ML) INJECTION EVERY 12 HOURS SCHEDULED
Status: DISCONTINUED | OUTPATIENT
Start: 2021-05-18 | End: 2021-05-18 | Stop reason: HOSPADM

## 2021-05-18 RX ORDER — FENTANYL CITRATE 50 UG/ML
INJECTION, SOLUTION INTRAMUSCULAR; INTRAVENOUS PRN
Status: DISCONTINUED | OUTPATIENT
Start: 2021-05-18 | End: 2021-05-18 | Stop reason: SDUPTHER

## 2021-05-18 RX ORDER — ONDANSETRON 2 MG/ML
4 INJECTION INTRAMUSCULAR; INTRAVENOUS
Status: COMPLETED | OUTPATIENT
Start: 2021-05-18 | End: 2021-05-18

## 2021-05-18 RX ORDER — ONDANSETRON 2 MG/ML
INJECTION INTRAMUSCULAR; INTRAVENOUS PRN
Status: DISCONTINUED | OUTPATIENT
Start: 2021-05-18 | End: 2021-05-18 | Stop reason: SDUPTHER

## 2021-05-18 RX ORDER — ROCURONIUM BROMIDE 10 MG/ML
INJECTION, SOLUTION INTRAVENOUS PRN
Status: DISCONTINUED | OUTPATIENT
Start: 2021-05-18 | End: 2021-05-18 | Stop reason: SDUPTHER

## 2021-05-18 RX ORDER — HYDROCODONE BITARTRATE AND ACETAMINOPHEN 5; 325 MG/1; MG/1
1 TABLET ORAL EVERY 6 HOURS PRN
Qty: 12 TABLET | Refills: 0 | Status: SHIPPED | OUTPATIENT
Start: 2021-05-18 | End: 2021-05-21

## 2021-05-18 RX ORDER — PROPOFOL 10 MG/ML
INJECTION, EMULSION INTRAVENOUS PRN
Status: DISCONTINUED | OUTPATIENT
Start: 2021-05-18 | End: 2021-05-18 | Stop reason: SDUPTHER

## 2021-05-18 RX ADMIN — OXYCODONE HYDROCHLORIDE AND ACETAMINOPHEN 1 TABLET: 5; 325 TABLET ORAL at 13:58

## 2021-05-18 RX ADMIN — FENTANYL CITRATE 50 MCG: 50 INJECTION INTRAMUSCULAR; INTRAVENOUS at 11:49

## 2021-05-18 RX ADMIN — SODIUM CHLORIDE: 9 INJECTION, SOLUTION INTRAVENOUS at 11:30

## 2021-05-18 RX ADMIN — ROCURONIUM BROMIDE 50 MG: 10 INJECTION INTRAVENOUS at 11:38

## 2021-05-18 RX ADMIN — HYDROMORPHONE HYDROCHLORIDE 0.5 MG: 1 INJECTION, SOLUTION INTRAMUSCULAR; INTRAVENOUS; SUBCUTANEOUS at 12:38

## 2021-05-18 RX ADMIN — PROMETHAZINE HYDROCHLORIDE 6.25 MG: 25 INJECTION INTRAMUSCULAR; INTRAVENOUS at 14:14

## 2021-05-18 RX ADMIN — SUGAMMADEX 200 MG: 100 INJECTION, SOLUTION INTRAVENOUS at 12:22

## 2021-05-18 RX ADMIN — ONDANSETRON 4 MG: 2 INJECTION INTRAMUSCULAR; INTRAVENOUS at 12:22

## 2021-05-18 RX ADMIN — HYDROMORPHONE HYDROCHLORIDE 0.5 MG: 1 INJECTION, SOLUTION INTRAMUSCULAR; INTRAVENOUS; SUBCUTANEOUS at 12:51

## 2021-05-18 RX ADMIN — PROPOFOL 200 MG: 10 INJECTION, EMULSION INTRAVENOUS at 11:38

## 2021-05-18 RX ADMIN — HYDROMORPHONE HYDROCHLORIDE 0.5 MG: 1 INJECTION, SOLUTION INTRAMUSCULAR; INTRAVENOUS; SUBCUTANEOUS at 12:45

## 2021-05-18 RX ADMIN — METOPROLOL TARTRATE 2 MG: 1 INJECTION, SOLUTION INTRAVENOUS at 11:59

## 2021-05-18 RX ADMIN — FENTANYL CITRATE 50 MCG: 50 INJECTION INTRAMUSCULAR; INTRAVENOUS at 11:38

## 2021-05-18 RX ADMIN — MIDAZOLAM 2 MG: 1 INJECTION INTRAMUSCULAR; INTRAVENOUS at 11:30

## 2021-05-18 RX ADMIN — LIDOCAINE HYDROCHLORIDE 100 MG: 20 INJECTION, SOLUTION EPIDURAL; INFILTRATION; INTRACAUDAL; PERINEURAL at 11:38

## 2021-05-18 RX ADMIN — DEXAMETHASONE SODIUM PHOSPHATE 10 MG: 4 INJECTION, SOLUTION INTRAMUSCULAR; INTRAVENOUS at 11:48

## 2021-05-18 RX ADMIN — HYDROMORPHONE HYDROCHLORIDE 0.5 MG: 1 INJECTION, SOLUTION INTRAMUSCULAR; INTRAVENOUS; SUBCUTANEOUS at 12:57

## 2021-05-18 RX ADMIN — ONDANSETRON 4 MG: 2 INJECTION INTRAMUSCULAR; INTRAVENOUS at 12:46

## 2021-05-18 ASSESSMENT — PULMONARY FUNCTION TESTS
PIF_VALUE: 21
PIF_VALUE: 21
PIF_VALUE: 7
PIF_VALUE: 21
PIF_VALUE: 1
PIF_VALUE: 21
PIF_VALUE: 22
PIF_VALUE: 2
PIF_VALUE: 26
PIF_VALUE: 4
PIF_VALUE: 23
PIF_VALUE: 19
PIF_VALUE: 22
PIF_VALUE: 22
PIF_VALUE: 21
PIF_VALUE: 22
PIF_VALUE: 22
PIF_VALUE: 0
PIF_VALUE: 4
PIF_VALUE: 22
PIF_VALUE: 1
PIF_VALUE: 22
PIF_VALUE: 3
PIF_VALUE: 16
PIF_VALUE: 30
PIF_VALUE: 24
PIF_VALUE: 9
PIF_VALUE: 16
PIF_VALUE: 25
PIF_VALUE: 22

## 2021-05-18 ASSESSMENT — PAIN DESCRIPTION - FREQUENCY
FREQUENCY: CONTINUOUS

## 2021-05-18 ASSESSMENT — PAIN SCALES - GENERAL
PAINLEVEL_OUTOF10: 10
PAINLEVEL_OUTOF10: 5
PAINLEVEL_OUTOF10: 9

## 2021-05-18 ASSESSMENT — PAIN DESCRIPTION - ONSET
ONSET: ON-GOING

## 2021-05-18 ASSESSMENT — PAIN DESCRIPTION - PAIN TYPE
TYPE: SURGICAL PAIN

## 2021-05-18 ASSESSMENT — PAIN DESCRIPTION - LOCATION
LOCATION: MOUTH
LOCATION: OTHER (COMMENT)
LOCATION: MOUTH

## 2021-05-18 ASSESSMENT — PAIN DESCRIPTION - PROGRESSION
CLINICAL_PROGRESSION: GRADUALLY IMPROVING
CLINICAL_PROGRESSION: NOT CHANGED
CLINICAL_PROGRESSION: NOT CHANGED

## 2021-05-18 ASSESSMENT — PAIN DESCRIPTION - DESCRIPTORS
DESCRIPTORS: ACHING;BURNING;SHARP
DESCRIPTORS: BURNING
DESCRIPTORS: BURNING

## 2021-05-18 ASSESSMENT — PAIN - FUNCTIONAL ASSESSMENT: PAIN_FUNCTIONAL_ASSESSMENT: 0-10

## 2021-05-18 ASSESSMENT — PAIN DESCRIPTION - ORIENTATION
ORIENTATION: LOWER;LEFT
ORIENTATION: LEFT;LOWER

## 2021-05-18 NOTE — OP NOTE
[address] [facility] SURGERY  OPERATIVE REPORT    Patient Name: Rosales Preston  YOB: 1960  Medical Record Number:  [id number]  Billing Number:  [id number]  Date of Procedure: [unfilled]  Time: 1400    Pre Operative Diagnoses:   Problem List Items Addressed This Visit     None      Visit Diagnoses     Sialoadenitis of submandibular gland    -  Primary    Relevant Medications    HYDROcodone-acetaminophen (Izella Orr) 5-325 MG per tablet        Post Operative Diagnoses:    Problem List Items Addressed This Visit     None      Visit Diagnoses     Sialoadenitis of submandibular gland    -  Primary    Relevant Medications    HYDROcodone-acetaminophen (Izella Orr) 5-325 MG per tablet                 Procedure: Salivary duct dilation with removal of salivary stone and sialolithotomy (00496)       Surgeon: Adrian Sethi MD    OR Staff/ Assistant:  Circulator: Malgorzata Mike RN  Surgical Assistant: Adra Burkitt Relief Circulator: Aurelio Robins RN  Scrub Person First: Stephane Ortiz    Anesthesia:  General anesthesia. Findings:  1) successful cannulation of left submandibular duct with irrigation. Incision made more posteriorly in the oral cavity into the duct and again irrigated. Salivary not obviously seen, however entire length of the duct was cannulated     indications: This is a 64 y.o. female with recurrent sialoadenitis secondary to left submandibular stone. Decision was made to bring to the operating room to try to remove it. .  Risks and benefits discussed with the patient including alternate treatment options, Informed consent was obtained, the patient elected to proceed with the planned procedure. DETAILS OF PROCEDURE(S):   Patient was brought to the operating placed in supine position operating table. She underwent uncomplicated general anesthesia with endotracheal intubation. Head of bed was turned 180 degrees. She was prepped and draped.     Exam with palpation

## 2021-05-18 NOTE — PROGRESS NOTES
Pt awake. Unable to open mouth wide. No bleeding or drainage noted from under tongue. Pt states mouth and tongue \"hurt\". Medicating for pain - see emar.

## 2021-05-18 NOTE — PROGRESS NOTES
Arrives to [facility], vital signs stable, IV infusing without complications, c/o pain, will medicate once tolerating PO.

## 2021-05-18 NOTE — PROGRESS NOTES
To pacu from OR. Pt asleep. No oral drainage noted at present. IV infusing. Monitor in sinus rhythm.

## 2021-05-18 NOTE — ANESTHESIA POSTPROCEDURE EVALUATION
Department of Anesthesiology  Postprocedure Note    Patient: Huseyin Farrell  MRN: [id number]  YOB: 1960  Date of evaluation: 5/18/2021  Time:  3:08 PM     Procedure Summary     Date: 05/18/21 Room / Location: [address] [place] [address]    Anesthesia Start: 1130 Anesthesia Stop: 1292    Procedure: EXCISION OF SALIVARY STONE IN THE LEFT SUBMANDIBULAR DUCT (Left ) Diagnosis: (SIALOADENITIS)    Surgeons: Awa Singleton MD Responsible Provider: Dani Harrington MD    Anesthesia Type: general ASA Status: 3          Anesthesia Type: general    Carole Phase I: Carole Score: 9    Carole Phase II:      Last vitals: Reviewed and per EMR flowsheets.        Anesthesia Post Evaluation    Patient location during evaluation: bedside  Patient participation: complete - patient participated  Level of consciousness: awake  Pain score: 0  Airway patency: patent  Nausea & Vomiting: no nausea and no vomiting  Complications: no  Cardiovascular status: blood pressure returned to baseline  Respiratory status: acceptable  Hydration status: euvolemic

## 2021-05-18 NOTE — H&P
I have reviewed this patient's history and physical and there have been no significant changes. The patient was counseled at length about the risks of candis Covid-19 during their perioperative period and any recovery window from their procedure. The patient was made aware that candis Covid-19  may worsen their prognosis for recovering from their procedure  and lend to a higher morbidity and/or mortality risk. All material risks, benefits, and reasonable alternatives including postponing the procedure were discussed. The patient does wish to proceed with the procedure at this time. The patient understands the risk of excision of a salivary stone including ongoing symptoms and need for additional intervention. She agrees to proceed.

## 2021-05-18 NOTE — ANESTHESIA PRE PROCEDURE
[facility] Department of Anesthesiology  Pre-Anesthesia Evaluation/Consultation       Name:  Laura Mckay  : 1960  Age:  64 y.o.                                            MRN:  [id number]  Date: 2021           Surgeon: Surgeon(s):  Yanna Morgan MD    Procedure: Procedure(s):  EXCISION OF SALIVARY STONE IN THE LEFT SUBMANDIBULAR DUCT     Allergies   Allergen Reactions    Shellfish Allergy Anaphylaxis    Shellfish-Derived Products Shortness Of Breath and Palpitations    Lisinopril      Dry cough, no lip swelling or resp. sxs     Patient Active Problem List   Diagnosis    Asthma-chronic obstructive pulmonary disease overlap syndrome (HCC)    Headache    S/P gastric bypass    Hypertension    Insomnia    Carpal tunnel syndrome of left wrist    Benign essential HTN    Decreased potassium in the blood    Chest pain    Internal hernia    Midsternal chest pain    Tobacco abuse    Chronic cough    Moderate persistent asthma without complication    Rash    Anxiety    GERD (gastroesophageal reflux disease)    Gastric bypass status for obesity    Obstructive sleep apnea     Past Medical History:   Diagnosis Date    Allergic rhinitis     Anxiety 2020    Arthritis     pt denies    Asthma 4/10/2012    Asthma-chronic obstructive pulmonary disease overlap syndrome (HCC)     pt denies    Bilateral carpal tunnel syndrome 2016    Depression     Dysmenorrhea     Fibroid     GERD (gastroesophageal reflux disease)     Headache(784.0) 4/10/2012    Hyperlipidemia     Hypertension     S/P gastric bypass     Status post coronary angiogram 2015    Wears glasses      Past Surgical History:   Procedure Laterality Date    BREAST SURGERY      Breast reduction    CARPAL TUNNEL RELEASE Left 2016     Left carpal tunnel release      CARPAL TUNNEL RELEASE Right 10/06/2016     SECTION      x1    COLONOSCOPY      HYSTERECTOMY      complete-ovaries out Social History     Tobacco Use    Smoking status: Former Smoker     Packs/day: 0.25     Years: 5.00     Pack years: 1.25     Types: Cigarettes     Quit date: 1980     Years since quittin.9    Smokeless tobacco: Former User     Quit date: 12/10/1994   Vaping Use    Vaping Use: Never used   Substance Use Topics    Alcohol use: Yes     Alcohol/week: 0.0 standard drinks     Comment: socially-wine    Drug use: No     Medications  No current facility-administered medications on file prior to encounter.      Current Outpatient Medications on File Prior to Encounter   Medication Sig Dispense Refill    benzonatate (TESSALON) 100 MG capsule TAKE 1 CAPSULE BY MOUTH THREE TIMES A DAY AS NEEDED FOR COUGH 90 capsule 3    butalbital-acetaminophen-caffeine (FIORICET, ESGIC) -40 MG per tablet TAKE 1 TABLET BY MOUTH EVERY 4 HOURS AS NEEDED FOR PAIN 30 tablet 1    hydrALAZINE (APRESOLINE) 50 MG tablet Take 1 tablet by mouth 2 times daily 180 tablet 3    amLODIPine (NORVASC) 10 MG tablet TAKE 1 TABLET BY MOUTH EVERY DAY 30 tablet 2    potassium chloride (KLOR-CON M20) 20 MEQ extended release tablet Take 1 tablet by mouth 2 times daily 180 tablet 1    clotrimazole-betamethasone (LOTRISONE) 1-0.05 % cream APPLY TO AFFECTED AREA TWICE A DAY 15 g 1    topiramate (TOPAMAX) 25 MG tablet Take 1 tablet daily (Patient taking differently: as needed Take 1 tablet daily) 30 tablet 2    hydroCHLOROthiazide (HYDRODIURIL) 25 MG tablet TAKE 1 TABLET BY MOUTH EVERY DAY 30 tablet 5    Prenatal Vit-Fe Fumarate-FA (PRENATAL COMPLETE PO) Take by mouth      montelukast (SINGULAIR) 10 MG tablet TAKE 1 TABLET BY MOUTH EVERY DAY 30 tablet 5    vitamin D (ERGOCALCIFEROL) 1.25 MG (06401 UT) CAPS capsule TAKE 1 CAPSULE BY MOUTH EVERY 30 DAYS 4 capsule 3    fluticasone (FLONASE) 50 MCG/ACT nasal spray INHALE 1 SPRAY NASALLY EVERY DAY 1 Bottle 5    budesonide (PULMICORT) 0.5 MG/2ML nebulizer suspension Take 2 mLs by nebulization 2 times daily (Patient taking differently: Take 500 mcg by nebulization 2 times daily as needed ) 60 ampule 3    albuterol sulfate HFA (PROAIR HFA) 108 (90 Base) MCG/ACT inhaler Inhale 2 puffs into the lungs every 6 hours as needed for Wheezing 1 Inhaler 5    EPINEPHrine (EPIPEN) 0.3 MG/0.3ML DELPHINE injection Use as directed for allergic reaction 2 Device 3    naproxen (NAPROSYN) 500 MG tablet Take 1 tablet by mouth 2 times daily for 10 days 20 tablet 0    ondansetron (ZOFRAN) 4 MG tablet TAKE 1 TABLET BY MOUTH EVERY 12 HOURS AS NEEDED or nausea or vomiting (Patient taking differently: once TAKE 1 TABLET BY MOUTH EVERY 12 HOURS AS NEEDED or nausea or vomiting) 30 tablet 12    triamcinolone (KENALOG) 0.1 % cream Apply topically 2 times daily. 28.4 g 1    Nebulizers (NEBULIZER COMPRESSOR) MISC Use every 6 hours as needed with duoneb 1 each 0    Chlorhexidine Gluconate 2 % SOLN Apply to skin once daily prn 250 mL 1     Current Facility-Administered Medications   Medication Dose Route Frequency Provider Last Rate Last Admin    0.9 % sodium chloride infusion   Intravenous Continuous Meghna David MD        sodium chloride flush 0.9 % injection 10 mL  10 mL Intravenous 2 times per day Meghna David MD        sodium chloride flush 0.9 % injection 10 mL  10 mL Intravenous PRN Meghna David MD        0.9 % sodium chloride infusion  25 mL Intravenous PRN Meghna David MD         Vital Signs (Current)   Vitals:    05/10/21 0851 05/18/21 0944   Weight: 198 lb (89.8 kg) 197 lb 15.6 oz (89.8 kg)   Height: 5' 2\" (1.575 m) 5' 2\" (1.575 m)                                          BP Readings from Last 3 Encounters:   04/29/21 (!) 139/90   04/07/21 116/69   03/29/21 123/85     Vital Signs Statistics (for past 48 hrs)     No data recorded  BP Readings from Last 3 Encounters:   04/29/21 (!) 139/90   04/07/21 116/69   03/29/21 123/85       BMI  Body mass index is 36.21 kg/m².   Estimated body mass index is 36.21 kg/m² as calculated from the following:    Height as of this encounter: 5' 2\" (1.575 m). Weight as of this encounter: 197 lb 15.6 oz (89.8 kg). CBC   Lab Results   Component Value Date    WBC 8.1 03/22/2021    RBC 4.32 03/22/2021    HGB 13.0 03/22/2021    HCT 39.1 03/22/2021    MCV 90.6 03/22/2021    RDW 13.9 03/22/2021     03/22/2021     CMP    Lab Results   Component Value Date     03/22/2021    K 3.3 03/22/2021     03/22/2021    CO2 23 03/22/2021    BUN 14 03/22/2021    CREATININE 1.0 03/22/2021    GFRAA >60 03/22/2021    GFRAA >60 03/26/2013    AGRATIO 1.8 12/29/2020    LABGLOM 57 03/22/2021    GLUCOSE 111 03/22/2021    PROT 6.5 12/29/2020    PROT 6.4 04/11/2012    CALCIUM 8.9 03/22/2021    BILITOT <0.2 12/29/2020    ALKPHOS 117 12/29/2020    AST 37 12/29/2020    ALT 33 12/29/2020     BMP    Lab Results   Component Value Date     03/22/2021    K 3.3 03/22/2021     03/22/2021    CO2 23 03/22/2021    BUN 14 03/22/2021    CREATININE 1.0 03/22/2021    CALCIUM 8.9 03/22/2021    GFRAA >60 03/22/2021    GFRAA >60 03/26/2013    LABGLOM 57 03/22/2021    GLUCOSE 111 03/22/2021     POCGlucose  No results for input(s): GLUCOSE in the last 72 hours.    Coags    Lab Results   Component Value Date    PROTIME 10.0 09/09/2016    INR 0.88 09/09/2016    APTT 31.8 33/36/1542     HCG (If Applicable) No results found for: PREGTESTUR, PREGSERUM, HCG, HCGQUANT   ABGs No results found for: PHART, PO2ART, DOG7BNA, SVR8JHM, BEART, Y3VFVCQD   Type & Screen (If Applicable)  No results found for: LABABO, LABRH                         BMI: Wt Readings from Last 3 Encounters:       NPO Status:                          Anesthesia Evaluation  Patient summary reviewed no history of anesthetic complications:   Airway: Mallampati: II        Dental:          Pulmonary:   (+) COPD:  sleep apnea:  asthma:                            Cardiovascular:    (+) hypertension:,                   Neuro/Psych:   (+) neuromuscular disease:, headaches:, psychiatric history:   (-) seizures           GI/Hepatic/Renal:   (+) GERD:,      (-) no renal disease and bowel prep       Endo/Other:        (-) diabetes mellitus, hypothyroidism, hyperthyroidism               Abdominal:   (+) obese,         Vascular: negative vascular ROS. Anesthesia Plan      general     ASA 3       Induction: intravenous. MIPS: Prophylactic antiemetics administered. Anesthetic plan and risks discussed with patient. Plan discussed with CRNA. Attending anesthesiologist reviewed and agrees with Pre Eval content              This pre-anesthesia assessment may be used as a history and physical.    DOS STAFF ADDENDUM:    Pt seen and examined, chart reviewed (including anesthesia, drug and allergy history). No interval changes to history and physical examination. Anesthetic plan, risks, benefits, alternatives, and personnel involved discussed with patient. Patient verbalized an understanding and agrees to proceed.       Donya Cullen MD  May 18, 2021  9:48 AM

## 2021-05-18 NOTE — PROGRESS NOTES
CLINICAL PHARMACY NOTE: MEDS TO BEDS    Total # of Prescriptions Filled: 2   The following medications were delivered to the patient:  Current Discharge Medication List      START taking these medications    Details   HYDROcodone-acetaminophen (NORCO) 5-325 MG per tablet Take 1 tablet by mouth every 6 hours as needed for Pain for up to 3 days. Intended supply: 3 days.  Take lowest dose possible to manage pain  Qty: 12 tablet, Refills: 0    Comments: Reduce doses taken as pain becomes manageable  Associated Diagnoses: Sialoadenitis of submandibular gland      clindamycin (CLEOCIN) 300 MG capsule Take 1 capsule by mouth 3 times daily for 7 days  Qty: 21 capsule, Refills: 0         ·   ·     Additional Documentation:

## 2021-05-19 ENCOUNTER — TELEPHONE (OUTPATIENT)
Dept: ENT CLINIC | Age: 61
End: 2021-05-19

## 2021-05-19 RX ORDER — CHLORHEXIDINE GLUCONATE 0.12 MG/ML
15 RINSE ORAL 2 TIMES DAILY
Qty: 420 ML | Refills: 0 | Status: SHIPPED | OUTPATIENT
Start: 2021-05-19 | End: 2021-06-02

## 2021-05-19 NOTE — TELEPHONE ENCOUNTER
She should alternate this with 600 mg of Motrin every 3 hours.   I would expect that the pain from this would resolve fairly quickly

## 2021-05-19 NOTE — TELEPHONE ENCOUNTER
Pt calling about pain medication, hydrocodone, states it was not effective at all, she got no sleep last night; asking can Dr Kelli Oliva prescribe something else. Please call to advise.

## 2021-05-27 ENCOUNTER — OFFICE VISIT (OUTPATIENT)
Dept: ENT CLINIC | Age: 61
End: 2021-05-27

## 2021-05-27 VITALS — BODY MASS INDEX: 36.21 KG/M2 | WEIGHT: 198 LBS

## 2021-05-27 DIAGNOSIS — K11.20 SIALOADENITIS: Primary | ICD-10-CM

## 2021-05-27 PROCEDURE — 99024 POSTOP FOLLOW-UP VISIT: CPT | Performed by: OTOLARYNGOLOGY

## 2021-05-27 NOTE — PROGRESS NOTES
Patient is following up from did removal of a salivary stone transorally from May 18. Patient is upset because she feels as though her pain was not adequately addressed. Overall still getting a little better. Still complaining of swelling of the left submandibular gland    Exam  Floor mouth incision is healing well. When I palpated some to be a gland I see clear fluid coming out fairly posteriorly as it was preoperatively. No evidence of infection. Firm left submandibular gland without evidence of abscess    Plan  Patient is recovering okay from surgery. Will take a while for the submandibular swelling to completely resolve. Patient upset regarding pain control. She called him one time requesting more narcotic pain medication. I informed her to alternate Tylenol and Motrin on May 19 which is a well studied and used method of pain control with ENT procedures including very painful procedure such as tonsillectomy. Patient did not call back after this to say that it was not working. I would like to see her next week to make sure things are continue to get better.

## 2021-06-07 ENCOUNTER — OFFICE VISIT (OUTPATIENT)
Dept: ENT CLINIC | Age: 61
End: 2021-06-07
Payer: COMMERCIAL

## 2021-06-07 VITALS — WEIGHT: 198 LBS | BODY MASS INDEX: 36.44 KG/M2 | HEIGHT: 62 IN

## 2021-06-07 DIAGNOSIS — J30.9 ALLERGIC RHINITIS, UNSPECIFIED SEASONALITY, UNSPECIFIED TRIGGER: ICD-10-CM

## 2021-06-07 DIAGNOSIS — H93.13 TINNITUS OF BOTH EARS: ICD-10-CM

## 2021-06-07 DIAGNOSIS — K11.20 SIALOADENITIS: Primary | ICD-10-CM

## 2021-06-07 PROCEDURE — 99213 OFFICE O/P EST LOW 20 MIN: CPT | Performed by: OTOLARYNGOLOGY

## 2021-06-07 RX ORDER — METHYLPREDNISOLONE 4 MG/1
TABLET ORAL
Qty: 1 KIT | Refills: 0 | Status: SHIPPED | OUTPATIENT
Start: 2021-06-07 | End: 2021-06-13

## 2021-06-07 RX ORDER — AZELASTINE 1 MG/ML
1 SPRAY, METERED NASAL 2 TIMES DAILY
Qty: 2 BOTTLE | Refills: 1 | Status: SHIPPED | OUTPATIENT
Start: 2021-06-07 | End: 2021-06-29

## 2021-06-07 NOTE — PROGRESS NOTES
[address] [facility] SURGERY  Follow up      Patient Name: Kira Nettles Record Number:  [id number]  Primary Care Physician:  Haley Gilman MD  Date of Consultation: 6/7/2021    Chief Complaint: Neck mass        Interval History  Patient is following up for her sialoadenitis. I took her to the operating room on May 18 for a transoral removal of small salivary stone. Initially she had a lot of inflammation of the submandibular gland. She said that this is gotten a lot better. She said that her main complaint now is ringing in the ears as well as allergies. She said that over the last week she had very congested nose. She said also her ears have been ringing. She seen an allergist a couple of years ago. Already on Flonase. Already takes an over-the-counter antihistamine. REVIEW OF SYSTEMS  As above    PHYSICAL EXAM  GENERAL: No Acute Distress, Alert and Oriented, no Hoarseness, strong voice  EYES: EOMI, Anti-icteric  HENT:   Head: Normocephalic and atraumatic. Face:  Symmetric, facial nerve intact, no sinus tenderness  Right Ear: Normal external ear, normal external auditory canal, intact tympanic membrane with normal mobility and aerated middle ear  Left Ear: Normal external ear, normal external auditory canal, intact tympanic membrane with normal mobility and aerated middle ear  Mouth/Oral Cavity:  normal lips, Uvula is midline, no mucosal lesions, no trismus, normal dentition, normal salivary quality/flow  Oropharynx/Larynx:  normal oropharynx, normal tonsils; normal larynx/nasopharynx on mirror exam  Nose:Normal external nasal appearance. Anterior nostril shows a rightward deviated septum.   She has some clear rhinorrhea from the bilateral nasal cavity  NECK: Left submandibular gland is still firm, but not tender  CHEST: Normal respiratory effort, no retractions, breathing comfortably  SKIN: No rashes, normal appearing skin, no evidence of skin

## 2021-06-24 ENCOUNTER — TELEPHONE (OUTPATIENT)
Dept: NEUROLOGY | Age: 61
End: 2021-06-24

## 2021-06-24 ENCOUNTER — OFFICE VISIT (OUTPATIENT)
Dept: PRIMARY CARE CLINIC | Age: 61
End: 2021-06-24
Payer: COMMERCIAL

## 2021-06-24 VITALS
SYSTOLIC BLOOD PRESSURE: 124 MMHG | HEIGHT: 62 IN | BODY MASS INDEX: 36.25 KG/M2 | WEIGHT: 197 LBS | HEART RATE: 103 BPM | TEMPERATURE: 99 F | DIASTOLIC BLOOD PRESSURE: 81 MMHG

## 2021-06-24 DIAGNOSIS — H69.82 DYSFUNCTION OF LEFT EUSTACHIAN TUBE: ICD-10-CM

## 2021-06-24 DIAGNOSIS — H60.502 ACUTE OTITIS EXTERNA OF LEFT EAR, UNSPECIFIED TYPE: Primary | ICD-10-CM

## 2021-06-24 PROCEDURE — 99212 OFFICE O/P EST SF 10 MIN: CPT | Performed by: INTERNAL MEDICINE

## 2021-06-24 RX ORDER — PSEUDOEPHEDRINE HYDROCHLORIDE 30 MG/1
30 TABLET ORAL 3 TIMES DAILY
Qty: 21 TABLET | Refills: 0 | Status: SHIPPED | OUTPATIENT
Start: 2021-06-24 | End: 2021-10-25

## 2021-06-24 SDOH — ECONOMIC STABILITY: FOOD INSECURITY: WITHIN THE PAST 12 MONTHS, THE FOOD YOU BOUGHT JUST DIDN'T LAST AND YOU DIDN'T HAVE MONEY TO GET MORE.: NEVER TRUE

## 2021-06-24 SDOH — ECONOMIC STABILITY: FOOD INSECURITY: WITHIN THE PAST 12 MONTHS, YOU WORRIED THAT YOUR FOOD WOULD RUN OUT BEFORE YOU GOT MONEY TO BUY MORE.: NEVER TRUE

## 2021-06-24 ASSESSMENT — SOCIAL DETERMINANTS OF HEALTH (SDOH): HOW HARD IS IT FOR YOU TO PAY FOR THE VERY BASICS LIKE FOOD, HOUSING, MEDICAL CARE, AND HEATING?: NOT HARD AT ALL

## 2021-06-24 NOTE — TELEPHONE ENCOUNTER
PT states that yesterday and work her vision go blurry and she felt she could not understand what people were saying around her. She felt as though she was having a stroke. She almost fell out of her chair. Also she is having ringing in both ears and her left ear is hurting. She had a headache but took her Topamate and the headache went away. She would like to have a call regarding these concerns.

## 2021-06-24 NOTE — PATIENT INSTRUCTIONS
1. Topamix 25mg daily  2. Ear drops  3. Continue zyrtec singulair albuterol   4.  Sudafed 3x/day  5 Cortisporin ear drops

## 2021-06-24 NOTE — PROGRESS NOTES
2021     Mariya Nieves (:  1960) is a 64 y.o. female, here for evaluation of the following medical concerns:    Chief Complaint   Patient presents with   Radu Doyle     left     Tinnitus       HPI      70-year-old -American female with nicotine dependence, a cause, sleep apnea headache blurry vision dizzy spells recent surgically treated sialoadenitis pituitary mass detected 2020 without follow-up MRI since despite being ordered twice, on topiramate for headache since 2020 who comes in for left earache. She called her neurologist note her complex migraine recurrence yesterday, relieved with topiramate. Review of Systems   Constitutional: Negative for activity change, appetite change, fatigue and unexpected weight change. HENT: Negative for dental problem, sinus pain, sore throat and trouble swallowing. Eyes: Negative for pain and visual disturbance. Respiratory: Negative for apnea, cough, chest tightness, shortness of breath and wheezing. Cardiovascular: Negative for chest pain and palpitations. Gastrointestinal: Negative for abdominal pain, blood in stool, constipation, diarrhea, nausea, rectal pain and vomiting. Endocrine: Negative for cold intolerance, heat intolerance, polydipsia, polyphagia and polyuria. Genitourinary: Negative for difficulty urinating, dysuria, flank pain, frequency, hematuria, pelvic pain, urgency, vaginal bleeding and vaginal discharge. Musculoskeletal: Negative for arthralgias, back pain, gait problem, joint swelling, myalgias, neck pain and neck stiffness. Skin: Negative for color change and rash. Neurological: Negative for dizziness, tremors, syncope, speech difficulty, weakness, light-headedness and headaches. Hematological: Negative for adenopathy. Does not bruise/bleed easily. Psychiatric/Behavioral: Negative for agitation, behavioral problems, decreased concentration, sleep disturbance and suicidal ideas.  The patient is not nervous/anxious and is not hyperactive. Prior to Visit Medications    Medication Sig Taking?  Authorizing Provider   pseudoephedrine (DECONGESTANT) 30 MG tablet Take 1 tablet by mouth 3 times daily Yes Lynsey Pond MD   neomycin-polymyxin-hydrocortisone (CORTISPORIN) 3.5-70585-2 otic solution Place 4 drops into the left ear 3 times daily for 10 days Instill into L  Ear for 10 days Yes Lynsey Pond MD   azelastine (ASTELIN) 0.1 % nasal spray 1 spray by Nasal route 2 times daily Use in each nostril as directed  Domi Mendez MD   omeprazole (PRILOSEC) 40 MG delayed release capsule TAKE 1 CAPSULE BY MOUTH EVERY DAY  Christina Mendez MD   benzonatate (TESSALON) 100 MG capsule TAKE 1 CAPSULE BY MOUTH THREE TIMES A DAY AS NEEDED FOR COUGH  Christina Mendez MD   butalbital-acetaminophen-caffeine (FIORICET, ESGIC) -40 MG per tablet TAKE 1 TABLET BY MOUTH EVERY 4 HOURS AS NEEDED FOR PAIN  Christina Mendez MD   hydrALAZINE (APRESOLINE) 50 MG tablet Take 1 tablet by mouth 2 times daily  Christina Mendez MD   amLODIPine (NORVASC) 10 MG tablet TAKE 1 TABLET BY MOUTH EVERY DAY  Christina Mendez MD   naproxen (NAPROSYN) 500 MG tablet Take 1 tablet by mouth 2 times daily for 10 days  Earlene Goodman MD   potassium chloride (KLOR-CON M20) 20 MEQ extended release tablet Take 1 tablet by mouth 2 times daily  Christina Mendez MD   clotrimazole-betamethasone (LOTRISONE) 1-0.05 % cream APPLY TO AFFECTED AREA TWICE A DAY  Christina Mendez MD   topiramate (TOPAMAX) 25 MG tablet Take 1 tablet daily  Patient taking differently: as needed Take 1 tablet daily  Jack Medel MD   hydroCHLOROthiazide (HYDRODIURIL) 25 MG tablet TAKE 1 TABLET BY MOUTH EVERY DAY  Christina Mendez MD   Prenatal Vit-Fe Fumarate-FA (PRENATAL COMPLETE PO) Take by mouth  Historical Provider, MD   montelukast (SINGULAIR) 10 MG tablet TAKE 1 TABLET BY MOUTH EVERY DAY  Christina Mendez MD   ondansetron (ZOFRAN) 4 MG tablet TAKE 1 TABLET BY MOUTH EVERY 12 HOURS AS NEEDED or nausea or vomiting  Patient taking differently: once TAKE 1 TABLET BY MOUTH EVERY 12 HOURS AS NEEDED or nausea or vomiting  Wayne Campa MD   vitamin D (ERGOCALCIFEROL) 1.25 MG (75426 UT) CAPS capsule TAKE 1 CAPSULE BY MOUTH EVERY 30 DAYS  Wayne Campa MD   triamcinolone (KENALOG) 0.1 % cream Apply topically 2 times daily. Wayne Campa MD   fluticasone [facility]) 50 MCG/ACT nasal spray INHALE 1 SPRAY NASALLY EVERY DAY  Wayne Campa MD   Nebulizers (NEBULIZER COMPRESSOR) MISC Use every 6 hours as needed with duoneb  Wayne Campa MD   budesonide (PULMICORT) 0.5 MG/2ML nebulizer suspension Take 2 mLs by nebulization 2 times daily  Patient taking differently: Take 500 mcg by nebulization 2 times daily as needed   Wayne Campa MD   Chlorhexidine Gluconate 2 % SOLN Apply to skin once daily prn  Fransisco Yuan DO   albuterol sulfate HFA (PROAIR HFA) 108 (90 Base) MCG/ACT inhaler Inhale 2 puffs into the lungs every 6 hours as needed for Wheezing  Wayne Campa MD   EPINEPHrine (EPIPEN) 0.3 MG/0.3ML DELPHINE injection Use as directed for allergic reaction  Wayne Campa MD        Social History     Tobacco Use    Smoking status: Former Smoker     Packs/day: 0.25     Years: 5.00     Pack years: 1.25     Types: Cigarettes     Quit date: 1980     Years since quittin.0    Smokeless tobacco: Former User     Quit date: 12/10/1994   Substance Use Topics    Alcohol use: Yes     Alcohol/week: 0.0 standard drinks     Comment: socially-wine        Vitals:    21 1019   BP: 124/81   Pulse: 103   Temp: 99 °F (37.2 °C)   TempSrc: Temporal   Weight: 197 lb (89.4 kg)   Height: 5' 2\" (1.575 m)     Estimated body mass index is 36.03 kg/m² as calculated from the following:    Height as of this encounter: 5' 2\" (1.575 m). Weight as of this encounter: 197 lb (89.4 kg).     PHYSICAL EXAM  GENERAL:  Pleasant tired appearing  female who looks her stated age, awake alert and oriented x3, no acute distress. HEENT:  Normocephalic atraumatic. Pupils equal round reactive light and accommodation, extra ocular muscles are intact. Mildly pruritic conjunctivae with chemosis. Oropharynx is clear moist without injection or exudate. Tongue and palate move normally. Turbinates appear mildly reddened. Tympanic membranes appear normal though some erythema and swelling in the left external auditory canal.  NECK:  Supple nontender. No carotid bruits. Brisk carotid upstrokes, no JVD. No thyromegaly. LYMPH:  No supraclavicular cervicallymphadenopathy. LUNGS:  Clear to auscultation bilaterally. Poor air entry. No inspiratory crackles or expiratory wheezes. HEART:  Regular rate and rhythm without pathologic murmur rub gallop S3 or S4. ABDOMEN:  Soft, nontender. Normal bowel sounds. No guarding. No masses. UROGENITAL:  Deferred  EXTREMITIES:  Warm and well perfused without clubbing cyanosis or edema. 2+ pulses in all 4 extremities. Capillary refill less than 2 seconds. NEURO:  Cranial nerves 2-12 grossly intact. Normal muscle bulk and tone. No resting tremor, cogwheeling, normal rapid alternating movements in the hands and feet. No stocking paresthesia. Normal gait and station. MUSCULOSKELETAL:  Mild osteoarthritic changes. SKIN:  No worrisome lesions, skin a little dry. PSYCH:  No psychomotor retardation or agitation. Good eye contact. Unrestricted affect range. Mood congruent with affect. Linear thought. ASSESSMENT/PLAN:  1. Acute otitis externa of left ear, unspecified type  Try Cortisporin eardrops. Indicates she should not put any Q-tips in for a week. To address eustachian tube dysfunction likely related to allergic rhinosinusitis, no antibiotic systemic indicated here. Smoking cessation would help. 2. Dysfunction of left eustachian tube  Sudafed 3 times daily for a week. #3 complex migraine.   Suggest topiramate prophylaxis rather than use as abortive therapy. It was a pleasure to visit with MsKari Gerhardt Dama today. Answered all questions as best I could. No follow-ups on file.     Matthew Ott MD   Time 15 minutes

## 2021-06-28 ENCOUNTER — OFFICE VISIT (OUTPATIENT)
Dept: ENT CLINIC | Age: 61
End: 2021-06-28

## 2021-06-28 VITALS — WEIGHT: 194 LBS | BODY MASS INDEX: 35.48 KG/M2

## 2021-06-28 DIAGNOSIS — K11.20 SIALADENITIS: Primary | ICD-10-CM

## 2021-06-28 DIAGNOSIS — H93.12 TINNITUS OF LEFT EAR: ICD-10-CM

## 2021-06-28 DIAGNOSIS — H92.02 OTALGIA, LEFT: ICD-10-CM

## 2021-06-28 PROCEDURE — 99024 POSTOP FOLLOW-UP VISIT: CPT | Performed by: OTOLARYNGOLOGY

## 2021-06-28 NOTE — PROGRESS NOTES
[address] [facility] SURGERY  Follow up      Patient Name: Kira [facility] Record Number:  [id number]  Primary Care Physician:  Joe He MD  Date of Consultation: 6/28/2021    Chief Complaint: Left tinnitus and otalgia        Interval History  Patient is following up from a transoral removal of a salivary stone that I did on May 18. She was having a lot of postoperative pain that has resolved. She is no longer having neck pain. She is complaining of left-sided otalgia and some tinnitus. She thinks this happened after surgery. She said that her jaw popped. REVIEW OF SYSTEMS  As above    PHYSICAL EXAM  GENERAL: No Acute Distress, Alert and Oriented, no Hoarseness, strong voice  EYES: EOMI, Anti-icteric  HENT:   Head: Normocephalic and atraumatic. Face:  Symmetric, facial nerve intact, no sinus tenderness   see below r  Mouth/Oral Cavity: Intraoral incision under the tongue is healed well  Oropharynx/Larynx:  normal oropharynx  Nose:Normal external nasal appearance. NECK: Firm left submandibular gland that is not tender to palpation  CHEST: Normal respiratory effort, no retractions, breathing comfortably  SKIN: No rashes, normal appearing skin, no evidence of skin lesions/tumors  Neuro:  cranial nerve II-XII intact; normal gait  Cardio:  no edema    PROCEDURE  Bilateral ear exam  Right ear is visualized binoculars scope. Tympanic membrane intact and aerated middle ear    Left ear was then visualized. She does have a small pit of the anterior canal.  It does not appear to be infected. Tympanic membrane intact at this time. I do not appreciate a significant infection. ASSESSMENT/PLAN  1. Sialadenitis  Is currently resolved. I do not think that the firm submandibular gland however feels normal, but as long as it is not hurting I would not recommend any more intervention    2. Otalgia, left  I am not exactly sure what is causing her otalgia. 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  It does not appear to be infected. She could have some TMJ pain from the surgery but I would expect that this would resolve. I would like get an audiogram just because of the tinnitus. She will follow up with this. 3. Tinnitus of left ear  As above             I have performed a head and neck physical exam personally or was physically present during the key or critical portions of the service. This note was generated completely or in part utilizing Dragon dictation speech recognition software. Occasionally, words are mistranscribed and despite editing, the text may contain inaccuracies due to incorrect word recognition. If further clarification is needed please contact the office at [phone].

## 2021-06-29 ENCOUNTER — HOSPITAL ENCOUNTER (OUTPATIENT)
Dept: MRI IMAGING | Age: 61
Discharge: HOME | End: 2021-06-29
Payer: COMMERCIAL

## 2021-06-29 DIAGNOSIS — I10 ESSENTIAL HYPERTENSION: ICD-10-CM

## 2021-06-29 DIAGNOSIS — J45.40 MODERATE PERSISTENT ASTHMA WITHOUT COMPLICATION: ICD-10-CM

## 2021-06-29 DIAGNOSIS — D35.2 PITUITARY MACROADENOMA (HCC): ICD-10-CM

## 2021-06-29 LAB
GFR AFRICAN AMERICAN: >60
GFR NON-AFRICAN AMERICAN: 57
PERFORMED ON: ABNORMAL
POC CREATININE: 1 MG/DL (ref 0.6–1.1)
POC SAMPLE TYPE: ABNORMAL

## 2021-06-29 PROCEDURE — 70553 MRI BRAIN STEM W/O & W/DYE: CPT

## 2021-06-29 PROCEDURE — A9577 INJ MULTIHANCE: HCPCS | Performed by: NEUROLOGICAL SURGERY

## 2021-06-29 PROCEDURE — 6360000004 HC RX CONTRAST MEDICATION: Performed by: NEUROLOGICAL SURGERY

## 2021-06-29 PROCEDURE — 82565 ASSAY OF CREATININE: CPT

## 2021-06-29 RX ORDER — AMLODIPINE BESYLATE 10 MG/1
TABLET ORAL
Qty: 30 TABLET | Refills: 2 | Status: SHIPPED | OUTPATIENT
Start: 2021-06-29 | End: 2021-10-23

## 2021-06-29 RX ORDER — HYDROCHLOROTHIAZIDE 25 MG/1
TABLET ORAL
Qty: 30 TABLET | Refills: 5 | Status: SHIPPED | OUTPATIENT
Start: 2021-06-29 | End: 2021-12-05

## 2021-06-29 RX ORDER — AZELASTINE 1 MG/ML
1 SPRAY, METERED NASAL 2 TIMES DAILY
Qty: 1 BOTTLE | Refills: 1 | Status: SHIPPED | OUTPATIENT
Start: 2021-06-29 | End: 2021-07-30

## 2021-06-29 RX ORDER — MONTELUKAST SODIUM 10 MG/1
TABLET ORAL
Qty: 30 TABLET | Refills: 5 | Status: SHIPPED | OUTPATIENT
Start: 2021-06-29 | End: 2021-12-23

## 2021-06-29 RX ADMIN — GADOBENATE DIMEGLUMINE 17 ML: 529 INJECTION, SOLUTION INTRAVENOUS at 15:53

## 2021-06-29 NOTE — TELEPHONE ENCOUNTER
Medication:   Requested Prescriptions     Pending Prescriptions Disp Refills    hydroCHLOROthiazide (HYDRODIURIL) 25 MG tablet [Pharmacy Med Name: HYDROCHLOROTHIAZIDE 25 MG TAB] 30 tablet 5     Sig: TAKE 1 TABLET BY MOUTH EVERY DAY    amLODIPine (NORVASC) 10 MG tablet [Pharmacy Med Name: AMLODIPINE BESYLATE 10 MG TAB] 30 tablet 2     Sig: TAKE 1 TABLET BY MOUTH EVERY DAY    montelukast (SINGULAIR) 10 MG tablet [Pharmacy Med Name: MONTELUKAST SOD 10 MG TABLET] 30 tablet 5     Sig: TAKE 1 TABLET BY MOUTH EVERY DAY        Last Filled:      Patient Phone Number: [phone] (home) [phone] (work)    Last appt: 6/24/2021   Next appt: Visit date not found    Last OARRS:   RX Monitoring 8/17/2017   Attestation The Prescription Monitoring Report for this patient was reviewed today.

## 2021-07-05 ENCOUNTER — HOSPITAL ENCOUNTER (INPATIENT)
Age: 61
LOS: 1 days | Discharge: HOME | DRG: 092 | End: 2021-07-07
Attending: EMERGENCY MEDICINE | Admitting: INTERNAL MEDICINE
Payer: COMMERCIAL

## 2021-07-05 ENCOUNTER — APPOINTMENT (OUTPATIENT)
Dept: GENERAL RADIOLOGY | Age: 61
DRG: 092 | End: 2021-07-05
Payer: COMMERCIAL

## 2021-07-05 ENCOUNTER — APPOINTMENT (OUTPATIENT)
Dept: CT IMAGING | Age: 61
DRG: 092 | End: 2021-07-05
Payer: COMMERCIAL

## 2021-07-05 DIAGNOSIS — R20.2 PARESTHESIAS: ICD-10-CM

## 2021-07-05 DIAGNOSIS — R47.1 DYSARTHRIA: Primary | ICD-10-CM

## 2021-07-05 PROBLEM — G45.9 TIA (TRANSIENT ISCHEMIC ATTACK): Status: ACTIVE | Noted: 2021-07-05

## 2021-07-05 LAB
A/G RATIO: 1.5 (ref 1.1–2.2)
ALBUMIN SERPL-MCNC: 3.4 G/DL (ref 3.4–5)
ALP BLD-CCNC: 98 U/L (ref 40–129)
ALT SERPL-CCNC: 32 U/L (ref 10–40)
ANION GAP SERPL CALCULATED.3IONS-SCNC: 13 MMOL/L (ref 3–16)
AST SERPL-CCNC: 38 U/L (ref 15–37)
BASOPHILS ABSOLUTE: 0.1 K/UL (ref 0–0.2)
BASOPHILS RELATIVE PERCENT: 1.1 %
BILIRUB SERPL-MCNC: 0.3 MG/DL (ref 0–1)
BILIRUBIN URINE: NEGATIVE
BLOOD, URINE: NEGATIVE
BUN BLDV-MCNC: 8 MG/DL (ref 7–20)
CALCIUM SERPL-MCNC: 8 MG/DL (ref 8.3–10.6)
CHLORIDE BLD-SCNC: 105 MMOL/L (ref 99–110)
CLARITY: CLEAR
CO2: 22 MMOL/L (ref 21–32)
COLOR: YELLOW
CREAT SERPL-MCNC: 0.7 MG/DL (ref 0.6–1.1)
EOSINOPHILS ABSOLUTE: 0.1 K/UL (ref 0–0.6)
EOSINOPHILS RELATIVE PERCENT: 1.4 %
GFR AFRICAN AMERICAN: >60
GFR NON-AFRICAN AMERICAN: >60
GLOBULIN: 2.3 G/DL
GLUCOSE BLD-MCNC: 112 MG/DL
GLUCOSE BLD-MCNC: 112 MG/DL (ref 70–99)
GLUCOSE BLD-MCNC: 122 MG/DL (ref 70–99)
GLUCOSE URINE: NEGATIVE MG/DL
HCT VFR BLD CALC: 36.2 % (ref 36–48)
HEMOGLOBIN: 12.3 G/DL (ref 12–16)
INR BLD: 0.97 (ref 0.88–1.12)
KETONES, URINE: NEGATIVE MG/DL
LACTIC ACID: 3.1 MMOL/L (ref 0.4–2)
LEUKOCYTE ESTERASE, URINE: NEGATIVE
LYMPHOCYTES ABSOLUTE: 3.2 K/UL (ref 1–5.1)
LYMPHOCYTES RELATIVE PERCENT: 51 %
MAGNESIUM: 1.8 MG/DL (ref 1.8–2.4)
MCH RBC QN AUTO: 30.6 PG (ref 26–34)
MCHC RBC AUTO-ENTMCNC: 34 G/DL (ref 31–36)
MCV RBC AUTO: 89.9 FL (ref 80–100)
MICROSCOPIC EXAMINATION: NORMAL
MONOCYTES ABSOLUTE: 0.4 K/UL (ref 0–1.3)
MONOCYTES RELATIVE PERCENT: 6 %
NEUTROPHILS ABSOLUTE: 2.6 K/UL (ref 1.7–7.7)
NEUTROPHILS RELATIVE PERCENT: 40.5 %
NITRITE, URINE: NEGATIVE
PDW BLD-RTO: 14.3 % (ref 12.4–15.4)
PERFORMED ON: ABNORMAL
PH UA: 7 (ref 5–8)
PLATELET # BLD: 248 K/UL (ref 135–450)
PMV BLD AUTO: 7.2 FL (ref 5–10.5)
POTASSIUM REFLEX MAGNESIUM: 2.8 MMOL/L (ref 3.5–5.1)
PROTEIN UA: NEGATIVE MG/DL
PROTHROMBIN TIME: 11 SEC (ref 9.9–12.7)
RBC # BLD: 4.02 M/UL (ref 4–5.2)
SODIUM BLD-SCNC: 140 MMOL/L (ref 136–145)
SPECIFIC GRAVITY UA: >1.03 (ref 1–1.03)
TOTAL PROTEIN: 5.7 G/DL (ref 6.4–8.2)
TROPONIN: <0.01 NG/ML
URINE REFLEX TO CULTURE: NORMAL
URINE TYPE: NORMAL
UROBILINOGEN, URINE: 0.2 E.U./DL
WBC # BLD: 6.4 K/UL (ref 4–11)

## 2021-07-05 PROCEDURE — 6360000002 HC RX W HCPCS: Performed by: INTERNAL MEDICINE

## 2021-07-05 PROCEDURE — 70496 CT ANGIOGRAPHY HEAD: CPT

## 2021-07-05 PROCEDURE — 2580000003 HC RX 258: Performed by: EMERGENCY MEDICINE

## 2021-07-05 PROCEDURE — 82607 VITAMIN B-12: CPT

## 2021-07-05 PROCEDURE — 85610 PROTHROMBIN TIME: CPT

## 2021-07-05 PROCEDURE — 6360000004 HC RX CONTRAST MEDICATION: Performed by: EMERGENCY MEDICINE

## 2021-07-05 PROCEDURE — 94640 AIRWAY INHALATION TREATMENT: CPT

## 2021-07-05 PROCEDURE — 36415 COLL VENOUS BLD VENIPUNCTURE: CPT

## 2021-07-05 PROCEDURE — 84484 ASSAY OF TROPONIN QUANT: CPT

## 2021-07-05 PROCEDURE — 82746 ASSAY OF FOLIC ACID SERUM: CPT

## 2021-07-05 PROCEDURE — 83735 ASSAY OF MAGNESIUM: CPT

## 2021-07-05 PROCEDURE — G0378 HOSPITAL OBSERVATION PER HR: HCPCS

## 2021-07-05 PROCEDURE — 71045 X-RAY EXAM CHEST 1 VIEW: CPT

## 2021-07-05 PROCEDURE — 99283 EMERGENCY DEPT VISIT LOW MDM: CPT

## 2021-07-05 PROCEDURE — 93005 ELECTROCARDIOGRAM TRACING: CPT | Performed by: EMERGENCY MEDICINE

## 2021-07-05 PROCEDURE — 81003 URINALYSIS AUTO W/O SCOPE: CPT

## 2021-07-05 PROCEDURE — 70450 CT HEAD/BRAIN W/O DYE: CPT

## 2021-07-05 PROCEDURE — 83605 ASSAY OF LACTIC ACID: CPT

## 2021-07-05 PROCEDURE — 6370000000 HC RX 637 (ALT 250 FOR IP): Performed by: INTERNAL MEDICINE

## 2021-07-05 PROCEDURE — 94761 N-INVAS EAR/PLS OXIMETRY MLT: CPT

## 2021-07-05 PROCEDURE — 80053 COMPREHEN METABOLIC PANEL: CPT

## 2021-07-05 PROCEDURE — 85025 COMPLETE CBC W/AUTO DIFF WBC: CPT

## 2021-07-05 PROCEDURE — 94664 DEMO&/EVAL PT USE INHALER: CPT

## 2021-07-05 RX ORDER — BUDESONIDE 0.5 MG/2ML
500 INHALANT ORAL 2 TIMES DAILY
Status: DISCONTINUED | OUTPATIENT
Start: 2021-07-05 | End: 2021-07-07 | Stop reason: HOSPADM

## 2021-07-05 RX ORDER — ASPIRIN 81 MG/1
324 TABLET, CHEWABLE ORAL ONCE
Status: DISCONTINUED | OUTPATIENT
Start: 2021-07-05 | End: 2021-07-05 | Stop reason: HOSPADM

## 2021-07-05 RX ORDER — ACETAMINOPHEN 325 MG/1
650 TABLET ORAL EVERY 4 HOURS PRN
Status: DISCONTINUED | OUTPATIENT
Start: 2021-07-05 | End: 2021-07-05

## 2021-07-05 RX ORDER — ASPIRIN 300 MG/1
300 SUPPOSITORY RECTAL DAILY
Status: DISCONTINUED | OUTPATIENT
Start: 2021-07-06 | End: 2021-07-07 | Stop reason: HOSPADM

## 2021-07-05 RX ORDER — ASPIRIN 81 MG/1
81 TABLET ORAL DAILY
Status: DISCONTINUED | OUTPATIENT
Start: 2021-07-06 | End: 2021-07-07 | Stop reason: HOSPADM

## 2021-07-05 RX ORDER — AMLODIPINE BESYLATE 10 MG/1
10 TABLET ORAL DAILY
Status: DISCONTINUED | OUTPATIENT
Start: 2021-07-06 | End: 2021-07-07 | Stop reason: HOSPADM

## 2021-07-05 RX ORDER — HYDRALAZINE HYDROCHLORIDE 50 MG/1
50 TABLET, FILM COATED ORAL 2 TIMES DAILY
Status: DISCONTINUED | OUTPATIENT
Start: 2021-07-05 | End: 2021-07-07 | Stop reason: HOSPADM

## 2021-07-05 RX ORDER — ONDANSETRON 2 MG/ML
4 INJECTION INTRAMUSCULAR; INTRAVENOUS EVERY 6 HOURS PRN
Status: DISCONTINUED | OUTPATIENT
Start: 2021-07-05 | End: 2021-07-07 | Stop reason: HOSPADM

## 2021-07-05 RX ORDER — ALBUTEROL SULFATE 90 UG/1
2 AEROSOL, METERED RESPIRATORY (INHALATION) EVERY 6 HOURS PRN
Status: DISCONTINUED | OUTPATIENT
Start: 2021-07-05 | End: 2021-07-07 | Stop reason: HOSPADM

## 2021-07-05 RX ORDER — HYDROCHLOROTHIAZIDE 25 MG/1
25 TABLET ORAL DAILY
Status: DISCONTINUED | OUTPATIENT
Start: 2021-07-06 | End: 2021-07-07 | Stop reason: HOSPADM

## 2021-07-05 RX ORDER — ACETAMINOPHEN 500 MG
1000 TABLET ORAL EVERY 6 HOURS PRN
Status: DISCONTINUED | OUTPATIENT
Start: 2021-07-05 | End: 2021-07-07 | Stop reason: HOSPADM

## 2021-07-05 RX ORDER — ATORVASTATIN CALCIUM 40 MG/1
40 TABLET, FILM COATED ORAL NIGHTLY
Status: DISCONTINUED | OUTPATIENT
Start: 2021-07-05 | End: 2021-07-07 | Stop reason: HOSPADM

## 2021-07-05 RX ORDER — 0.9 % SODIUM CHLORIDE 0.9 %
1000 INTRAVENOUS SOLUTION INTRAVENOUS ONCE
Status: COMPLETED | OUTPATIENT
Start: 2021-07-05 | End: 2021-07-05

## 2021-07-05 RX ORDER — TOPIRAMATE 25 MG/1
25 TABLET ORAL DAILY
Status: DISCONTINUED | OUTPATIENT
Start: 2021-07-06 | End: 2021-07-07 | Stop reason: HOSPADM

## 2021-07-05 RX ORDER — ONDANSETRON 4 MG/1
4 TABLET, ORALLY DISINTEGRATING ORAL EVERY 8 HOURS PRN
Status: DISCONTINUED | OUTPATIENT
Start: 2021-07-05 | End: 2021-07-07 | Stop reason: HOSPADM

## 2021-07-05 RX ADMIN — SODIUM CHLORIDE 1000 ML: 9 INJECTION, SOLUTION INTRAVENOUS at 17:48

## 2021-07-05 RX ADMIN — IOPAMIDOL 75 ML: 755 INJECTION, SOLUTION INTRAVENOUS at 15:52

## 2021-07-05 RX ADMIN — BUDESONIDE 500 MCG: 0.5 SUSPENSION RESPIRATORY (INHALATION) at 20:39

## 2021-07-05 ASSESSMENT — PAIN DESCRIPTION - FREQUENCY
FREQUENCY: CONTINUOUS
FREQUENCY: CONTINUOUS

## 2021-07-05 ASSESSMENT — PAIN DESCRIPTION - PAIN TYPE
TYPE: ACUTE PAIN
TYPE: ACUTE PAIN

## 2021-07-05 ASSESSMENT — PAIN DESCRIPTION - ONSET
ONSET: ON-GOING
ONSET: ON-GOING

## 2021-07-05 ASSESSMENT — PAIN DESCRIPTION - LOCATION
LOCATION: LEG
LOCATION: HEAD;LEG

## 2021-07-05 ASSESSMENT — PAIN SCALES - GENERAL
PAINLEVEL_OUTOF10: 8
PAINLEVEL_OUTOF10: 10

## 2021-07-05 ASSESSMENT — PAIN - FUNCTIONAL ASSESSMENT: PAIN_FUNCTIONAL_ASSESSMENT: PREVENTS OR INTERFERES SOME ACTIVE ACTIVITIES AND ADLS

## 2021-07-05 ASSESSMENT — PAIN DESCRIPTION - ORIENTATION
ORIENTATION: LEFT;RIGHT
ORIENTATION: RIGHT;LEFT

## 2021-07-05 ASSESSMENT — PAIN DESCRIPTION - DESCRIPTORS
DESCRIPTORS: ACHING
DESCRIPTORS: ACHING;NUMBNESS

## 2021-07-05 ASSESSMENT — PAIN DESCRIPTION - PROGRESSION
CLINICAL_PROGRESSION: NOT CHANGED

## 2021-07-05 NOTE — PROGRESS NOTES
Medication Reconciliation    List of medications patient is currently taking is complete. Source of information: 1. Conversation with patient and family at bedside                                      2. EPIC records      Allergies  Shellfish allergy, Shellfish-derived products, and Lisinopril     Notes regarding home medications:   1. Patient received all of her morning home medications prior to arrival to the emergency department today.     Loc Cruz, [address] [place] [address], PharmD, BCPS  7/5/2021 5:08 PM

## 2021-07-05 NOTE — ED NOTES
Report called to Wilfrid Puga RN on 5W. Denies further questions.      Alisa Crandall RN  07/05/21 [id number]

## 2021-07-05 NOTE — ED PROVIDER NOTES
9 [facility]      Pt Name: Sharon Loja  MRN: [id number]  [place] 1960  Date of evaluation: 7/5/2021  Provider: Corinne Kobs, [address] [place] [address]  Chief Complaint   Patient presents with    Aphasia     pt wtih ldifficulty speaking when she woke up this am.  woke up about 0500 this am       I wore personal protective equipment when I was in the room the entire time. This includes gloves, N95 mask, face shield, and a glove over my stethoscope for protection. HPI  Sharon Loja is a 64 y.o. female who presents with dysarthria and inability to speak that started 45 minutes prior to arrival.  However, she told the nurse that started at 10 AM.  She then told another nurse that started 3 AM.  She denies history of diabetes. She denies a history of myocardial infarction. She denies a history of stroke. She states this morning when she woke up she was okay. She developed tingling in her lips numbness in her bilateral lower extremities and numbness in her right arm that she describes as tingling and tingling in the back of her neck. She denies any new medications or medications over-the-counter that are new. She denies any medication changes. She did have some chest pressure with that. She states she was also stuttering. She describes her symptoms as severe. She denies any shortness of breath. He does not have any chest pain at the present time. ? REVIEW OF SYSTEMS  All systems negative except as noted in the HPI. Reviewed Nurses' notes and concur. History limited due to poor historian and changing story. .    No LMP recorded. Patient has had a hysterectomy.     PAST MEDICAL HISTORY  Past Medical History:   Diagnosis Date    Allergic rhinitis     Anxiety 1/16/2020    Arthritis     pt denies    Asthma 4/10/2012    Asthma-chronic obstructive pulmonary disease overlap syndrome ([facility] [address]     pt denies    Bilateral carpal tunnel syndrome 2016    Depression     Dysmenorrhea     Fibroid     GERD (gastroesophageal reflux disease)     Headache(784.0) 4/10/2012    Hyperlipidemia     Hypertension     S/P gastric bypass     Status post coronary angiogram 2015    Wears glasses        FAMILY HISTORY  Family History   Problem Relation Age of Onset    Diabetes Father     Rheum Arthritis Neg Hx     Osteoarthritis Neg Hx     Asthma Neg Hx     Breast Cancer Neg Hx     Cancer Neg Hx     Heart Failure Neg Hx     High Cholesterol Neg Hx     Hypertension Neg Hx     Migraines Neg Hx     Ovarian Cancer Neg Hx     Rashes/Skin Problems Neg Hx     Seizures Neg Hx     Stroke Neg Hx     Thyroid Disease Neg Hx        SOCIAL HISTORY   reports that she quit smoking about 41 years ago. Her smoking use included cigarettes. She has a 1.25 pack-year smoking history. She quit smokeless tobacco use about 26 years ago. She reports current alcohol use. She reports that she does not use drugs.     SURGICAL HISTORY  Past Surgical History:   Procedure Laterality Date    BREAST SURGERY      Breast reduction    CARPAL TUNNEL RELEASE Left 2016     Left carpal tunnel release      CARPAL TUNNEL RELEASE Right 10/06/2016     SECTION      x1    COLONOSCOPY      HYSTERECTOMY      complete-ovaries out    SALIVARY GLAND SURGERY Left 2021    EXCISION OF SALIVARY STONE IN THE LEFT SUBMANDIBULAR DUCT performed by Lazarus Mattock, MD at [address]  Current Outpatient Rx   Medication Sig Dispense Refill    hydroCHLOROthiazide (HYDRODIURIL) 25 MG tablet TAKE 1 TABLET BY MOUTH EVERY DAY 30 tablet 5    azelastine (ASTELIN) 0.1 % nasal spray 1 SPRAY BY NASAL ROUTE 2 TIMES DAILY USE IN EACH NOSTRIL AS DIRECTED 1 Bottle 1    amLODIPine (NORVASC) 10 MG tablet TAKE 1 TABLET BY MOUTH EVERY DAY 30 tablet 2    montelukast (SINGULAIR) 10 MG tablet TAKE 1 TABLET BY MOUTH EVERY DAY 30 tablet 5    pseudoephedrine (DECONGESTANT) 30 MG tablet Take 1 tablet by mouth 3 times daily 21 tablet 0    omeprazole (PRILOSEC) 40 MG delayed release capsule TAKE 1 CAPSULE BY MOUTH EVERY DAY 30 capsule 5    benzonatate (TESSALON) 100 MG capsule TAKE 1 CAPSULE BY MOUTH THREE TIMES A DAY AS NEEDED FOR COUGH 90 capsule 3    butalbital-acetaminophen-caffeine (FIORICET, ESGIC) -40 MG per tablet TAKE 1 TABLET BY MOUTH EVERY 4 HOURS AS NEEDED FOR PAIN 30 tablet 1    hydrALAZINE (APRESOLINE) 50 MG tablet Take 1 tablet by mouth 2 times daily 180 tablet 3    potassium chloride (KLOR-CON M20) 20 MEQ extended release tablet Take 1 tablet by mouth 2 times daily 180 tablet 1    clotrimazole-betamethasone (LOTRISONE) 1-0.05 % cream APPLY TO AFFECTED AREA TWICE A DAY 15 g 1    topiramate (TOPAMAX) 25 MG tablet Take 1 tablet daily (Patient taking differently: Take 25 mg by mouth daily ) 30 tablet 2    Prenatal Vit-Fe Fumarate-FA (PRENATAL COMPLETE PO) Take 1 tablet by mouth daily       ondansetron (ZOFRAN) 4 MG tablet TAKE 1 TABLET BY MOUTH EVERY 12 HOURS AS NEEDED or nausea or vomiting 30 tablet 12    vitamin D (ERGOCALCIFEROL) 1.25 MG (69776 UT) CAPS capsule TAKE 1 CAPSULE BY MOUTH EVERY 30 DAYS 4 capsule 3    fluticasone (FLONASE) 50 MCG/ACT nasal spray INHALE 1 SPRAY NASALLY EVERY DAY 1 Bottle 5    Nebulizers (NEBULIZER COMPRESSOR) MISC Use every 6 hours as needed with duoneb 1 each 0    budesonide (PULMICORT) 0.5 MG/2ML nebulizer suspension Take 2 mLs by nebulization 2 times daily (Patient taking differently: Take 500 mcg by nebulization 2 times daily as needed ) 60 ampule 3    Chlorhexidine Gluconate 2 % SOLN Apply to skin once daily prn 250 mL 1    albuterol sulfate HFA (PROAIR HFA) 108 (90 Base) MCG/ACT inhaler Inhale 2 puffs into the lungs every 6 hours as needed for Wheezing 1 Inhaler 5    EPINEPHrine (EPIPEN) 0.3 MG/0.3ML DELPHINE injection Use as directed for allergic reaction 2 Device 3 ALLERGIES  Allergies   Allergen Reactions    Shellfish Allergy Anaphylaxis    Shellfish-Derived Products Shortness Of Breath and Palpitations    Lisinopril      Dry cough, no lip swelling or resp. sxs       Tetanus vaccination status reviewed: tetanus re-vaccination not indicated. PHYSICAL EXAM  VITAL SIGNS: /78   Pulse 88   Temp 98.3 °F (36.8 °C) (Oral)   Resp 16   SpO2 99%   Constitutional: Well-developed, well-nourished, appears fatigued but otherwise normal, nontoxic, activity: On the cart, slow to speak, mild slurring of her speech is noted  HENT: Normocephalic, Atraumatic, Bilateral external ears normal, TM's were normal, Mucus membranes are moist and oropharynx is patent and clear, No oral exudates, Nose normal.  Eyes: PERRLA, EOMI, Conjunctiva normal, No discharge. No scleral icterus. Neck: Normal range of motion, No tenderness, Supple. Lymphatic: No lymphadenopathy noted. Cardiovascular: Normal heart rate, Normal rhythm, no murmurs, no gallops, no rubs. Thorax & Lungs: Normal breath sounds, no respiratory distress, no wheezing, no rales, no rhonchi  Abdomen: Soft, Nontender, No hepatosplenomegaly, No masses, No pulsatile masses, No distension, normal bowel sounds  Skin: Warm, Dry, No erythema, No rash. Extremities: No edema, No tenderness, No cyanosis, No clubbing. No amputations, capillary refill less than 2 seconds. Musculoskeletal:  no major deformities noted. Neurologic: Alert & oriented x 3, CN II through XII are intact except for dysarthria, normal motor function, decreased sensation to light touch is noted in the bilateral lower extremities, right arm, and back of her neck. no other focal deficits noted, no clonus, no tremors. Psychiatric: Affect flattened, Mood depressed. NIH Stroke Scale-3  NIH Stroke Scale  Interval: Baseline  Level of Consciousness (1a. ): Alert  LOC Questions (1b. ):  Answers both correctly  LOC Commands (1c. ): Performs both tasks [address]   Phone [phone]   PROTIME-INR    Narrative:     Performed at:  [facility]  [address] [facility] [place] [address]   Phone [phone]   MAGNESIUM    Narrative:     Twin G[facility] tel. [phone],  Chemistry results called to and read back by Angela Hoffman RN, 07/05/2021 18:56,  by Becky Schmidt  Performed at:  [facility]  [address] [facility] [address] [place] [address]   Phone [phone]   URINE RT REFLEX TO CULTURE     ? EKG  EKG Interpretation    Interpreted by emergency department physician  Time performed: 9782  Time read: 1622    Rhythm: Sinus  Ventricular Rate: 87  QRS Axis: 58  Ectopy: PVCs  Conduction: Sinus rhythm with occasional PVCs and prolonged QT interval  ST Segments: normal  T Waves: normal  Q Waves: None    Other findings: Nonspecific interventricular conduction delay    Compared to EKG on: 11/25/2020    Clinical Impression: Sinus rhythm with occasional PVC and prolonged QT interval but otherwise normal EKG. Camryn Cardenas, DO      RADIOLOGY/PROCEDURES  I personally reviewed the images for this case. XR CHEST PORTABLE   Final Result   Clear lungs. Mild cardiac enlargement. No acute cardiopulmonary abnormality. CTA HEAD NECK W CONTRAST   Final Result   Unremarkable CT angiogram of the head and neck, with no evidence of vessel   dissection or irregularity, significant stenosis, or proximal intracranial   arterial occlusion. Comment:      Reference per NASCET criteria for degree of stenosis:  Mild: Less than 50%   stenosis. Moderate: 50-69% stenosis. Severe: 70-94% stenosis. Near-occlusion: 95-99% stenosis. CT HEAD WO CONTRAST   Final Result   No hemorrhage or mass identified.       Periventricular and scattered frontal parietal white matter disease, likely   due to small-vessel ischemic change, similar compared to prior      Results discussed with Camryn Cardenas by intended.)    Patient refused pain medicines at the time of their exam.    IMPRESSION(S):  1. Dysarthria    2. Paresthesias        ? Recheck Times: Multiple due to delay in lab work.   Critical Care Time: 35 minutes not including billable procedures    Diagnostic considerations include but are not limited to:  thrombotic stroke, embolic stroke, hemorrhagic stroke, TIA,  hypoglycemia, mass lesions, metabolic cause, head injury, encephalopathy, multiple sclerosis, seizure, hypoxia, Bell's palsy, Cezar's paralysis, infection/abscesses-intracranially or other spinal cord, other         Charlotte Powell DO  07/05/21 7647

## 2021-07-05 NOTE — LETTER
[facility]  [address] [address]  Dept: [phone]  Loc: [place] [address] [facility] [address]        RETURN TO WORK STATUS      These are your [employer] Instructions. It may contain personal and confidential  information about your health. It is up to you to share  these instructions as necessary with your employer(s) as required by their policies for you to return to work. We care about your health. If your symptoms persist or worsen by the time you are expected to return to work, you should be evaluated by your physician. Please call to arrange for an appointment. To whom it may concern Lola Connelly was hospitalized and in our care from 7/5/2021- 7/7/2021. She may return to work without modifications. Patient is medically safe to return to work on 7/12/2021    BACK TO WORK RECOMMENDATIONS:    WORK STATUS: May return to work without modifications.  Return to work date: 7/12/2021        Date: 7/7/2021  _______________________________________M.D/D.O./N.P/P.A

## 2021-07-05 NOTE — ED NOTES
Pt resting in bed at this time. Call light remains in reach instructed pt how to use, and encouraged pt to call if needed assistance, no distress noted. RR even and unlabored, skin warm and dry. No needs at this time. Will continue to monitor closely.          Kim Silva RN  07/05/21 0474

## 2021-07-06 ENCOUNTER — APPOINTMENT (OUTPATIENT)
Dept: MRI IMAGING | Age: 61
DRG: 092 | End: 2021-07-06
Payer: COMMERCIAL

## 2021-07-06 LAB
ANION GAP SERPL CALCULATED.3IONS-SCNC: 15 MMOL/L (ref 3–16)
ANTI-NUCLEAR ANTIBODY (ANA): NEGATIVE
BUN BLDV-MCNC: 6 MG/DL (ref 7–20)
CALCIUM SERPL-MCNC: 8.4 MG/DL (ref 8.3–10.6)
CHLORIDE BLD-SCNC: 104 MMOL/L (ref 99–110)
CO2: 21 MMOL/L (ref 21–32)
CREAT SERPL-MCNC: 0.7 MG/DL (ref 0.6–1.1)
EKG ATRIAL RATE: 87 BPM
EKG DIAGNOSIS: NORMAL
EKG P AXIS: 48 DEGREES
EKG P-R INTERVAL: 156 MS
EKG Q-T INTERVAL: 400 MS
EKG QRS DURATION: 82 MS
EKG QTC CALCULATION (BAZETT): 481 MS
EKG R AXIS: 58 DEGREES
EKG T AXIS: 67 DEGREES
EKG VENTRICULAR RATE: 87 BPM
FOLATE: >20 NG/ML (ref 4.78–24.2)
GFR AFRICAN AMERICAN: >60
GFR NON-AFRICAN AMERICAN: >60
GLUCOSE BLD-MCNC: 167 MG/DL (ref 70–99)
HCT VFR BLD CALC: 37.3 % (ref 36–48)
HEMOGLOBIN: 12.6 G/DL (ref 12–16)
LACTIC ACID: 1.8 MMOL/L (ref 0.4–2)
MAGNESIUM: 1.8 MG/DL (ref 1.8–2.4)
MCH RBC QN AUTO: 30.2 PG (ref 26–34)
MCHC RBC AUTO-ENTMCNC: 33.7 G/DL (ref 31–36)
MCV RBC AUTO: 89.8 FL (ref 80–100)
PDW BLD-RTO: 14.6 % (ref 12.4–15.4)
PLATELET # BLD: 237 K/UL (ref 135–450)
PMV BLD AUTO: 7.3 FL (ref 5–10.5)
POTASSIUM REFLEX MAGNESIUM: 2.5 MMOL/L (ref 3.5–5.1)
RBC # BLD: 4.16 M/UL (ref 4–5.2)
SODIUM BLD-SCNC: 140 MMOL/L (ref 136–145)
TSH REFLEX FT4: 1.9 UIU/ML (ref 0.27–4.2)
VITAMIN B-12: 1185 PG/ML (ref 211–911)
WBC # BLD: 7.6 K/UL (ref 4–11)

## 2021-07-06 PROCEDURE — 85027 COMPLETE CBC AUTOMATED: CPT

## 2021-07-06 PROCEDURE — 80048 BASIC METABOLIC PNL TOTAL CA: CPT

## 2021-07-06 PROCEDURE — 92523 SPEECH SOUND LANG COMPREHEN: CPT

## 2021-07-06 PROCEDURE — 94640 AIRWAY INHALATION TREATMENT: CPT

## 2021-07-06 PROCEDURE — 93010 ELECTROCARDIOGRAM REPORT: CPT | Performed by: INTERNAL MEDICINE

## 2021-07-06 PROCEDURE — 6370000000 HC RX 637 (ALT 250 FOR IP): Performed by: INTERNAL MEDICINE

## 2021-07-06 PROCEDURE — 97162 PT EVAL MOD COMPLEX 30 MIN: CPT | Performed by: PHYSICAL THERAPIST

## 2021-07-06 PROCEDURE — 2580000003 HC RX 258: Performed by: NURSE PRACTITIONER

## 2021-07-06 PROCEDURE — 97165 OT EVAL LOW COMPLEX 30 MIN: CPT

## 2021-07-06 PROCEDURE — 1200000000 HC SEMI PRIVATE

## 2021-07-06 PROCEDURE — 84165 PROTEIN E-PHORESIS SERUM: CPT

## 2021-07-06 PROCEDURE — 96372 THER/PROPH/DIAG INJ SC/IM: CPT

## 2021-07-06 PROCEDURE — 70553 MRI BRAIN STEM W/O & W/DYE: CPT

## 2021-07-06 PROCEDURE — 92610 EVALUATE SWALLOWING FUNCTION: CPT

## 2021-07-06 PROCEDURE — A9577 INJ MULTIHANCE: HCPCS | Performed by: INTERNAL MEDICINE

## 2021-07-06 PROCEDURE — 86038 ANTINUCLEAR ANTIBODIES: CPT

## 2021-07-06 PROCEDURE — 97116 GAIT TRAINING THERAPY: CPT | Performed by: PHYSICAL THERAPIST

## 2021-07-06 PROCEDURE — 84155 ASSAY OF PROTEIN SERUM: CPT

## 2021-07-06 PROCEDURE — 6360000002 HC RX W HCPCS: Performed by: INTERNAL MEDICINE

## 2021-07-06 PROCEDURE — 83735 ASSAY OF MAGNESIUM: CPT

## 2021-07-06 PROCEDURE — 97530 THERAPEUTIC ACTIVITIES: CPT

## 2021-07-06 PROCEDURE — 6360000004 HC RX CONTRAST MEDICATION: Performed by: INTERNAL MEDICINE

## 2021-07-06 PROCEDURE — 6360000002 HC RX W HCPCS: Performed by: NURSE PRACTITIONER

## 2021-07-06 PROCEDURE — 36415 COLL VENOUS BLD VENIPUNCTURE: CPT

## 2021-07-06 PROCEDURE — 83605 ASSAY OF LACTIC ACID: CPT

## 2021-07-06 PROCEDURE — 97530 THERAPEUTIC ACTIVITIES: CPT | Performed by: PHYSICAL THERAPIST

## 2021-07-06 PROCEDURE — 72141 MRI NECK SPINE W/O DYE: CPT

## 2021-07-06 PROCEDURE — 84443 ASSAY THYROID STIM HORMONE: CPT

## 2021-07-06 PROCEDURE — 94761 N-INVAS EAR/PLS OXIMETRY MLT: CPT

## 2021-07-06 RX ORDER — POTASSIUM CHLORIDE 7.45 MG/ML
10 INJECTION INTRAVENOUS PRN
Status: DISCONTINUED | OUTPATIENT
Start: 2021-07-06 | End: 2021-07-07 | Stop reason: HOSPADM

## 2021-07-06 RX ORDER — 0.9 % SODIUM CHLORIDE 0.9 %
1000 INTRAVENOUS SOLUTION INTRAVENOUS ONCE
Status: COMPLETED | OUTPATIENT
Start: 2021-07-06 | End: 2021-07-06

## 2021-07-06 RX ORDER — POTASSIUM CHLORIDE 20 MEQ/1
40 TABLET, EXTENDED RELEASE ORAL PRN
Status: DISCONTINUED | OUTPATIENT
Start: 2021-07-06 | End: 2021-07-07 | Stop reason: HOSPADM

## 2021-07-06 RX ADMIN — POTASSIUM CHLORIDE 10 MEQ: 7.46 INJECTION, SOLUTION INTRAVENOUS at 21:54

## 2021-07-06 RX ADMIN — HYDRALAZINE HYDROCHLORIDE 50 MG: 50 TABLET, FILM COATED ORAL at 08:45

## 2021-07-06 RX ADMIN — ATORVASTATIN CALCIUM 40 MG: 40 TABLET, FILM COATED ORAL at 20:38

## 2021-07-06 RX ADMIN — HYDRALAZINE HYDROCHLORIDE 50 MG: 50 TABLET, FILM COATED ORAL at 20:38

## 2021-07-06 RX ADMIN — ONDANSETRON 4 MG: 4 TABLET, ORALLY DISINTEGRATING ORAL at 22:39

## 2021-07-06 RX ADMIN — POTASSIUM CHLORIDE 10 MEQ: 7.46 INJECTION, SOLUTION INTRAVENOUS at 15:45

## 2021-07-06 RX ADMIN — AMLODIPINE BESYLATE 10 MG: 10 TABLET ORAL at 08:44

## 2021-07-06 RX ADMIN — ASPIRIN 81 MG: 81 TABLET, COATED ORAL at 08:45

## 2021-07-06 RX ADMIN — ALBUTEROL SULFATE 2 PUFF: 90 AEROSOL, METERED RESPIRATORY (INHALATION) at 03:36

## 2021-07-06 RX ADMIN — HYDROCHLOROTHIAZIDE 25 MG: 25 TABLET ORAL at 08:45

## 2021-07-06 RX ADMIN — BUDESONIDE 500 MCG: 0.5 SUSPENSION RESPIRATORY (INHALATION) at 08:54

## 2021-07-06 RX ADMIN — POTASSIUM CHLORIDE 10 MEQ: 7.46 INJECTION, SOLUTION INTRAVENOUS at 19:34

## 2021-07-06 RX ADMIN — ACETAMINOPHEN 1000 MG: 500 TABLET ORAL at 15:44

## 2021-07-06 RX ADMIN — BUDESONIDE 500 MCG: 0.5 SUSPENSION RESPIRATORY (INHALATION) at 19:31

## 2021-07-06 RX ADMIN — POTASSIUM CHLORIDE 10 MEQ: 7.46 INJECTION, SOLUTION INTRAVENOUS at 09:37

## 2021-07-06 RX ADMIN — TOPIRAMATE 25 MG: 25 TABLET, FILM COATED ORAL at 08:45

## 2021-07-06 RX ADMIN — SODIUM CHLORIDE 1000 ML: 9 INJECTION, SOLUTION INTRAVENOUS at 09:37

## 2021-07-06 RX ADMIN — GADOBENATE DIMEGLUMINE 19 ML: 529 INJECTION, SOLUTION INTRAVENOUS at 13:48

## 2021-07-06 RX ADMIN — ENOXAPARIN SODIUM 40 MG: 40 INJECTION SUBCUTANEOUS at 08:44

## 2021-07-06 RX ADMIN — POTASSIUM CHLORIDE 10 MEQ: 7.46 INJECTION, SOLUTION INTRAVENOUS at 14:33

## 2021-07-06 RX ADMIN — POTASSIUM CHLORIDE 10 MEQ: 7.46 INJECTION, SOLUTION INTRAVENOUS at 10:46

## 2021-07-06 ASSESSMENT — PAIN DESCRIPTION - ONSET: ONSET: ON-GOING

## 2021-07-06 ASSESSMENT — PAIN DESCRIPTION - PROGRESSION: CLINICAL_PROGRESSION: NOT CHANGED

## 2021-07-06 ASSESSMENT — PAIN DESCRIPTION - LOCATION
LOCATION: HEAD
LOCATION: LEG
LOCATION: LEG

## 2021-07-06 ASSESSMENT — PAIN SCALES - GENERAL
PAINLEVEL_OUTOF10: 0
PAINLEVEL_OUTOF10: 8
PAINLEVEL_OUTOF10: 0
PAINLEVEL_OUTOF10: 8
PAINLEVEL_OUTOF10: 0
PAINLEVEL_OUTOF10: 10

## 2021-07-06 ASSESSMENT — PAIN DESCRIPTION - PAIN TYPE
TYPE: ACUTE PAIN
TYPE: ACUTE PAIN

## 2021-07-06 ASSESSMENT — PAIN DESCRIPTION - FREQUENCY: FREQUENCY: CONTINUOUS

## 2021-07-06 ASSESSMENT — PAIN - FUNCTIONAL ASSESSMENT
PAIN_FUNCTIONAL_ASSESSMENT: ACTIVITIES ARE NOT PREVENTED
PAIN_FUNCTIONAL_ASSESSMENT: 0-10
PAIN_FUNCTIONAL_ASSESSMENT: PREVENTS OR INTERFERES SOME ACTIVE ACTIVITIES AND ADLS

## 2021-07-06 ASSESSMENT — PAIN DESCRIPTION - DESCRIPTORS
DESCRIPTORS: ACHING;NUMBNESS;TINGLING
DESCRIPTORS: HEADACHE

## 2021-07-06 ASSESSMENT — PAIN DESCRIPTION - ORIENTATION: ORIENTATION: RIGHT;LEFT

## 2021-07-06 NOTE — ACP (ADVANCE CARE PLANNING)
Advance Care Planning     Advance Care Planning Activator (Inpatient)  Conversation Note      Date of ACP Conversation: 7/6/2021     Conversation Conducted with: Patient with Decision Making Capacity    ACP Activator: [address] Decision Maker:     Current Designated Health Care Decision Maker:     Primary Decision Maker: Donya Randhawa - Spouse - [phone]    Today we documented Decision Maker(s) consistent with Legal Next of Kin hierarchy. Care Preferences    Ventilation: \"If you were in your present state of health and suddenly became very ill and were unable to breathe on your own, what would your preference be about the use of a ventilator (breathing machine) if it were available to you? \"      Would the patient desire the use of ventilator (breathing machine)?: yes    \"If your health worsens and it becomes clear that your chance of recovery is unlikely, what would your preference be about the use of a ventilator (breathing machine) if it were available to you? \"     Would the patient desire the use of ventilator (breathing machine)?: Yes      Resuscitation  \"CPR works best to restart the heart when there is a sudden event, like a heart attack, in someone who is otherwise healthy. Unfortunately, CPR does not typically restart the heart for people who have serious health conditions or who are very sick. \"    \"In the event your heart stopped as a result of an underlying serious health condition, would you want attempts to be made to restart your heart (answer \"yes\" for attempt to resuscitate) or would you prefer a natural death (answer \"no\" for do not attempt to resuscitate)? \" yes       [] Yes   [] No   Educated Patient / Bailee Osman regarding differences between Advance Directives and portable DNR orders.     Length of ACP Conversation in minutes:  10    Conversation Outcomes:  [x] ACP discussion completed  [] Existing advance directive reviewed with patient; no changes to patient's previously recorded wishes  [] New Advance Directive completed  [] Portable Do Not Rescitate prepared for Provider review and signature  [] POLST/POST/MOLST/MOST prepared for Provider review and signature      Follow-up plan:    [] Schedule follow-up conversation to continue planning  [] Referred individual to Provider for additional questions/concerns   [] Advised patient/agent/surrogate to review completed ACP document and update if needed with changes in condition, patient preferences or care setting    [x] This note routed to one or more involved healthcare providers    Sherman Pallas, [place]  [phone]  Electronically signed by Jamie Newell on 7/6/2021 at 4:33 PM

## 2021-07-06 NOTE — PROGRESS NOTES
4 Eyes Skin Assessment     NAME:  Barbara Gilliam OF BIRTH:  1960  MEDICAL RECORD NUMBER:  [id number]    The patient is being assess for  Admission    I agree that 2 RN's have performed a thorough Head to Toe Skin Assessment on the patient. ALL assessment sites listed below have been assessed. Areas assessed by both nurses:    Head, Face, Ears, Shoulders, Back, Chest, Arms, Elbows, Hands, Sacrum. Buttock, Coccyx, Ischium and Legs. Feet and Heels        Does the Patient have a Wound?  No noted wound(s)       Irineo Prevention initiated:  NA   Wound Care Orders initiated:  NA    Pressure Injury (Stage 3,4, Unstageable, DTI, NWPT, and Complex wounds) if present place consult order under [de-identified] NA    New and Established Ostomies if present place consult order under : NA      Nurse 1 eSignature: Electronically signed by Keily Coley RN on 7/5/21 at 8:38 PM EDT    **SHARE this note so that the co-signing nurse is able to place an eSignature**    Nurse 2 eSignature: Electronically signed by Marisol Franco RN on 7/5/21 at 10:19 PM EDT

## 2021-07-06 NOTE — PROGRESS NOTES
Occupational Therapy   Occupational Therapy Initial Assessment and Tentative D/C    This note to serve as d/c summary should pt d/c prior to next session. Date: 2021   Patient Name: Eloina Costa  MRN: [id number]     : 1960    Date of Service: 2021    Discharge Recommendations: Eloina Costa scored a 21/24 on the [facility] ADL Inpatient form. At this time, no further OT is recommended upon discharge due to anticipate pt safe to return home with assist from . Recommend patient returns to prior setting with prior services. Continue to assess pending progress, Home with assist PRN  OT Equipment Recommendations  Equipment Needed: No    Assessment   Performance deficits / Impairments: Decreased functional mobility ; Decreased strength;Decreased endurance;Decreased ADL status; Decreased balance;Decreased high-level IADLs  Assessment: Eloina Costa is a 64 y.o. female who presents with dysarthria and inability to speak that started 45 minutes prior to arrival.  However, she told the nurse that started at 10 AM.  She then told another nurse that started 3 AM.  She denies history of diabetes. She denies a history of myocardial infarction. She denies a history of stroke. She states this morning when she woke up she was okay. She developed tingling in her lips numbness in her bilateral lower extremities and numbness in her right arm that she describes as tingling and tingling in the back of her neck. She denies any new medications or medications over-the-counter that are new. She denies any medication changes. She did have some chest pressure with that. She states she was also stuttering. She describes her symptoms as severe. She denies any shortness of breath. He does not have any chest pain at the present time. PTA pt from home with  where pt was Ind with mobility and ADLs.  Pt currently functioning below baseline completing mobility and transfers with SBA and use of RW. Anticipate pt needing up to SBA for ADLs. Pt with questionable strength with MMT. Pt with [facility] coordination and sensation in B UE. Pt will benefit from skilled OT services at this time. Anticipate pt safe to return home at time of D/C. Prognosis: Good  Decision Making: Low Complexity  Exam: see above  Assistance / Modification: RW  OT Education: Plan of Care;OT Role;Transfer Training  REQUIRES OT FOLLOW UP: Yes  Activity Tolerance  Activity Tolerance: Patient Tolerated treatment well  Safety Devices  Safety Devices in place: Yes  Type of devices: Call light within reach;Nurse notified;Gait belt;Left in chair;Chair alarm in place           Patient Diagnosis(es): The primary encounter diagnosis was Dysarthria. A diagnosis of Paresthesias was also pertinent to this visit. has a past medical history of Allergic rhinitis, Anxiety, Arthritis, Asthma, Asthma-chronic obstructive pulmonary disease overlap syndrome (HCC), Bilateral carpal tunnel syndrome, Depression, Dysmenorrhea, Fibroid, GERD (gastroesophageal reflux disease), Headache(784.0), Hyperlipidemia, Hypertension, S/P gastric bypass, Status post coronary angiogram, and Wears glasses. has a past surgical history that includes  section; Hysterectomy; Breast surgery; Colonoscopy; Carpal tunnel release (Left, 2016); Carpal tunnel release (Right, 10/06/2016); and Salivary gland surgery (Left, 2021). Restrictions  Restrictions/Precautions  Restrictions/Precautions: Fall Risk    Subjective   General  Chart Reviewed: Yes  Patient assessed for rehabilitation services?: Yes  Additional Pertinent Hx: per ED note, Srinivas Verdin is a 64 y.o. female who presents with dysarthria and inability to speak that started 45 minutes prior to arrival.  However, she told the nurse that started at 10 AM.  She then told another nurse that started 3 AM.  She denies history of diabetes. She denies a history of myocardial infarction.   She denies a history of stroke. She states this morning when she woke up she was okay. She developed tingling in her lips numbness in her bilateral lower extremities and numbness in her right arm that she describes as tingling and tingling in the back of her neck. She denies any new medications or medications over-the-counter that are new. She denies any medication changes. She did have some chest pressure with that. She states she was also stuttering. She describes her symptoms as severe. She denies any shortness of breath. He does not have any chest pain at the present time. Family / Caregiver Present: No  Referring Practitioner: Ashwin Tate MD  Subjective  Subjective: Pt agreeable to OT evaluation. Pt reports pain in B LE with no number stated. General Comment  Comments: okay for therapy per RN.      Social/Functional History  Social/Functional History  Lives With: Spouse  Type of Home: House  Home Layout: Two level, Laundry in basement, Able to Live on Main level with bedroom/bathroom  Home Access: Stairs to enter without rails  Entrance Stairs - Number of Steps: 3-4  Bathroom Shower/Tub: Tub/Shower unit  Bathroom Toilet: Handicap height  Bathroom Equipment: Grab bars in shower  ADL Assistance: [address]: Independent (shares with )  Ambulation Assistance: Independent  Transfer Assistance: Independent  Active : Yes  Mode of Transportation: Car  Occupation: Full time employment  Type of occupation:        Objective   Vision: Within Functional Limits  Hearing: Within functional limits    Orientation  Overall Orientation Status: Within Functional Limits     Balance  Sitting Balance: Supervision  Standing Balance: Stand by assistance (with and without RW)  Functional Mobility  Functional - Mobility Device: Rolling Walker  Activity: Other (household distances in room)  Assist Level: Stand by assistance  Functional Mobility Comments: no overt LOB; use of RW to increase steadiness  Wheelchair Bed Transfers  Wheelchair/Bed - Technique: Ambulating (RW)  Equipment Used: Bed;Other (bed to chair)  Level of Asssistance: Stand by assistance  ADL  LE Dressing: Supervision (seated EOB to don bilateral socks)  Additional Comments: Anticipate pt needing up to SBA for ADLs including dressing, bathing, and toileting based on ROM, strength, and balance.   Tone RUE  RUE Tone: Normotonic  Tone LUE  LUE Tone: Normotonic  Coordination  Movements Are Fluid And Coordinated: Yes     Bed mobility  Supine to Sit: Supervision  Sit to Supine: Unable to assess  Scooting: Supervision  Transfers  Sit to stand: Stand by assistance  Stand to sit: Stand by assistance     Cognition  Overall Cognitive Status: WFL        Sensation  Overall Sensation Status: Impaired  Light Touch: Partial deficits in the RLE;Partial deficits in the LLE        LUE AROM (degrees)  LUE AROM : WFL  RUE AROM (degrees)  RUE AROM : WFL  LUE Strength  Gross LUE Strength: WFL  LUE Strength Comment: questionable effort  RUE Strength  Gross RUE Strength: WFL  RUE Strength Comment: questionable effort                   Plan   Plan  Times per week: 2-3x  Current Treatment Recommendations: Strengthening, Functional Mobility Training, Balance Training, Self-Care / ADL, Patient/Caregiver Education & Training      AM-PAC Score        AM-[facility] Inpatient Daily Activity Raw Score: 21 (07/06/21 0759)  AM-PAC Inpatient ADL T-Scale Score : 44.27 (07/06/21 0759)  ADL Inpatient CMS 0-100% Score: 32.79 (07/06/21 0759)  ADL Inpatient CMS G-Code Modifier : Pablito Cords (07/06/21 0759)    Goals  Short term goals  Time Frame for Short term goals: prior to D/C  Short term goal 1: complete functional mobility and transfers Mod Ind  Short term goal 2: complete dressing and bathing Mod Ind  Short term goal 3: complete toileting Mod Ind  Short term goal 4: complete grooming in stance at sink Mod Ind  Long term goals  Time Frame for Long term goals : STG=LTG  Patient Goals   Patient goals : no stated goals       Therapy Time   Individual Concurrent Group Co-treatment   Time In 0720         Time Out 0758         Minutes 38         Timed Code Treatment Minutes: 23 Minutes (15 minute eval)       Xuan Dolan OTR/RAMESH

## 2021-07-06 NOTE — CONSULTS
Neurology Consult Note  Reason for Consult: probable TIA    Chief complaint: numbness, leg pain    Dr Jeferson De Souza MD asked me to see Medhat Noguera in consultation for evaluation of probable TIA    History of Present Illness:  Medhat Noguera is a 64 y.o. female who presents with numbness. I obtained my information via interview w/ the patient, supplemented by chart review. The patient says she was eating some vegetable soup yesterday afternoon though can't really specify what time. There were different reports about symptoms onset, at one point suggesting her symptoms started 45 minute PTA, another time 5+ hours PTA. Nonetheless, she says that she abruptly started feeling like her entire body was numb. Her vision was blurry and the light was bothering her. She felt a weight on her chest.  She also says that she was feeling R sided neck pain and says it felt like there were \"sticks\" in her neck and back. She thought maybe she was having some sort of reaction to the food. After about a minute she says she called 911 and was transported to the ED to be evaluated. BP was normal.  No fevers. CT head was w/out any acute findings. CTA head/neck unremarkable. NIH was documented to be 3 (BUE drift, RUE drift). She says that over the course of the evening and into today she has had some improvement though continued to feel oral/tongue and BLE numbness, as well as some R neck discomfort. She tells me that she is having some sort of pain in her legs as well though difficult to sort out exactly. She follows w/ Dr. Kiera Means (Neurology) for migraines. She says she has been taking 25 mg of topiramate BID though recent note suggests 25 mg per day dosing. She has also seen ENT recently for sialoadentitis s/p surgical removal of salivary stone complicated by tinnitus and possible TMJ. Recent MRI brain (6/29) showed stable L pituitary mass.       She denies ever experiencing anything like this in the past.  She has never had a panic or anxiety attack.       Medical History:  Past Medical History:   Diagnosis Date    Allergic rhinitis     Anxiety 2020    Arthritis     pt denies    Asthma 4/10/2012    Asthma-chronic obstructive pulmonary disease overlap syndrome (HCC)     pt denies    Bilateral carpal tunnel syndrome 2016    Depression     Dysmenorrhea     Fibroid     GERD (gastroesophageal reflux disease)     Headache(784.0) 4/10/2012    Hyperlipidemia     Hypertension     S/P gastric bypass     Status post coronary angiogram 2015    Wears glasses      Past Surgical History:   Procedure Laterality Date    BREAST SURGERY      Breast reduction    CARPAL TUNNEL RELEASE Left 2016     Left carpal tunnel release      CARPAL TUNNEL RELEASE Right 10/06/2016     SECTION      x1    COLONOSCOPY      HYSTERECTOMY      complete-ovaries out    SALIVARY GLAND SURGERY Left 2021    EXCISION OF SALIVARY STONE IN THE LEFT SUBMANDIBULAR DUCT performed by Trenton Desai MD at [facility]      Scheduled Meds:   sodium chloride  1,000 mL Intravenous Once    amLODIPine  10 mg Oral Daily    budesonide  500 mcg Nebulization BID    hydrALAZINE  50 mg Oral BID    hydroCHLOROthiazide  25 mg Oral Daily    topiramate  25 mg Oral Daily    enoxaparin  40 mg Subcutaneous Daily    aspirin  81 mg Oral Daily    Or    aspirin  300 mg Rectal Daily    atorvastatin  40 mg Oral Nightly     Medications Prior to Admission:   hydroCHLOROthiazide (HYDRODIURIL) 25 MG tablet, TAKE 1 TABLET BY MOUTH EVERY DAY  azelastine (ASTELIN) 0.1 % nasal spray, 1 SPRAY BY NASAL ROUTE 2 TIMES DAILY USE IN EACH NOSTRIL AS DIRECTED  amLODIPine (NORVASC) 10 MG tablet, TAKE 1 TABLET BY MOUTH EVERY DAY  montelukast (SINGULAIR) 10 MG tablet, TAKE 1 TABLET BY MOUTH EVERY DAY  pseudoephedrine (DECONGESTANT) 30 MG tablet, Take 1 tablet by mouth 3 times daily  omeprazole (PRILOSEC) 40 MG delayed release capsule, TAKE 1 CAPSULE BY MOUTH EVERY DAY  benzonatate (TESSALON) 100 MG capsule, TAKE 1 CAPSULE BY MOUTH THREE TIMES A DAY AS NEEDED FOR COUGH  butalbital-acetaminophen-caffeine (FIORICET, ESGIC) -40 MG per tablet, TAKE 1 TABLET BY MOUTH EVERY 4 HOURS AS NEEDED FOR PAIN  hydrALAZINE (APRESOLINE) 50 MG tablet, Take 1 tablet by mouth 2 times daily  potassium chloride (KLOR-CON M20) 20 MEQ extended release tablet, Take 1 tablet by mouth 2 times daily  clotrimazole-betamethasone (LOTRISONE) 1-0.05 % cream, APPLY TO AFFECTED AREA TWICE A DAY  topiramate (TOPAMAX) 25 MG tablet, Take 1 tablet daily (Patient taking differently: Take 25 mg by mouth daily )  Prenatal Vit-Fe Fumarate-FA (PRENATAL COMPLETE PO), Take 1 tablet by mouth daily   ondansetron (ZOFRAN) 4 MG tablet, TAKE 1 TABLET BY MOUTH EVERY 12 HOURS AS NEEDED or nausea or vomiting  vitamin D (ERGOCALCIFEROL) 1.25 MG (12152 UT) CAPS capsule, TAKE 1 CAPSULE BY MOUTH EVERY 30 DAYS  fluticasone (FLONASE) 50 MCG/ACT nasal spray, INHALE 1 SPRAY NASALLY EVERY DAY  Nebulizers (NEBULIZER COMPRESSOR) MISC, Use every 6 hours as needed with duoneb  budesonide (PULMICORT) 0.5 MG/2ML nebulizer suspension, Take 2 mLs by nebulization 2 times daily (Patient taking differently: Take 500 mcg by nebulization 2 times daily as needed )  Chlorhexidine Gluconate 2 % SOLN, Apply to skin once daily prn  albuterol sulfate HFA (PROAIR HFA) 108 (90 Base) MCG/ACT inhaler, Inhale 2 puffs into the lungs every 6 hours as needed for Wheezing  EPINEPHrine (EPIPEN) 0.3 MG/0.3ML DELPHINE injection, Use as directed for allergic reaction    Allergies   Allergen Reactions    Shellfish Allergy Anaphylaxis    Shellfish-Derived Products Shortness Of Breath and Palpitations    Lisinopril      Dry cough, no lip swelling or resp. sxs     Family History   Problem Relation Age of Onset    Diabetes Father     Rheum Arthritis Neg Hx     Osteoarthritis Neg Hx     Asthma Neg Hx     Breast Cancer Neg Hx     Cancer Neg Hx     Heart Failure Neg Hx     High Cholesterol Neg Hx     Hypertension Neg Hx     Migraines Neg Hx     Ovarian Cancer Neg Hx     Rashes/Skin Problems Neg Hx     Seizures Neg Hx     Stroke Neg Hx     Thyroid Disease Neg Hx      Social History     Tobacco Use   Smoking Status Former Smoker    Packs/day: 0.25    Years: 5.00    Pack years: 1.25    Types: Cigarettes    Quit date: 1980    Years since quittin.0   Smokeless Tobacco Former User    Quit date: 12/10/1994     Social History     Substance and Sexual Activity   Drug Use No     Social History     Substance and Sexual Activity   Alcohol Use Yes    Alcohol/week: 0.0 standard drinks    Comment: socially-wine     ROS:  Constitutional- No weight loss or fevers  Eyes- No diplopia. No photophobia. Ears/nose/throat- No dysphagia. No Dysarthria  Cardiovascular- No palpitations. + chest heaviness  Respiratory- No dyspnea. No Cough  Gastrointestinal- No Abdominal pain. No Vomiting. Genitourinary- No incontinence. No urinary retention  Musculoskeletal- No myalgia. + arthralgia  Skin- No rash. No easy bruising. Psychiatric- No depression. hx anxiety  Endocrine- No diabetes. No thyroid issues. Hematologic- No bleeding difficulty. No fatigue  Neurologic- No weakness. No Headache. Exam:  Blood pressure 139/79, pulse 88, temperature 98 °F (36.7 °C), temperature source Oral, resp. rate 16, height 5' 2\" (1.575 m), weight 196 lb 3.4 oz (89 kg), SpO2 97 %, not currently breastfeeding. Constitutional    Vital signs: BP, HR, and RR reviewed   General alert, no distress  Eyes: unable to visualize the fundi  Cardiovascular: no peripheral edema. Psychiatric: cooperative with examination, no psychotic behavior noted. Neurologic  Mental status:   orientation to person, place, time, situation.      General fund of knowledge grossly intact   Memory grossly intact   Attention intact as able to attend well to the exam     Language fluent in conversation   Comprehension intact; follows simple commands  Cranial nerves:   CN2: visual fields full   CN 3,4,6: extraocular muscles intact. Pupils are equal, round, reactive bilaterally. CN5: perioral/tongue numbness. CN7: face symmetric though poor effort. No dysarthria. CN8: hearing grossly intact  CN9: palate elevated symmetrically  CN11: trap full strength on shoulder shrug  CN12: tongue midline with protrusion  Strength: No pronator drift. Good strength in all 4 extremities   Deep tendon reflexes: 1+ throughout. No hyperreflexia. Sensory: BLE paresthesias. Cerebellar/coordination: finger nose finger normal without ataxia. Movements are slow, deliberate. RUE is limited to some extent given IV placement. Tone: normal in all 4 extremities  Gait: deferred at this time for safety. Labs  Glucose 167  Na 140  K 2.5  Mg 1.80  BUN 6  Cr 0.7    Lactic acid 3.1    ALT 32  AST 38    WBC 7.6K  Hg 12.6  Platelets 350    UA negative    Studies  CT head w/o 7/5/21, independently reviewed  1. No hemorrhage or mass identified. 2. Periventricular and scattered frontal parietal white matter disease, likely   due to small-vessel ischemic change, similar compared to prior     CTA head/neck 7/5/21, independently reviewed  Unremarkable CT angiogram of the head and neck, with no evidence of vessel   dissection or irregularity, significant stenosis, or proximal intracranial   arterial occlusion. Impression  1. Full body paresthesias w/ associated vision changes, neck discomfort, and chest pressure. This has improved though she continues to experience perioral/tongue and BLE numbness/discomfort. The etiology is unclear. Her symptoms are difficult to localize from Neurology perspective. 2.  Migraines. 3.  Anxiety/depression. 4.  Bilateral carpal tunnel syndrome. Recommendations  1. A brain MRI has already been ordered.   Will add cervical spine MRI w/o.    2.  B12/folate, SPEP, TSH, KEIRY.    3.  Will defer any treatment for BLE discomfort to primary while awaiting imaging. 4.  Further recommendations pending above testing and patient course.      Jing Arreola NP  [address] [place] [address] [address] Neurology    A copy of this note was provided for Dr Emmanuel Cotton MD

## 2021-07-06 NOTE — H&P
[address] [address]                              HISTORY AND PHYSICAL    PATIENT NAME: Rosalita Siemens                  :        1960  MED REC NO:   [id number]                          ROOM:       [id number]  ACCOUNT NO:   [de-identified]                           ADMIT DATE: 2021  PROVIDER:     Kip Recio MD    I obtained the history and performed the physical exam on the patient on  the medical floor in the presence of her floor nurse in the room on  2021. CHIEF COMPLAINT:  \"My head was hurting, my chest was hurting, all my  skin and my face and my mouth were going numb, my hands and feet were  going numb. \"    HISTORY OF PRESENT ILLNESS:  The patient is a 65-year-old  -American female who presented to the hospital with a chief  complaint of what she describes as sudden onset of headaches, chest  pain, arm numbness, feet numbness, feeling extremely anxious and  panicking and breathing very fast, because of which she called EMS. The  patient knows that she has to slow her breathing down and her symptoms  decreased but they did not go away completely. Now, the patient  complains of persistent headache. PAST MEDICAL/PAST SURGICAL HISTORY:  1. Chronic migraine. 2.  The patient has pituitary microadenoma for which she follows up with  Endocrinology. 3.  Asthma/COPD.  4.  History of gastric bypass. 5.  Hypertension. 6.  Dyslipidemia. 7.  Obesity with a BMI of 35.89 kg/m2. PAST SURGICAL HISTORY:  The patient has had a gastric bypass operation. ALLERGIC HISTORY:  LISINOPRIL. FAMILY HISTORY:  Reviewed by me and is currently noncontributory. SOCIAL HISTORY:  Lives at home. Tobacco dependent. MEDICATIONS:  Home medication list is extensive, has been listed in the  EMR and has been reviewed by me.     REVIEW OF SYSTEMS:  Significant for the chest pain, arm pain, tingling,  and headache and per the history of present illness. All other systems  have been reviewed and are negative except for the history of present  illness. PHYSICAL EXAMINATION:  VITAL SIGNS:  Temperature 98.3, respiratory rate 14, pulse 89, blood  pressure 119/82, saturating 98%. CNS:  Alert, awake, and oriented. The patient currently does not have  any focal neurological deficits. The patient has got nonsustained  fatigue and _____ nystagmus, horizontal.  PSYCH:  The patient is cooperative. HEENT:  Eyes:  Pupils are reactive to light. ENT:  No oral mucosal  lesions. RESPIRATORY:  No obvious rales, rubs, or rhonchi. CVS:  S1, S2 are heard. No murmurs or rubs. ABDOMEN:  Obese. MUSCULOSKELETAL:  No acute deformities. SKIN:  Appears without rashes or lesions. DIAGNOSTIC DATA:  Sodium 140, potassium 2.8, BUN 8, creatinine 0.7,  glucose 122. Calcium 8.0. Lactic acid 3.1. INR 0.97. CBC showed a  white count of 6.4. Chest x-ray showed no acute abnormalities. CTA of the head and neck  shows no acute abnormalities. CONSULTATIONS REQUESTED:  Neurology. REVIEW OF PREVIOUS MEDICAL RECORDS:  Shows that the patient had a recent  MRI of the brain that was done on 06/29/2021 that showed left acute T2  hyperintense, nonenhancing lesion which is either possible Rathke's  cleft cyst or pituitary microadenoma. ASSESSMENT:  1. Panic attack with atypical noncardiac chest wall pain. 2.  Paresthesia. 3.  Chronic migraine with sudden headache. 4.  Hypertension. 5.  Pituitary microadenoma, which is nonsecreting. 6.  COPD. 7.  Tobacco dependence. 8.  Obesity with a BMI of 35.89 kg/m2. PLAN OF CARE:  The patient is admitted to Observation. Telemetry  monitoring will be continued. Neurology consult requested. Nonnarcotic  treatment for headache will be instituted. Low-dose aspirin will be  continued. The patient's home dose of topiramate and other medications  will be continued.   MRI brain will be

## 2021-07-06 NOTE — PLAN OF CARE
Problem: Pain:  Goal: Pain level will decrease  Description: Pain level will decrease  Outcome: Ongoing  Goal: Control of acute pain  Description: Control of acute pain  Outcome: Ongoing  Goal: Control of chronic pain  Description: Control of chronic pain  Outcome: Ongoing  Goal: Patient's pain/discomfort is manageable  Description: Patient's pain/discomfort is manageable  Outcome: Ongoing   Alert and oriented x4. Resp. Easy and even Sats. WNL on RA Lungs diminished in bases Cough and deep breathing exercises encouraged C/O bilat. Leg pain and h/a refuses Tylenol and MD would not rx. Anything else. Educated patient on narcotics and encouraged her to use Tyl. But galdino. To refuse.  Up to bathroom with SBA Free from fall/injury Frequently turns and repositions self

## 2021-07-06 NOTE — PROGRESS NOTES
Patient requested a new IV as the IV placed at [phone] was painful for her with potassium. New IV attempted in opposite arm and as advancing patient requested new IV be removed.   Removed per request and updated RN

## 2021-07-06 NOTE — PROGRESS NOTES
Speech Language/Pathology   SPEECH LANGUAGE AND CLINICAL BEDSIDE SWALLOWING EVALUATION   Speech Therapy Department       Levy Leo  AGE: 64 y.o. GENDER: female  : 1960  [id number]  EPISODE DATE:  2021    MEDICAL DIAGNOSIS:   Chief Complaint   Patient presents with    Aphasia     pt wtih ldifficulty speaking when she woke up this am.  woke up about 0500 this am     Onset: 2021     CHART REVIEW: Patient admitted with C/O of inability to speak, dysarthria, numbness and chest pain H&P: Levy Leo is a 64 y.o. female who presents with dysarthria and inability to speak that started 45 minutes prior to arrival.  However, she told the nurse that started at 10 AM.  She then told another nurse that started 3 AM.  She denies history of diabetes. She denies a history of myocardial infarction. She denies a history of stroke. She states this morning when she woke up she was okay. She developed tingling in her lips numbness in her bilateral lower extremities and numbness in her right arm that she describes as tingling and tingling in the back of her neck. She denies any new medications or medications over-the-counter that are new. She denies any medication changes. She did have some chest pressure with that. She states she was also stuttering. She describes her symptoms as severe. She denies any shortness of breath. He does not have any chest pain at the present time. MRI ordered for 21    CT HEAD 21:  Impression   No hemorrhage or mass identified.       Periventricular and scattered frontal parietal white matter disease, likely   due to small-vessel ischemic change, similar compared to prior     CT HEAD&NECK 21:  Impression   Unremarkable CT angiogram of the head and neck, with no evidence of vessel   dissection or irregularity, significant stenosis, or proximal intracranial   arterial occlusion.          CHEST X-RAY 21:  Impression   Clear lungs.  Mild cardiac enlargement.  No acute cardiopulmonary abnormality.        PAST MEDICAL HISTORY        Diagnosis Date    Allergic rhinitis     Anxiety 2020    Arthritis     pt denies    Asthma 4/10/2012    Asthma-chronic obstructive pulmonary disease overlap syndrome (HCC)     pt denies    Bilateral carpal tunnel syndrome 2016    Depression     Dysmenorrhea     Fibroid     GERD (gastroesophageal reflux disease)     Headache(784.0) 4/10/2012    Hyperlipidemia     Hypertension     S/P gastric bypass     Status post coronary angiogram 2015    Wears glasses        PAST SURGICAL HISTORY    Past Surgical History:   Procedure Laterality Date    BREAST SURGERY      Breast reduction    CARPAL TUNNEL RELEASE Left 2016     Left carpal tunnel release      CARPAL TUNNEL RELEASE Right 10/06/2016     SECTION      x1    COLONOSCOPY      HYSTERECTOMY      complete-ovaries out    SALIVARY GLAND SURGERY Left 2021    EXCISION OF SALIVARY STONE IN THE LEFT SUBMANDIBULAR DUCT performed by Marietta Solorzano MD at [address] [place] [address]    Allergies   Allergen Reactions    Shellfish Allergy Anaphylaxis    Shellfish-Derived Products Shortness Of Breath and Palpitations    Lisinopril      Dry cough, no lip swelling or resp. sxs            General  Chart Reviewed: Yes  Family / Caregiver Present: No  Subjective  Subjective: Patient reports improved speech with no more stuttering or slurred speech  Pain Assessment  Patient Currently in Pain: Yes  Pain Assessment: 0-10  Pain Level: 8  Pain Location: Leg       Vision: Impaired (Adequate for evaluation needs, patient reports blurred vision)  Vision Exceptions: Wears glasses for reading  Hearing: Within functional limits       Prior Status: (Regular, thin liquids, no Cognitive-Communication deficits)      Reason for referral: Constantino Pace was referred for a Speech-Language and Bedside Swallow Evaluation to assess the efficiency of communicative effectiveness; the efficiency of swallow function, rule out aspiration and make recommendations regarding safe dietary consistencies, effective compensatory strategies, and safe eating environment. Dysphagia Treatment Diagnosis: Oropharyngeal Dysphagia   Speech Language Treatment Diagnosis: Cognitive Communication      IMPRESSIONS  Dysphagia Diagnosis  Dysphagia Diagnosis: Swallow function appears grossly intact  Dysphagia Impression :   · Patient accepted and tolerated evaluation at bedside. · Patient alert, responsive and cooperative. · Patient oriented to self, situation, year, month and place. Off by 1 for date and day. · Patient verbally responsive and able to follow complex directions. · Oral motor exam indicates oral mechanism WFL. · Patient's swallowing appears [facility] for items presented. Patient's oral phase WNL. Patient tolerated regular texture with mildly prolonged mastication secondary to taking large bites. Patient demonstrated adequate AP transport with no residue. Patient has adequate dentition and denies any mastication difficulties or swallowing difficulties. Patient tolerated regular textures and thin liquids via cup's edge and straw with timely swallow initiation, adequate laryngeal elevation and no overt S/S of aspiration or penetration. · Follow up 1-3x during acute admission for ongoing assessment of diet tolerance pending MRI results    Cognitive Diagnosis: No cognitive impairments  Diagnosis:   · Patient seen for cognitive and speech assessment. · Patient alert and cooperative. Patient oriented to self, situation, month, year, place and off by 1 for date and day. Pt able to follow complex directions and verbally responsive. · Pt often responds in 1 word responses, but when provided encouragement patient able to expand utterances and engage in conversation. · Pt engaged in conversation with no signs of slurred speech or word finding difficulties.  Patient reports her slurred speech has improved since yesterday and has not experienced any word finding difficulties. Patient recalled 3/3 words after delayed recall. · Follow up 1-3x during acute admission for ongoing assessment of cognitive abilities    Recommended Diet:  Diet Solids Recommendation: Regular  Liquid Consistency Recommendation: Thin     GOAL: The patient will tolerate recommended diet without observed clinical signs of aspiration, The patient will recall and perform compensatory strategies, with no cues. Plan:    Recommendations  D/C Recommendations:  (No ST services warranted upon D/C)  Requires SLP Intervention: Yes    Therapy  Requires SLP Intervention: Yes     Duration/Frequency of Treatment: Follow up 1-3x for assessment of diet tolerance      Barriers to Progress: motivation    Prognosis  Prognosis for safe diet advancement: good  Barriers to reach goals: motivation    Education  Consulted and agree with results and recommendations: Patient  Patient Education: Patient educated on purpose of ST evaluation  Patient Education Response: Verbalizes understanding      Discharge Recommendations:  Anticipate no ST services warranted upon D/C      Objective:  ASSESSMENT: Included Clinical Evaluation of Swallowing; Oral Motor Speech Evaluation and Cognitive-Linguistic Assessment  Vision  Vision: Impaired (Adequate for evaluation needs, patient reports blurred vision)  Vision Exceptions: Wears glasses for reading  Hearing  Hearing: Within functional limits  Oral/Motor  Oral Motor: Within functional limits  Auditory Comprehension  Comprehension: Within Functional Limits     Expression  Primary Mode of Expression: Verbal  Verbal Expression  Verbal Expression: Within functional limits     Motor Speech  Motor Speech:  Within Functional Limits          Orientation  Overall Orientation Status: Within Functional Limits (Adequate for assessment)       Oral Phase Dysfunction  Oral Phase  Oral Phase: WNL  Oral Phase  Oral Phase - Comment: Patient's oral phase WNL. Patient tolerated regular texture with mildly prolonged mastication secondary to taking large bites. Patient demonstrated adequate AP transport with no residue. Patient has adequate dentition and denies any mastication difficulties or swallowing difficulties. Indicators of Pharyngeal Phase Dysfunction   Pharyngeal Phase  Pharyngeal Phase: WNL  Pharyngeal Phase   Pharyngeal: Patient's pharyngeal phase WNL. Patient demonstrates timely swallow initiation, adequate laryngeal elevation and tolerated regular textures wtih no throat clear, cough or change in vocal quality. Please accept this as Speech Therapy Discharge status, if pt is discharged prior to next therapy session.     Timed Code Treatment:  Swallowing Evaluation:  Time in: 07:59 AM  Time out: 08:20 AM  Cognitive communication evaluation:  Time in: 08:20 AM  Time out: 08:45 AM  Total Treatment Time: 46 mins       SIGNED:   Inés Briscoe [facility], [address] [place] [address]  Speech-Language Pathologist  On 07/06/21 at 7:59 AM

## 2021-07-06 NOTE — PROGRESS NOTES
[facility] Medicine Progress Note      Admit Date: 7/5/2021       CC: F/U for  \"My head was hurting, my chest was hurting, all my  skin and my face and my mouth were going numb, my hands and feet were  going numb. \"     HISTORY OF PRESENT ILLNESS:  The patient is a 61-year-old  -American female who presented to the hospital with a chief  complaint of what she describes as sudden onset of headaches, chest pain, arm numbness, feet numbness, feeling extremely anxious and panicking and breathing very fast, because of which she called EMS. The patient knows that she has to slow her breathing down and her symptoms  decreased but they did not go away completely. Now, the patient  complains of persistent headache.     PAST MEDICAL/PAST SURGICAL HISTORY:  1. Chronic migraine. 2.  The patient has pituitary microadenoma for which she follows up with  Endocrinology. 3.  Asthma/COPD.  4.  History of gastric bypass. 5.  Hypertension. 6.  Dyslipidemia. 7.  Obesity with a BMI of 35.89 kg/m2. Interval History/Subjective: states she felt like rods were going up the back of her neck like her skull was going to pop off and like someone was sitting on her chest. Legs were tingling, lips and tongue and face were numb. States now both legs were numb and face and tongue are still numb now. Last echo 2019. States had a normal treadmill stress test in the past but not recently. Will consult cardiology if neuro w/u neg. MRI brain ordered and neurology consulted. She sees Dr. 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 \"  She gets migraines at times with this and recently was told she could take that up to twice daily -  topamax - for that. Has ENT she sees , Dr. Arnoldo Adame for stones in saliva glands.     Will recheck lactate after IVF      Review of Systems:       The patient denied headaches, visual changes, LOC, SOB, CP, ABD pain, N/V/D, skin changes, new or worsening weakness or neuromuscular deficits. Comprehensive ROS negative except as mentioned above. Past Medical History:        Diagnosis Date    Allergic rhinitis     Anxiety 2020    Arthritis     pt denies    Asthma 4/10/2012    Asthma-chronic obstructive pulmonary disease overlap syndrome (HCC)     pt denies    Bilateral carpal tunnel syndrome 2016    Depression     Dysmenorrhea     Fibroid     GERD (gastroesophageal reflux disease)     Headache(784.0) 4/10/2012    Hyperlipidemia     Hypertension     S/P gastric bypass     Status post coronary angiogram 2015    Wears glasses        Past Surgical History:        Procedure Laterality Date    BREAST SURGERY      Breast reduction    CARPAL TUNNEL RELEASE Left 2016     Left carpal tunnel release      CARPAL TUNNEL RELEASE Right 10/06/2016     SECTION      x1    COLONOSCOPY      HYSTERECTOMY      complete-ovaries out    SALIVARY GLAND SURGERY Left 2021    EXCISION OF SALIVARY STONE IN THE LEFT SUBMANDIBULAR DUCT performed by Geovanna Gayle MD at [address] [place] [address]:  Shellfish allergy, Shellfish-derived products, and Lisinopril    Past medical and surgical history reviewed. Any changes have been noted. PHYSICAL EXAM:  /79   Pulse 88   Temp 98 °F (36.7 °C) (Oral)   Resp 16   Ht 5' 2\" (1.575 m)   Wt 196 lb 3.4 oz (89 kg)   SpO2 97%   BMI 35.89 kg/m²     No intake or output data in the 24 hours ending 21 0847     General appearance:   No apparent distress, appears stated age. Cooperative. HEENT:  Normocephalic, atraumatic. PERRLA. EOMi. Conjunctivae/corneas clear, no icterus, non-injected. Neck: Supple, with full range of motion. No jugular venous distention. Trachea midline. Respiratory:  Normal respiratory effort. Clear to auscultation, bilaterally without Rales/Wheezes/Rhonchi. Cardiovascular:  Regular rate and rhythm without murmurs, rubs or gallops.   Abdomen: Soft, non-tender, non-distended, without rebound or guarding. Normal bowel sounds. Musculoskeletal:  No clubbing, cyanosis or edema bilaterally. Full range of motion without deformity. Skin: Skin color, texture, turgor normal.  No rashes or lesions. Neurologic:  Neurovascularly intact without any focal sensory/motor deficits. Cranial nerves: II-XII intact, grossly intact. No facial asymmetry, tongue midline. Psychiatric:  Alert and oriented, thought content appropriate  Capillary Refill: Brisk,< 3 seconds   Peripheral Pulses: +2 palpable, equal bilaterally       LABS:    Lab Results   Component Value Date    WBC 7.6 07/06/2021    HGB 12.6 07/06/2021    HCT 37.3 07/06/2021    MCV 89.8 07/06/2021     07/06/2021    LYMPHOPCT 51.0 07/05/2021    RBC 4.16 07/06/2021    MCH 30.2 07/06/2021    MCHC 33.7 07/06/2021    RDW 14.6 07/06/2021       Lab Results   Component Value Date    CREATININE 0.7 07/05/2021    BUN 8 07/05/2021     07/05/2021    K 2.8 (LL) 07/05/2021     07/05/2021    CO2 22 07/05/2021       Lab Results   Component Value Date    MG 1.80 07/05/2021       Lab Results   Component Value Date    ALT 32 07/05/2021    AST 38 (H) 07/05/2021    ALKPHOS 98 07/05/2021    BILITOT 0.3 07/05/2021        No flowsheet data found. Lab Results   Component Value Date    LABA1C 5.5 12/22/2020       Imaging:  XR CHEST PORTABLE   Final Result   Clear lungs. Mild cardiac enlargement. No acute cardiopulmonary abnormality. CTA HEAD NECK W CONTRAST   Final Result   Unremarkable CT angiogram of the head and neck, with no evidence of vessel   dissection or irregularity, significant stenosis, or proximal intracranial   arterial occlusion. Comment:      Reference per NASCET criteria for degree of stenosis:  Mild: Less than 50%   stenosis. Moderate: 50-69% stenosis. Severe: 70-94% stenosis. Near-occlusion: 95-99% stenosis. CT HEAD WO CONTRAST   Final Result   No hemorrhage or mass identified.       Periventricular and scattered frontal parietal white matter disease, likely   due to small-vessel ischemic change, similar compared to prior      Results discussed with Camryn Cardenas by Dena Rojo. Lena Rivers MD at 4:04 pm   on 7/5/2021         MRI BRAIN W WO CONTRAST    (Results Pending)       Scheduled and prn Medications:    Scheduled Meds:   amLODIPine  10 mg Oral Daily    budesonide  500 mcg Nebulization BID    hydrALAZINE  50 mg Oral BID    hydroCHLOROthiazide  25 mg Oral Daily    topiramate  25 mg Oral Daily    enoxaparin  40 mg Subcutaneous Daily    aspirin  81 mg Oral Daily    Or    aspirin  300 mg Rectal Daily    atorvastatin  40 mg Oral Nightly     Continuous Infusions:  PRN Meds:.albuterol sulfate HFA, ondansetron **OR** ondansetron, acetaminophen    Assessment & Plan:        ASSESSMENT:  1. Panic attack with atypical noncardiac chest wall pain. 2.  Paresthesia. 3.  Chronic migraine with sudden headache. 4.  Hypertension. 5.  Pituitary microadenoma, which is nonsecreting. 6.  COPD. 7.  Tobacco dependence. 8.  Obesity with a BMI of 35.89 kg/m2. 9. Lactic acidosis     PLAN OF CARE:  The patient is admitted to Observation. Telemetry  monitoring will be continued. Neurology consult requested. Nonnarcotic treatment for headache will be instituted. Low-dose aspirin will be continued. The patient's home dose of topiramate and other medications will be continued. MRI brain will be obtained to evaluate for pituitary apoplexy. IV hydration and re-eval lactate  - c-spine MRI pending  - MRI brain pending   - B12/folate normal   SPEP, TSH, KEIRY       Continue current regimen/therapies. Monitor. Adjust medical regimen as appropriate. Body mass index is 35.89 kg/m². The patient and / or the family were informed of the results of any tests, a time was given to answer questions, a plan was proposed and they agreed with plan. DVT ppx: lovenox      Diet: ADULT DIET;  Regular    Consults:  IP CONSULT TO STROKE TEAM  IP CONSULT TO PHARMACY  PHARMACY TO CHANGE BASE FLUIDS  IP CONSULT TO NEUROLOGY    DISPO/placement plan: pending    Code Status: Full Code      JAYSON Virgen - JEFFERSON  07/06/21

## 2021-07-06 NOTE — PROGRESS NOTES
Physical Therapy    Facility/Department: [facility] 3F PROGRESSIVE CARE  Initial Assessment    NAME: Jana Sawyer  : 1960  MRN: [id number]    Date of Service: 2021    Discharge Recommendations:  Home with assist PRN, Outpatient PT   PT Equipment Recommendations  Other: will continue to assess need for RW; pt felt safer with RW if this continues then may need RW for at home  Jana Sawyer scored a 20/24 on the AM-PAC short mobility form. At this time if pt continues to feel unsteady then may benefit from OP PT. Recommend patient returns to prior setting with prior services. Assessment   Assessment: Pt is a 63 yo female who was adm to hosp with numbness, chest pain and c/o weakness. Pt appears slightly below her baseline however unsure if she is providing full effort due to give away strength during MMT of LLE and ataxic movements with MMT but not observed with functional tasks; Anticipate pt will be safe to return home at discharge; pt currently reporting feeling safer with walker therefore if she continues to report this, then may need RW at discharge. If pt's symptoms do not resolve at discharge then may benefit from OP PT to address deficits  Prognosis: Good  Decision Making: Medium Complexity  PT Education: Goals; General Safety  REQUIRES PT FOLLOW UP: Yes  Activity Tolerance  Activity Tolerance: Patient Tolerated treatment well  Activity Tolerance: appears to be somewhat self limiting       Patient Diagnosis(es): The primary encounter diagnosis was Dysarthria. A diagnosis of Paresthesias was also pertinent to this visit.      has a past medical history of Allergic rhinitis, Anxiety, Arthritis, Asthma, Asthma-chronic obstructive pulmonary disease overlap syndrome (HCC), Bilateral carpal tunnel syndrome, Depression, Dysmenorrhea, Fibroid, GERD (gastroesophageal reflux disease), Headache(784.0), Hyperlipidemia, Hypertension, S/P gastric bypass, Status post coronary angiogram, and Wears glasses. has a past surgical history that includes  section; Hysterectomy; Breast surgery; Colonoscopy; Carpal tunnel release (Left, 2016); Carpal tunnel release (Right, 10/06/2016); and Salivary gland surgery (Left, 2021). Restrictions  Restrictions/Precautions  Restrictions/Precautions: Fall Risk  Vision/Hearing  Vision: Within Functional Limits  Hearing: Within functional limits     Subjective  General  Chart Reviewed: Yes  Patient assessed for rehabilitation services?: Yes  Additional Pertinent Hx: The patient is a 61-year-old -American female who presented to the hospital with a chief complaint of what she describes as sudden onset of headaches, chest pain, arm numbness, feet numbness, feeling extremely anxious and panicking and breathing very fast, because of which she called EMS. The patient knows that she has to slow her breathing down and her symptoms decreased but they did not go away completely. Now, the patient complains of persistent headache.   Response To Previous Treatment: Not applicable  Family / Caregiver Present: No  Follows Commands: Within Functional Limits  Subjective  Subjective: Pt is agreeable to working with therapy; reports vague symptoms throughout; does not appear to give full effort; reports \"burning in her LE's)  Pain Screening  Patient Currently in Pain: Yes          Orientation  Orientation  Overall Orientation Status: Within Functional Limits  Social/Functional History  Social/Functional History  Lives With: Spouse  Type of Home: House  Home Layout: Two level, Laundry in basement, Able to Live on Main level with bedroom/bathroom  Home Access: Stairs to enter without rails  Entrance Stairs - Number of Steps: 3-4  Bathroom Shower/Tub: Tub/Shower unit  Bathroom Toilet: Handicap height  Bathroom Equipment: Grab bars in shower  ADL Assistance: Independent  Homemaking Assistance: Independent (shares with )  Ambulation Assistance: Independent  Transfer Assistance: Independent  Active : Yes  Mode of Transportation: Car  Occupation: Full time employment  Type of occupation:   Cognition   Cognition  Overall Cognitive Status: WFL    Objective          AROM RLE (degrees)  RLE AROM: WFL  AROM LLE (degrees)  LLE AROM : WFL  Strength RLE  Strength RLE: WFL  Strength LLE  Comment: 3-3+/5 with ataxic like movements with MMT, does not appear to give full effort and no ataxia noted with functional movements     Sensation  Overall Sensation Status: Impaired  Light Touch: Partial deficits in the RLE;Partial deficits in the LLE  Bed mobility  Supine to Sit: Supervision  Sit to Supine: Unable to assess  Scooting: Supervision  Transfers  Sit to Stand: Stand by assistance  Stand to sit: Stand by assistance  Ambulation  Ambulation?: Yes  More Ambulation?: Yes  Ambulation 1  Surface: level tile  Device: No Device  Assistance: Stand by assistance  Quality of Gait: pt had no LOB but very slow small steps reaching out for objects to hold onto  Distance: 20'  Ambulation 2  Surface - 2: level tile  Device 2: [address] 2: Stand by assistance  Quality of Gait 2: continues slow pace but no LOB and pt reports feeling more steady  Distance: 40'     Balance  Sitting - Static: Good  Sitting - Dynamic: Good  Standing - Static: Fair;+  Standing - Dynamic: [address]  Times per week: 3-5  Current Treatment Recommendations: Functional Mobility Training  Safety Devices  Type of devices: Call light within reach, Chair alarm in place, Left in chair, Nurse notified, All fall risk precautions in place    G-Code       OutComes Score                                                  AM-PAC Score  AM-PAC Inpatient Mobility Raw Score : 20 (07/06/21 0809)  AM-PAC Inpatient T-Scale Score : 47.67 (07/06/21 0809)  Mobility Inpatient CMS 0-100% Score: 35.83 (07/06/21 0809)  Mobility Inpatient CMS G-Code Modifier : Mago Dawson (07/06/21 0809) Goals  Short term goals  Time Frame for Short term goals: by discharge  Short term goal 1: transfers MI  Short term goal 2: amb 100-200' with LRAD MI  Patient Goals   Patient goals : to know why she is feeling this way       Therapy Time   Individual Concurrent Group Co-treatment   Time In 0717         Time Out 0800         Minutes 43                 MARVA BENTON, PT    Electronically signed by MARVA BENTON PT on 7/6/2021 at 8:10 AM

## 2021-07-06 NOTE — PROGRESS NOTES
Patient brought to room from ED Placed in bed Oriented to room and call light Instructed not to get OOB without assistance.  States understanding

## 2021-07-06 NOTE — PROGRESS NOTES
Also on arrival to room I tried to [facility] as rxd.  But patient would not even try to do the reading and seeing my fingers s/t her not having her glasses

## 2021-07-06 NOTE — DISCHARGE INSTR - COC
Continuity of Care Form    Patient Name: Tyree Bhatt   :  1960  MRN:  [id number]    Admit date:  2021  Discharge date:  21    Code Status Order: Full Code   Advance Directives:   [facility] Documentation       Date/Time Healthcare Directive Type of Healthcare Directive Copy in [facility] [address] Agent's Name Healthcare Agent's Phone Number    21 2219  No, patient does not have an advance directive for healthcare treatment -- -- -- -- --            Admitting Physician:  Andi Roy MD  PCP: Mansoor Enciso MD    Discharging Nurse: [facility] Unit/Room#: D9E-6764/3803-94  Discharging Unit Phone Number: [phone]    Emergency Contact:   Extended Emergency Contact Information  Primary Emergency Contact: Rudy Dutton  Address: [address] [place] [address],[address] Phone: [phone]  Mobile Phone: [phone]  Relation: Spouse    Past Surgical History:  Past Surgical History:   Procedure Laterality Date    BREAST SURGERY      Breast reduction    CARPAL TUNNEL RELEASE Left 2016     Left carpal tunnel release      CARPAL TUNNEL RELEASE Right 10/06/2016     SECTION      x1    COLONOSCOPY      HYSTERECTOMY      complete-ovaries out    SALIVARY GLAND SURGERY Left 2021    EXCISION OF SALIVARY STONE IN THE LEFT SUBMANDIBULAR DUCT performed by Ino Moore MD at [place] [address]       Immunization History:   Immunization History   Administered Date(s) Administered    Hepatitis A Adult (Havrix, Vaqta) 2018    Hepatitis A Adult (Vaqta) 2018    Hepatitis B Adult (Engerix-B) 2018    Influenza Vaccine, unspecified formulation 2017    Influenza Virus Vaccine 10/16/2013, 2017, 2019, 2020    Influenza, Intradermal, Preservative free 12/10/2014    Influenza, Intradermal, Quadrivalent, Preservative Free 2018    PPD Test 08/07/2012    Pneumococcal Polysaccharide (Zauzkobkq50) 01/21/2017, 01/12/2018    Td, unspecified formulation 03/08/2016    Zoster Recombinant (Shingrix) 11/27/2018       Active Problems:  Patient Active Problem List   Diagnosis Code    Asthma-chronic obstructive pulmonary disease overlap syndrome (HCC) J44.9    Headache R51.9    S/P gastric bypass Z98.84    Hypertension I10    Insomnia G47.00    Carpal tunnel syndrome of left wrist G56.02    Benign essential HTN I10    Decreased potassium in the blood E87.6    Chest pain R07.9    Internal hernia K45.8    Midsternal chest pain R07.89    Tobacco abuse Z72.0    Chronic cough R05    Moderate persistent asthma without complication P16.78    Rash R21    Anxiety F41.9    GERD (gastroesophageal reflux disease) K21.9    Gastric bypass status for obesity Z98.84    Obstructive sleep apnea G47.33    TIA (transient ischemic attack) G45.9       Isolation/Infection:   Isolation            No Isolation          Unreconciled Outside Infections       Enable clinical decision support by reconciling outside information with the patient's chart.     .      Infection Onset Last Indicated Last Received Source    COVID-19 Rule Out 09/03/20 09/03/20 01/14/21 [facility]          Patient Infection Status       None to display            Nurse Assessment:  Last Vital Signs: /69   Pulse 83   Temp 98 °F (36.7 °C) (Oral)   Resp 16   Ht 5' 2\" (1.575 m)   Wt 196 lb 3.4 oz (89 kg)   SpO2 97%   BMI 35.89 kg/m²     Last documented pain score (0-10 scale): Pain Level: 8  Last Weight:   Wt Readings from Last 1 Encounters:   07/06/21 196 lb 3.4 oz (89 kg)     Mental Status:  oriented and alert    IV Access:  - None    Nursing Mobility/ADLs:  Walking   Assisted  Transfer  Independent  Bathing  Independent  Dressing  Independent  [address]  Independent  Med Delivery   whole    Wound Care Documentation and Therapy: Elimination:  Continence:   · Bowel: Yes  · Bladder: Yes  Urinary Catheter: None   Colostomy/Ileostomy/Ileal Conduit: No       Date of Last BM: 7/6/21    Intake/Output Summary (Last 24 hours) at 7/6/2021 1352  Last data filed at 7/6/2021 0938  Gross per 24 hour   Intake 360 ml   Output --   Net 360 ml     No intake/output data recorded. Safety Concerns:     None    Impairments/Disabilities:      Vision    Nutrition Therapy:  Current Nutrition Therapy:   - Oral Diet:  General    Routes of Feeding: Oral  Liquids: Thin Liquids  Daily Fluid Restriction: no  Last Modified Barium Swallow with Video (Video Swallowing Test): not done    Treatments at the Time of Hospital Discharge:   Respiratory Treatments: SEE MED LIST  Oxygen Therapy:  is not on home oxygen therapy.   Ventilator:    - No ventilator support    Rehab Therapies: Physical Therapy and Occupational Therapy  Weight Bearing Status/Restrictions: No weight bearing restirctions  Other Medical Equipment (for information only, NOT a DME order):  walker  Other Treatments: SEE POC    Patient's personal belongings (please select all that are sent with patient):  Adal    RN SIGNATURE:  Electronically signed by Edison Kilgore RN on 7/7/21 at 4:10 PM EDT    CASE MANAGEMENT/SOCIAL WORK SECTION    Inpatient Status Date: ***    Readmission Risk Assessment Score:  Readmission Risk              Risk of Unplanned Readmission:  0           Discharging to Facility/ Agency   · Name:   · Address:  · Phone:  · Fax:    Dialysis Facility (if applicable)   · Name:  · Address:  · Dialysis Schedule:  · Phone:  · Fax:    / signature: {Esignature:[id number]:::0}    PHYSICIAN SECTION    Prognosis: Good    Condition at Discharge: Stable    Rehab Potential (if transferring to Rehab): Good    Recommended Labs or Other Treatments After Discharge: PT/OT/RN    Physician Certification: I certify the above information and transfer of Jana Sawyer  is necessary for the continuing treatment of the diagnosis listed and that she requires Home Care for less 30 days.      Update Admission H&P: No change in H&P    PHYSICIAN SIGNATURE:  Electronically signed by JAYSON Canela CNP on 7/6/21 at 1:53 PM EDT/ Dr. Yazan Wood

## 2021-07-06 NOTE — CARE COORDINATION
DISCHARGE PLAN: Pt plans to return home with her spouse at d/c. May need DME or outpt therapy.   ___________________________________  Met w/pt to address barriers to dc. HOME: Pt reported that she resides in a two story home with her spouse. There are 3-4 JIMMY. COVID Vaccination: Yes    DME/O2: Grab bars, nebulizer and CPAP    ACTIVE SERVICES: pt reported that she was independent with all self care PTA. Pt would be agreeable to outpt therapy if needed. Pt does not anticipate the need for any assistance upon d/c. TRANSPORTATION: Pt is an active  and stated that her spouse will transport her home at d/c. PHARMACY: Denies difficulty obtaining/taking meds. Pt has all meds filled at [facility] on [address]. PCP: Heath Magdaleno    DEMOGRAPHICS: Verified address/phone number as correct    INSURANCE:  [facility]  PRESCRIPTION COVERAGE: R    HD/PD: No    THERAPY RECS    PHYSICAL THERAPY  \"Date of Service: 7/6/2021     Discharge Recommendations:  Home with assist PRN, Outpatient PT   PT Equipment Recommendations  Other: will continue to assess need for RW; pt felt safer with RW if this continues then may need RW for at home  Martin Tovar scored a 20/24 on the -PAC short mobility form. At this time if pt continues to feel unsteady then may benefit from OP PT. Recommend patient returns to prior setting with prior services. \"    OCCUPATIONAL THERAPY  \"Date of Service: 7/6/2021     Discharge Recommendations: Martin Tovar [facility]. At this time, no further OT is recommended upon discharge due to anticipate pt safe to return home with assist from .   Recommend patient returns to prior setting with prior services.          Continue to assess pending progress, Home with assist PRN  OT Equipment Recommendations  Equipment Needed: No\"    SPEECH THERAPY  \"Plan:    Recommendations  D/C Recommendations:  (No ST services warranted upon D/C)  Requires SLP Intervention: Yes\"    Discharge planning team will remain available for needs. Please consult for any specifics not addressed in this note.     [facility] [place]  [phone]  Electronically signed by Marbella Seen on 7/6/2021 at 4:32 PM

## 2021-07-06 NOTE — PROGRESS NOTES
NAME:  Klaudia Madrigal  YOB: 1960  MEDICAL RECORD NUMBER:  [id number]  TODAYS DATE:  7/6/2021    Discussed personal risk factors for Stroke /TIA with patient/family, and ways to reduce the risk for a recurrent stroke. Patient's personal risk factors which were identified are:     [] Alcohol Abuse: check with your physician before any alcohol consumption. [] Atrial fibrillation: may cause blood clots. [] Drug Abuse: Seek help, talk with your doctor  [] Clotting Disorder  [] Diabetes  [] Family history of stroke or heart disease  [x] High Blood Pressure/Hypertension: work with your physician. [x] High cholesterol: monitor cholesterol levels with your physician.   [] Overweight/Obesity: work with your physician for your ideal body weight.  [] Physical Inactivity: get regular exercise as directed by your physician. [] Personal history of previous TIA or stroke  [] Poor Diet; decrease salt (sodium) in your diet, follow diet directed by physician. [] Smoking: Cigarette/Cigar: stop smoking. Advised pt. that you can reduce your risk for stroke/TIA by modifying/controlling the risk factors that you have. Pt.advised to take the medications as prescribed, which will be detailed in the discharge instructions, and to not stop taking them without consulting their physician. In addition, pt. advised to maintain a healthy diet, exercise regularly and to not smoke. [facility]'s Stroke treatment and prevention, Managing your recovery  notebook  provided and/or reviewed  with patient/family. The notebook includes, but not limited to, sections addressing warning signs & symptoms of a stroke, which are: sudden numbness or weakness especially on one side of the body, sudden confusion, difficulty speaking or understanding, sudden changes in vision, sudden dizziness or loss of balance/ coordination, sudden severe headache, syncope and seizure.   The need to call EMS (911) immediately if signs &

## 2021-07-07 VITALS
HEIGHT: 62 IN | SYSTOLIC BLOOD PRESSURE: 133 MMHG | TEMPERATURE: 97.9 F | HEART RATE: 94 BPM | DIASTOLIC BLOOD PRESSURE: 87 MMHG | WEIGHT: 199.52 LBS | RESPIRATION RATE: 18 BRPM | BODY MASS INDEX: 36.72 KG/M2 | OXYGEN SATURATION: 96 %

## 2021-07-07 DIAGNOSIS — I10 ESSENTIAL HYPERTENSION: ICD-10-CM

## 2021-07-07 DIAGNOSIS — E87.6 HYPOKALEMIA: ICD-10-CM

## 2021-07-07 LAB
ALBUMIN SERPL-MCNC: 3 G/DL (ref 3.1–4.9)
ALPHA-1-GLOBULIN: 0.1 G/DL (ref 0.2–0.4)
ALPHA-2-GLOBULIN: 1 G/DL (ref 0.4–1.1)
BETA GLOBULIN: 1 G/DL (ref 0.9–1.6)
ESTIMATED AVERAGE GLUCOSE: 119.8 MG/DL
GAMMA GLOBULIN: 0.5 G/DL (ref 0.6–1.8)
HBA1C MFR BLD: 5.8 %
POTASSIUM SERPL-SCNC: 3.1 MMOL/L (ref 3.5–5.1)
POTASSIUM SERPL-SCNC: 3.2 MMOL/L (ref 3.5–5.1)
SPE/IFE INTERPRETATION: NORMAL
TOTAL PROTEIN: 5.6 G/DL (ref 6.4–8.2)

## 2021-07-07 PROCEDURE — 6360000002 HC RX W HCPCS: Performed by: INTERNAL MEDICINE

## 2021-07-07 PROCEDURE — 94640 AIRWAY INHALATION TREATMENT: CPT

## 2021-07-07 PROCEDURE — 6370000000 HC RX 637 (ALT 250 FOR IP): Performed by: NURSE PRACTITIONER

## 2021-07-07 PROCEDURE — 6370000000 HC RX 637 (ALT 250 FOR IP): Performed by: INTERNAL MEDICINE

## 2021-07-07 PROCEDURE — 97116 GAIT TRAINING THERAPY: CPT | Performed by: PHYSICAL THERAPIST

## 2021-07-07 PROCEDURE — 84132 ASSAY OF SERUM POTASSIUM: CPT

## 2021-07-07 PROCEDURE — 94760 N-INVAS EAR/PLS OXIMETRY 1: CPT

## 2021-07-07 PROCEDURE — 97530 THERAPEUTIC ACTIVITIES: CPT | Performed by: PHYSICAL THERAPIST

## 2021-07-07 PROCEDURE — 36415 COLL VENOUS BLD VENIPUNCTURE: CPT

## 2021-07-07 PROCEDURE — 83036 HEMOGLOBIN GLYCOSYLATED A1C: CPT

## 2021-07-07 RX ORDER — GABAPENTIN 300 MG/1
300 CAPSULE ORAL 3 TIMES DAILY
Status: DISCONTINUED | OUTPATIENT
Start: 2021-07-07 | End: 2021-07-07 | Stop reason: HOSPADM

## 2021-07-07 RX ORDER — ASPIRIN 81 MG/1
81 TABLET ORAL DAILY
Qty: 30 TABLET | Refills: 3 | Status: ON HOLD | OUTPATIENT
Start: 2021-07-08 | End: 2022-07-19 | Stop reason: HOSPADM

## 2021-07-07 RX ORDER — POTASSIUM CHLORIDE 20 MEQ/1
20 TABLET, EXTENDED RELEASE ORAL 2 TIMES DAILY WITH MEALS
Status: DISCONTINUED | OUTPATIENT
Start: 2021-07-07 | End: 2021-07-07 | Stop reason: HOSPADM

## 2021-07-07 RX ORDER — POTASSIUM CHLORIDE 750 MG/1
40 TABLET, FILM COATED, EXTENDED RELEASE ORAL ONCE
Status: DISCONTINUED | OUTPATIENT
Start: 2021-07-07 | End: 2021-07-07

## 2021-07-07 RX ORDER — ATORVASTATIN CALCIUM 40 MG/1
40 TABLET, FILM COATED ORAL NIGHTLY
Qty: 30 TABLET | Refills: 3 | Status: SHIPPED | OUTPATIENT
Start: 2021-07-07 | End: 2021-12-13 | Stop reason: SDUPTHER

## 2021-07-07 RX ORDER — GABAPENTIN 300 MG/1
300 CAPSULE ORAL 3 TIMES DAILY
Qty: 90 CAPSULE | Refills: 3 | Status: SHIPPED | OUTPATIENT
Start: 2021-07-07 | End: 2021-10-25 | Stop reason: ALTCHOICE

## 2021-07-07 RX ORDER — POTASSIUM CHLORIDE 1500 MG/1
TABLET, EXTENDED RELEASE ORAL
Qty: 60 TABLET | Refills: 5 | Status: SHIPPED | OUTPATIENT
Start: 2021-07-07 | End: 2021-12-15

## 2021-07-07 RX ADMIN — HYDRALAZINE HYDROCHLORIDE 50 MG: 50 TABLET, FILM COATED ORAL at 08:38

## 2021-07-07 RX ADMIN — POTASSIUM CHLORIDE 40 MEQ: 1500 TABLET, EXTENDED RELEASE ORAL at 04:53

## 2021-07-07 RX ADMIN — BUDESONIDE 500 MCG: 0.5 SUSPENSION RESPIRATORY (INHALATION) at 08:17

## 2021-07-07 RX ADMIN — GABAPENTIN 300 MG: 300 CAPSULE ORAL at 09:00

## 2021-07-07 RX ADMIN — ENOXAPARIN SODIUM 40 MG: 40 INJECTION SUBCUTANEOUS at 08:38

## 2021-07-07 RX ADMIN — ASPIRIN 81 MG: 81 TABLET, COATED ORAL at 08:38

## 2021-07-07 RX ADMIN — AMLODIPINE BESYLATE 10 MG: 10 TABLET ORAL at 08:38

## 2021-07-07 RX ADMIN — GABAPENTIN 300 MG: 300 CAPSULE ORAL at 15:03

## 2021-07-07 RX ADMIN — ACETAMINOPHEN 1000 MG: 500 TABLET ORAL at 06:42

## 2021-07-07 RX ADMIN — ONDANSETRON 4 MG: 2 INJECTION INTRAMUSCULAR; INTRAVENOUS at 09:31

## 2021-07-07 RX ADMIN — HYDROCHLOROTHIAZIDE 25 MG: 25 TABLET ORAL at 08:38

## 2021-07-07 RX ADMIN — POTASSIUM CHLORIDE 20 MEQ: 1500 TABLET, EXTENDED RELEASE ORAL at 09:00

## 2021-07-07 RX ADMIN — TOPIRAMATE 25 MG: 25 TABLET, FILM COATED ORAL at 08:38

## 2021-07-07 RX ADMIN — ALBUTEROL SULFATE 2 PUFF: 90 AEROSOL, METERED RESPIRATORY (INHALATION) at 08:17

## 2021-07-07 ASSESSMENT — PAIN DESCRIPTION - ORIENTATION
ORIENTATION: LOWER;MID
ORIENTATION: ANTERIOR

## 2021-07-07 ASSESSMENT — PAIN DESCRIPTION - ONSET: ONSET: SUDDEN

## 2021-07-07 ASSESSMENT — PAIN SCALES - GENERAL
PAINLEVEL_OUTOF10: 9
PAINLEVEL_OUTOF10: 6

## 2021-07-07 ASSESSMENT — PAIN SCALES - WONG BAKER
WONGBAKER_NUMERICALRESPONSE: 0

## 2021-07-07 ASSESSMENT — PAIN DESCRIPTION - PAIN TYPE
TYPE: ACUTE PAIN
TYPE: ACUTE PAIN

## 2021-07-07 ASSESSMENT — PAIN DESCRIPTION - LOCATION
LOCATION: BACK
LOCATION: HEAD

## 2021-07-07 ASSESSMENT — PAIN - FUNCTIONAL ASSESSMENT
PAIN_FUNCTIONAL_ASSESSMENT: ACTIVITIES ARE NOT PREVENTED
PAIN_FUNCTIONAL_ASSESSMENT: ACTIVITIES ARE NOT PREVENTED

## 2021-07-07 ASSESSMENT — PAIN DESCRIPTION - DESCRIPTORS
DESCRIPTORS: HEADACHE
DESCRIPTORS: SHARP

## 2021-07-07 ASSESSMENT — PAIN DESCRIPTION - FREQUENCY: FREQUENCY: INTERMITTENT

## 2021-07-07 NOTE — PROGRESS NOTES
Speech Language Pathology    Speech Therapy note. · Pt was seen for assessment 7/6/2021 with no further recommendations for therapy unless otherwise notified by MRI Brain findings. ·  MRI Brain noted; no further SLP at this time; unless otherwise indicated by MD order  · MD documentation indicates that pt will be discharged today. ·  This SLP spoke briefly with RN who did confirm pt is being discharged and RN reporting working on paperwork right now for d/c.    Plan: If MD is concerned regarding any cognitive-communication or dysphagia ;please order f/u as an outpatient. Benjamin Isaac,MS,CCC,SLP [id number]  Speech and Language Pathologist  7/7/2021 at 1556 pm

## 2021-07-07 NOTE — DISCHARGE SUMMARY
Hospital Medicine Discharge Summary    Patient ID: Brittany Palomo      Patient's PCP: Danni David MD    Admit Date: 7/5/2021     Discharge Date:   7/7/21    Admitting Physician: Martin Rod MD     Discharge Physician: JAYSON Ruby CNP       Discharge Diagnoses: Active Hospital Problems    Diagnosis Date Noted    TIA (transient ischemic attack) [G45.9] 07/05/2021       The patient was seen and examined on day of discharge and this discharge summary is in conjunction with any daily progress note from day of discharge. Disposition:  [x] Home  [] Home with home health [] Rehab [] Psych [] SNF  [] LTAC  [] Long term nursing home or group home [] Transfer to ICU  [] Transfer to PCU [] Other:      Discharge Instructions/Follow-up:  PCP 1 wk    D/C condition: stable    Code status: full    D/C Meds: ASA + statin. Start gabapentin     Hospital Course:  F/U for  \"My head was hurting, my chest was hurting, all my  skin and my face and my mouth were going numb, my hands and feet were  going numb. \"     HISTORY OF PRESENT ILLNESS:  The patient is a 70-year-old  -American female who presented to the hospital with a chief  complaint of what she describes as sudden onset of headaches, chest pain, arm numbness, feet numbness, feeling extremely anxious and panicking and breathing very fast, because of which she called EMS.  The patient knows that she has to slow her breathing down and her symptoms  decreased but they did not go away completely. Now, the patient  complains of persistent headache.     PAST MEDICAL/PAST SURGICAL HISTORY:  1.  Chronic migraine. 2.  The patient has pituitary microadenoma for which she follows up with  Endocrinology. 3.  Asthma/COPD. 4.  History of gastric bypass. 5.  Hypertension. 6.  Dyslipidemia.   7.  Obesity with a BMI of 35.89 kg/m2.     7/6: states she felt like rods were going up the back of her neck like her skull was going to pop off and like someone was sitting on her chest. Legs were tingling, lips and tongue and face were numb. States now both legs were numb and face and tongue are still numb now.      Last echo 2019. States had a normal treadmill stress test in the past but not recently. Will consult cardiology if neuro w/u neg.      MRI brain ordered and neurology consulted. She sees Dr. Juan Pickett in [place] and [address] [place] [address] at [facility] for brain tumor \"adenoma at my brainstem. \"  She gets migraines at times with this and recently was told she could take that up to twice daily -  topamax - for that.     Has ENT she sees , Dr. Arnoldo Adame for stones in saliva glands.     Will recheck lactate after   Home later today with f/u to neurology outpatient for poss EMG going forward if sx persist. MRI unremarkable. Start gabapentin. ASSESSMENT:  1.  Panic attack with atypical noncardiac chest wall pain vs. TIA  TIA ruled out with likely migraine/anxiety as etiology  2.  Paresthesia. 3.  Chronic migraine with sudden headache. 4.  Hypertension. 5.  Pituitary microadenoma, which is nonsecreting. 6.  COPD. 7.  Tobacco dependence. 8.  Obesity with a BMI of 35.89 kg/m2. 9. Lactic acidosis     PLAN OF CARE:  The patient is admitted to Observation.  Telemetry  monitoring will be continued.  Neurology consult requested.  Nonnarcotic treatment for headache will be instituted.  Low-dose aspirin will be continued.  The patient's home dose of topiramate and other medications will be continued. MRI brain will be obtained to evaluate for pituitary apoplexy. IV hydration and re-eval lactate  - c-spine MRI pending  - MRI brain: stable appearance of brain and pituitary gland compared to 6/29/21.  No acute abnormality   - B12/folate normal   SPEP, TSH, KEIRY   - may need EMG outpatient with neurology  - can start gabapentin low dose for neuropathies     Hypokalemia  - Replace potassium  - likely d/t diuretic   - restarted home oral K+ supplements     Continue current regimen/therapies. Monitor. Adjust medical regimen as appropriate. Exam:     /87   Pulse 94   Temp 97.9 °F (36.6 °C) (Oral)   Resp 18   Ht 5' 2\" (1.575 m)   Wt 199 lb 8.3 oz (90.5 kg)   SpO2 96%   BMI 36.49 kg/m²   General appearance: No apparent distress, appears stated age and cooperative. HEENT: Pupils equal, round, and reactive to light. Conjunctivae/corneas clear. Neck: Supple, with full range of motion. No jugular venous distention. Trachea midline. Respiratory:  Normal respiratory effort. Clear to auscultation, bilaterally without Rales/Wheezes/Rhonchi. Cardiovascular: Regular rate and rhythm with normal S1/S2 without murmurs, rubs or gallops. Abdomen: Soft, non-tender, non-distended with normal bowel sounds. Musculoskelatal: No clubbing, cyanosis or edema bilaterally. Full range of motion without deformity. Skin: Skin color, texture, turgor normal.  No rashes or lesions. Neurologic:  Neurovascularly intact without any focal sensory/motor deficits. Cranial nerves: II-XII intact, grossly non-focal.  Psychiatric: Alert and oriented, thought content appropriate, normal insight      Consults:     IP CONSULT TO STROKE TEAM  IP CONSULT TO PHARMACY  PHARMACY TO CHANGE BASE FLUIDS  IP CONSULT TO NEUROLOGY    Diagnostic tests: Imaging:  MRI BRAIN W WO CONTRAST   Final Result   Stable appearance of the brain and pituitary gland compared to June 29, 2021.       no acute abnormality.           MRI CERVICAL SPINE WO CONTRAST   Final Result   1. Minimal spinal canal stenosis at C3-C4 and C4-C5. 2. Mild right neural foraminal narrowing at C6-C7. 3. Straightening of the normal cervical lordosis without spondylolisthesis.         XR CHEST PORTABLE   Final Result   Clear lungs. Mild cardiac enlargement.   No acute cardiopulmonary abnormality.                   CTA HEAD NECK W CONTRAST   Final Result   Unremarkable CT angiogram of the head and neck, with no evidence of vessel   dissection or irregularity, significant stenosis, or proximal intracranial   arterial occlusion.       Comment:       Reference per NASCET criteria for degree of stenosis:  Mild: Less than 50%   stenosis. Moderate: 50-69% stenosis. Severe: 70-94% stenosis. Near-occlusion: 95-99% stenosis.         CT HEAD WO CONTRAST   Final Result   No hemorrhage or mass identified.       Periventricular and scattered frontal parietal white matter disease, likely   due to small-vessel ischemic change, similar compared to prior       Results discussed with Camryn Cardenas by Marialusia Murcia MD at 4:04 pm   on 7/5/2021                         Labs: For convenience and continuity at follow-up the following most recent labs are provided:      CBC:    Lab Results   Component Value Date    WBC 7.6 07/06/2021    HGB 12.6 07/06/2021    HCT 37.3 07/06/2021     07/06/2021       Renal:    Lab Results   Component Value Date     07/06/2021    K 3.1 07/07/2021    K 2.5 07/06/2021     07/06/2021    CO2 21 07/06/2021    BUN 6 07/06/2021    CREATININE 0.7 07/06/2021    CALCIUM 8.4 07/06/2021    PHOS 3.2 08/23/2013       Discharge Medications:     Current Discharge Medication List           Details   hydroCHLOROthiazide (HYDRODIURIL) 25 MG tablet TAKE 1 TABLET BY MOUTH EVERY DAY  Qty: 30 tablet, Refills: 5    Associated Diagnoses: Essential hypertension      azelastine (ASTELIN) 0.1 % nasal spray 1 SPRAY BY NASAL ROUTE 2 TIMES DAILY USE IN EACH NOSTRIL AS DIRECTED  Qty: 1 Bottle, Refills: 1      amLODIPine (NORVASC) 10 MG tablet TAKE 1 TABLET BY MOUTH EVERY DAY  Qty: 30 tablet, Refills: 2    Associated Diagnoses: Essential hypertension      montelukast (SINGULAIR) 10 MG tablet TAKE 1 TABLET BY MOUTH EVERY DAY  Qty: 30 tablet, Refills: 5    Associated Diagnoses:  Moderate persistent asthma without complication      pseudoephedrine (DECONGESTANT) 30 MG tablet Take 1 tablet by mouth 3 times daily  Qty: 21 tablet, Refills: 0 omeprazole (PRILOSEC) 40 MG delayed release capsule TAKE 1 CAPSULE BY MOUTH EVERY DAY  Qty: 30 capsule, Refills: 5    Associated Diagnoses: Gastroesophageal reflux disease without esophagitis      benzonatate (TESSALON) 100 MG capsule TAKE 1 CAPSULE BY MOUTH THREE TIMES A DAY AS NEEDED FOR COUGH  Qty: 90 capsule, Refills: 3    Comments: DX Code Needed  . Associated Diagnoses: Chronic cough      butalbital-acetaminophen-caffeine (FIORICET, ESGIC) -40 MG per tablet TAKE 1 TABLET BY MOUTH EVERY 4 HOURS AS NEEDED FOR PAIN  Qty: 30 tablet, Refills: 1    Comments: DX Code Needed  . Associated Diagnoses: Chronic tension-type headache, not intractable      hydrALAZINE (APRESOLINE) 50 MG tablet Take 1 tablet by mouth 2 times daily  Qty: 180 tablet, Refills: 3      potassium chloride (KLOR-CON M20) 20 MEQ extended release tablet Take 1 tablet by mouth 2 times daily  Qty: 180 tablet, Refills: 1    Associated Diagnoses: Hypokalemia; Essential hypertension      clotrimazole-betamethasone (LOTRISONE) 1-0.05 % cream APPLY TO AFFECTED AREA TWICE A DAY  Qty: 15 g, Refills: 1    Associated Diagnoses: Skin rash      topiramate (TOPAMAX) 25 MG tablet Take 1 tablet daily  Qty: 30 tablet, Refills: 2      Prenatal Vit-Fe Fumarate-FA (PRENATAL COMPLETE PO) Take 1 tablet by mouth daily       ondansetron (ZOFRAN) 4 MG tablet TAKE 1 TABLET BY MOUTH EVERY 12 HOURS AS NEEDED or nausea or vomiting  Qty: 30 tablet, Refills: 12    Associated Diagnoses: Nausea      vitamin D (ERGOCALCIFEROL) 1.25 MG (92174 UT) CAPS capsule TAKE 1 CAPSULE BY MOUTH EVERY 30 DAYS  Qty: 4 capsule, Refills: 3      fluticasone (FLONASE) 50 MCG/ACT nasal spray INHALE 1 SPRAY NASALLY EVERY DAY  Qty: 1 Bottle, Refills: 5    Associated Diagnoses: Allergic rhinitis, unspecified      Nebulizers (NEBULIZER COMPRESSOR) MISC Use every 6 hours as needed with duoneb  Qty: 1 each, Refills: 0    Associated Diagnoses:  Moderate persistent asthma without complication budesonide (PULMICORT) 0.5 MG/2ML nebulizer suspension Take 2 mLs by nebulization 2 times daily  Qty: 60 ampule, Refills: 3    Associated Diagnoses: Moderate persistent asthma without complication      Chlorhexidine Gluconate 2 % SOLN Apply to skin once daily prn  Qty: 250 mL, Refills: 1    Associated Diagnoses: Contact dermatitis, unspecified contact dermatitis type, unspecified trigger      albuterol sulfate HFA (PROAIR HFA) 108 (90 Base) MCG/ACT inhaler Inhale 2 puffs into the lungs every 6 hours as needed for Wheezing  Qty: 1 Inhaler, Refills: 5    Associated Diagnoses: Moderate persistent asthma without complication; Chronic cough      EPINEPHrine (EPIPEN) 0.3 MG/0.3ML DELPHINE injection Use as directed for allergic reaction  Qty: 2 Device, Refills: 3             Time Spent on discharge is more than 45 mins in the examination, evaluation, counseling and review of medications and discharge plan. Signed:    JAYSON Zhang - CNP   7/7/2021      Thank you Bertha Guadalupe MD for the opportunity to be involved in this patient's care. If you have any questions or concerns please feel free to contact me at [phone].

## 2021-07-07 NOTE — PROGRESS NOTES
Allergic rhinitis, Anxiety, Arthritis, Asthma, Asthma-chronic obstructive pulmonary disease overlap syndrome (HCC), Bilateral carpal tunnel syndrome, Depression, Dysmenorrhea, Fibroid, GERD (gastroesophageal reflux disease), Headache(784.0), Hyperlipidemia, Hypertension, S/P gastric bypass, Status post coronary angiogram, and Wears glasses. has a past surgical history that includes  section; Hysterectomy; Breast surgery; Colonoscopy; Carpal tunnel release (Left, 2016); Carpal tunnel release (Right, 10/06/2016); and Salivary gland surgery (Left, 2021). Restrictions  Restrictions/Precautions  Restrictions/Precautions: Fall Risk  Subjective   General  Chart Reviewed: Yes  Additional Pertinent Hx: The patient is a 24-year-old -American female who presented to the hospital with a chief complaint of what she describes as sudden onset of headaches, chest pain, arm numbness, feet numbness, feeling extremely anxious and panicking and breathing very fast, because of which she called EMS. The patient knows that she has to slow her breathing down and her symptoms decreased but they did not go away completely. Now, the patient complains of persistent headache. Response To Previous Treatment: Patient with no complaints from previous session.   Family / Caregiver Present: No  Subjective  Subjective: pt is agreeable to working with therapy; upon entering pt reports she just got back from bathroom washing up but needed new gown and hospital pants which therapist obtained for pt; she donned them without assist except to tie gown;          Orientation  Orientation  Overall Orientation Status: Within Functional Limits  Cognition   Cognition  Overall Cognitive Status: WFL  Objective   Bed mobility  Supine to Sit: Modified independent  Sit to Supine: Modified independent  Transfers  Sit to Stand: Supervision;Modified independent  Stand to sit: Supervision;Modified independent  Ambulation  Ambulation?: Yes  Ambulation 1  Surface: level tile  Device: No Device  Assistance: Stand by assistance  Quality of Gait: pt initally taking very small steps and reaching out for objects to hold onto but then as she began walking her gait became more normalized except slower than her likely usual pace  Distance: 70'x2     Balance  Sitting - Static: Good  Sitting - Dynamic: Good  Standing - Static: Fair;+  Standing - Dynamic: Fair;+            Comment: pt donned new hospital pants and gown with assist to tie gown; assisted with positioning in chair for comfort with LEs elevated and all needs in reach              G-Code     OutComes Score                                                     AM-PAC Score  AM-PAC Inpatient Mobility Raw Score : 20 (07/06/21 0809)  AM-PAC Inpatient T-Scale Score : 47.67 (07/06/21 0809)  Mobility Inpatient CMS 0-100% Score: 35.83 (07/06/21 0809)  Mobility Inpatient CMS G-Code Modifier : CJ (07/06/21 0809)          Goals  Short term goals  Time Frame for Short term goals: by discharge  Short term goal 1: transfers MI  Short term goal 2: amb 100-200' with LRAD MI  Patient Goals   Patient goals : to know why she is feeling this way    Plan    Plan  Times per week: 3-5  Current Treatment Recommendations: Functional Mobility Training  Safety Devices  Type of devices: Call light within reach, Left in chair, Nurse notified, All fall risk precautions in place     Therapy Time   Individual Concurrent Group Co-treatment   Time In 0952         Time Out 1034         Minutes 42                 MARVA BENTON PT    Electronically signed by MARVA BENTON PT on 7/7/2021 at 10:36 AM

## 2021-07-07 NOTE — PROGRESS NOTES
[facility] Medicine Progress Note      Admit Date: 7/5/2021       CC: F/U for  \"My head was hurting, my chest was hurting, all my  skin and my face and my mouth were going numb, my hands and feet were  going numb. \"     HISTORY OF PRESENT ILLNESS:  The patient is a 62-year-old  -American female who presented to the hospital with a chief  complaint of what she describes as sudden onset of headaches, chest pain, arm numbness, feet numbness, feeling extremely anxious and panicking and breathing very fast, because of which she called EMS.  The patient knows that she has to slow her breathing down and her symptoms  decreased but they did not go away completely. Now, the patient  complains of persistent headache.     PAST MEDICAL/PAST SURGICAL HISTORY:  1.  Chronic migraine. 2.  The patient has pituitary microadenoma for which she follows up with  Endocrinology. 3.  Asthma/COPD. 4.  History of gastric bypass. 5.  Hypertension. 6.  Dyslipidemia. 7.  Obesity with a BMI of 35.89 kg/m2.     7/6: states she felt like rods were going up the back of her neck like her skull was going to pop off and like someone was sitting on her chest. Legs were tingling, lips and tongue and face were numb. States now both legs were numb and face and tongue are still numb now.      Last echo 2019. States had a normal treadmill stress test in the past but not recently. Will consult cardiology if neuro w/u neg.      MRI brain ordered and neurology consulted. She sees Dr. 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 \"  She gets migraines at times with this and recently was told she could take that up to twice daily -  topamax - for that.     Has ENT she sees , Dr. Puja Waggoner for stones in saliva glands.     Will recheck lactate after IVF    Interval History/Subjective: patient with hypokalemia due to taking diuretic. Will restart her home potassium bid and also received 40mg this morning.      Home later today with f/u to neurology outpatient for poss EMG going forward if sx persist. MRI unremarkable. Start gabapentin. Review of Systems:     The patient denied headaches, visual changes, LOC, SOB, CP, ABD pain, N/V/D, skin changes, new or worsening weakness or neuromuscular deficits. Comprehensive ROS negative except as mentioned above. Past Medical History:        Diagnosis Date    Allergic rhinitis     Anxiety 2020    Arthritis     pt denies    Asthma 4/10/2012    Asthma-chronic obstructive pulmonary disease overlap syndrome (HCC)     pt denies    Bilateral carpal tunnel syndrome 2016    Depression     Dysmenorrhea     Fibroid     GERD (gastroesophageal reflux disease)     Headache(784.0) 4/10/2012    Hyperlipidemia     Hypertension     S/P gastric bypass     Status post coronary angiogram 2015    Wears glasses        Past Surgical History:        Procedure Laterality Date    BREAST SURGERY      Breast reduction    CARPAL TUNNEL RELEASE Left 2016     Left carpal tunnel release      CARPAL TUNNEL RELEASE Right 10/06/2016     SECTION      x1    COLONOSCOPY      HYSTERECTOMY      complete-ovaries out    SALIVARY GLAND SURGERY Left 2021    EXCISION OF SALIVARY STONE IN THE LEFT SUBMANDIBULAR DUCT performed by Irineo Hicks MD at [address] [place] [address] Place:  Shellfish allergy, Shellfish-derived products, and Lisinopril    Past medical and surgical history reviewed. Any changes have been noted. PHYSICAL EXAM:  /87   Pulse 94   Temp 97.9 °F (36.6 °C) (Oral)   Resp 18   Ht 5' 2\" (1.575 m)   Wt 199 lb 8.3 oz (90.5 kg)   SpO2 96%   BMI 36.49 kg/m²       Intake/Output Summary (Last 24 hours) at 2021 0841  Last data filed at 2021 1944  Gross per 24 hour   Intake 1240 ml   Output 200 ml   Net 1040 ml        General appearance:   No apparent distress, appears stated age. Cooperative. HEENT:  Normocephalic, atraumatic. PERRLA. EOMi. Conjunctivae/corneas clear, no icterus, non-injected. Neck: Supple, with full range of motion. No jugular venous distention. Trachea midline. Respiratory:  Normal respiratory effort. Clear to auscultation, bilaterally without Rales/Wheezes/Rhonchi. Cardiovascular:  Regular rate and rhythm without murmurs, rubs or gallops. Abdomen: Soft, non-tender, non-distended, without rebound or guarding. Normal bowel sounds. Musculoskeletal:  No clubbing, cyanosis or edema bilaterally. Full range of motion without deformity. Skin: Skin color, texture, turgor normal.  No rashes or lesions. Neurologic:  Neurovascularly intact without any focal sensory/motor deficits. Cranial nerves: II-XII intact, grossly intact. No facial asymmetry, tongue midline. Psychiatric:  Alert and oriented, thought content appropriate  Capillary Refill: Brisk,< 3 seconds   Peripheral Pulses: +2 palpable, equal bilaterally       LABS:    Lab Results   Component Value Date    WBC 7.6 07/06/2021    HGB 12.6 07/06/2021    HCT 37.3 07/06/2021    MCV 89.8 07/06/2021     07/06/2021    LYMPHOPCT 51.0 07/05/2021    RBC 4.16 07/06/2021    MCH 30.2 07/06/2021    MCHC 33.7 07/06/2021    RDW 14.6 07/06/2021       Lab Results   Component Value Date    CREATININE 0.7 07/06/2021    BUN 6 (L) 07/06/2021     07/06/2021    K 3.1 (L) 07/07/2021     07/06/2021    CO2 21 07/06/2021       Lab Results   Component Value Date    MG 1.80 07/06/2021       Lab Results   Component Value Date    ALT 32 07/05/2021    AST 38 (H) 07/05/2021    ALKPHOS 98 07/05/2021    BILITOT 0.3 07/05/2021        No flowsheet data found. Lab Results   Component Value Date    LABA1C 5.5 12/22/2020       Imaging:  MRI BRAIN W WO CONTRAST   Final Result   Stable appearance of the brain and pituitary gland compared to June 29, 2021.      no acute abnormality. MRI CERVICAL SPINE WO CONTRAST   Final Result   1. Minimal spinal canal stenosis at C3-C4 and C4-C5.    2. Mild right neural foraminal narrowing at C6-C7. 3. Straightening of the normal cervical lordosis without spondylolisthesis. XR CHEST PORTABLE   Final Result   Clear lungs. Mild cardiac enlargement. No acute cardiopulmonary abnormality. CTA HEAD NECK W CONTRAST   Final Result   Unremarkable CT angiogram of the head and neck, with no evidence of vessel   dissection or irregularity, significant stenosis, or proximal intracranial   arterial occlusion. Comment:      Reference per NASCET criteria for degree of stenosis:  Mild: Less than 50%   stenosis. Moderate: 50-69% stenosis. Severe: 70-94% stenosis. Near-occlusion: 95-99% stenosis. CT HEAD WO CONTRAST   Final Result   No hemorrhage or mass identified. Periventricular and scattered frontal parietal white matter disease, likely   due to small-vessel ischemic change, similar compared to prior      Results discussed with Camryn Cardenas by Hernán Owens. Yenni Avalos MD at 4:04 pm   on 7/5/2021             Scheduled and prn Medications:    Scheduled Meds:   potassium chloride  20 mEq Oral BID WC    amLODIPine  10 mg Oral Daily    budesonide  500 mcg Nebulization BID    hydrALAZINE  50 mg Oral BID    hydroCHLOROthiazide  25 mg Oral Daily    topiramate  25 mg Oral Daily    enoxaparin  40 mg Subcutaneous Daily    aspirin  81 mg Oral Daily    Or    aspirin  300 mg Rectal Daily    atorvastatin  40 mg Oral Nightly     Continuous Infusions:  PRN Meds:.potassium chloride **OR** potassium alternative oral replacement **OR** potassium chloride, albuterol sulfate HFA, ondansetron **OR** ondansetron, acetaminophen    Assessment & Plan:        ASSESSMENT:  1.  Panic attack with atypical noncardiac chest wall pain vs. TIA  2.  Paresthesia. 3.  Chronic migraine with sudden headache. 4.  Hypertension. 5.  Pituitary microadenoma, which is nonsecreting. 6.  COPD. 7.  Tobacco dependence. 8.  Obesity with a BMI of 35.89 kg/m2.   9. Lactic acidosis     PLAN OF CARE:  The patient is admitted to Observation.  Telemetry  monitoring will be continued.  Neurology consult requested.  Nonnarcotic treatment for headache will be instituted.  Low-dose aspirin will be continued.  The patient's home dose of topiramate and other medications will be continued. MRI brain will be obtained to evaluate for pituitary apoplexy. IV hydration and re-eval lactate  - c-spine MRI pending  - MRI brain: stable appearance of brain and pituitary gland compared to 6/29/21. No acute abnormality   - B12/folate normal   SPEP, TSH, KEIRY   - may need EMG outpatient with neurology  - can start gabapentin low dose for neuropathies    Hypokalemia  - Replace potassium  - likely d/t diuretic   - restarted home oral K+ supplements    Continue current regimen/therapies. Monitor. Adjust medical regimen as appropriate. Body mass index is 36.49 kg/m². The patient and / or the family were informed of the results of any tests, a time was given to answer questions, a plan was proposed and they agreed with plan. DVT ppx: lovenox      Diet: ADULT DIET;  Regular    Consults:  IP CONSULT TO STROKE TEAM  IP CONSULT TO PHARMACY  PHARMACY TO CHANGE BASE FLUIDS  IP CONSULT TO NEUROLOGY    DISPO/placement plan: pending    Code Status: Full Code      JAYSON Cortes - JEFFERSON  07/07/21

## 2021-07-07 NOTE — PROGRESS NOTES
Discharge instructions reviewed with patient; verbalizes understanding of medications and follow up appointments. Denies any further questions. Transportation notified to wheel patient out of hospital for discharge to home. Spouse present.

## 2021-07-08 ENCOUNTER — TELEPHONE (OUTPATIENT)
Dept: PRIMARY CARE CLINIC | Age: 61
End: 2021-07-08

## 2021-07-08 NOTE — TELEPHONE ENCOUNTER
Give this patient an appointment to see me tomorrow, she can be added own as my last patient if necessary

## 2021-07-08 NOTE — TELEPHONE ENCOUNTER
[facility] Initial Follow Up Call    Outreach made within 2 business days of discharge: Yes    Patient: Vandana Alcocer Patient : 1960   MRN: [id number]  Reason for Admission: There are no discharge diagnoses documented for the most recent discharge.   Discharge Date: 21       Spoke with: patient in contact with PCP office to schedule HFU appointment with Dr Akhil Olivia    Discharge department/facility: [facility]    Scheduled appointment with PCP within 7-14 days    Follow Up  Future Appointments   Date Time Provider Priya Miller   2021  2:45 PM Alana moreira, PT [facility] OP PT [facility]   2021  3:00 PM Betty Oneill AUDIO [facility]   2021  3:30 PM Madison Merlos[address] ENT [facility]   10/25/2021  8:20 AM Torey Blanco MD [facility]       Wolfgang Flowers MA

## 2021-07-09 ENCOUNTER — OFFICE VISIT (OUTPATIENT)
Dept: PRIMARY CARE CLINIC | Age: 61
End: 2021-07-09
Payer: COMMERCIAL

## 2021-07-09 VITALS
DIASTOLIC BLOOD PRESSURE: 89 MMHG | OXYGEN SATURATION: 97 % | HEART RATE: 78 BPM | SYSTOLIC BLOOD PRESSURE: 138 MMHG | BODY MASS INDEX: 36.4 KG/M2 | WEIGHT: 199 LBS | TEMPERATURE: 97.2 F

## 2021-07-09 DIAGNOSIS — R07.89 ATYPICAL CHEST PAIN: ICD-10-CM

## 2021-07-09 DIAGNOSIS — Z72.0 TOBACCO ABUSE DISORDER: ICD-10-CM

## 2021-07-09 DIAGNOSIS — R20.2 PARESTHESIA: Primary | ICD-10-CM

## 2021-07-09 DIAGNOSIS — R47.1 DYSARTHRIA: ICD-10-CM

## 2021-07-09 DIAGNOSIS — E87.6 HYPOKALEMIA: ICD-10-CM

## 2021-07-09 DIAGNOSIS — D35.2 PITUITARY MICROADENOMA (HCC): ICD-10-CM

## 2021-07-09 DIAGNOSIS — J44.9 CHRONIC OBSTRUCTIVE PULMONARY DISEASE, UNSPECIFIED COPD TYPE (HCC): ICD-10-CM

## 2021-07-09 PROCEDURE — 99214 OFFICE O/P EST MOD 30 MIN: CPT | Performed by: FAMILY MEDICINE

## 2021-07-09 ASSESSMENT — ENCOUNTER SYMPTOMS
COLOR CHANGE: 0
EYE DISCHARGE: 0
SHORTNESS OF BREATH: 0
DIARRHEA: 0
SINUS PRESSURE: 0
RHINORRHEA: 0
EYE PAIN: 0
CHOKING: 0
RECTAL PAIN: 0
NAUSEA: 0
EYE REDNESS: 0
STRIDOR: 0
EYE ITCHING: 0
ABDOMINAL DISTENTION: 0
COUGH: 0
CHEST TIGHTNESS: 0
TROUBLE SWALLOWING: 0
APNEA: 0
BLOOD IN STOOL: 0
BACK PAIN: 0
SORE THROAT: 0
VOMITING: 0
WHEEZING: 0
PHOTOPHOBIA: 0
CONSTIPATION: 0

## 2021-07-09 NOTE — PROGRESS NOTES
Post-Discharge Transitional Care Management Services or Hospital Follow Up      Vandana Alcocer   YOB: 1960    Date of Office Visit:  7/9/2021  Date of Hospital Admission: 7/5/21  Date of Hospital Discharge: 7/7/21  Readmission Risk Score(high >=14%.  Medium >=10%):Readmission Risk Score: 14      Care management risk score Rising risk (score 2-5) and Complex Care (Scores >=6): 3     Non face to face  following discharge, date last encounter closed (first attempt may have been earlier): 7/8/2021  1:37 PM 7/8/2021  1:37 PM    Call initiated 2 business days of discharge: Yes     Patient Active Problem List   Diagnosis    Asthma-chronic obstructive pulmonary disease overlap syndrome ([address]    Headache    S/P gastric bypass    Hypertension    Insomnia    Carpal tunnel syndrome of left wrist    Benign essential HTN    Decreased potassium in the blood    Chest pain    Internal hernia    Midsternal chest pain    Tobacco abuse    Chronic cough    Moderate persistent asthma without complication    Rash    Anxiety    GERD (gastroesophageal reflux disease)    Gastric bypass status for obesity    Obstructive sleep apnea    TIA (transient ischemic attack)       Allergies   Allergen Reactions    Shellfish Allergy Anaphylaxis    Shellfish-Derived Products Shortness Of Breath and Palpitations    Lisinopril      Dry cough, no lip swelling or resp. sxs       Medications listed as ordered at the time of discharge from hospital   Blanka CHANDLER   Home Medication Instructions ANTHONY:    Printed on:07/09/21 0017   Medication Information                      albuterol sulfate HFA (PROAIR HFA) 108 (90 Base) MCG/ACT inhaler  Inhale 2 puffs into the lungs every 6 hours as needed for Wheezing             amLODIPine (NORVASC) 10 MG tablet  TAKE 1 TABLET BY MOUTH EVERY DAY             aspirin 81 MG EC tablet  Take 1 tablet by mouth daily             atorvastatin (LIPITOR) 40 MG tablet  Take 1 tablet by mouth nightly             azelastine (ASTELIN) 0.1 % nasal spray  1 SPRAY BY NASAL ROUTE 2 TIMES DAILY USE IN EACH NOSTRIL AS DIRECTED             benzonatate (TESSALON) 100 MG capsule  TAKE 1 CAPSULE BY MOUTH THREE TIMES A DAY AS NEEDED FOR COUGH             budesonide (PULMICORT) 0.5 MG/2ML nebulizer suspension  Take 2 mLs by nebulization 2 times daily             butalbital-acetaminophen-caffeine (FIORICET, ESGIC) -40 MG per tablet  TAKE 1 TABLET BY MOUTH EVERY 4 HOURS AS NEEDED FOR PAIN             Chlorhexidine Gluconate 2 % SOLN  Apply to skin once daily prn             clotrimazole-betamethasone (LOTRISONE) 1-0.05 % cream  APPLY TO AFFECTED AREA TWICE A DAY             EPINEPHrine (EPIPEN) 0.3 MG/0.3ML DELPHINE injection  Use as directed for allergic reaction             fluticasone (FLONASE) 50 MCG/ACT nasal spray  INHALE 1 SPRAY NASALLY EVERY DAY             gabapentin (NEURONTIN) 300 MG capsule  Take 1 capsule by mouth 3 times daily for 30 days.              hydrALAZINE (APRESOLINE) 50 MG tablet  Take 1 tablet by mouth 2 times daily             hydroCHLOROthiazide (HYDRODIURIL) 25 MG tablet  TAKE 1 TABLET BY MOUTH EVERY DAY             KLOR-CON M20 20 MEQ extended release tablet  TAKE 1 TABLET BY MOUTH 2 TIMES DAILY             montelukast (SINGULAIR) 10 MG tablet  TAKE 1 TABLET BY MOUTH EVERY DAY             Nebulizers (NEBULIZER COMPRESSOR) MISC  Use every 6 hours as needed with duoneb             nicotine polacrilex (NICORETTE) 2 MG gum  Use up to 6 sticks of gum a day for smoking             omeprazole (PRILOSEC) 40 MG delayed release capsule  TAKE 1 CAPSULE BY MOUTH EVERY DAY             ondansetron (ZOFRAN) 4 MG tablet  TAKE 1 TABLET BY MOUTH EVERY 12 HOURS AS NEEDED or nausea or vomiting             Prenatal Vit-Fe Fumarate-FA (PRENATAL COMPLETE PO)  Take 1 tablet by mouth daily              pseudoephedrine (DECONGESTANT) 30 MG tablet  Take 1 tablet by mouth 3 times daily             topiramate (TOPAMAX) 25 MG tablet  Take 1 tablet daily             vitamin D (ERGOCALCIFEROL) 1.25 MG (22905 UT) CAPS capsule  TAKE 1 CAPSULE BY MOUTH EVERY 30 DAYS                   Medications marked \"taking\" at this time  Outpatient Medications Marked as Taking for the 7/9/21 encounter (Office Visit) with Pam Aiken MD   Medication Sig Dispense Refill    nicotine polacrilex (NICORETTE) 2 MG gum Use up to 6 sticks of gum a day for smoking 110 each 3    KLOR-CON M20 20 MEQ extended release tablet TAKE 1 TABLET BY MOUTH 2 TIMES DAILY 60 tablet 5    aspirin 81 MG EC tablet Take 1 tablet by mouth daily 30 tablet 3    gabapentin (NEURONTIN) 300 MG capsule Take 1 capsule by mouth 3 times daily for 30 days.  90 capsule 3    atorvastatin (LIPITOR) 40 MG tablet Take 1 tablet by mouth nightly 30 tablet 3    hydroCHLOROthiazide (HYDRODIURIL) 25 MG tablet TAKE 1 TABLET BY MOUTH EVERY DAY 30 tablet 5    azelastine (ASTELIN) 0.1 % nasal spray 1 SPRAY BY NASAL ROUTE 2 TIMES DAILY USE IN EACH NOSTRIL AS DIRECTED 1 Bottle 1    amLODIPine (NORVASC) 10 MG tablet TAKE 1 TABLET BY MOUTH EVERY DAY 30 tablet 2    montelukast (SINGULAIR) 10 MG tablet TAKE 1 TABLET BY MOUTH EVERY DAY 30 tablet 5    pseudoephedrine (DECONGESTANT) 30 MG tablet Take 1 tablet by mouth 3 times daily 21 tablet 0    omeprazole (PRILOSEC) 40 MG delayed release capsule TAKE 1 CAPSULE BY MOUTH EVERY DAY 30 capsule 5    benzonatate (TESSALON) 100 MG capsule TAKE 1 CAPSULE BY MOUTH THREE TIMES A DAY AS NEEDED FOR COUGH 90 capsule 3    butalbital-acetaminophen-caffeine (FIORICET, ESGIC) -40 MG per tablet TAKE 1 TABLET BY MOUTH EVERY 4 HOURS AS NEEDED FOR PAIN 30 tablet 1    hydrALAZINE (APRESOLINE) 50 MG tablet Take 1 tablet by mouth 2 times daily 180 tablet 3    clotrimazole-betamethasone (LOTRISONE) 1-0.05 % cream APPLY TO AFFECTED AREA TWICE A DAY 15 g 1    topiramate (TOPAMAX) 25 MG tablet Take 1 tablet daily (Patient taking differently: Take 25 mg by mouth daily ) 30 tablet 2    Prenatal Vit-Fe Fumarate-FA (PRENATAL COMPLETE PO) Take 1 tablet by mouth daily       ondansetron (ZOFRAN) 4 MG tablet TAKE 1 TABLET BY MOUTH EVERY 12 HOURS AS NEEDED or nausea or vomiting 30 tablet 12    vitamin D (ERGOCALCIFEROL) 1.25 MG (55939 UT) CAPS capsule TAKE 1 CAPSULE BY MOUTH EVERY 30 DAYS 4 capsule 3    fluticasone (FLONASE) 50 MCG/ACT nasal spray INHALE 1 SPRAY NASALLY EVERY DAY 1 Bottle 5    Nebulizers (NEBULIZER COMPRESSOR) MISC Use every 6 hours as needed with duoneb 1 each 0    budesonide (PULMICORT) 0.5 MG/2ML nebulizer suspension Take 2 mLs by nebulization 2 times daily (Patient taking differently: Take 500 mcg by nebulization 2 times daily as needed ) 60 ampule 3    Chlorhexidine Gluconate 2 % SOLN Apply to skin once daily prn 250 mL 1    albuterol sulfate HFA (PROAIR HFA) 108 (90 Base) MCG/ACT inhaler Inhale 2 puffs into the lungs every 6 hours as needed for Wheezing 1 Inhaler 5    EPINEPHrine (EPIPEN) 0.3 MG/0.3ML DELPHINE injection Use as directed for allergic reaction 2 Device 3        Medications patient taking as of now reconciled against medications ordered at time of hospital discharge: Yes    Chief Complaint   Patient presents with    Follow-Up from Hospital       HPI 65 y/o female hosp. With atypical cp numbness of face, arms, legs feet/  And head  Some dysarthria   Card work up neg  MRI head/neck non revealing  Did have hypokalemia 2.5  Partially corrected with iv potassium          Inpatient course: Discharge summary reviewed- see chart. Interval history/Current status: she says sxs are not much improved since her hosp  But she has not fallen    She needs pt/speech    Review of Systems   Constitutional: Negative for activity change, appetite change, chills, diaphoresis, fatigue and fever.    HENT: Negative for congestion, dental problem, drooling, ear discharge, ear pain, hearing loss, mouth sores, nosebleeds, postnasal drip, rhinorrhea, sinus pressure, sneezing, sore throat, tinnitus and trouble swallowing. Eyes: Negative for photophobia, pain, discharge, redness, itching and visual disturbance. Respiratory: Negative for apnea, cough, choking, chest tightness, shortness of breath, wheezing and stridor. Cardiovascular: Negative for chest pain, palpitations and leg swelling. Gastrointestinal: Negative for abdominal distention, blood in stool, constipation, diarrhea, nausea, rectal pain and vomiting. Genitourinary: Negative for decreased urine volume, difficulty urinating, dyspareunia, dysuria, enuresis, flank pain, frequency, genital sores, hematuria, menstrual problem, pelvic pain, urgency, vaginal bleeding and vaginal pain. Musculoskeletal: Negative for arthralgias, back pain, gait problem, joint swelling, myalgias, neck pain and neck stiffness. Skin: Negative for color change, pallor, rash and wound. Neurological: Negative for dizziness, tremors, seizures, syncope, facial asymmetry, speech difficulty, weakness, light-headedness, numbness and headaches. Hematological: Negative for adenopathy. Does not bruise/bleed easily. Psychiatric/Behavioral: Negative for agitation, behavioral problems, confusion, decreased concentration, dysphoric mood, hallucinations, self-injury, sleep disturbance and suicidal ideas. The patient is not nervous/anxious and is not hyperactive. Vitals:    07/09/21 1531   BP: 138/89   Pulse: 78   Temp: 97.2 °F (36.2 °C)   TempSrc: Oral   SpO2: 97%   Weight: 199 lb (90.3 kg)     Body mass index is 36.4 kg/m². Wt Readings from Last 3 Encounters:   07/09/21 199 lb (90.3 kg)   07/07/21 199 lb 8.3 oz (90.5 kg)   06/28/21 194 lb (88 kg)     BP Readings from Last 3 Encounters:   07/09/21 138/89   07/07/21 133/87   06/24/21 124/81       Physical Exam        Assessment/Plan:  1. Paresthesia      - [facility] [place]    2. Atypical chest pain        3.  Dysarthria      - [facility]    4. Hypokalemia  Potassium chloride 20 meq tab bid  Ck labs today    - Basic Metabolic Panel; Future    5. Pit[address] microadenoma [address] [address]          6 . Chronic obstructive pulmonary disease, unspecified COPD type ([facility] [address]      7. Tobacco abuse disorder      - nicotine polacrilex (NICORETTE) 2 MG gum; Use up to 6 sticks of gum a day for smoking  Dispense: 110 each;  Refill: 3            7/30/21  Consult Joey Valentino MD, neurology  7/21/2021 [facility] neurolog      A/P  Migraine  Cont to    pimax for now  May wean off in future with gabapentin on board  Medical Decision Making: moderate complexity      Tingling  Unknown cause  Bilateral LE/painful  Bilateral hand aching    EKG  LLE,RUCRISPIN, YUKI  H/o CTS with surgery    Increase  gabapentin  300 mg 4 times a day    Speech abnormality  In speech therapy  Noted neg KEIRY, AIC 5.8  TSH normal    This patient does have abnormal SPEP  Low albumin, low alpha 1 globulin , Gamma gamma globulin    She does have previous h/o bypass surgery for obesity  But has regained weight    Will ask for heme/onc consult for low SPEP

## 2021-07-09 NOTE — LETTER
ASSESSMENT:  1. Panic attack with atypical noncardiac chest wall pain. 2.  Paresthesia. 3.  Chronic migraine with sudden headache. 4.  Hypertension. 5.  Pituitary microadenoma, which is nonsecreting. 6.  COPD. 7.  Tobacco dependence. 8.  Obesity with a BMI of 35.89 kg/m2.     PLAN OF CARE:  The patient is admitted to Observation. Telemetry  monitoring will be continued. Neurology consult requested. Nonnarcotic  treatment for headache will be instituted. Low-dose aspirin will be  continued. The patient's home dose of topiramate and other medications  will be continued. MRI brain will be obtained to evaluate for pituitary apoplexy.   CODE STATUS:  Full.     EXPECTED LENGTH OF STAY:  Less than two midnights based on the plan of  care above.

## 2021-07-11 DIAGNOSIS — J30.9 ALLERGIC RHINITIS, UNSPECIFIED: ICD-10-CM

## 2021-07-12 DIAGNOSIS — E87.6 HYPOKALEMIA: ICD-10-CM

## 2021-07-12 LAB
ANION GAP SERPL CALCULATED.3IONS-SCNC: 13 MMOL/L (ref 3–16)
BUN BLDV-MCNC: 16 MG/DL (ref 7–20)
CALCIUM SERPL-MCNC: 9.4 MG/DL (ref 8.3–10.6)
CHLORIDE BLD-SCNC: 108 MMOL/L (ref 99–110)
CO2: 26 MMOL/L (ref 21–32)
CREAT SERPL-MCNC: 0.8 MG/DL (ref 0.6–1.1)
GFR AFRICAN AMERICAN: >60
GFR NON-AFRICAN AMERICAN: >60
GLUCOSE BLD-MCNC: 89 MG/DL (ref 70–99)
POTASSIUM SERPL-SCNC: 3.6 MMOL/L (ref 3.5–5.1)
SODIUM BLD-SCNC: 147 MMOL/L (ref 136–145)

## 2021-07-12 RX ORDER — FLUTICASONE PROPIONATE 50 MCG
SPRAY, SUSPENSION (ML) NASAL
Qty: 1 BOTTLE | Refills: 5 | Status: SHIPPED | OUTPATIENT
Start: 2021-07-12 | End: 2021-12-05

## 2021-07-13 ENCOUNTER — TELEPHONE (OUTPATIENT)
Dept: PRIMARY CARE CLINIC | Age: 61
End: 2021-07-13

## 2021-07-13 NOTE — TELEPHONE ENCOUNTER
QUITNENI, Patient called & she is having slirred speech, balance is off & not doing well, advised the patient to call 911 immediately

## 2021-07-13 NOTE — PROGRESS NOTES
Physician Progress Note      PATIENT:               Marta Rodriguez  CSN #:                  [id number]  :                       1960  ADMIT DATE:       2021 3:38 PM  [address] [place] DATE:        2021 6:46 PM  RESPONDING  PROVIDER #:        Radha De León CNP          QUERY TEXT:    Dear Deven David, JD,  Pt admitted with Paresthesia, atypical non-cardiac chest wall pain, panic   attack, sudden headache with Hx migraines . Pt noted to have TIA in discharge   summary. If possible, please document in progress notes and discharge summary   if you are evaluating and /or treating any of the following: The medical record reflects the following:  Risk Factors: Hx gastric bypass, anxiety, COPD, HTN chronic migraines,   pituitary microadenoma, current hypokalemia  Clinical Indicators:  ED after waking up with aphasia, difficulty speaking,   developed tingling in her lips numbness in her bilateral lower extremities   and numbness in her right arm that she describes as tingling and tingling in   the back of her neck., K=2.8 and 2.5. Lactic acid=3.1, H and P notes \"she   describes as sudden onset of headaches, chest pain, arm numbness, feet   numbness, feeling extremely anxious and panicking and breathing very fast,   because of which she called EMS\", Admitted for Panic attack with atypical   noncardiac chest wall pain, Paresthesia, Chronic migrain  Treatment: Potassium, IVF, low dose ASA and home dose topiramate continued,   MRI, Neuro consult, monitor labs, started gabapentin, possible EMG if symptoms   persist  Thank you,  Elías Flowers RN, [email or web address]  Options provided:  -- TIA symptoms due to hypokalemia  -- TIA symptoms due to unclear etiology  -- TIA symptoms due to Migraine  -- TIA symptoms due to Brain Neoplasm  -- TIA symptoms due to Anxiety  -- TIA ruled out  -- TIA ruled in  -- Other - I will add my own diagnosis  -- Disagree - Not applicable / Not valid  -- Disagree - Clinically unable to determine / Unknown  -- Refer to Clinical Documentation Reviewer    PROVIDER RESPONSE TEXT:    TIA ruled out. Query created by:  [id number] [place] on 7/12/2021 4:26 PM      Electronically signed by:  Rajesh De León CNP 7/13/2021 7:13 AM

## 2021-07-14 ENCOUNTER — HOSPITAL ENCOUNTER (OUTPATIENT)
Dept: PHYSICAL THERAPY | Age: 61
Setting detail: THERAPIES SERIES
Discharge: HOME | End: 2021-07-14
Payer: COMMERCIAL

## 2021-07-14 PROCEDURE — 97530 THERAPEUTIC ACTIVITIES: CPT

## 2021-07-14 PROCEDURE — 97110 THERAPEUTIC EXERCISES: CPT

## 2021-07-14 PROCEDURE — 97161 PT EVAL LOW COMPLEX 20 MIN: CPT

## 2021-07-14 NOTE — PROGRESS NOTES
[facility]. [address]  Phone: [phone]   Fax:     [phone]          Physical Therapy Certification    Dear Referring Practitioner: Dr. Palak Pacheco,    We had the pleasure of evaluating the following patient for physical therapy services at [facility]. A summary of our findings can be found in the initial assessment below. This includes our plan of care. If you have any questions or concerns regarding these findings, please do not hesitate to contact me at the office phone number checked above. Thank you for the referral.       Physician Signature:_______________________________Date:__________________  By signing above (or electronic signature), therapists plan is approved by physician    Patient: Sharon Loja   : 1960   MRN: [id number]  Referring Physician: Referring Practitioner: Dr. Palak Pacheco      Evaluation Date: 2021      Medical Diagnosis Information:  Diagnosis: R20.2 (ICD-10-CM) - Paresthesia   Treatment Diagnosis: Decreased left UE and LE strength. Pain                                         Insurance information: PT Insurance Information: [employer]    Precautions/ Contra-indications:   Latex Allergy:  [x]NO      []YES  Preferred Language for Healthcare:   [x]English       []other:    C-SSRS Triggered by Intake questionnaire (Past 2 wk assessment ):   [x] No, Questionnaire did not trigger screening.   [] Yes, Patient intake triggered C-SSRS Screening      [] C-SSRS Screening completed  [] PCP notified via Epic     SUBJECTIVE:   Patient stated complaint:Pt states on  she woke up with numbness in her entire body, from her face to the hands and toes. States went to the ER 3x with the episodes and the ER has not been able to find anything.  States she can't walk now and has burning in both feet and lower legs, feels like she is walking on glass. States she gets some burning pain in left upper arm too. States gabapentin relieves pain but if she does not take it then has a lot of pain. States she has double vision at times and gets bad migraines but states she has had a brain tumor since September of last year. She states she is going to see a nuerologist on 7/21/21. Relevant Medical History:Additional Pertinent Hx: HTN, Asthma, Hernia  Functional Outcome Measure: LEFI: 0    Pain Scale: 2-10/10  Easing factors: gabapentin   Provocative factors: movement, standing/walking     Type: [x]Constant   [x]Intermittent  []Radiating []Localized []other:     Numbness/Tingling: B LE and left upper arm    Occupation/School: [employer]     Living Status/Prior Level of Function: Prior to this injury / incident, pt was independent with ADLs and IADLs,     OBJECTIVE:     Palpation:     Functional Mobility/Transfers: Slow and some difficulty with sit to stand transfer    Inspection:     Posture:     Bandages/Dressings/Incisions:     Gait: (include devices/WB status):   Pt ambulated 20 feet in the // bars with CGA. Decreased step length and inconsistent step length.  Pain in feet reported    Dermatomes Normal Abnormal Comments   Top of head (C1)      Posterior occipital region (C2)      Side of neck (C3)      Top of shoulder (C4) x     Lateral deltoid (C5) x     Tip of thumb (C6) x     Distal middle finger (C7) x     Distal fifth finger (C8) x     Medial forearm (T1) x     inguinal area (L1)       anterior mid-thigh (L2) x     distal ant thigh/med knee (L3) x     medial lower leg and foot (L4) x     lateral lower leg and foot (L5) x     posterior calf (S1) x     medial calcaneus (S2) x       *MMT measurements were inconsistent for UE and LE  Myotomes Normal Abnormal Comments   Neck flexion (C1-C2)      Neck sidebending (C3)      Shoulder elevation (C4)  x Weakness on left   Shoulder abduction (C5)      Elbow flexion/wrist extension (C6)      Elbow extension/wrist flexion (C7)      Thumb abduction (C8)      Finger abduction (T1)      Hip flexion (L1-L2)      Knee extension (L2-L4)      Dorsiflexion (L4-L5)      Great Toe Ext (L5)      Ankle Eversion (S1-S2)  x Weakness on both sides-inconsistent   Ankle PF(S1-S2)          Reflexes Normal Abnormal Comments   C5-6 Biceps      C5-6 Brachioradialis      C7-8 Triceps      Romeros      S1-2 Seated achilles      S1-2 Prone knee bend      L3-4 Patellar tendon      Clonus      Babinski           PROM AROM    L R L R                                                                             Joint mobility:    []Normal    []Hypo   []Hyper    Strength (0-5) Left Right                                                                         Flexibility                              Girth (cm)                                      Orthopaedic Special Tests  Positive  Negative  NT Comments                                                                                          [x] Patient history, allergies, meds reviewed. Medical chart reviewed. See intake form. Review Of Systems (ROS):  [x]Performed Review of systems (Integumentary, CardioPulmonary, Neurological) by intake and observation. Intake form has been scanned into medical record. Patient has been instructed to contact their primary care physician regarding ROS issues if not already being addressed at this time.       Co-morbidities/Complexities (which will affect course of rehabilitation):   []None           Arthritic conditions   []Rheumatoid arthritis (M05.9)  []Osteoarthritis (M19.91)   Cardiovascular conditions   []Hypertension (I10)  []Hyperlipidemia (E78.5)  []Angina pectoris (I20)  []Atherosclerosis (I70)   Musculoskeletal conditions   []Disc pathology   []Congenital spine pathologies   []Prior surgical intervention  []Osteoporosis (M81.8)  []Osteopenia (M85.8)   Endocrine conditions   []Hypothyroid (E03.9)  []Hyperthyroid Gastrointestinal conditions []Constipation (L78.27)   Metabolic conditions   []Morbid obesity (E66.01)  []Diabetes type 1(E10.65) or 2 (E11.65)   []Neuropathy (G60.9)     Pulmonary conditions   []Asthma (J45)  []Coughing   []COPD (J44.9)   Psychological Disorders  []Anxiety (F41.9)  []Depression (F32.9)   []Other:   []Other:          Barriers to/and or personal factors that will affect rehab potential:              []Age  []Sex   []Smoker              []Motivation/Lack of Motivation                        []Co-Morbidities              []Cognitive Function, education/learning barriers              []Environmental, home barriers              []profession/work barriers  []past PT/medical experience  []other:  Justification:     Falls Risk Assessment (30 days):   [x] Falls Risk assessed and no intervention required.   [] Falls Risk assessed and Patient requires intervention due to being higher risk   TUG score (>12s at risk):     [] Falls education provided, including         ASSESSMENT:    Functional Impairments:     []Noted  joint hypomobility   []Noted  joint hypermobility   []Decreased functional ROM   [x]Abnormal reflexes/sensation/myotomal/dermatomal deficits   xDecreased strength and neuromuscular control    [x]Decreased functional strength   []other:      Functional Activity Limitations (from functional questionnaire and intake)   [x]Reduced ability to tolerate prolonged functional positions   [x]Reduced ability or difficulty with changes of positions or transfers between positions   [x]Reduced ability to maintain good posture and demonstrate good body mechanics with sitting, bending, and lifting   [x] Reduced ability or tolerance with driving and/or computer work   [x]Reduced ability to perform lifting, reaching, carrying tasks   []Reduced ability to concentrate   []Reduced ability to sleep    [x]Reduced ability to tolerate any impact through    [x]Reduced ability to ambulate prolonged functional periods/distances   []other:    Participation Restrictions   [x]Reduced participation in self care activities   [x]Reduced participation in home management activities   [x]Reduced participation in work activities   [x]Reduced participation in social activities. [x]Reduced participation in sport/recreational activities. Classification/Subgrouping:   []signs/symptoms consistent with     Prognosis/Rehab Potential:      []Excellent   []Good    [x]Fair   []Poor    Tolerance of evaluation/treatment:    []Excellent   []Good    [x]Fair   []Poor    Physical Therapy Evaluation Complexity Justification  [x] A history of present problem with:  [] no personal factors and/or comorbidities that impact the plan of care;  [x]1-2 personal factors and/or comorbidities that impact the plan of care  []3 personal factors and/or comorbidities that impact the plan of care  [x] An examination of body systems using standardized tests and measures addressing any of the following: body structures and functions (impairments), activity limitations, and/or participation restrictions;:  [] a total of 1-2 or more elements   [x] a total of 3 or more elements   [] a total of 4 or more elements   [x] A clinical presentation with:  [] stable and/or uncomplicated characteristics   [] evolving clinical presentation with changing characteristics  [x] unstable and unpredictable characteristics;   [x] Clinical decision making of [x] low, [] moderate, [] high complexity using standardized patient assessment instrument and/or measurable assessment of functional outcome. [x] EVAL (LOW) 50104 (typically 20 minutes face-to-face)  [] EVAL (MOD) 30704 (typically 30 minutes face-to-face)  [] EVAL (HIGH) 77003 (typically 45 minutes face-to-face)  [] RE-EVAL     PLAN:   Frequency/Duration:  2 days per week for 6 Weeks:  Interventions:  [x]  Therapeutic exercise including: strength training, ROM, including postural re-education.    [x]  NMR activation and proprioception, including postural re-education. [x]  Manual therapy as indicated to include: Dry Needling/IASTM, STM, PROM, Gr I-IV mobilizations, manipulation. [x] Modalities as needed that may include: thermal agents, E-stim, Biofeedback, US, iontophoresis as indicated  [x] Patient education on joint protection, postural re-education, activity modification, progression of HEP. HEP instruction: Written HEP instructions provided and reviewed    GOALS:  Patient stated goal: to return to full functioning and mobility in the body    Therapist goals for Patient:   Short Term Goals: To be achieved in: 2 weeks  1. Independent in HEP and progression per patient tolerance, in order to prevent re-injury. 2. Patient will have a decrease in pain to facilitate improvement in movement, function, and ADLs as indicated by Functional Deficits. Long Term Goals: To be achieved in: 6 weeks  1. Disability index score of 25% or less for the (functional outcomes test) to assist with reaching prior level of function. 2. Patient will demonstrate increased AROM to [facility] to allow for proper joint functioning as indicated by patients Functional Deficits. 3. Patient will demonstrate an increase in postural awareness and control to allow for proper functional mobility as indicated by patients Functional Deficits. 4. Patient will demonstrate an increase in Strength to at least 4+/5 in B LE to allow for proper functional mobility as indicated by patients Functional Deficits. 5. Patient will return to functional activities including work and daily activities without increased symptoms or restriction.    6. Pt will report normal use of body so she can complete normal daily activities like exercises and work (patient specific functional goal)       Electronically signed by:  Clark Vallejo PT

## 2021-07-14 NOTE — FLOWSHEET NOTE
[facility]. [address] [place] [address]  Phone: [phone]   Fax:     [phone]    Physical Therapy Treatment Note/ Progress Report:   Date:  2021    Patient Name:  Alonso Whitfield    :  1960  MRN: [id number]    Pertinent Medical History:   HTN, Asthma, Hernia    Medical/Treatment Diagnosis Information:  · Diagnosis: R20.2 (ICD-10-CM) - Paresthesia  · Treatment Diagnosis: Decreased left UE and LE strength. Pain    Insurance/Certification information:  PT Insurance Information: [place]  Physician Information:  Referring Practitioner: Dr. Wilberto Carrington of care signed (Y/N): []  Yes [x]  No     Date of Patient follow up with Physician:      Progress Report: []  Yes  [x]  No     Date Range for reporting period:  Beginnin2021  Ending:     Progress report due (10 Rx/or 30 days whichever is less):      Recertification due (POC duration/ or 90 days whichever is less):      Visit # Insurance Allowable Auth required? Date Range   1 12 [x]  Yes [x]  No      Latex Allergy:  [x]NO      []YES  Preferred Language for Healthcare:   [x]English       []other:    Functional Scale:      Date assessed: at eval  Test:  Score:    Pain level:  0-10/10     History of Injury:  Patient stated complaint:Pt states on  she woke up with numbness in her entire body, from her face to the hands and toes. States went to the ER 3x with the episodes and the ER has not been able to find anything. States she can't walk now and has burning in both feet and lower legs, feels like she is walking on glass. States she gets some burning pain in left upper arm too. States gabapentin relieves pain but if she does not take it then has a lot of pain. States she has double vision at times and gets bad migraines but states she has had a brain tumor since September of last year.  She states she is going to see a nuerologist on 7/21/21. SUBJECTIVE:  See eval    OBJECTIVE: See eval    Plan:   Gait  LE strengthening training and transfers      RESTRICTIONS/PRECAUTIONS: Neuropathy and LE weakness affecting gait and balance    Exercises/Interventions:     Therapeutic Exercises (78074) Min: Resistance/Reps Notes/Cues   Nu Step     // [place] Gait  HR/TR  Mini Squat    LAQ                                   Therapeutic Activities (53455) Min:                              Neuromuscular Re-ed (01941) Min:                                   Manual Intervention (62082) Min:                Modalities  Min:               HEP     Seated; LAQ, Marching, HR/TR  7/14   Standing Mini Squats, Hip Abd, HR/TR Hold for now           Other Therapeutic Activities: Pt was educated on PT POC, Diagnosis, Prognosis, pathomechanics as well as frequency and duration of scheduling future physical therapy appointments. Time was also taken on this day to answer all patient questions and participation in PT. Reviewed appointment policy in detail with patient and patient verbalized understanding. Home Exercise Program:   Patient was instructed in the following for HEP: see above     . Patient verbalized/demonstrated understanding and was issued written handout. Therapeutic Exercise and NMR EXR  [] (34684) Provided verbal/tactile cueing for activities related to strengthening, flexibility, endurance, ROM for improvements in LE, proximal hip, and core control with self care, mobility, lifting, ambulation.  [] (09068) Provided verbal/tactile cueing for activities related to improving balance, coordination, kinesthetic sense, posture, motor skill, proprioception  to assist with LE, proximal hip, and core control in self care, mobility, lifting, ambulation and eccentric single leg control.  [address] and Therapeutic Activities:    [] (51345 or ) Provided verbal/tactile cueing for activities related to improving balance, coordination, kinesthetic sense, (53534) x  [x] TA (63179) x     [] Mech Traction (45796)  [] ES(attended) (35980)     [] ES (un) (80758):   [] Vasopump (81981) [] Ionto (70847)   [] Other:    GOALS:  Therapist goals for Patient:   Short Term Goals: To be achieved in: 2 weeks  1. Independent in HEP and progression per patient tolerance, in order to prevent re-injury. 2. Patient will have a decrease in pain to facilitate improvement in movement, function, and ADLs as indicated by Functional Deficits.     Long Term Goals: To be achieved in: 6 weeks  1. Disability index score of 25% or less for the (functional outcomes test) to assist with reaching prior level of function. 2. Patient will demonstrate increased AROM to [facility] to allow for proper joint functioning as indicated by patients Functional Deficits. 3. Patient will demonstrate an increase in postural awareness and control to allow for proper functional mobility as indicated by patients Functional Deficits. 4. Patient will demonstrate an increase in Strength to at least 4+/5 in B LE to allow for proper functional mobility as indicated by patients Functional Deficits. 5. Patient will return to functional activities including work and daily activities without increased symptoms or restriction. 6. Pt will report normal use of body so she can complete normal daily activities like exercises and work (patient specific functional goal)            (cut and paste from eval)    ASSESSMENT:  See eval    Treatment/Activity Tolerance:  [x] Patient tolerated treatment well [] Patient limited by fatique  [] Patient limited by pain  [] Patient limited by other medical complications  [] Other:     Overall Progression Towards Functional goals/ Treatment Progress Update:  [] Patient is progressing as expected towards functional goals listed. [] Progression is slowed due to complexities/Impairments listed. [] Progression has been slowed due to co-morbidities.   [x] Plan just implemented, too soon to assess goals progression <30days   [] Goals require adjustment due to lack of progress  [] Patient is not progressing as expected and requires additional follow up with physician  [] Other    Prognosis for POC: [x] Good [] Fair  [] Poor    Patient requires continued skilled intervention: [x] Yes  [] No        PLAN:   [] Continue per plan of care [] Alter current plan (see comments)  [x] Plan of care initiated [] Hold pending MD visit [] Discharge    Electronically signed by: Keny Brandon PT    Note: If patient does not return for scheduled/recommended follow up visits, this note will serve as a discharge from care along with the most recent update on progress.

## 2021-07-15 ENCOUNTER — TELEPHONE (OUTPATIENT)
Dept: PRIMARY CARE CLINIC | Age: 61
End: 2021-07-15

## 2021-07-15 DIAGNOSIS — R20.2 PARESTHESIA AND PAIN OF BOTH UPPER EXTREMITIES: Primary | ICD-10-CM

## 2021-07-15 DIAGNOSIS — M79.602 PARESTHESIA AND PAIN OF BOTH UPPER EXTREMITIES: Primary | ICD-10-CM

## 2021-07-15 DIAGNOSIS — M79.601 PARESTHESIA AND PAIN OF BOTH UPPER EXTREMITIES: Primary | ICD-10-CM

## 2021-07-15 DIAGNOSIS — R53.1 WEAKNESS: ICD-10-CM

## 2021-07-15 NOTE — TELEPHONE ENCOUNTER
Patient states taht she needs a prescription for a rolling walker with a seat sent to [facility]

## 2021-07-20 ENCOUNTER — HOSPITAL ENCOUNTER (OUTPATIENT)
Dept: PHYSICAL THERAPY | Age: 61
Setting detail: THERAPIES SERIES
Discharge: HOME | End: 2021-07-20
Payer: COMMERCIAL

## 2021-07-20 ENCOUNTER — HOSPITAL ENCOUNTER (OUTPATIENT)
Dept: SPEECH THERAPY | Age: 61
Setting detail: THERAPIES SERIES
Discharge: HOME | End: 2021-07-20
Payer: COMMERCIAL

## 2021-07-20 DIAGNOSIS — R47.1 DYSARTHRIA: Primary | ICD-10-CM

## 2021-07-20 PROCEDURE — 92523 SPEECH SOUND LANG COMPREHEN: CPT

## 2021-07-20 PROCEDURE — 97530 THERAPEUTIC ACTIVITIES: CPT

## 2021-07-20 PROCEDURE — 97116 GAIT TRAINING THERAPY: CPT

## 2021-07-20 PROCEDURE — 97110 THERAPEUTIC EXERCISES: CPT

## 2021-07-20 NOTE — PROGRESS NOTES
Speech Language Pathology  Facility/Department: [facility]  Initial Assessment    NAME: Ellis Spurling  : 1960  MRN: [id number]    Date of Eval: 2021  Evaluating Therapist: Gisselle Del Toro, SLP    Visit Diagnoses       Codes    Dysarthria    -  Primary R47.1        Past Medical History: has a past medical history of Allergic rhinitis, Anxiety, Arthritis, Asthma, Asthma-chronic obstructive pulmonary disease overlap syndrome ([facility] [address] Bilateral carpal tunnel syndrome, Depression, Dysmenorrhea, Fibroid, GERD (gastroesophageal reflux disease), Headache(784.0), Hyperlipidemia, Hypertension, S/P gastric bypass, Status post coronary angiogram, and Wears glasses. Past Surgical History:  has a past surgical history that includes  section; Hysterectomy; Breast surgery; Colonoscopy; Carpal tunnel release (Left, 2016); Carpal tunnel release (Right, 10/06/2016); and Salivary gland surgery (Left, 2021). HPI:Pt states on  she woke up with numbness in her entire body, from her face to the hands and toes. She states she is going to see a neurologist on 21. Primary Complaint: abrupt onset speech difficulty, \"I can't get the words out,\" inability to clearly speak. Pt reports speech is occasionally clear, but nobody can understand her and she can't return to work    Pain: denies  Assessment:  Speech Diagnosis: Variable mild to moderate motor speech impairment with dysarthric and dysfluent features, characterized by occasionally rushed strings of words, variable articulatory precision, mildly impaired onset/initiation of phrases and sentences. · Pt reports abrupt onset ~3 weeks ago, and works as a . Pt desires to return to work but her current inability to fluently communicate impairs her ability to effectively perform her job duties. · Auditory comprehension, verbal expression and basic cognition appear intact.     · ST will initiate therapy 1-2x per week to address dysarthria and motor speech.      Subjective:   Previous level of function and limitations:   IND at home, works as  at [employer]  Chart Reviewed: Yes  Family / Caregiver Present: No  Visit Information  SLP Insurance Information: R  Total # of Visits Approved: 30     Vision  Vision: Impaired  Vision Exceptions: Wears glasses for reading  Hearing  Hearing: Within functional limits     Objective:     Oral/Motor  Oral Motor: Exceptions to [employer] [email or web address] [place]  Labial ROM:  (inconsistent bilaterally; reduced motor planning)    Auditory Comprehension  Comprehension: Within Functional Limits     Verbal Expression  Verbal Expression: Within functional limits     Motor Speech  Motor Speech: Exceptions to [employer] [facility] [place]  Intelligibility: Moderate (inconsistent trace to moderate across word to sentence level)  Dysarthria : Moderate (moderate dysfluencies; occasionally rushed word strings; variable articulatory precision; mildly impaired/delayed onset)    Pragmatics/Social Functioning  Pragmatics: Within functional limits     Cognition  Orientation  Overall Orientation Status: Within Functional Limits  Memory  Memory: Within Funtional Limits (pt reports sometimes forgetting appointments)    Plan:    Goals:   Short-term Goals  Timeframe for Short-term Goals: 2-3 weeks  Goal 1: Pt will execute speech strategies in structured simple sentences with a model in >80% of trials  Goal 2: Pt will improve fluent clear speech at simple sentence level in structured conversation in 70% of trials with mod verbal cues  Long-term Goals  Timeframe for Long-term Goals: 6 weeks  Goal 1: Pt will improve speech intelligibility and fluency at simple conversation level to occasionally impaired with occasional cues for strategies    Speech Therapy Prognosis  Prognosis: Good  Prognosis Considerations: Age, Previous Level of Function  Duration/Frequency of Treatment  Duration/Frequency of Treatment: 1-2x/wk x6 wks    Recommendations  Requires SLP Intervention: Yes  Patient Education: goals, POC  Patient Education Response: Verbalizes understanding  Patient/family involved in developing goals and treatment plan: yes          Follow Up:   Follow up in:1 week       Terrance Franco MS, CCC-SLP #[id number]  Speech Language Pathologist

## 2021-07-20 NOTE — PROGRESS NOTES
Outpatient Speech Therapy  [] [facility] [address]IZOL RAGSDALE   Phone: [phone]   Fax: [phone]   [x] [facility]  Phone: [phone]              Fax: [phone]  [] [facility]   Phone: [phone]   Fax: [phone]     To: Reina Daniels, APRN-CNP      Patient: Bassem Little  : 1960  MRN: [id number]  Evaluation Date: 2021      Diagnosis Information: dysarthria R47.1            Speech Therapy Certification Form  Dear Reina Daniels: The following patient has been evaluated for speech therapy services and for therapy to continue, Medicare requires monthly physician review of the treatment plan. Please review the attached evaluation and/or summary of the patient's plan of care, and verify that you agree therapy should continue by signing the attached document and sending it back to our office. Plan of Care/Treatment to date:  [x] Speech-Language Evaluation/Treatment    [] Dysphagia Evaluation/Treatment        [] Dysphagia Treatment via Neuromuscular Electrical Stimulation (NMES)   [] Modified Barium Swallowing Study   [] Cognitive-Linguistic Skills Development  [] Voice evaluation and Treatment      [] Evaluation, modification, and Training of Voice Prosthetic     [] Evaluation for Speech-Generating Augmentative and Alternative Communication Device   [] Therapeutic Services for the use of Speech-Generating Device. [] Other:          Frequency/Duration:  # Days per week: [x] 1 day # Weeks: [] 1 week [] 5 weeks      [x] 2 days? [] 2 weeks [x] 6 weeks     [] 3 days   [] 3 weeks [] 7 weeks     [] 4 days   [] 4 weeks [] 8 weeks    Rehab Potential: [] Excellent [x] Good [] Fair  [] Poor       Electronically signed by:   Goldy Weber MS, CCC-SLP #[id number]  Speech Language Pathologist      If you have any questions or concerns, please don't hesitate to call.   Thank you for your referral.      Physician Signature:________________________________Date:__________________  By signing above, therapists plan is approved by physician

## 2021-07-20 NOTE — FLOWSHEET NOTE
[address] [facility] [address]  Phone: [phone]   Fax:     [phone]    Physical Therapy Treatment Note/ Progress Report:   Date:  2021    Patient Name:  Paula Gutierrez    :  1960  MRN: [id number]    Pertinent Medical History:   HTN, Asthma, Hernia    Medical/Treatment Diagnosis Information:  · Diagnosis: R20.2 (ICD-10-CM) - Paresthesia  · Treatment Diagnosis: Decreased left UE and LE strength. Pain    Insurance/Certification information:  PT Insurance Information: [place]- [place]  Physician Information:  Referring Practitioner: Dr. Thomas Mccartney of care signed (Y/N): []  Yes [x]  No     Date of Patient follow up with Physician:      Progress Report: []  Yes  [x]  No     Date Range for reporting period:  Beginnin2021  Ending:     Progress report due (10 Rx/or 30 days whichever is less):      Recertification due (POC duration/ or 90 days whichever is less):      Visit # Insurance Allowable Auth required? Date Range   2 12 [x]  Yes [x]  No      Latex Allergy:  [x]NO      []YES  Preferred Language for Healthcare:   [x]English       []other:    Functional Scale:      Date assessed: at eval  Test:  Score:    Pain level:  0-10/10     History of Injury:  Patient stated complaint:Pt states on  she woke up with numbness in her entire body, from her face to the hands and toes. States went to the ER 3x with the episodes and the ER has not been able to find anything. States she can't walk now and has burning in both feet and lower legs, feels like she is walking on glass. States she gets some burning pain in left upper arm too. States gabapentin relieves pain but if she does not take it then has a lot of pain. States she has double vision at times and gets bad migraines but states she has had a brain tumor since September of last year.  She states she is going to see a nuerologist on 7/21/21. SUBJECTIVE:    7/20/21: Pt states she is doing a little better, has been working hard    OBJECTIVE: See eval      Plan:   Gait  LE strengthening training and transfers      RESTRICTIONS/PRECAUTIONS: Neuropathy and LE weakness affecting gait and balance    Exercises/Interventions:     Therapeutic Exercises (03110) Min: Resistance/Reps Notes/Cues   Nu Step X 6 min, level 4    // Bars Gait with RW x 30 feet, x 2 laps in // bars with UE support and CGA, rest in between  HR/TR x 10  Mini Squat 2 x 10  // bars   LAQ X 10 B                                  Therapeutic Activities (08898) Min:                              Neuromuscular Re-ed (40149) Min:                                   Manual Intervention (46637) Min:                Modalities  Min:               HEP     Seated; LAQ, Marching, HR/TR  7/14   Standing Mini Squats, Hip Abd, HR/TR Hold for now           Other Therapeutic Activities: Pt was educated on PT POC, Diagnosis, Prognosis, pathomechanics as well as frequency and duration of scheduling future physical therapy appointments. Time was also taken on this day to answer all patient questions and participation in PT. Reviewed appointment policy in detail with patient and patient verbalized understanding. Home Exercise Program:   Patient was instructed in the following for HEP: see above     . Patient verbalized/demonstrated understanding and was issued written handout.       Therapeutic Exercise and NMR EXR  [] (61650) Provided verbal/tactile cueing for activities related to strengthening, flexibility, endurance, ROM for improvements in LE, proximal hip, and core control with self care, mobility, lifting, ambulation.  [] (32057) Provided verbal/tactile cueing for activities related to improving balance, coordination, kinesthetic sense, posture, motor skill, proprioception  to assist with LE, proximal hip, and core control in self care, mobility, lifting, ambulation and eccentric single leg control. [address] and Therapeutic Activities:    [] (35710 or 49975) Provided verbal/tactile cueing for activities related to improving balance, coordination, kinesthetic sense, posture, motor skill, proprioception and motor activation to allow for proper function of core, proximal hip and LE with self care and ADLs and functional mobility.   [] (43065) Gait Re-education- Provided training and instruction to the patient for proper LE, core and proximal hip recruitment and positioning and eccentric body weight control with ambulation re-education including up and down stairs     Home Exercise Program:    [x] (44610) Reviewed/Progressed HEP activities related to strengthening, flexibility, endurance, ROM of core, proximal hip and LE for functional self-care, mobility, lifting and ambulation/stair navigation   [] (33939)Reviewed/Progressed HEP activities related to improving balance, coordination, kinesthetic sense, posture, motor skill, proprioception of core, proximal hip and LE for self care, mobility, lifting, and ambulation/stair navigation      Manual Treatments:  PROM / STM / Oscillations-Mobs:  G-I, II, III, IV (PA's, Inf., Post.)  [] (80557) Provided manual therapy to mobilize LE, proximal hip and/or LS spine soft tissue/joints for the purpose of modulating pain, promoting relaxation,  increasing ROM, reducing/eliminating soft tissue swelling/inflammation/restriction, improving soft tissue extensibility and allowing for proper ROM for normal function with self care, mobility, lifting and ambulation.      If [facility] Please Indicate Time In/Out  CPT Code Time in Time out                         Approval Dates:  CPT Code Units Approved Units Used  Date Updated:                     Charges:  Timed Code Treatment Minutes: 50   Total Treatment Minutes: 55      [] EVAL (LOW) 64110 (typically 20 minutes face-to-face)  [] EVAL (MOD) 02805 (typically 30 minutes face-to-face)  [] EVAL (HIGH) 85701 (typically 45 minutes face-to-face)  [] RE-EVAL     [x] OZ(67831) x     [] Dry needle 1 or 2 Muscles (44046)  [] NMR (59901) x     [] Dry needle 3+ Muscles (33838)  [] Manual (44869) x     [] Ultrasound (71764) x  [x] TA (14724) x     [] Mech Traction (04739)  [] ES(attended) (35532)     [] ES (un) (60056):   [] Vasopump (86710)  [] Ionto (71790)   [x] Other:gait    GOALS:  Therapist goals for Patient:   Short Term Goals: To be achieved in: 2 weeks  1. Independent in HEP and progression per patient tolerance, in order to prevent re-injury. 2. Patient will have a decrease in pain to facilitate improvement in movement, function, and ADLs as indicated by Functional Deficits.     Long Term Goals: To be achieved in: 6 weeks  1. Disability index score of 25% or less for the (functional outcomes test) to assist with reaching prior level of function. 2. Patient will demonstrate increased AROM to [facility] to allow for proper joint functioning as indicated by patients Functional Deficits. 3. Patient will demonstrate an increase in postural awareness and control to allow for proper functional mobility as indicated by patients Functional Deficits. 4. Patient will demonstrate an increase in Strength to at least 4+/5 in B LE to allow for proper functional mobility as indicated by patients Functional Deficits. 5. Patient will return to functional activities including work and daily activities without increased symptoms or restriction.    6. Pt will report normal use of body so she can complete normal daily activities like exercises and work (patient specific functional goal)            (cut and paste from eval)    ASSESSMENT:  See eval    Treatment/Activity Tolerance:  [x] Patient tolerated treatment well [] Patient limited by fatique  [] Patient limited by pain  [] Patient limited by other medical complications  [] Other:     Overall Progression Towards Functional goals/ Treatment Progress Update:  [] Patient is progressing as expected towards functional goals listed. [] Progression is slowed due to complexities/Impairments listed. [] Progression has been slowed due to co-morbidities. [x] Plan just implemented, too soon to assess goals progression <30days   [] Goals require adjustment due to lack of progress  [] Patient is not progressing as expected and requires additional follow up with physician  [] Other    Prognosis for POC: [x] Good [] Fair  [] Poor    Patient requires continued skilled intervention: [x] Yes  [] No        PLAN:   [] Continue per plan of care [] Alter current plan (see comments)  [x] Plan of care initiated [] Hold pending MD visit [] Discharge    Electronically signed by: Soniya Jackman PT    Note: If patient does not return for scheduled/recommended follow up visits, this note will serve as a discharge from care along with the most recent update on progress.

## 2021-07-21 NOTE — PROGRESS NOTES
Regis Means   1960, 64 y.o. female   [id number]       Referring Provider: Michael Dickerson MD  Referral Type: In an order in [address] [place] [address]    Reason for Visit: Evaluation of suspected change in hearing, tinnitus, or balance. ADULT AUDIOLOGIC EVALUATION      Regis Means is a 64 y.o. female seen today, 7/22/2021 , for an initial audiologic evaluation. Patient was seen by Michael Dickerson MD following today's evaluation. AUDIOLOGIC AND OTHER PERTINENT MEDICAL HISTORY:      Regis Means noted otalgia and tinnitus. Patient reports left sided otalgia. Patient reports bilateral tinnitus that is constant in nature. Regis Means denied aural fullness, otorrhea, dizziness, imbalance, history of falls, history of significant noise exposure, history of head trauma, history of ear surgery and family history of hearing loss. Date: 7/22/2021     IMPRESSIONS:      AU: Hearing WNL sloping to Mild SNHL, Excellent WRS, Type A tymp    Hearing loss consistent with SNHL. Hearing loss significant enough to create hearing difficulty in most listening situations . Discussed hearing loss, tinnitus, and hearing aids with patient. Patient to follow medical recommendations per Michael Dickerson MD .    ASSESSMENT AND FINDINGS:     Otoscopy revealed: Clear ear canals bilaterally    RIGHT EAR:  Hearing Sensitivity: Normal hearing sensitivity to Mild sensorineural hearing loss. Speech Recognition Threshold: 20 dB HL  Word Recognition: Excellent (%), based on NU-6  25-word list at 55 dBHL using recorded speech stimuli. Tympanometry: Normal peak pressure and compliance, Type A tympanogram, consistent with normal middle ear function. Acoustic Reflexes: Ipsilateral: Did not test. Contralateral: Did not test.    LEFT EAR:  Hearing Sensitivity:  Normal hearing sensitivity to Mild sensorineural hearing loss.   Speech Recognition Threshold: 20 dB HL  Word Recognition: Excellent (%), based on NU-6 25-word list at 55 dBHL using recorded speech stimuli. Tympanometry: Normal peak pressure and compliance, Type A tympanogram, consistent with normal middle ear function. Acoustic Reflexes: Ipsilateral: Did not test. Contralateral: Did not test.    Reliability: Good  Transducer: Inserts    See scanned audiogram dated 7/22/2021  for results. PATIENT EDUCATION:       The following items were discussed with the patient:    - Good Communication Strategies  - Hearing Loss and Hearing Aids   - Tinnitus Management Strategies    Educational information was shared in the After Visit Summary. RECOMMENDATIONS:                                                                                                                                                                                                                                                            The following items are recommended based on patient report and results from today's appointment:   - Continue medical follow-up with Armida Lilly MD.   - Retest hearing as medically indicated and/or sooner if a change in hearing is noted. - If desired, schedule a Hearing Aid Evaluation (HAE) appointment to discuss hearing aid options  - Utilize \"Good Communication Strategies\" as discussed to assist in speech understanding with communication partners. - Maintain a sound enriched environment to assist in the management of tinnitus symptoms.          Betty Apodaca  Audiologist    Chart CC'd to: Armida Lilly MD      Degree of   Hearing Sensitivity dB Range   Within Normal Limits (WNL) 0 - 20   Mild 20 - 40   Moderate 40 - 55   Moderately-Severe 55 - 70   Severe 70 - 90   Profound 90 +

## 2021-07-22 ENCOUNTER — OFFICE VISIT (OUTPATIENT)
Dept: ENT CLINIC | Age: 61
End: 2021-07-22
Payer: COMMERCIAL

## 2021-07-22 ENCOUNTER — PROCEDURE VISIT (OUTPATIENT)
Dept: AUDIOLOGY | Age: 61
End: 2021-07-22
Payer: COMMERCIAL

## 2021-07-22 VITALS — BODY MASS INDEX: 35.85 KG/M2 | WEIGHT: 196 LBS

## 2021-07-22 DIAGNOSIS — H90.3 SENSORINEURAL HEARING LOSS (SNHL) OF BOTH EARS: Primary | ICD-10-CM

## 2021-07-22 DIAGNOSIS — H93.13 TINNITUS OF BOTH EARS: ICD-10-CM

## 2021-07-22 DIAGNOSIS — H91.90 HEARING LOSS, UNSPECIFIED HEARING LOSS TYPE, UNSPECIFIED LATERALITY: Primary | ICD-10-CM

## 2021-07-22 PROCEDURE — 99213 OFFICE O/P EST LOW 20 MIN: CPT | Performed by: OTOLARYNGOLOGY

## 2021-07-22 PROCEDURE — 92567 TYMPANOMETRY: CPT | Performed by: AUDIOLOGIST

## 2021-07-22 PROCEDURE — 92557 COMPREHENSIVE HEARING TEST: CPT | Performed by: AUDIOLOGIST

## 2021-07-22 NOTE — PATIENT INSTRUCTIONS
Good Communication Strategies    Communication can be challenging for anyone, but can be especially difficult for those with some degree of hearing loss. While we may not be able to control every factor that may lead to difficulty with communication, there are Good Communication Strategies that we can all use in our day-to-day lives. Communication takes both parties working together for it to be successful. Tips as a Listener:   1. Control your environment. It is important to limit the amount of background noise in the room when possible. You should also consider having a good light source in the room to best see the other person. 2. Ask for clarification. Instead of saying \"What?\", you can use parts of what you heard to make a new question. For example, if you heard the word \"Thursday\" but not the rest of the week, you may ask \"What was that about Thursday? \" or \"What did you want to do Thursday? \". This shows the person talking that you are listening and will help them better explain what they are saying. 3. Be an advocate for yourself. If you are hearing but not understanding, tell the other person \"I can hear you, but I need you to slow down when you speak. \"  Or if someone is facing the other direction, say \"I cannot hear you when you are not looking at me when we talk. \"       Tips as a Talker:   - Sit or stand 3 to 6 feet away to maximize audibility         -- It is unrealistic to believe someone else will fully hear your message if you are speaking from across the room or in a different room in the house   - Stay at eye level to help with visual cues   - Make sure you have the persons attention before speaking   - Use facial expressions and gestures to accentuate your message   - Raise your voice slightly (do not scream)   - Speak slowly and distinctly   - Use short, simple sentences   - Rephrase your words if the person is having a hard time understanding you    - To avoid distortion, dont speak directly into a persons ear      Some additional items that may be helpful:   - Remain patient - this is important for both parties   - Write down items that still cannot be heard/understood. You may write with pen/paper or consider typing/texting on a cell phone or smart device. - If background noise is unavoidable, try to keep yourself in a good position in the room. By sitting at a linton on the side of the restaurant (preferably a corner), it will be easier to communicate than if you were sitting at a table in the middle with background noise surrounding you. Keep yourself positioned away from music speakers or heavy foot traffic. Hearing Loss: Care Instructions  Your Care Instructions      Hearing loss is a sudden or slow decrease in how well you hear. It can range from mild to profound. Permanent hearing loss can occur with aging, and it can happen when you are exposed long-term to loud noise. Examples include listening to loud music, riding motorcycles, or being around other loud machines. Hearing loss can affect your work and home life. It can make you feel lonely or depressed. You may feel that you have lost your independence. But hearing aids and other devices can help you hear better and feel connected to others. Follow-up care is a key part of your treatment and safety. Be sure to make and go to all appointments, and call your doctor if you are having problems. It's also a good idea to know your test results and keep a list of the medicines you take. How can you care for yourself at home? · Avoid loud noises whenever possible. This helps keep your hearing from getting worse. Always wear hearing protection around loud noises. · If appropriate, wear hearing aid(s) as directed. It is recommended that hearing aids are worn during all waking hours to keep your brain active and give it access to the sounds it is missing.       · If you are beginning your process with hearing aid(s), schedule a \"Hearing Aid Evaluation\" with an audiologist to discuss your lifestyle, features of hearing aid technology, and styles of hearing aids available. It is recommended that you contact your insurance company to determine if you have a hearing aid benefit, as this may dictate who you can see for these services. · Have hearing tests as your doctor suggests. They can show whether your hearing has changed. Your hearing aid may need to be adjusted. · Use other assistive devices as needed. These may include:  ? Telephone amplifiers and hearing aids that can connect to a television, stereo, radio, or microphone. ? Devices that use lights or vibrations. These alert you to the doorbell, a ringing telephone, or a baby monitor. ? Television closed-captioning. This shows the words at the bottom of the screen. Most new TVs can do this. ? TTY (text telephone). This lets you type messages back and forth on the telephone instead of talking or listening. These devices are also called TDD. When messages are typed on the keyboard, they are sent over the phone line to a receiving TTY. The message is shown on a monitor. · Use pagers, fax machines, text, and email if it is hard for you to communicate by telephone. · Try to learn a listening technique called speech-reading. It is not lip-reading. You pay attention to people's gestures, expressions, posture, and tone of voice. These clues can help you understand what a person is saying. Face the person you are talking to, and have him or her face you. Make sure the lighting is good. You need to see the other person's face clearly. · Think about counseling if you need help to adjust to your hearing loss. When should you call for help? Watch closely for changes in your health, and be sure to contact your doctor if:    · You think your hearing is getting worse. · You have new symptoms, such as dizziness or nausea.            Learning About Hearing Aids  What is a hearing covers hearing aids. They can be expensive. Different types of hearing aids come with different costs. Also find out about a warranty or return policy in case you are not happy with your hearing aids. Follow-up care is a key part of your treatment and safety. Be sure to make and go to all appointments, and call your doctor if you are having problems. It's also a good idea to know your test results and keep a list of the medicines you take. Where can you learn more? Go to [email or web address] and sign in to your [facility] account. Enter W209 in the RedShelf box to learn more about \"Learning About Hearing Aids. \"     If you do not have an account, please click on the \"Sign Up Now\" link. Current as of: October 21, 2018  Content Version: 12.1  © 4385-9690 Healthwise, Incorporated. Care instructions adapted under license by [facility] [place] ([facility]). If you have questions about a medical condition or this instruction, always ask your healthcare professional. [place] [address] any warranty or liability for your use of this information.

## 2021-07-22 NOTE — Clinical Note
Dr. Zenda Lanes,    Please see note from this patient's audiogram from today. Please let me know if there is anything further you need.         Betty Marrero  Audiologist

## 2021-07-22 NOTE — PROGRESS NOTES
[address] [facility]  Follow up      Patient Name: Kira [facility] Record Number:  [id number]  Primary Care Physician:  Keerthi Leonard MD  Date of Consultation: 7/22/2021    Chief Complaint: Ringing in the ear        Interval History  Patient is following up for ringing in the ears. She had an audiogram today. Still has the ringing in the ears. Since I saw her she had an MRI for a pituitary microadenoma that was being followed. She says that something happened in the MRI and she has been having issues since then. She has slurring of the speech and numbness of the feet. It appears as though she is seeing neurology here as well as possibly [facility]. Unclear of the exact diagnosis. REVIEW OF SYSTEMS  As above  PHYSICAL EXAM  GENERAL: Very slow speech, can still understand  EYES: EOMI, Anti-icteric  HENT:   Head: Normocephalic and atraumatic. Face:  Symmetric, facial nerve intact, no sinus tenderness  Right Ear: Normal external ear, normal external auditory canal, intact tympanic membrane with normal mobility and aerated middle ear  Left Ear: Normal external ear, normal external auditory canal, intact tympanic membrane with normal mobility and aerated middle ear  Mouth/Oral Cavity: Tongue movements normal.  Right floor mouth is healed well      Patient had an audiogram that shows mild bilateral high-frequency sensorineural hearing loss        ASSESSMENT/PLAN  1. Sensorineural hearing loss (SNHL) of both ears  Probably early age-related versus noise exposure. Follow-up in 2 years with a hearing test    2. Tinnitus of both ears  Secondary the hearing loss. Patient has a lot of new issues since I last saw her. She is following up with neurology. Unclear diagnosis. Possibly related to migraines versus a conversion disorder. I am happy to help if she develops any ENT specific problems related to this.              I have performed a head and neck physical exam personally or was physically present during the key or critical portions of the service. This note was generated completely or in part utilizing Dragon dictation speech recognition software. Occasionally, words are mistranscribed and despite editing, the text may contain inaccuracies due to incorrect word recognition. If further clarification is needed please contact the office at [phone].

## 2021-07-23 ENCOUNTER — TELEPHONE (OUTPATIENT)
Dept: PRIMARY CARE CLINIC | Age: 61
End: 2021-07-23

## 2021-07-23 NOTE — TELEPHONE ENCOUNTER
Patient says that she had her partial in when she had an MRI and she felt the electricity go through her body on June 29. Valerie Gunngo Patient went to the hospital on July 5 and was complaining of leg weakness. She says the other doctors said it couldn't have been related to the MRI. She says she ordered and received her own walker already.

## 2021-07-26 ENCOUNTER — HOSPITAL ENCOUNTER (OUTPATIENT)
Dept: SPEECH THERAPY | Age: 61
Setting detail: THERAPIES SERIES
Discharge: HOME | End: 2021-07-26
Payer: COMMERCIAL

## 2021-07-26 ENCOUNTER — TELEPHONE (OUTPATIENT)
Dept: PRIMARY CARE CLINIC | Age: 61
End: 2021-07-26

## 2021-07-26 ENCOUNTER — HOSPITAL ENCOUNTER (OUTPATIENT)
Dept: PHYSICAL THERAPY | Age: 61
Setting detail: THERAPIES SERIES
Discharge: HOME | End: 2021-07-26
Payer: COMMERCIAL

## 2021-07-26 PROCEDURE — 92507 TX SP LANG VOICE COMM INDIV: CPT

## 2021-07-26 PROCEDURE — 97112 NEUROMUSCULAR REEDUCATION: CPT

## 2021-07-26 PROCEDURE — 97116 GAIT TRAINING THERAPY: CPT

## 2021-07-26 PROCEDURE — 97110 THERAPEUTIC EXERCISES: CPT

## 2021-07-26 NOTE — FLOWSHEET NOTE
on 7/21/21. SUBJECTIVE:    7/20/21: Pt states she is doing a little better, has been working hard  7/26/21: States she is doing better. She is walking more with the RW    OBJECTIVE:       Plan:   Gait  LE strengthening training and transfers      RESTRICTIONS/PRECAUTIONS: Neuropathy and LE weakness affecting gait and balance    Exercises/Interventions:     Therapeutic Exercises (51822) Min: Resistance/Reps Notes/Cues   Nu Step X 5 min, level 3    // Bars Gait with RW 3 x 30 feet with SBA around clinic  x 2 laps in // bars with UE support and supervision  HR/TR x 10  Mini Squat 2 x 10  // bars   LAQ X 20 B 2# on Right   Leg Press 30# 2 x 20 B    Incline 3 x 30\"                        Therapeutic Activities (52310) Min:                              Neuromuscular Re-ed (30485) Min:                                   Manual Intervention (57352) Min:                Modalities  Min:               HEP     Seated; LAQ, Marching, HR/TR  7/14   Standing Mini Squats, Hip Abd, HR/TR Hold for now           Other Therapeutic Activities: Pt was educated on PT POC, Diagnosis, Prognosis, pathomechanics as well as frequency and duration of scheduling future physical therapy appointments. Time was also taken on this day to answer all patient questions and participation in PT. Reviewed appointment policy in detail with patient and patient verbalized understanding. Home Exercise Program:   Patient was instructed in the following for HEP: see above     . Patient verbalized/demonstrated understanding and was issued written handout.       Therapeutic Exercise and NMR EXR  [] (36886) Provided verbal/tactile cueing for activities related to strengthening, flexibility, endurance, ROM for improvements in LE, proximal hip, and core control with self care, mobility, lifting, ambulation.  [] (07948) Provided verbal/tactile cueing for activities related to improving balance, coordination, kinesthetic sense, posture, motor skill, proprioception  to assist with LE, proximal hip, and core control in self care, mobility, lifting, ambulation and eccentric single leg control. [address] and Therapeutic Activities:    [] (36634 or 95720) Provided verbal/tactile cueing for activities related to improving balance, coordination, kinesthetic sense, posture, motor skill, proprioception and motor activation to allow for proper function of core, proximal hip and LE with self care and ADLs and functional mobility.   [] (13253) Gait Re-education- Provided training and instruction to the patient for proper LE, core and proximal hip recruitment and positioning and eccentric body weight control with ambulation re-education including up and down stairs     Home Exercise Program:    [x] (89834) Reviewed/Progressed HEP activities related to strengthening, flexibility, endurance, ROM of core, proximal hip and LE for functional self-care, mobility, lifting and ambulation/stair navigation   [] (56597)Reviewed/Progressed HEP activities related to improving balance, coordination, kinesthetic sense, posture, motor skill, proprioception of core, proximal hip and LE for self care, mobility, lifting, and ambulation/stair navigation      Manual Treatments:  PROM / STM / Oscillations-Mobs:  G-I, II, III, IV (PA's, Inf., Post.)  [] (36120) Provided manual therapy to mobilize LE, proximal hip and/or LS spine soft tissue/joints for the purpose of modulating pain, promoting relaxation,  increasing ROM, reducing/eliminating soft tissue swelling/inflammation/restriction, improving soft tissue extensibility and allowing for proper ROM for normal function with self care, mobility, lifting and ambulation.      If [facility] Please Indicate Time In/Out  CPT Code Time in Time out                         Approval Dates:  CPT Code Units Approved Units Used  Date Updated:                     Charges:  Timed Code Treatment Minutes: 55   Total Treatment Minutes: 55      [] POLOAL (LOW) 29124 (typically 20 minutes face-to-face)  [] EVAL (MOD) 24924 (typically 30 minutes face-to-face)  [] EVAL (HIGH) 10613 (typically 45 minutes face-to-face)  [] RE-EVAL     [x] XZ(49746) x 2    [] Dry needle 1 or 2 Muscles (00565)  [x] NMR (30236) x     [] Dry needle 3+ Muscles (17905)  [] Manual (32976) x     [] Ultrasound (23132) x  [] TA (33043) x     [] Mech Traction (60317)  [] ES(attended) (12333)     [] ES (un) (03362):   [] Vasopump (37977)  [] Ionto (20115)   [x] Other:gait    GOALS:  Therapist goals for Patient:   Short Term Goals: To be achieved in: 2 weeks  1. Independent in HEP and progression per patient tolerance, in order to prevent re-injury. 2. Patient will have a decrease in pain to facilitate improvement in movement, function, and ADLs as indicated by Functional Deficits.     Long Term Goals: To be achieved in: 6 weeks  1. Disability index score of 25% or less for the (functional outcomes test) to assist with reaching prior level of function. 2. Patient will demonstrate increased AROM to [facility] to allow for proper joint functioning as indicated by patients Functional Deficits. 3. Patient will demonstrate an increase in postural awareness and control to allow for proper functional mobility as indicated by patients Functional Deficits. 4. Patient will demonstrate an increase in Strength to at least 4+/5 in B LE to allow for proper functional mobility as indicated by patients Functional Deficits. 5. Patient will return to functional activities including work and daily activities without increased symptoms or restriction.    6. Pt will report normal use of body so she can complete normal daily activities like exercises and work (patient specific functional goal)            (cut and paste from eval)    ASSESSMENT:  See eval    Treatment/Activity Tolerance:  [x] Patient tolerated treatment well [] Patient limited by fatique  [] Patient limited by pain  [] Patient limited by other medical complications  [] Other:     Overall Progression Towards Functional goals/ Treatment Progress Update:  [] Patient is progressing as expected towards functional goals listed. [] Progression is slowed due to complexities/Impairments listed. [] Progression has been slowed due to co-morbidities. [x] Plan just implemented, too soon to assess goals progression <30days   [] Goals require adjustment due to lack of progress  [] Patient is not progressing as expected and requires additional follow up with physician  [] Other    Prognosis for POC: [x] Good [] Fair  [] Poor    Patient requires continued skilled intervention: [x] Yes  [] No        PLAN:   [] Continue per plan of care [] Alter current plan (see comments)  [x] Plan of care initiated [] Hold pending MD visit [] Discharge    Electronically signed by: Iva Severance, PT    Note: If patient does not return for scheduled/recommended follow up visits, this note will serve as a discharge from care along with the most recent update on progress.

## 2021-07-26 NOTE — TELEPHONE ENCOUNTER
----- Message from Hodan Georges sent at 7/23/2021  8:18 AM EDT -----  Subject: Message to Provider    QUESTIONS  Information for Provider? Patient would like to speak with Dr. Bernabe Valderrama   about a incident that happened when she had a MRI on June 29th. Patient   would like a call back as soon as possible  ---------------------------------------------------------------------------  --------------  CALL BACK INFO  What is the best way for the office to contact you? OK to leave message on   voicemail  Preferred Call Back Phone Number? [phone]  ---------------------------------------------------------------------------  --------------  SCRIPT ANSWERS  Relationship to Patient?  Self

## 2021-07-26 NOTE — PROGRESS NOTES
her current inability to fluently communicate impairs her ability to effectively perform her job duties.     Plan:   1-2x/wk; homework provided and reviewed    Timed Code Treatment: 0    Total Treatment Time:   45 speech therapy  [facility] [id number]    Signature:   Portillo Oconnell MS, CCC-SLP #[id number]  Speech Language Pathologist

## 2021-07-27 ENCOUNTER — APPOINTMENT (OUTPATIENT)
Dept: SPEECH THERAPY | Age: 61
End: 2021-07-27
Payer: COMMERCIAL

## 2021-07-28 ENCOUNTER — HOSPITAL ENCOUNTER (OUTPATIENT)
Dept: PHYSICAL THERAPY | Age: 61
Setting detail: THERAPIES SERIES
Discharge: HOME | End: 2021-07-28
Payer: COMMERCIAL

## 2021-07-28 ENCOUNTER — HOSPITAL ENCOUNTER (OUTPATIENT)
Dept: SPEECH THERAPY | Age: 61
Setting detail: THERAPIES SERIES
Discharge: HOME | End: 2021-07-28
Payer: COMMERCIAL

## 2021-07-28 PROCEDURE — 97110 THERAPEUTIC EXERCISES: CPT

## 2021-07-28 PROCEDURE — 92507 TX SP LANG VOICE COMM INDIV: CPT

## 2021-07-28 PROCEDURE — 97116 GAIT TRAINING THERAPY: CPT

## 2021-07-28 PROCEDURE — 97530 THERAPEUTIC ACTIVITIES: CPT

## 2021-07-28 NOTE — FLOWSHEET NOTE
[address] [facility] [place]. [address]  Phone: [phone]   Fax:     [phone]    Physical Therapy Treatment Note/ Progress Report:   Date:  2021    Patient Name:  Constantino Pace    :  1960  MRN: [id number]    Pertinent Medical History:   HTN, Asthma, Hernia    Medical/Treatment Diagnosis Information:  · Diagnosis: R20.2 (ICD-10-CM) - Paresthesia  · Treatment Diagnosis: Decreased left UE and LE strength. Pain    Insurance/Certification information:  PT Insurance Information: [facility]- [place]  Physician Information:  Referring Practitioner: Dr. Yusuf Brown of care signed (Y/N): []  Yes [x]  No     Date of Patient follow up with Physician:      Progress Report: []  Yes  [x]  No     Date Range for reporting period:  Beginnin2021  Ending:     Progress report due (10 Rx/or 30 days whichever is less):      Recertification due (POC duration/ or 90 days whichever is less):      Visit # Insurance Allowable Auth required? Date Range   4 12 [x]  Yes [x]  No      Latex Allergy:  [x]NO      []YES  Preferred Language for Healthcare:   [x]English       []other:    Functional Scale:      Date assessed: at eval  Test:  Score:    Pain level:  0-10/10     History of Injury:  Patient stated complaint:Pt states on  she woke up with numbness in her entire body, from her face to the hands and toes. States went to the ER 3x with the episodes and the ER has not been able to find anything. States she can't walk now and has burning in both feet and lower legs, feels like she is walking on glass. States she gets some burning pain in left upper arm too. States gabapentin relieves pain but if she does not take it then has a lot of pain. States she has double vision at times and gets bad migraines but states she has had a brain tumor since September of last year.  She states she is going to see a neurologist on 7/21/21. SUBJECTIVE:    7/20/21: Pt states she is doing a little better, has been working hard  7/26/21: States she is doing better. She is walking more with the RW  7/28/21: Pt states she is doing a little better. States legs press hurt her hip    OBJECTIVE:       Plan:   Gait  LE strengthening training and transfers      RESTRICTIONS/PRECAUTIONS: Neuropathy and LE weakness affecting gait and balance    Exercises/Interventions:     Therapeutic Exercises (57280) Min: Resistance/Reps Notes/Cues   Nu Step X 7 min, level 1    // Bars Gait with RW 2 x 70 feet with SBA around clinic  x 2 laps in // bars with UE support and supervision  HR/TR x 10  Mini Squat 2 x 10  // bars   LAQ X 20 B 2# on Right   Leg Press     Incline 3 x 30\"                        Therapeutic Activities (71907) Min:                              Neuromuscular Re-ed (55156) Min:                                   Manual Intervention (66792) Min:                Modalities  Min:               HEP     Seated; LAQ, Marching, HR/TR  7/14   Standing Mini Squats, Hip Abd, HR/TR Hold for now           Other Therapeutic Activities: Pt was educated on PT POC, Diagnosis, Prognosis, pathomechanics as well as frequency and duration of scheduling future physical therapy appointments. Time was also taken on this day to answer all patient questions and participation in PT. Reviewed appointment policy in detail with patient and patient verbalized understanding. Home Exercise Program:   Patient was instructed in the following for HEP: see above     . Patient verbalized/demonstrated understanding and was issued written handout.       Therapeutic Exercise and NMR EXR  [] (49537) Provided verbal/tactile cueing for activities related to strengthening, flexibility, endurance, ROM for improvements in LE, proximal hip, and core control with self care, mobility, lifting, ambulation.  [] (74659) Provided verbal/tactile cueing for activities related to improving balance, coordination, kinesthetic sense, posture, motor skill, proprioception  to assist with LE, proximal hip, and core control in self care, mobility, lifting, ambulation and eccentric single leg control. [address] and Therapeutic Activities:    [] (20754 or 38117) Provided verbal/tactile cueing for activities related to improving balance, coordination, kinesthetic sense, posture, motor skill, proprioception and motor activation to allow for proper function of core, proximal hip and LE with self care and ADLs and functional mobility.   [] (36897) Gait Re-education- Provided training and instruction to the patient for proper LE, core and proximal hip recruitment and positioning and eccentric body weight control with ambulation re-education including up and down stairs     Home Exercise Program:    [x] (46096) Reviewed/Progressed HEP activities related to strengthening, flexibility, endurance, ROM of core, proximal hip and LE for functional self-care, mobility, lifting and ambulation/stair navigation   [] (06922)Reviewed/Progressed HEP activities related to improving balance, coordination, kinesthetic sense, posture, motor skill, proprioception of core, proximal hip and LE for self care, mobility, lifting, and ambulation/stair navigation      Manual Treatments:  PROM / STM / Oscillations-Mobs:  G-I, II, III, IV (PA's, Inf., Post.)  [] (97863) Provided manual therapy to mobilize LE, proximal hip and/or LS spine soft tissue/joints for the purpose of modulating pain, promoting relaxation,  increasing ROM, reducing/eliminating soft tissue swelling/inflammation/restriction, improving soft tissue extensibility and allowing for proper ROM for normal function with self care, mobility, lifting and ambulation.      If [facility] Please Indicate Time In/Out  CPT Code Time in Time out                         Approval Dates:  CPT Code Units Approved Units Used  Date Updated:                     Charges:  Timed Code Treatment Minutes: 50   Total Treatment Minutes: 50      [] EVAL (LOW) 69361 (typically 20 minutes face-to-face)  [] EVAL (MOD) 15599 (typically 30 minutes face-to-face)  [] EVAL (HIGH) 34658 (typically 45 minutes face-to-face)  [] RE-EVAL     [x] FR(79014) x    [] Dry needle 1 or 2 Muscles (61790)  [] NMR (20671) x     [] Dry needle 3+ Muscles (48117)  [] Manual (09013) x     [] Ultrasound (69284) x  [x] TA (88997) x     [] Mech Traction (77077)  [] ES(attended) (76620)     [] ES (un) (04357):   [] Vasopump (50034)  [] Ionto (06004)   [x] Other:gait    GOALS:  Therapist goals for Patient:   Short Term Goals: To be achieved in: 2 weeks  1. Independent in HEP and progression per patient tolerance, in order to prevent re-injury. 2. Patient will have a decrease in pain to facilitate improvement in movement, function, and ADLs as indicated by Functional Deficits.     Long Term Goals: To be achieved in: 6 weeks  1. Disability index score of 25% or less for the (functional outcomes test) to assist with reaching prior level of function. 2. Patient will demonstrate increased AROM to [facility] to allow for proper joint functioning as indicated by patients Functional Deficits. 3. Patient will demonstrate an increase in postural awareness and control to allow for proper functional mobility as indicated by patients Functional Deficits. 4. Patient will demonstrate an increase in Strength to at least 4+/5 in B LE to allow for proper functional mobility as indicated by patients Functional Deficits. 5. Patient will return to functional activities including work and daily activities without increased symptoms or restriction.    6. Pt will report normal use of body so she can complete normal daily activities like exercises and work (patient specific functional goal)            (cut and paste from eval)    ASSESSMENT:  See eval    Treatment/Activity Tolerance:  [x] Patient tolerated treatment well [] Patient limited by fatique  [] Patient

## 2021-07-28 NOTE — PROGRESS NOTES
Speech-Language Pathology  Daily Treatment Note    Date:  2021    Patient Name:  Nona Pickett    :  1960  MRN: [id number]  Diagnosis:    Dysarthria    -  Primary T44.7     Insurance/Certification information: R  Referring Physician:  JD Bright  Plan of care signed (Y/N):  N  Total visits: 3     Progress Note: []  Yes  [x]  No  Next due by: Visit #10     Subjective: Pt in good spirits and reports 'my speech is getting better.' She has a goal to return to work mid-September and desires to improve speech fluency since it's a fast paced environment. She lost her HW; new copy provided. She reports talking to siblings multiple times daily, and those around her tell her she is doing better. Objective:   Goal 1: Pt will execute speech strategies in structured simple sentences with a model in >80% of trials  · : Training and model provided for easy onset, exhalation prior to phonation, and inflection at sentence level. Pt executes during structured sentence reading with occasional verbal cues/encouragement with mild-moderate telegraphic speech and pauses at onset and mid-sentence. · : reading sentences with easy onset, fluent speech and improved intonation without a model ~75% of trials; pt IND recalls strategies    Goal 2: Pt will improve fluent clear speech at simple sentence level in structured conversation in 70% of trials with mod verbal cues  · : Independent spontaneous fluent speech with proper intonation in simple structured conversation ~65% of trials without cues. · : heavy focus on fluency and intonation in conversational speech: mild to moderate decreased rate, mild decreased inflection. Overall 80% fluent without cues.       Assessment:   Speech Diagnosis: Variable mild to moderate motor speech impairment with dysarthric and dysfluent features, characterized by occasionally rushed strings of words, variable articulatory precision, mildly impaired onset/initiation of phrases and sentences. Impaired prosody/inflection at sentence level. · Pt reports abrupt onset ~3 weeks ago, and works as a . Pt desires to return to work but her current inability to fluently communicate impairs her ability to effectively perform her job duties.     Plan:   1-2x/wk; homework provided    Timed Code Treatment: 0    Total Treatment Time:   45 speech therapy  [facility] [id number]    Signature:   Andreina Olivera MS, CCC-SLP #[id number]  Speech Language Pathologist

## 2021-07-30 DIAGNOSIS — R89.9 ABNORMAL LABORATORY TEST RESULT: Primary | ICD-10-CM

## 2021-07-30 DIAGNOSIS — R20.2 PARESTHESIA: Primary | ICD-10-CM

## 2021-07-30 DIAGNOSIS — R20.2 PARESTHESIA: ICD-10-CM

## 2021-07-30 DIAGNOSIS — R89.9 ABNORMAL LABORATORY TEST: ICD-10-CM

## 2021-07-30 RX ORDER — AZELASTINE 1 MG/ML
SPRAY, METERED NASAL
Qty: 1 BOTTLE | Refills: 1 | Status: SHIPPED | OUTPATIENT
Start: 2021-07-30 | End: 2021-08-25

## 2021-08-02 ENCOUNTER — HOSPITAL ENCOUNTER (OUTPATIENT)
Dept: PHYSICAL THERAPY | Age: 61
Setting detail: THERAPIES SERIES
Discharge: HOME | End: 2021-08-02
Payer: COMMERCIAL

## 2021-08-02 ENCOUNTER — TELEPHONE (OUTPATIENT)
Dept: PRIMARY CARE CLINIC | Age: 61
End: 2021-08-02

## 2021-08-02 DIAGNOSIS — R20.2 PARESTHESIA: Primary | ICD-10-CM

## 2021-08-02 DIAGNOSIS — R79.89: ICD-10-CM

## 2021-08-02 DIAGNOSIS — D80.1 LOW GAMMAGLOBULIN LEVEL (HCC): ICD-10-CM

## 2021-08-02 PROCEDURE — 97110 THERAPEUTIC EXERCISES: CPT

## 2021-08-02 PROCEDURE — 97530 THERAPEUTIC ACTIVITIES: CPT

## 2021-08-02 PROCEDURE — 97116 GAIT TRAINING THERAPY: CPT

## 2021-08-02 NOTE — FLOWSHEET NOTE
[address] [facility] [place]. [address] [address]  Phone: [phone]   Fax:     [phone]    Physical Therapy Treatment Note/ Progress Report:   Date:  2021    Patient Name:  Regis Means    :  1960  MRN: [id number]    Pertinent Medical History:   HTN, Asthma, Hernia    Medical/Treatment Diagnosis Information:  · Diagnosis: R20.2 (ICD-10-CM) - Paresthesia  · Treatment Diagnosis: Decreased left UE and LE strength. Pain  Insurance/Certification information:  PT Insurance Information: [facility]- [place]  Physician Information:  Referring Practitioner: Dr. Parth Sagastume of care signed (Y/N): []  Yes [x]  No     Date of Patient follow up with Physician:      Progress Report: []  Yes  [x]  No     Date Range for reporting period:  Beginnin2021  Ending:     Progress report due (10 Rx/or 30 days whichever is less):      Recertification due (POC duration/ or 90 days whichever is less):      Visit # Insurance Allowable Auth required? Date Range   5 12 [x]  Yes [x]  No      Latex Allergy:  [x]NO      []YES  Preferred Language for Healthcare:   [x]English       []other:    Functional Scale:      Date assessed: at eval  Test:  Score:    Pain level:  8/10     History of Injury:  Patient stated complaint:Pt states on  she woke up with numbness in her entire body, from her face to the hands and toes. States went to the ER 3x with the episodes and the ER has not been able to find anything. States she can't walk now and has burning in both feet and lower legs, feels like she is walking on glass. States she gets some burning pain in left upper arm too. States gabapentin relieves pain but if she does not take it then has a lot of pain. States she has double vision at times and gets bad migraines but states she has had a brain tumor since September of last year.  She states she is going to see a neurologist on 7/21/21. SUBJECTIVE:    7/20/21: Pt states she is doing a little better, has been working hard  7/26/21: States she is doing better. She is walking more with the RW  7/28/21: Pt states she is doing a little better. States legs press hurt her hip  8/2/21: States her legs and feet were really burning over the weekend, still hurting today. States her neck is still hurting, getting pain down her left arm. OBJECTIVE:       Plan:   Gait  LE strengthening training and transfers      RESTRICTIONS/PRECAUTIONS: Neuropathy and LE weakness affecting gait and balance    Exercises/Interventions:     Therapeutic Exercises (61641) Min: Resistance/Reps Notes/Cues   Nu Step X 5 min, level 1    // Bars Gait with RW 2 x 70 feet, 2 x 35 feet with SBA around clinic  x 2 laps in // bars with UE support and supervision  HR/TR x 10  Mini Squat 2 x 10  // bars   LAQ X 20 B 2# on Right   Leg Press     Incline 3 x 30\"                        Therapeutic Activities (36977) Min:                              Neuromuscular Re-ed (68049) Min:                                   Manual Intervention (17306) Min:                Modalities  Min:               HEP     Seated; LAQ, Marching, HR/TR  7/14   Standing Mini Squats, Hip Abd, HR/TR Hold for now           Other Therapeutic Activities:     Home Exercise Program:   Patient was instructed in the following for HEP: see above     . Patient verbalized/demonstrated understanding and was issued written handout.       Therapeutic Exercise and NMR EXR  [x] (29780) Provided verbal/tactile cueing for activities related to strengthening, flexibility, endurance, ROM for improvements in LE, proximal hip, and core control with self care, mobility, lifting, ambulation.  [] (72801) Provided verbal/tactile cueing for activities related to improving balance, coordination, kinesthetic sense, posture, motor skill, proprioception  to assist with LE, proximal hip, and core control in self care, mobility, lifting, ambulation and eccentric single leg control. [address] and Therapeutic Activities:    [x] (63634 or 71385) Provided verbal/tactile cueing for activities related to improving balance, coordination, kinesthetic sense, posture, motor skill, proprioception and motor activation to allow for proper function of core, proximal hip and LE with self care and ADLs and functional mobility.   [] (15713) Gait Re-education- Provided training and instruction to the patient for proper LE, core and proximal hip recruitment and positioning and eccentric body weight control with ambulation re-education including up and down stairs     Home Exercise Program:    [x] (81837) Reviewed/Progressed HEP activities related to strengthening, flexibility, endurance, ROM of core, proximal hip and LE for functional self-care, mobility, lifting and ambulation/stair navigation   [] (31396)Reviewed/Progressed HEP activities related to improving balance, coordination, kinesthetic sense, posture, motor skill, proprioception of core, proximal hip and LE for self care, mobility, lifting, and ambulation/stair navigation      Manual Treatments:  PROM / STM / Oscillations-Mobs:  G-I, II, III, IV (PA's, Inf., Post.)  [x] (81973) Provided manual therapy to mobilize LE, proximal hip and/or LS spine soft tissue/joints for the purpose of modulating pain, promoting relaxation,  increasing ROM, reducing/eliminating soft tissue swelling/inflammation/restriction, improving soft tissue extensibility and allowing for proper ROM for normal function with self care, mobility, lifting and ambulation.      If [facility] Please Indicate Time In/Out  CPT Code Time in Time out                         Approval Dates:  CPT Code Units Approved Units Used  Date Updated:                     Charges:  Timed Code Treatment Minutes: 50   Total Treatment Minutes: 50      [] EVAL (LOW) 98562 (typically 20 minutes face-to-face)  [] EVAL (MOD) 88554 (typically 30 minutes face-to-face)  [] EVAL (HIGH) 72158 (typically 45 minutes face-to-face)  [] RE-EVAL     [x] QT(95761) x    [] Dry needle 1 or 2 Muscles (51342)  [] NMR (08445) x     [] Dry needle 3+ Muscles (83495)  [] Manual (28487) x     [] Ultrasound (46560) x  [x] TA (39774) x     [] Mech Traction (74466)  [] ES(attended) (41933)     [] ES (un) (32389):   [] Vasopump (81751)  [] Ionto (73230)   [x] Other:gait    GOALS:  Therapist goals for Patient:   Short Term Goals: To be achieved in: 2 weeks  1. Independent in HEP and progression per patient tolerance, in order to prevent re-injury. 2. Patient will have a decrease in pain to facilitate improvement in movement, function, and ADLs as indicated by Functional Deficits.     Long Term Goals: To be achieved in: 6 weeks  1. Disability index score of 25% or less for the (functional outcomes test) to assist with reaching prior level of function. 2. Patient will demonstrate increased AROM to [facility] to allow for proper joint functioning as indicated by patients Functional Deficits. 3. Patient will demonstrate an increase in postural awareness and control to allow for proper functional mobility as indicated by patients Functional Deficits. 4. Patient will demonstrate an increase in Strength to at least 4+/5 in B LE to allow for proper functional mobility as indicated by patients Functional Deficits. 5. Patient will return to functional activities including work and daily activities without increased symptoms or restriction.    6. Pt will report normal use of body so she can complete normal daily activities like exercises and work (patient specific functional goal)            (cut and paste from eval)    ASSESSMENT:  See eval    Treatment/Activity Tolerance:  [x] Patient tolerated treatment well [] Patient limited by fatique  [] Patient limited by pain  [] Patient limited by other medical complications  [] Other:     Overall Progression Towards Functional goals/ Treatment Progress Update:  [] Patient is progressing as expected towards functional goals listed. [x] Progression is slowed due to complexities/Impairments listed. [x] Progression has been slowed due to co-morbidities. [] Plan just implemented, too soon to assess goals progression <30days   [] Goals require adjustment due to lack of progress  [] Patient is not progressing as expected and requires additional follow up with physician  [] Other    Prognosis for POC: [x] Good [] Fair  [] Poor    Patient requires continued skilled intervention: [x] Yes  [] No        PLAN:   [x] Continue per plan of care [] Alter current plan (see comments)  [] Plan of care initiated [] Hold pending MD visit [] Discharge    Electronically signed by: Elroy Farah PT    Note: If patient does not return for scheduled/recommended follow up visits, this note will serve as a discharge from care along with the most recent update on progress.

## 2021-08-03 ENCOUNTER — TELEPHONE (OUTPATIENT)
Dept: PRIMARY CARE CLINIC | Age: 61
End: 2021-08-03

## 2021-08-04 ENCOUNTER — HOSPITAL ENCOUNTER (OUTPATIENT)
Dept: PHYSICAL THERAPY | Age: 61
Setting detail: THERAPIES SERIES
Discharge: HOME | End: 2021-08-04
Payer: COMMERCIAL

## 2021-08-04 PROCEDURE — 97116 GAIT TRAINING THERAPY: CPT

## 2021-08-04 PROCEDURE — 97110 THERAPEUTIC EXERCISES: CPT

## 2021-08-04 NOTE — FLOWSHEET NOTE
7/21/21. SUBJECTIVE:    7/20/21: Pt states she is doing a little better, has been working hard  7/26/21: States she is doing better. She is walking more with the RW  7/28/21: Pt states she is doing a little better. States legs press hurt her hip  8/2/21: States her legs and feet were really burning over the weekend, still hurting today. States her neck is still hurting, getting pain down her left arm.   8/4/21: Pt states she is doing better today, walked in the store after last session and needed to use the scooter some. OBJECTIVE:       Plan:   Gait  LE strengthening training and transfers      RESTRICTIONS/PRECAUTIONS: Neuropathy and LE weakness affecting gait and balance    Exercises/Interventions:     Therapeutic Exercises (26485) Min: Resistance/Reps Notes/Cues   Nu Step X 5 min, level 2    // Bars Gait with RW 2 x 70 feet, 2 x 35 feet with SBA around clinic  x 2 laps in // bars with no UE support and CGA  HR/TR x 10  Mini Squat 2 x 10  // bars   LAQ X 20 B 2# on Right   Leg Press     Incline 3 x 30\"                        Therapeutic Activities (70555) Min:                              Neuromuscular Re-ed (56993) Min:                                   Manual Intervention (90995) Min:                Modalities  Min:               HEP     Seated; LAQ, Marching, HR/TR  7/14   Standing Mini Squats, Hip Abd, HR/TR Hold for now           Other Therapeutic Activities:     Home Exercise Program:   Patient was instructed in the following for HEP: see above     . Patient verbalized/demonstrated understanding and was issued written handout.       Therapeutic Exercise and NMR EXR  [x] (25459) Provided verbal/tactile cueing for activities related to strengthening, flexibility, endurance, ROM for improvements in LE, proximal hip, and core control with self care, mobility, lifting, ambulation.  [] (41290) Provided verbal/tactile cueing for activities related to improving balance, coordination, kinesthetic sense, posture, motor skill, proprioception  to assist with LE, proximal hip, and core control in self care, mobility, lifting, ambulation and eccentric single leg control. [address] and Therapeutic Activities:    [x] (64154 or 31864) Provided verbal/tactile cueing for activities related to improving balance, coordination, kinesthetic sense, posture, motor skill, proprioception and motor activation to allow for proper function of core, proximal hip and LE with self care and ADLs and functional mobility.   [] (64147) Gait Re-education- Provided training and instruction to the patient for proper LE, core and proximal hip recruitment and positioning and eccentric body weight control with ambulation re-education including up and down stairs     Home Exercise Program:    [x] (44192) Reviewed/Progressed HEP activities related to strengthening, flexibility, endurance, ROM of core, proximal hip and LE for functional self-care, mobility, lifting and ambulation/stair navigation   [] (74000)Reviewed/Progressed HEP activities related to improving balance, coordination, kinesthetic sense, posture, motor skill, proprioception of core, proximal hip and LE for self care, mobility, lifting, and ambulation/stair navigation      Manual Treatments:  PROM / STM / Oscillations-Mobs:  G-I, II, III, IV (PA's, Inf., Post.)  [x] (12943) Provided manual therapy to mobilize LE, proximal hip and/or LS spine soft tissue/joints for the purpose of modulating pain, promoting relaxation,  increasing ROM, reducing/eliminating soft tissue swelling/inflammation/restriction, improving soft tissue extensibility and allowing for proper ROM for normal function with self care, mobility, lifting and ambulation.      If [facility] Please Indicate Time In/Out  CPT Code Time in Time out                         Approval Dates:  CPT Code Units Approved Units Used  Date Updated:                     Charges:  Timed Code Treatment Minutes: 49   Total Treatment Minutes: 52 [] EVAL (LOW) 03187 (typically 20 minutes face-to-face)  [] EVAL (MOD) 56568 (typically 30 minutes face-to-face)  [] EVAL (HIGH) 46892 (typically 45 minutes face-to-face)  [] RE-EVAL     [x] GF(85643) x  2  [] Dry needle 1 or 2 Muscles (27004)  [] NMR (56997) x     [] Dry needle 3+ Muscles (18764)  [] Manual (51575) x     [] Ultrasound (36342) x  [] TA (52514) x     [] Mech Traction (50272)  [] ES(attended) (72543)     [] ES (un) (88982):   [] Vasopump (98113)  [] Ionto (56921)   [x] Other:gait    GOALS:  Therapist goals for Patient:   Short Term Goals: To be achieved in: 2 weeks  1. Independent in HEP and progression per patient tolerance, in order to prevent re-injury. 2. Patient will have a decrease in pain to facilitate improvement in movement, function, and ADLs as indicated by Functional Deficits.     Long Term Goals: To be achieved in: 6 weeks  1. Disability index score of 25% or less for the (functional outcomes test) to assist with reaching prior level of function. 2. Patient will demonstrate increased AROM to [facility] to allow for proper joint functioning as indicated by patients Functional Deficits. 3. Patient will demonstrate an increase in postural awareness and control to allow for proper functional mobility as indicated by patients Functional Deficits. 4. Patient will demonstrate an increase in Strength to at least 4+/5 in B LE to allow for proper functional mobility as indicated by patients Functional Deficits. 5. Patient will return to functional activities including work and daily activities without increased symptoms or restriction.    6. Pt will report normal use of body so she can complete normal daily activities like exercises and work (patient specific functional goal)            (cut and paste from eval)    ASSESSMENT:  See eval    Treatment/Activity Tolerance:  [x] Patient tolerated treatment well [] Patient limited by fatique  [] Patient limited by pain  [] Patient limited by other medical complications  [] Other:     Overall Progression Towards Functional goals/ Treatment Progress Update:  [] Patient is progressing as expected towards functional goals listed. [x] Progression is slowed due to complexities/Impairments listed. [x] Progression has been slowed due to co-morbidities. [] Plan just implemented, too soon to assess goals progression <30days   [] Goals require adjustment due to lack of progress  [] Patient is not progressing as expected and requires additional follow up with physician  [] Other    Prognosis for POC: [x] Good [] Fair  [] Poor    Patient requires continued skilled intervention: [x] Yes  [] No        PLAN:   [x] Continue per plan of care [] Alter current plan (see comments)  [] Plan of care initiated [] Hold pending MD visit [] Discharge    Electronically signed by: Jessica Mckeon PT    Note: If patient does not return for scheduled/recommended follow up visits, this note will serve as a discharge from care along with the most recent update on progress.

## 2021-08-06 ENCOUNTER — OFFICE VISIT (OUTPATIENT)
Dept: PRIMARY CARE CLINIC | Age: 61
End: 2021-08-06
Payer: COMMERCIAL

## 2021-08-06 VITALS
OXYGEN SATURATION: 98 % | WEIGHT: 198 LBS | DIASTOLIC BLOOD PRESSURE: 87 MMHG | TEMPERATURE: 97.2 F | HEART RATE: 88 BPM | SYSTOLIC BLOOD PRESSURE: 130 MMHG | BODY MASS INDEX: 36.21 KG/M2

## 2021-08-06 DIAGNOSIS — R20.2 BILATERAL NUMBNESS AND TINGLING OF ARMS AND LEGS: ICD-10-CM

## 2021-08-06 DIAGNOSIS — D80.1 LOW GAMMAGLOBULIN LEVEL (HCC): ICD-10-CM

## 2021-08-06 DIAGNOSIS — R20.0 BILATERAL NUMBNESS AND TINGLING OF ARMS AND LEGS: ICD-10-CM

## 2021-08-06 DIAGNOSIS — G45.9 TIA (TRANSIENT ISCHEMIC ATTACK): Primary | ICD-10-CM

## 2021-08-06 DIAGNOSIS — R47.81 SLURRED SPEECH: ICD-10-CM

## 2021-08-06 DIAGNOSIS — G43.909 MIGRAINE WITHOUT STATUS MIGRAINOSUS, NOT INTRACTABLE, UNSPECIFIED MIGRAINE TYPE: ICD-10-CM

## 2021-08-06 PROCEDURE — 99214 OFFICE O/P EST MOD 30 MIN: CPT | Performed by: FAMILY MEDICINE

## 2021-08-06 ASSESSMENT — ENCOUNTER SYMPTOMS
CHOKING: 0
NAUSEA: 0
WHEEZING: 0
EYE DISCHARGE: 0
CONSTIPATION: 0
PHOTOPHOBIA: 0
EYE REDNESS: 0
SHORTNESS OF BREATH: 0
TROUBLE SWALLOWING: 0
EYE ITCHING: 0
VOMITING: 0
RECTAL PAIN: 0
DIARRHEA: 0
SORE THROAT: 0
CHEST TIGHTNESS: 0
BLOOD IN STOOL: 0
COLOR CHANGE: 0
STRIDOR: 0
EYE PAIN: 0
APNEA: 0
SINUS PRESSURE: 0
ABDOMINAL DISTENTION: 0
BACK PAIN: 0
RHINORRHEA: 0
COUGH: 0

## 2021-08-06 NOTE — LETTER
Hospital Medicine Discharge Summary     Patient ID: Nona Pickett                 Patient's PCP: Sandra Mendoza MD     Admit Date: 7/5/2021      Discharge Date:   7/7/21     Admitting Physician: Aysha Liu MD     Discharge Physician: JAYSON Barrow CNP         Discharge Diagnoses:             Active Hospital Problems     Diagnosis Date Noted    TIA (transient ischemic attack) [G45.9] 07/05/2021         The patient was seen and examined on day of discharge and this discharge summary is in conjunction with any daily progress note from day of discharge.     Disposition:  [x]? ? Home  []? ? Home with home health []? ?[facility] []? ? Psych []? ? SNF  []? ? LTAC  []? ? Long term nursing home or group home []? ? Transfer to ICU  []? ? Transfer to PCU []?? Other:        Discharge Instructions/Follow-up:  PCP 1 wk     D/C condition: stable     Code status: full     D/C Meds: ASA + statin. Start gabapentin      Hospital Course:  F/U for  \"My head was hurting, my chest was hurting, all my  skin and my face and my mouth were going numb, my hands and feet were  going numb. \"     HISTORY OF PRESENT ILLNESS:  The patient is a 43-year-old  -American female who presented to the hospital with a chief  complaint of what she describes as sudden onset of headaches, chest pain, arm numbness, feet numbness, feeling extremely anxious and panicking and breathing very fast, because of which she called EMS.  The patient knows that she has to slow her breathing down and her symptoms  decreased but they did not go away completely. Now, the patient  complains of persistent headache.     PAST MEDICAL/PAST SURGICAL HISTORY:  1.  Chronic migraine. 2.  The patient has pituitary microadenoma for which she follows up with  Endocrinology. 3.  Asthma/COPD. 4.  History of gastric bypass. 5.  Hypertension. 6.  Dyslipidemia.   7.  Obesity with a BMI of 35.89 kg/m2.     7/6: states she felt like rods were going up the back of her neck like her skull was going to pop off and like someone was sitting on her chest. Legs were tingling, lips and tongue and face were numb. States now both legs were numb and face and tongue are still numb now.      Last echo 2019. States had a normal treadmill stress test in the past but not recently. Will consult cardiology if neuro w/u neg.      MRI brain ordered and neurology consulted. She sees Dr. 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 \"  She gets migraines at times with this and recently was told she could take that up to twice daily -  topamax - for that.     Has ENT she sees , Dr. Flaca Justin for stones in saliva glands.     Will recheck lactate after   Home later today with f/u to neurology outpatient for poss EMG going forward if sx persist. MRI unremarkable.  Start gabapentin.        ASSESSMENT:  1.  Panic attack with atypical noncardiac chest wall pain vs. TIA  TIA ruled out with likely migraine/anxiety as etiology  2.  Paresthesia. 3.  Chronic migraine with sudden headache. 4.  Hypertension. 5.  Pituitary microadenoma, which is nonsecreting. 6.  COPD. 7.  Tobacco dependence. 8.  Obesity with a BMI of 35.89 kg/m2. 9. Lactic acidosis     PLAN OF CARE:  The patient is admitted to Observation.  Telemetry  monitoring will be continued.  Neurology consult requested.  Nonnarcotic treatment for headache will be instituted.  Low-dose aspirin will be continued.  The patient's home dose of topiramate and other medications will be continued. MRI brain will be obtained to evaluate for pituitary apoplexy. IV hydration and re-eval lactate  - c-spine MRI pending  - MRI brain: stable appearance of brain and pituitary gland compared to 6/29/21.  No acute abnormality   - B12/folate normal   SPEP, TSH, KEIRY   - may need EMG outpatient with neurology  - can start gabapentin low dose for neuropathies     Hypokalemia  - Replace potassium  - likely d/t diuretic   - restarted home oral K+ supplements     Continue current regimen/therapies. Monitor. Adjust medical regimen as appropriate.     Exam:      /87   Pulse 94   Temp 97.9 °F (36.6 °C) (Oral)   Resp 18   Ht 5' 2\" (1.575 m)   Wt 199 lb 8.3 oz (90.5 kg)   SpO2 96%   BMI 36.49 kg/m²   General appearance: No apparent distress, appears stated age and cooperative. HEENT: Pupils equal, round, and reactive to light. Conjunctivae/corneas clear. Neck: Supple, with full range of motion. No jugular venous distention. Trachea midline. Respiratory:  Normal respiratory effort. Clear to auscultation, bilaterally without Rales/Wheezes/Rhonchi. Cardiovascular: Regular rate and rhythm with normal S1/S2 without murmurs, rubs or gallops. Abdomen: Soft, non-tender, non-distended with normal bowel sounds. Musculoskelatal: No clubbing, cyanosis or edema bilaterally. Full range of motion without deformity. Skin: Skin color, texture, turgor normal.  No rashes or lesions. Neurologic:  Neurovascularly intact without any focal sensory/motor deficits. Cranial nerves: II-XII intact, grossly non-focal.  Psychiatric: Alert and oriented, thought content appropriate, normal insight        Consults:      IP CONSULT TO STROKE TEAM  IP CONSULT TO PHARMACY  PHARMACY TO CHANGE BASE FLUIDS  IP CONSULT TO NEUROLOGY     Diagnostic tests: Imaging:  MRI BRAIN W WO CONTRAST   Final Result   Stable appearance of the brain and pituitary gland compared to June 29, 2021.       no acute abnormality.           MRI CERVICAL SPINE WO CONTRAST   Final Result   1. Minimal spinal canal stenosis at C3-C4 and C4-C5. 2. Mild right neural foraminal narrowing at C6-C7. 3. Straightening of the normal cervical lordosis without spondylolisthesis.            XR CHEST PORTABLE   Final Result   Clear lungs.  Mild cardiac enlargement.  No acute cardiopulmonary abnormality.                   CTA HEAD NECK W CONTRAST   Final Result   Unremarkable CT angiogram of the head and neck, with no evidence of vessel   dissection or irregularity, significant stenosis, or proximal intracranial   arterial occlusion.       Comment:       Reference per NASCET criteria for degree of stenosis:  Mild: Less than 50%   stenosis.  Moderate: 50-69% stenosis.  Severe: 70-94% stenosis. Near-occlusion: 95-99% stenosis.         CT HEAD WO CONTRAST   Final Result   No hemorrhage or mass identified.       Periventricular and scattered frontal parietal white matter disease, likely   due to small-vessel ischemic change, similar compared to prior       Results discussed with Camryn Cardenas by Ephraim Remy. Annabelle Leggett MD at 4:04 pm   on 7/5/2021                             Labs: For convenience and continuity at follow-up the following most recent labs are provided:        CBC:    Lab Results   Component Value Date     WBC 7.6 07/06/2021     HGB 12.6 07/06/2021     HCT 37.3 07/06/2021      07/06/2021         Renal:          Lab Results   Component Value Date      07/06/2021     K 3.1 07/07/2021     K 2.5 07/06/2021      07/06/2021     CO2 21 07/06/2021     BUN 6 07/06/2021     CREATININE 0.7 07/06/2021     CALCIUM 8.4 07/06/2021     PHOS 3.2 08/23/2013         Discharge Medications:      Discharge Medications         Current Discharge Medication List                  Details   hydroCHLOROthiazide (HYDRODIURIL) 25 MG tablet TAKE 1 TABLET BY MOUTH EVERY DAY  Qty: 30 tablet, Refills: 5     Associated Diagnoses: Essential hypertension       azelastine (ASTELIN) 0.1 % nasal spray 1 SPRAY BY NASAL ROUTE 2 TIMES DAILY USE IN EACH NOSTRIL AS DIRECTED  Qty: 1 Bottle, Refills: 1       amLODIPine (NORVASC) 10 MG tablet TAKE 1 TABLET BY MOUTH EVERY DAY  Qty: 30 tablet, Refills: 2     Associated Diagnoses: Essential hypertension       montelukast (SINGULAIR) 10 MG tablet TAKE 1 TABLET BY MOUTH EVERY DAY  Qty: 30 tablet, Refills: 5     Associated Diagnoses:  Moderate persistent asthma without complication     pseudoephedrine (DECONGESTANT) 30 MG tablet Take 1 tablet by mouth 3 times daily  Qty: 21 tablet, Refills: 0       omeprazole (PRILOSEC) 40 MG delayed release capsule TAKE 1 CAPSULE BY MOUTH EVERY DAY  Qty: 30 capsule, Refills: 5     Associated Diagnoses: Gastroesophageal reflux disease without esophagitis       benzonatate (TESSALON) 100 MG capsule TAKE 1 CAPSULE BY MOUTH THREE TIMES A DAY AS NEEDED FOR COUGH  Qty: 90 capsule, Refills: 3     Comments: DX Code Needed  . Associated Diagnoses: Chronic cough       butalbital-acetaminophen-caffeine (FIORICET, ESGIC) -40 MG per tablet TAKE 1 TABLET BY MOUTH EVERY 4 HOURS AS NEEDED FOR PAIN  Qty: 30 tablet, Refills: 1     Comments: DX Code Needed  . Associated Diagnoses: Chronic tension-type headache, not intractable       hydrALAZINE (APRESOLINE) 50 MG tablet Take 1 tablet by mouth 2 times daily  Qty: 180 tablet, Refills: 3       potassium chloride (KLOR-CON M20) 20 MEQ extended release tablet Take 1 tablet by mouth 2 times daily  Qty: 180 tablet, Refills: 1     Associated Diagnoses: Hypokalemia; Essential hypertension       clotrimazole-betamethasone (LOTRISONE) 1-0.05 % cream APPLY TO AFFECTED AREA TWICE A DAY  Qty: 15 g, Refills: 1     Associated Diagnoses: Skin rash       topiramate (TOPAMAX) 25 MG tablet Take 1 tablet daily  Qty: 30 tablet, Refills: 2       Prenatal Vit-Fe Fumarate-FA (PRENATAL COMPLETE PO) Take 1 tablet by mouth daily        ondansetron (ZOFRAN) 4 MG tablet TAKE 1 TABLET BY MOUTH EVERY 12 HOURS AS NEEDED or nausea or vomiting  Qty: 30 tablet, Refills: 12     Associated Diagnoses: Nausea       vitamin D (ERGOCALCIFEROL) 1.25 MG (59035 UT) CAPS capsule TAKE 1 CAPSULE BY MOUTH EVERY 30 DAYS  Qty: 4 capsule, Refills: 3       fluticasone (FLONASE) 50 MCG/ACT nasal spray INHALE 1 SPRAY NASALLY EVERY DAY  Qty: 1 Bottle, Refills: 5     Associated Diagnoses:  Allergic rhinitis, unspecified       Nebulizers (NEBULIZER COMPRESSOR) MISC Use every 6 hours as needed with duoneb  Qty: 1 each, Refills: 0     Associated Diagnoses: Moderate persistent asthma without complication       budesonide (PULMICORT) 0.5 MG/2ML nebulizer suspension Take 2 mLs by nebulization 2 times daily  Qty: 60 ampule, Refills: 3     Associated Diagnoses: Moderate persistent asthma without complication       Chlorhexidine Gluconate 2 % SOLN Apply to skin once daily prn  Qty: 250 mL, Refills: 1     Associated Diagnoses: Contact dermatitis, unspecified contact dermatitis type, unspecified trigger       albuterol sulfate HFA (PROAIR HFA) 108 (90 Base) MCG/ACT inhaler Inhale 2 puffs into the lungs every 6 hours as needed for Wheezing  Qty: 1 Inhaler, Refills: 5     Associated Diagnoses: Moderate persistent asthma without complication; Chronic cough       EPINEPHrine (EPIPEN) 0.3 MG/0.3ML DELPHINE injection Use as directed for allergic reaction  Qty: 2 Device, Refills: 3                    Time Spent on discharge is more than 45 mins in the examination, evaluation, counseling and review of medications and discharge plan.        Signed:     JAYSON Macedo CNP             7/7/2021        Thank you Gabi Gomes MD for the opportunity to be involved in this patient's care.  If you have any questions or concerns please feel free to contact me at [phone].         Other Notes  All notes     H&P from Jenna West MD (Internal Medicine)     Consults from JAYSON Baltazar CNP (Neurology)  Additional Orders and Documentation       Results   Imaging       Meds          Orders   Procedures       Flowsheets       Encounter Info:  History,   Allergies,   Education,   Care Plan,   Detailed Report      Communications       IP Summary of Care sent to Reva Younger MD   Sent 7/7/2021 by Claude Méndez MD     IP Summary of Care sent to JAYSON Macedo CNP   Sent 7/7/2021 by Claude Méndez MD     Letter   Sent 7/7/2021 by Lori Galeano RN  Media  From this encounter   EKG Scanned - Scan on 7/10/2021 7:49 AM by [address] on 7/10/2021 6:30 AM by Lilibeth Pu - Scan on 2021 3:05 AM by Sushila Ulloa    Discharge Instructions - Scan on 2021 2:23 AM by Marcia Laresuser   Coverage COB Information - Electronic signature on 2021 7:45 PM - [facility] Consent Treat/HIPAA - Electronic signature on 2021 7:44 PM - E-signed   Patient Demographics    Patient Name   Justina Valle Legal Sex   Female    1960 [facility]   [id number] Address   [address] [address] Phone   [phone] (Home) *Preferred*   [phone] (Work)   [phone] (Mobile)   Note Information    Progress Notes All Except Progress Notes All Notes    Travel Screening   Question  Response   In the last month, have you been in contact with someone who was confirmed or suspected to have Coronavirus / COVID-19? No / Unsure   Have you had a COVID-19 viral test in the last 14 days? No   Do you have any of the following new or worsening symptoms? None of these   Have you traveled internationally or domestically in the last month?   No   Travel History   Travel since 21   No documented travel since 21

## 2021-08-06 NOTE — LETTER
7/6: states she felt like rods were going up the back of her neck like her skull was going to pop off and like someone was sitting on her chest. Legs were tingling, lips and tongue and face were numb. States now both legs were numb and face and tongue are still numb now.      Last echo 2019. States had a normal treadmill stress test in the past but not recently. Will consult cardiology if neuro w/u neg.      MRI brain ordered and neurology consulted. She sees Dr. Ronit Swift in [place] and [address] [place] [address] for brain tumor \"adenoma at my brainstem. \"  She gets migraines at times with this and recently was told she could take that up to twice daily -  topamax - for that.     Has ENT she sees , Dr. Karuna Rodriguez for stones in saliva glands.     Will recheck lactate after   Home later today with f/u to neurology outpatient for poss EMG going forward if sx persist. MRI unremarkable.  Start gabapentin.        ASSESSMENT:  1.  Panic attack with atypical noncardiac chest wall pain vs. TIA  TIA ruled out with likely migraine/anxiety as etiology  2.  Paresthesia. 3.  Chronic migraine with sudden headache. 4.  Hypertension. 5.  Pituitary microadenoma, which is nonsecreting. 6.  COPD. 7.  Tobacco dependence. 8.  Obesity with a BMI of 35.89 kg/m2. 9. Lactic acidosis     PLAN OF CARE:  The patient is admitted to Observation.  Telemetry  monitoring will be continued.  Neurology consult requested.  Nonnarcotic treatment for headache will be instituted.  Low-dose aspirin will be continued.  The patient's home dose of topiramate and other medications will be continued. MRI brain will be obtained to evaluate for pituitary apoplexy. IV hydration and re-eval lactate  - c-spine MRI pending  - MRI brain: stable appearance of brain and pituitary gland compared to 6/29/21.  No acute abnormality   - B12/folate normal   SPEP, TSH, KIERY   - may need EMG outpatient with neurology  - can start gabapentin low dose for neuropathies     Hypokalemia  - Replace potassium  - likely d/t diuretic   - restarted home oral K+ supplements     Continue current regimen/therapies. Monitor. Adjust medical regimen as appropriate.     Exam:      /87   Pulse 94   Temp 97.9 °F (36.6 °C) (Oral)   Resp 18   Ht 5' 2\" (1.575 m)   Wt 199 lb 8.3 oz (90.5 kg)   SpO2 96%   BMI 36.49 kg/m²   General appearance: No apparent distress, appears stated age and cooperative. HEENT: Pupils equal, round, and reactive to light. Conjunctivae/corneas clear. Neck: Supple, with full range of motion. No jugular venous distention. Trachea midline. Respiratory:  Normal respiratory effort. Clear to auscultation, bilaterally without Rales/Wheezes/Rhonchi. Cardiovascular: Regular rate and rhythm with normal S1/S2 without murmurs, rubs or gallops. Abdomen: Soft, non-tender, non-distended with normal bowel sounds. Musculoskelatal: No clubbing, cyanosis or edema bilaterally. Full range of motion without deformity. Skin: Skin color, texture, turgor normal.  No rashes or lesions. Neurologic:  Neurovascularly intact without any focal sensory/motor deficits. Cranial nerves: II-XII intact, grossly non-focal.  Psychiatric: Alert and oriented, thought content appropriate, normal insight        Consults:      IP CONSULT TO STROKE TEAM  IP CONSULT TO PHARMACY  PHARMACY TO CHANGE BASE FLUIDS  IP CONSULT TO NEUROLOGY     Diagnostic tests: Imaging:  MRI BRAIN W WO CONTRAST   Final Result   Stable appearance of the brain and pituitary gland compared to June 29, 2021.       no acute abnormality.           MRI CERVICAL SPINE WO CONTRAST   Final Result   1. Minimal spinal canal stenosis at C3-C4 and C4-C5. 2. Mild right neural foraminal narrowing at C6-C7. 3. Straightening of the normal cervical lordosis without spondylolisthesis.            XR CHEST PORTABLE   Final Result   Clear lungs.  Mild cardiac enlargement.  No acute cardiopulmonary abnormality.                   CTA HEAD NECK W CONTRAST Final Result   Unremarkable CT angiogram of the head and neck, with no evidence of vessel   dissection or irregularity, significant stenosis, or proximal intracranial   arterial occlusion.       Comment:       Reference per NASCET criteria for degree of stenosis:  Mild: Less than 50%   stenosis.  Moderate: 50-69% stenosis.  Severe: 70-94% stenosis. Near-occlusion: 95-99% stenosis.         CT HEAD WO CONTRAST   Final Result   No hemorrhage or mass identified.       Periventricular and scattered frontal parietal white matter disease, likely   due to small-vessel ischemic change, similar compared to prior       Results discussed with Camryn Cardenas by Marialuisa Murcia MD at 4:04 pm   on 7/5/2021

## 2021-08-06 NOTE — PROGRESS NOTES
Post-Discharge Transitional Care Management Services or Hospital Follow Up      Kapil Garcia   YOB: 1960    Date of Office Visit:  8/6/2021  Date of Hospital Admission: 7/5/21  Date of Hospital Discharge: 7/7/21  Readmission Risk Score(high >=14%.  Medium >=10%):Readmission Risk Score: 14      Care management risk score Rising risk (score 2-5) and Complex Care (Scores >=6): 3     Non face to face  following discharge, date last encounter closed (first attempt may have been earlier): *No documented post hospital discharge outreach found in the last 14 days *No documented post hospital discharge outreach found in the last 14 days    Call initiated 2 business days of discharge: *No response recorded in the last 14 days     Patient Active Problem List   Diagnosis    Asthma-chronic obstructive pulmonary disease overlap syndrome ([address]    Headache    S/P gastric bypass    Hypertension    Insomnia    Carpal tunnel syndrome of left wrist    Benign essential HTN    Decreased potassium in the blood    Chest pain    Internal hernia    Midsternal chest pain    Tobacco abuse    Chronic cough    Moderate persistent asthma without complication    Rash    Anxiety    GERD (gastroesophageal reflux disease)    Gastric bypass status for obesity    Obstructive sleep apnea    TIA (transient ischemic attack)       Allergies   Allergen Reactions    Shellfish Allergy Anaphylaxis    Shellfish-Derived Products Shortness Of Breath and Palpitations    Lisinopril      Dry cough, no lip swelling or resp. sxs       Medications listed as ordered at the time of discharge from hospital   Rick Eldridge   [facility] Medication Instructions ANTHONY:    Printed on:08/06/21 5585   Medication Information                      albuterol sulfate HFA (PROAIR HFA) 108 (90 Base) MCG/ACT inhaler  Inhale 2 puffs into the lungs every 6 hours as needed for Wheezing             amLODIPine (NORVASC) 10 MG tablet  TAKE 1 TABLET BY vomiting             Prenatal Vit-Fe Fumarate-FA (PRENATAL COMPLETE PO)  Take 1 tablet by mouth daily              pseudoephedrine (DECONGESTANT) 30 MG tablet  Take 1 tablet by mouth 3 times daily             topiramate (TOPAMAX) 25 MG tablet  Take 1 tablet daily             vitamin D (ERGOCALCIFEROL) 1.25 MG (13013 UT) CAPS capsule  TAKE 1 CAPSULE BY MOUTH EVERY 30 DAYS                   Medications marked \"taking\" at this time  Outpatient Medications Marked as Taking for the 8/6/21 encounter (Office Visit) with Amrit Sharma MD   Medication Sig Dispense Refill    azelastine (ASTELIN) 0.1 % nasal spray INSTILL 1 SPRAY IN EACH NOSTRIL 2 TIMES DAILY AS DIRECTED 1 Bottle 1    Misc. Devices (ROLLER WALKER) MISC 1 each by Does not apply route daily 1 each 0    fluticasone (FLONASE) 50 MCG/ACT nasal spray INHALE 1 SPRAY NASALLY EVERY DAY 1 Bottle 5    nicotine polacrilex (NICORETTE) 2 MG gum Use up to 6 sticks of gum a day for smoking 110 each 3    KLOR-CON M20 20 MEQ extended release tablet TAKE 1 TABLET BY MOUTH 2 TIMES DAILY 60 tablet 5    aspirin 81 MG EC tablet Take 1 tablet by mouth daily 30 tablet 3    gabapentin (NEURONTIN) 300 MG capsule Take 1 capsule by mouth 3 times daily for 30 days.  90 capsule 3    atorvastatin (LIPITOR) 40 MG tablet Take 1 tablet by mouth nightly 30 tablet 3    hydroCHLOROthiazide (HYDRODIURIL) 25 MG tablet TAKE 1 TABLET BY MOUTH EVERY DAY 30 tablet 5    amLODIPine (NORVASC) 10 MG tablet TAKE 1 TABLET BY MOUTH EVERY DAY 30 tablet 2    montelukast (SINGULAIR) 10 MG tablet TAKE 1 TABLET BY MOUTH EVERY DAY 30 tablet 5    pseudoephedrine (DECONGESTANT) 30 MG tablet Take 1 tablet by mouth 3 times daily 21 tablet 0    omeprazole (PRILOSEC) 40 MG delayed release capsule TAKE 1 CAPSULE BY MOUTH EVERY DAY 30 capsule 5    benzonatate (TESSALON) 100 MG capsule TAKE 1 CAPSULE BY MOUTH THREE TIMES A DAY AS NEEDED FOR COUGH 90 capsule 3    butalbital-acetaminophen-caffeine (FIORICET, ESGIC) -40 MG per tablet TAKE 1 TABLET BY MOUTH EVERY 4 HOURS AS NEEDED FOR PAIN 30 tablet 1    hydrALAZINE (APRESOLINE) 50 MG tablet Take 1 tablet by mouth 2 times daily 180 tablet 3    clotrimazole-betamethasone (LOTRISONE) 1-0.05 % cream APPLY TO AFFECTED AREA TWICE A DAY 15 g 1    topiramate (TOPAMAX) 25 MG tablet Take 1 tablet daily (Patient taking differently: Take 25 mg by mouth daily ) 30 tablet 2    Prenatal Vit-Fe Fumarate-FA (PRENATAL COMPLETE PO) Take 1 tablet by mouth daily       ondansetron (ZOFRAN) 4 MG tablet TAKE 1 TABLET BY MOUTH EVERY 12 HOURS AS NEEDED or nausea or vomiting 30 tablet 12    vitamin D (ERGOCALCIFEROL) 1.25 MG (18483 UT) CAPS capsule TAKE 1 CAPSULE BY MOUTH EVERY 30 DAYS 4 capsule 3    Nebulizers (NEBULIZER COMPRESSOR) MISC Use every 6 hours as needed with duoneb 1 each 0    budesonide (PULMICORT) 0.5 MG/2ML nebulizer suspension Take 2 mLs by nebulization 2 times daily (Patient taking differently: Take 500 mcg by nebulization 2 times daily as needed ) 60 ampule 3    Chlorhexidine Gluconate 2 % SOLN Apply to skin once daily prn 250 mL 1    albuterol sulfate HFA (PROAIR HFA) 108 (90 Base) MCG/ACT inhaler Inhale 2 puffs into the lungs every 6 hours as needed for Wheezing 1 Inhaler 5    EPINEPHrine (EPIPEN) 0.3 MG/0.3ML DELPHINE injection Use as directed for allergic reaction 2 Device 3        Medications patient taking as of now reconciled against medications ordered at time of hospital discharge: Yes    Chief Complaint   Patient presents with    Follow-Up from Hospital       HPI 65 y/o female known history of FEDERICO on CPAP, pit adenoma, hld, hypertension,  S/p bypass surgery years ago/[facility], who was recently hosp with bilateral leg pain and paresthesia of the legs , slurred speech, and left sided facial weakness. She did have multiple imaging studies while hosp. Did have repeat ER visits times  Two since her hosp visit for similar complaints.     Inpatient course: Discharge summary reviewed- see chart. Interval history/Current status:currently her sxs are continuing as outlined above. She has seen Dr Sandrita Sebastian neurologist recently in [address] And office visit since d/c. She does think her speech may be a little better. Review of Systems   Constitutional: Negative for activity change, appetite change, chills, diaphoresis, fatigue and fever. HENT: Negative for congestion, dental problem, drooling, ear discharge, ear pain, hearing loss, mouth sores, nosebleeds, postnasal drip, rhinorrhea, sinus pressure, sneezing, sore throat, tinnitus and trouble swallowing. Eyes: Negative for photophobia, pain, discharge, redness, itching and visual disturbance. Respiratory: Negative for apnea, cough, choking, chest tightness, shortness of breath, wheezing and stridor. Cardiovascular: Negative for chest pain, palpitations and leg swelling. Gastrointestinal: Negative for abdominal distention, blood in stool, constipation, diarrhea, nausea, rectal pain and vomiting. Genitourinary: Negative for decreased urine volume, difficulty urinating, dyspareunia, dysuria, enuresis, flank pain, frequency, genital sores, hematuria, menstrual problem, pelvic pain, urgency, vaginal bleeding and vaginal pain. Musculoskeletal: Negative for arthralgias, back pain, gait problem, joint swelling, myalgias, neck pain and neck stiffness. Skin: Negative for color change, pallor, rash and wound. Neurological: Negative for dizziness, tremors, seizures, syncope, facial asymmetry, speech difficulty, weakness, light-headedness, numbness and headaches. Hematological: Negative for adenopathy. Does not bruise/bleed easily. Psychiatric/Behavioral: Negative for agitation, behavioral problems, confusion, decreased concentration, dysphoric mood, hallucinations, self-injury, sleep disturbance and suicidal ideas. The patient is not nervous/anxious and is not hyperactive.         Vitals:    08/06/21 0840 08/06/21 0846   BP: (!) 144/92 130/87   Pulse: 88    Temp: 97.2 °F (36.2 °C)    TempSrc: Oral    SpO2: 98%    Weight: 198 lb (89.8 kg)      Body mass index is 36.21 kg/m². Wt Readings from Last 3 Encounters:   08/06/21 198 lb (89.8 kg)   07/22/21 196 lb (88.9 kg)   07/09/21 199 lb (90.3 kg)     BP Readings from Last 3 Encounters:   08/06/21 130/87   07/09/21 138/89   07/07/21 133/87       Physical Exam  Constitutional:       General: She is not in acute distress. Appearance: She is well-developed. She is not diaphoretic. HENT:      Head: Normocephalic and atraumatic. Right Ear: External ear normal.      Left Ear: External ear normal.      Nose: Nose normal.      Mouth/Throat:      Pharynx: No oropharyngeal exudate. Eyes:      General: No scleral icterus. Left eye: No discharge. Conjunctiva/sclera: Conjunctivae normal.      Pupils: Pupils are equal, round, and reactive to light. Neck:      Thyroid: No thyromegaly. Vascular: No JVD. Trachea: No tracheal deviation. Cardiovascular:      Rate and Rhythm: Normal rate and regular rhythm. Heart sounds: Normal heart sounds. No murmur heard. No friction rub. No gallop. Pulmonary:      Effort: Pulmonary effort is normal. No respiratory distress. Breath sounds: Normal breath sounds. No stridor. No wheezing or rales. Chest:      Chest wall: No tenderness. Abdominal:      General: Bowel sounds are normal. There is no distension. Palpations: Abdomen is soft. There is no mass. Tenderness: There is no abdominal tenderness. There is no guarding or rebound. Musculoskeletal:         General: No tenderness. Normal range of motion. Cervical back: Normal range of motion and neck supple. Lymphadenopathy:      Cervical: No cervical adenopathy. Skin:     General: Skin is warm and dry. Coloration: Skin is not pale. Findings: No erythema or rash.    Neurological:      Mental Status: She is alert and oriented to person, place, and time. Cranial Nerves: No cranial nerve deficit. Coordination: Coordination normal.      Deep Tendon Reflexes: Reflexes are normal and symmetric. Reflexes normal.   Psychiatric:         Behavior: Behavior normal.         Thought Content: Thought content normal.         Judgment: Judgment normal.             Assessment/Plan:  1. TIA (transient ischemic attack)  Imaging studies including MRI brain   Did not show stroke  She has been started on ASA, Statin   She has seen Dr Tika Tolbert, neurologist at [address] [place] [address] [id number] in hospital  And in office    2. Bilateral numbness and tingling of arms and legs  Unclear etiology  Poss neuropathy  Gabapentin 300 mg has been started        3. Slurred speech  She thinks this is improving    4. Migraine without status migrainosus, not intractable, unspecified migraine type  She remains on topiramate  Which is being tapered per neurologist    5.  Low gammaglobulin level ([facility] [address]  Labs have been ordered today

## 2021-08-06 NOTE — PATIENT INSTRUCTIONS
Discussed your diagnosis TIA with you  Poss peripheral neuropathy    Labs done today  Will call you with results

## 2021-08-08 ENCOUNTER — TELEPHONE (OUTPATIENT)
Dept: PRIMARY CARE CLINIC | Age: 61
End: 2021-08-08

## 2021-08-08 NOTE — TELEPHONE ENCOUNTER
Spoke with patient today  Long discussion with her    She has a ref form for apparently PT and Ot  Needs re do or completion  Asked her to have  drop this off  At office    Did discuss labs with her    Noted borderline protein elevation in the urine  Also some elevation of the C4    With abnormal SPEP   Would like for her to see  Renal or nephrologist for opinion  Offered to make referral  But she says :\" I will call you back\"

## 2021-08-09 ENCOUNTER — HOSPITAL ENCOUNTER (OUTPATIENT)
Dept: PHYSICAL THERAPY | Age: 61
Setting detail: THERAPIES SERIES
Discharge: HOME | End: 2021-08-09
Payer: COMMERCIAL

## 2021-08-09 PROCEDURE — 97116 GAIT TRAINING THERAPY: CPT

## 2021-08-09 PROCEDURE — 97530 THERAPEUTIC ACTIVITIES: CPT

## 2021-08-09 PROCEDURE — 97110 THERAPEUTIC EXERCISES: CPT

## 2021-08-09 RX ORDER — IBUPROFEN 800 MG/1
TABLET ORAL
Qty: 100 TABLET | Refills: 3 | Status: SHIPPED | OUTPATIENT
Start: 2021-08-09 | End: 2021-09-10

## 2021-08-09 NOTE — FLOWSHEET NOTE
[address] [facility]. [address]  Phone: [phone]   Fax:     [phone]    Physical Therapy Treatment Note/ Progress Report:   Date:  2021    Patient Name:  Chandra Torres    :  1960  MRN: [id number]    Pertinent Medical History:   HTN, Asthma, Hernia    Medical/Treatment Diagnosis Information:  · Diagnosis: R20.2 (ICD-10-CM) - Paresthesia  · Treatment Diagnosis: Decreased left UE and LE strength. Pain  Insurance/Certification information:  PT Insurance Information: [facility]- [place]  Physician Information:  Referring Practitioner: Dr. Scott Mention of care signed (Y/N): []  Yes [x]  No     Date of Patient follow up with Physician:      Progress Report: []  Yes  [x]  No     Date Range for reporting period:  Beginnin2021  Ending:     Progress report due (10 Rx/or 30 days whichever is less):      Recertification due (POC duration/ or 90 days whichever is less):      Visit # Insurance Allowable Auth required? Date Range    12 [x]  Yes [x]  No      Latex Allergy:  [x]NO      []YES  Preferred Language for Healthcare:   [x]English       []other:    Functional Scale:      Date assessed: at eval  Test:  Score:    Pain level:  15/10     History of Injury:  Patient stated complaint:Pt states on  she woke up with numbness in her entire body, from her face to the hands and toes. States went to the ER 3x with the episodes and the ER has not been able to find anything. States she can't walk now and has burning in both feet and lower legs, feels like she is walking on glass. States she gets some burning pain in left upper arm too. States gabapentin relieves pain but if she does not take it then has a lot of pain. States she has double vision at times and gets bad migraines but states she has had a brain tumor since September of last year.  She states she is going to see a neurologist on 7/21/21. SUBJECTIVE:    7/20/21: Pt states she is doing a little better, has been working hard  7/26/21: States she is doing better. She is walking more with the RW  7/28/21: Pt states she is doing a little better. States legs press hurt her hip  8/2/21: States her legs and feet were really burning over the weekend, still hurting today. States her neck is still hurting, getting pain down her left arm.   8/4/21: Pt states she is doing better today, walked in the store after last session and needed to use the scooter some. 8/9/21: Pt states she is having a lot of pain today in both her feet. States pain is 15/10    OBJECTIVE:       Plan:   Gait  LE strengthening training and transfers      RESTRICTIONS/PRECAUTIONS: Neuropathy and LE weakness affecting gait and balance    Exercises/Interventions:     Therapeutic Exercises (28156) Min: Resistance/Reps Notes/Cues   Nu Step X 5 min, level 2    // Bars Gait with RW 2 x 70 feet, with SBA around clinic  x 2 laps in // bars with no UE support and CGA  HR/TR x 10  Mini Squat 2 x 10  // bars   LAQ X 20 B 0# on Right   Leg Press     Incline 3 x 30\"                        Therapeutic Activities (27922) Min:                              Neuromuscular Re-ed (26294) Min:                                   Manual Intervention (57693) Min:                Modalities  Min:               HEP     Seated; LAQ, Marching, HR/TR  7/14   Standing Mini Squats, Hip Abd, HR/TR Hold for now           Other Therapeutic Activities:   8/9/21: Discussed with patient the possible causes of pain and importance of following up with neurologist and primary care MD to control pain so she can better tolerate therapy. Also discussed the benefits of aquatic therapy. Home Exercise Program:   Patient was instructed in the following for HEP: see above     . Patient verbalized/demonstrated understanding and was issued written handout.       Therapeutic Exercise and NMR EXR  [x] (50753) Provided verbal/tactile cueing for activities related to strengthening, flexibility, endurance, ROM for improvements in LE, proximal hip, and core control with self care, mobility, lifting, ambulation.  [] (55095) Provided verbal/tactile cueing for activities related to improving balance, coordination, kinesthetic sense, posture, motor skill, proprioception  to assist with LE, proximal hip, and core control in self care, mobility, lifting, ambulation and eccentric single leg control.  [address] and Therapeutic Activities:    [x] (92011 or 82781) Provided verbal/tactile cueing for activities related to improving balance, coordination, kinesthetic sense, posture, motor skill, proprioception and motor activation to allow for proper function of core, proximal hip and LE with self care and ADLs and functional mobility.   [] (57237) Gait Re-education- Provided training and instruction to the patient for proper LE, core and proximal hip recruitment and positioning and eccentric body weight control with ambulation re-education including up and down stairs     Home Exercise Program:    [x] (82323) Reviewed/Progressed HEP activities related to strengthening, flexibility, endurance, ROM of core, proximal hip and LE for functional self-care, mobility, lifting and ambulation/stair navigation   [] (47853)Reviewed/Progressed HEP activities related to improving balance, coordination, kinesthetic sense, posture, motor skill, proprioception of core, proximal hip and LE for self care, mobility, lifting, and ambulation/stair navigation      Manual Treatments:  PROM / STM / Oscillations-Mobs:  G-I, II, III, IV (PA's, Inf., Post.)  [x] (14370) Provided manual therapy to mobilize LE, proximal hip and/or LS spine soft tissue/joints for the purpose of modulating pain, promoting relaxation,  increasing ROM, reducing/eliminating soft tissue swelling/inflammation/restriction, improving soft tissue extensibility and allowing for proper ROM for normal function with self care, mobility, lifting and ambulation. If [facility] Please Indicate Time In/Out  CPT Code Time in Time out                         Approval Dates:  CPT Code Units Approved Units Used  Date Updated:                     Charges:  Timed Code Treatment Minutes: 46   Total Treatment Minutes: 46      [] EVAL (LOW) 51373 (typically 20 minutes face-to-face)  [] EVAL (MOD) 27421 (typically 30 minutes face-to-face)  [] EVAL (HIGH) 60608 (typically 45 minutes face-to-face)  [] RE-EVAL     [x] ZR(67208) x    [] Dry needle 1 or 2 Muscles (29536)  [] NMR (97348) x     [] Dry needle 3+ Muscles (27121)  [] Manual (78915) x     [] Ultrasound (16671) x  [x] TA (52780) x     [] Mech Traction (49520)  [] ES(attended) (93622)     [] ES (un) (61318):   [] Vasopump (15709)  [] Ionto (33529)   [x] Other:gait    GOALS:  Therapist goals for Patient:   Short Term Goals: To be achieved in: 2 weeks  1. Independent in HEP and progression per patient tolerance, in order to prevent re-injury. 2. Patient will have a decrease in pain to facilitate improvement in movement, function, and ADLs as indicated by Functional Deficits.     Long Term Goals: To be achieved in: 6 weeks  1. Disability index score of 25% or less for the (functional outcomes test) to assist with reaching prior level of function. 2. Patient will demonstrate increased AROM to [facility] to allow for proper joint functioning as indicated by patients Functional Deficits. 3. Patient will demonstrate an increase in postural awareness and control to allow for proper functional mobility as indicated by patients Functional Deficits. 4. Patient will demonstrate an increase in Strength to at least 4+/5 in B LE to allow for proper functional mobility as indicated by patients Functional Deficits. 5. Patient will return to functional activities including work and daily activities without increased symptoms or restriction.    6. Pt will report normal use of body so she can complete normal daily activities like exercises and work (patient specific functional goal)            (cut and paste from eval)    ASSESSMENT:  See eval    Treatment/Activity Tolerance:  [x] Patient tolerated treatment well [] Patient limited by fatique  [] Patient limited by pain  [] Patient limited by other medical complications  [] Other:     Overall Progression Towards Functional goals/ Treatment Progress Update:  [] Patient is progressing as expected towards functional goals listed. [x] Progression is slowed due to complexities/Impairments listed. [x] Progression has been slowed due to co-morbidities. [] Plan just implemented, too soon to assess goals progression <30days   [] Goals require adjustment due to lack of progress  [] Patient is not progressing as expected and requires additional follow up with physician  [] Other    Prognosis for POC: [x] Good [] Fair  [] Poor    Patient requires continued skilled intervention: [x] Yes  [] No        PLAN:   [x] Continue per plan of care [] Alter current plan (see comments)  [] Plan of care initiated [] Hold pending MD visit [] Discharge    Electronically signed by: Lalo Wood PT    Note: If patient does not return for scheduled/recommended follow up visits, this note will serve as a discharge from care along with the most recent update on progress.

## 2021-08-10 ENCOUNTER — HOSPITAL ENCOUNTER (OUTPATIENT)
Dept: SPEECH THERAPY | Age: 61
Setting detail: THERAPIES SERIES
End: 2021-08-10
Payer: COMMERCIAL

## 2021-08-12 ENCOUNTER — HOSPITAL ENCOUNTER (OUTPATIENT)
Dept: SPEECH THERAPY | Age: 61
Setting detail: THERAPIES SERIES
Discharge: HOME | End: 2021-08-12
Payer: COMMERCIAL

## 2021-08-12 ENCOUNTER — HOSPITAL ENCOUNTER (OUTPATIENT)
Dept: PHYSICAL THERAPY | Age: 61
Setting detail: THERAPIES SERIES
Discharge: HOME | End: 2021-08-12
Payer: COMMERCIAL

## 2021-08-12 PROCEDURE — 92507 TX SP LANG VOICE COMM INDIV: CPT

## 2021-08-12 PROCEDURE — 97116 GAIT TRAINING THERAPY: CPT

## 2021-08-12 PROCEDURE — 97110 THERAPEUTIC EXERCISES: CPT

## 2021-08-12 PROCEDURE — 97112 NEUROMUSCULAR REEDUCATION: CPT

## 2021-08-12 NOTE — FLOWSHEET NOTE
[facility]. [address]  Phone: [phone]   Fax:     [phone]    Physical Therapy Treatment Note/ Progress Report:   Date:  2021    Patient Name:  Inderjit Grider    :  1960  MRN: [id number]    Pertinent Medical History:   HTN, Asthma, Hernia    Medical/Treatment Diagnosis Information:  · Diagnosis: R20.2 (ICD-10-CM) - Paresthesia  · Treatment Diagnosis: Decreased left UE and LE strength. Pain  Insurance/Certification information:  PT Insurance Information: [facility]- [place]  Physician Information:  Referring Practitioner: Dr. Yuko Hammond of care signed (Y/N): []  Yes [x]  No     Date of Patient follow up with Physician:      Progress Report: []  Yes  [x]  No     Date Range for reporting period:  Beginnin2021  Ending:     Progress report due (10 Rx/or 30 days whichever is less):      Recertification due (POC duration/ or 90 days whichever is less):      Visit # Insurance Allowable Auth required? Date Range   8 12 [x]  Yes [x]  No      Latex Allergy:  [x]NO      []YES  Preferred Language for Healthcare:   [x]English       []other:    Functional Scale:      Date assessed: at eval  Test:  Score:    Pain level:  8/10     History of Injury:  Patient stated complaint:Pt states on  she woke up with numbness in her entire body, from her face to the hands and toes. States went to the ER 3x with the episodes and the ER has not been able to find anything. States she can't walk now and has burning in both feet and lower legs, feels like she is walking on glass. States she gets some burning pain in left upper arm too. States gabapentin relieves pain but if she does not take it then has a lot of pain. States she has double vision at times and gets bad migraines but states she has had a brain tumor since September of last year.  She states she is going to see a neurologist on 7/21/21. SUBJECTIVE:    7/20/21: Pt states she is doing a little better, has been working hard  7/26/21: States she is doing better. She is walking more with the RW  7/28/21: Pt states she is doing a little better. States legs press hurt her hip  8/2/21: States her legs and feet were really burning over the weekend, still hurting today. States her neck is still hurting, getting pain down her left arm.   8/4/21: Pt states she is doing better today, walked in the store after last session and needed to use the scooter some. 8/9/21: Pt states she is having a lot of pain today in both her feet. States pain is 15/10  8/12/21: States she is doing ok, pain has still been bad. OBJECTIVE:       Plan:   Gait  LE strengthening training and transfers      RESTRICTIONS/PRECAUTIONS: Neuropathy and LE weakness affecting gait and balance    Exercises/Interventions:     Therapeutic Exercises (53047) Min: Resistance/Reps Notes/Cues   Nu Step X 5 min, Level 2    // Bars Gait with RW 2 x 70 feet, with SBA around clinic  x 2 laps in // bars with no UE support and CGA  X 1 lap side stepping with UE support  HR/TR x 10  Mini Squat 2 x 10   Hip Abduction x 10 B // bars   LAQ X 20 B 1# on Right, 2# on left   Leg Press     Incline 3 x 30\"                        Therapeutic Activities (74261) Min:                              Neuromuscular Re-ed (36290) Min:                                   Manual Intervention (98093) Min:                Modalities  Min:               HEP     Seated; LAQ, Marching, HR/TR  7/14   Standing Mini Squats, Hip Abd, HR/TR Hold for now           Other Therapeutic Activities:   8/9/21: Discussed with patient the possible causes of pain and importance of following up with neurologist and primary care MD to control pain so she can better tolerate therapy. Also discussed the benefits of aquatic therapy. Home Exercise Program:   Patient was instructed in the following for HEP: see above     .  Patient verbalized/demonstrated understanding and was issued written handout. Therapeutic Exercise and NMR EXR  [x] (32277) Provided verbal/tactile cueing for activities related to strengthening, flexibility, endurance, ROM for improvements in LE, proximal hip, and core control with self care, mobility, lifting, ambulation.  [] (34453) Provided verbal/tactile cueing for activities related to improving balance, coordination, kinesthetic sense, posture, motor skill, proprioception  to assist with LE, proximal hip, and core control in self care, mobility, lifting, ambulation and eccentric single leg control.  [address] and Therapeutic Activities:    [x] (64686 or 47413) Provided verbal/tactile cueing for activities related to improving balance, coordination, kinesthetic sense, posture, motor skill, proprioception and motor activation to allow for proper function of core, proximal hip and LE with self care and ADLs and functional mobility.   [] (89634) Gait Re-education- Provided training and instruction to the patient for proper LE, core and proximal hip recruitment and positioning and eccentric body weight control with ambulation re-education including up and down stairs     Home Exercise Program:    [x] (34212) Reviewed/Progressed HEP activities related to strengthening, flexibility, endurance, ROM of core, proximal hip and LE for functional self-care, mobility, lifting and ambulation/stair navigation   [] (91483)Reviewed/Progressed HEP activities related to improving balance, coordination, kinesthetic sense, posture, motor skill, proprioception of core, proximal hip and LE for self care, mobility, lifting, and ambulation/stair navigation      Manual Treatments:  PROM / STM / Oscillations-Mobs:  G-I, II, III, IV (PA's, Inf., Post.)  [x] (81070) Provided manual therapy to mobilize LE, proximal hip and/or LS spine soft tissue/joints for the purpose of modulating pain, promoting relaxation,  increasing ROM, reducing/eliminating soft tissue swelling/inflammation/restriction, improving soft tissue extensibility and allowing for proper ROM for normal function with self care, mobility, lifting and ambulation. If [facility] Please Indicate Time In/Out  CPT Code Time in Time out                         Approval Dates:  CPT Code Units Approved Units Used  Date Updated:                     Charges:  Timed Code Treatment Minutes: 44   Total Treatment Minutes: 44      [] EVAL (LOW) 80471 (typically 20 minutes face-to-face)  [] EVAL (MOD) 48548 (typically 30 minutes face-to-face)  [] EVAL (HIGH) 15286 (typically 45 minutes face-to-face)  [] RE-EVAL     [x] EI(48229) x    [] Dry needle 1 or 2 Muscles (29561)  [x] NMR (36660) x     [] Dry needle 3+ Muscles (09113)  [] Manual (20212) x     [] Ultrasound (66992) x  [] TA (96345) x     [] Mech Traction (46923)  [] ES(attended) (37894)     [] ES (un) (24856):   [] Vasopump (26861)  [] Ionto (64065)   [x] Other:gait    GOALS:  Therapist goals for Patient:   Short Term Goals: To be achieved in: 2 weeks  1. Independent in HEP and progression per patient tolerance, in order to prevent re-injury. 2. Patient will have a decrease in pain to facilitate improvement in movement, function, and ADLs as indicated by Functional Deficits.     Long Term Goals: To be achieved in: 6 weeks  1. Disability index score of 25% or less for the (functional outcomes test) to assist with reaching prior level of function. 2. Patient will demonstrate increased AROM to [facility] to allow for proper joint functioning as indicated by patients Functional Deficits. 3. Patient will demonstrate an increase in postural awareness and control to allow for proper functional mobility as indicated by patients Functional Deficits. 4. Patient will demonstrate an increase in Strength to at least 4+/5 in B LE to allow for proper functional mobility as indicated by patients Functional Deficits.    5. Patient will return to functional activities including work and daily activities without increased symptoms or restriction. 6. Pt will report normal use of body so she can complete normal daily activities like exercises and work (patient specific functional goal)            (cut and paste from eval)    ASSESSMENT:  See eval    Treatment/Activity Tolerance:  [x] Patient tolerated treatment well [] Patient limited by fatique  [] Patient limited by pain  [] Patient limited by other medical complications  [] Other:     Overall Progression Towards Functional goals/ Treatment Progress Update:  [] Patient is progressing as expected towards functional goals listed. [x] Progression is slowed due to complexities/Impairments listed. [x] Progression has been slowed due to co-morbidities. [] Plan just implemented, too soon to assess goals progression <30days   [] Goals require adjustment due to lack of progress  [] Patient is not progressing as expected and requires additional follow up with physician  [] Other    Prognosis for POC: [x] Good [] Fair  [] Poor    Patient requires continued skilled intervention: [x] Yes  [] No        PLAN:   [x] Continue per plan of care [] Alter current plan (see comments)  [] Plan of care initiated [] Hold pending MD visit [] Discharge    Electronically signed by: Fay Pepper PT    Note: If patient does not return for scheduled/recommended follow up visits, this note will serve as a discharge from care along with the most recent update on progress.

## 2021-08-17 ENCOUNTER — HOSPITAL ENCOUNTER (OUTPATIENT)
Dept: SPEECH THERAPY | Age: 61
Setting detail: THERAPIES SERIES
End: 2021-08-17
Payer: COMMERCIAL

## 2021-08-19 ENCOUNTER — HOSPITAL ENCOUNTER (OUTPATIENT)
Dept: SPEECH THERAPY | Age: 61
Setting detail: THERAPIES SERIES
Discharge: HOME | End: 2021-08-19
Payer: COMMERCIAL

## 2021-08-19 PROCEDURE — 92507 TX SP LANG VOICE COMM INDIV: CPT

## 2021-08-19 NOTE — PROGRESS NOTES
Speech-Language Pathology  Daily Treatment Note    Date:  2021    Patient Name:  Kolton Valle    :  1960  MRN: [id number]  Diagnosis:    Dysarthria    -  Primary E38.6     Insurance/Certification information: R  Referring Physician:  JD Mcmillan  Plan of care signed (Y/N):  Y  Total visits:5    Progress Note: []  Yes  [x]  No  Next due by: Visit #10     Subjective: Pt cooperative and motivated. 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 Objective:   Goal 1: Pt will execute speech strategies in structured simple sentences with a model in >80% of trials  · : Training and model provided for easy onset, exhalation prior to phonation, and inflection at sentence level. Pt executes during structured sentence reading with occasional verbal cues/encouragement with mild-moderate telegraphic speech and pauses at onset and mid-sentence. · : reading sentences with easy onset, fluent speech and improved intonation without a model ~75% of trials; pt IND recalls strategies   · : reading/paraphrasing sentences in \"[place]\" after initial model with mild pausing and moderately slow rate of speech; hesitations minimal.  Will continue to address reading/paraphrasing at sentence level with medical topics to simulate work environment.   · : summary/readback of mock job-related calls (ie Code [employer]roke, [place], etc) ~80% fluent; primary difficulty at onset of sentence/utterance, then fluent once she initiates     Goal 2: Pt will improve fluent clear speech at simple sentence level in structured conversation in 70% of trials with mod verbal cues  · : Independent spontaneous fluent speech with proper intonation in simple structured conversation ~65% of trials without cues.  · 7/28: heavy focus on fluency and intonation in conversational speech: mild to moderate decreased rate, mild decreased inflection. Overall 80% fluent without cues. · 8/12: fluent at sentence level in conversation >70% IND; approx 90% with pausing, cues for strategies  · 8/19: simple sentences in phone conversation with MD office (ie rescheduling MD appt while in speech session): mild dysfluency and hesitation at sentence onsets, with ~80% fluent IND. 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 Impaired prosody/inflection at sentence level. · Pt reports abrupt onset ~3 weeks ago, and works as a . Pt desires to return to work but her current inability to fluently communicate impairs her ability to effectively perform her job duties.     Plan:   1-2x/wk    Timed Code Treatment: 0    Total Treatment Time:   45 speech therapy  [facility] [id number]    Signature:   Merissa Lowe MS, CCC-SLP #[id number]  Speech Language Pathologist

## 2021-08-23 ENCOUNTER — TELEPHONE (OUTPATIENT)
Dept: PRIMARY CARE CLINIC | Age: 61
End: 2021-08-23

## 2021-08-23 NOTE — TELEPHONE ENCOUNTER
----- Message from Carlos Eduardo Wakefield sent at 8/23/2021 11:09 AM EDT -----  Subject: Message to Provider    QUESTIONS  Information for Provider? Pt states she needs her medical records from Dr Marialuisa Matthews from Sept. 2020 to date. Medical records informed patient to get   her records from Dr Marialuisa Matthews  ---------------------------------------------------------------------------  --------------  [address]  What is the best way for the office to contact you? OK to leave message on   voicemail  Preferred Call Back Phone Number? [phone]  ---------------------------------------------------------------------------  --------------  SCRIPT ANSWERS  Relationship to Patient?  Self

## 2021-08-24 ENCOUNTER — HOSPITAL ENCOUNTER (OUTPATIENT)
Dept: SPEECH THERAPY | Age: 61
Setting detail: THERAPIES SERIES
Discharge: HOME | End: 2021-08-24
Payer: COMMERCIAL

## 2021-08-24 ENCOUNTER — HOSPITAL ENCOUNTER (OUTPATIENT)
Dept: PHYSICAL THERAPY | Age: 61
Setting detail: THERAPIES SERIES
Discharge: HOME | End: 2021-08-24
Payer: COMMERCIAL

## 2021-08-24 PROCEDURE — 97116 GAIT TRAINING THERAPY: CPT

## 2021-08-24 PROCEDURE — 92507 TX SP LANG VOICE COMM INDIV: CPT

## 2021-08-24 PROCEDURE — 97110 THERAPEUTIC EXERCISES: CPT

## 2021-08-24 PROCEDURE — 97530 THERAPEUTIC ACTIVITIES: CPT

## 2021-08-24 NOTE — FLOWSHEET NOTE
[address] [facility] [place]. [address]  Phone: [phone]   Fax:     [phone]    Physical Therapy Treatment Note/ Progress Report:   Date:  2021    Patient Name:  Kolton Valle    :  1960  MRN: [id number]    Pertinent Medical History:   HTN, Asthma, Hernia    Medical/Treatment Diagnosis Information:  · Diagnosis: R20.2 (ICD-10-CM) - Paresthesia  · Treatment Diagnosis: Decreased left UE and LE strength. Pain  Insurance/Certification information:  PT Insurance Information: [facility]- [place]  Physician Information:  Referring Practitioner: Dr. Jordana Connelly of care signed (Y/N): []  Yes [x]  No     Date of Patient follow up with Physician:      Progress Report: []  Yes  [x]  No     Date Range for reporting period:  Beginnin2021  Ending:     Progress report due (10 Rx/or 30 days whichever is less):      Recertification due (POC duration/ or 90 days whichever is less):      Visit # Insurance Allowable Auth required? Date Range   9 12 [x]  Yes [x]  No      Latex Allergy:  [x]NO      []YES  Preferred Language for Healthcare:   [x]English       []other:    Functional Scale:      Date assessed: at eval  Test:  Score:    Pain level:  10/10     History of Injury:  Patient stated complaint:Pt states on  she woke up with numbness in her entire body, from her face to the hands and toes. States went to the ER 3x with the episodes and the ER has not been able to find anything. States she can't walk now and has burning in both feet and lower legs, feels like she is walking on glass. States she gets some burning pain in left upper arm too. States gabapentin relieves pain but if she does not take it then has a lot of pain. States she has double vision at times and gets bad migraines but states she has had a brain tumor since September of last year.  She states she is going to see a neurologist on 7/21/21. SUBJECTIVE:    8/4/21: Pt states she is doing better today, walked in the store after last session and needed to use the scooter some. 8/9/21: Pt states she is having a lot of pain today in both her feet. States pain is 15/10  8/12/21: States she is doing ok, pain has still been bad.   8/24/21: Pt states she is doing better. OBJECTIVE:       Plan:   Gait  LE strengthening training and transfers      RESTRICTIONS/PRECAUTIONS: Neuropathy and LE weakness affecting gait and balance    Exercises/Interventions:     Therapeutic Exercises (38814) Min: Resistance/Reps Notes/Cues   Nu Step X 6 min, Level 4    // Bars Gait with RW 2 x 70 feet, with SBA around clinic  x 2 laps in // bars with no UE support and CGA  X 1 lap side stepping with UE support  HR/TR  x 10 B  Mini Squat 2 x 10   Hip Abduction x 10 B  Step 6 Inch  Fwd x 10 R, Toe tap x 10 B // bars   LAQ  1# on Right, 2# on left   Leg Press 30#  x 20 R only    Incline 3 x 30\"                        Therapeutic Activities (24821) Min:                              Neuromuscular Re-ed (58002) Min:                                   Manual Intervention (03804) Min:                Modalities  Min:               HEP     Seated; LAQ, Marching, HR/TR  7/14   Standing Mini Squats, Hip Abd, HR/TR Hold for now           Other Therapeutic Activities:   8/9/21: Discussed with patient the possible causes of pain and importance of following up with neurologist and primary care MD to control pain so she can better tolerate therapy. Also discussed the benefits of aquatic therapy. Home Exercise Program:   Patient was instructed in the following for HEP: see above     . Patient verbalized/demonstrated understanding and was issued written handout.       Therapeutic Exercise and NMR EXR  [x] (15267) Provided verbal/tactile cueing for activities related to strengthening, flexibility, endurance, ROM for improvements in LE, proximal hip, and core control with self care, mobility, lifting, ambulation.  [] (02657) Provided verbal/tactile cueing for activities related to improving balance, coordination, kinesthetic sense, posture, motor skill, proprioception  to assist with LE, proximal hip, and core control in self care, mobility, lifting, ambulation and eccentric single leg control. [address] and Therapeutic Activities:    [x] (08742 or 50428) Provided verbal/tactile cueing for activities related to improving balance, coordination, kinesthetic sense, posture, motor skill, proprioception and motor activation to allow for proper function of core, proximal hip and LE with self care and ADLs and functional mobility.   [] (98194) Gait Re-education- Provided training and instruction to the patient for proper LE, core and proximal hip recruitment and positioning and eccentric body weight control with ambulation re-education including up and down stairs     Home Exercise Program:    [x] (60452) Reviewed/Progressed HEP activities related to strengthening, flexibility, endurance, ROM of core, proximal hip and LE for functional self-care, mobility, lifting and ambulation/stair navigation   [] (36939)Reviewed/Progressed HEP activities related to improving balance, coordination, kinesthetic sense, posture, motor skill, proprioception of core, proximal hip and LE for self care, mobility, lifting, and ambulation/stair navigation      Manual Treatments:  PROM / STM / Oscillations-Mobs:  G-I, II, III, IV (PA's, Inf., Post.)  [x] (83531) Provided manual therapy to mobilize LE, proximal hip and/or LS spine soft tissue/joints for the purpose of modulating pain, promoting relaxation,  increasing ROM, reducing/eliminating soft tissue swelling/inflammation/restriction, improving soft tissue extensibility and allowing for proper ROM for normal function with self care, mobility, lifting and ambulation.      If BW Please Indicate Time In/Out  CPT Code Time in Time out                         Approval Dates:  CPT Code Units Approved Units Used  Date Updated:                     Charges:  Timed Code Treatment Minutes: 44   Total Treatment Minutes: 44      [] EVAL (LOW) 87261 (typically 20 minutes face-to-face)  [] EVAL (MOD) 43283 (typically 30 minutes face-to-face)  [] EVAL (HIGH) 32716 (typically 45 minutes face-to-face)  [] RE-EVAL     [x] ZX(62714) x    [] Dry needle 1 or 2 Muscles (79829)  [] NMR (92856) x     [] Dry needle 3+ Muscles (63546)  [] Manual (29290) x     [] Ultrasound (98017) x  [x] TA (62701) x     [] Mech Traction (81959)  [] ES(attended) (12447)     [] ES (un) (16787):   [] Vasopump (38258)  [] Ionto (00003)   [x] Other:gait    GOALS:  Therapist goals for Patient:   Short Term Goals: To be achieved in: 2 weeks  1. Independent in HEP and progression per patient tolerance, in order to prevent re-injury. 2. Patient will have a decrease in pain to facilitate improvement in movement, function, and ADLs as indicated by Functional Deficits.     Long Term Goals: To be achieved in: 6 weeks  1. Disability index score of 25% or less for the (functional outcomes test) to assist with reaching prior level of function. 2. Patient will demonstrate increased AROM to [facility] to allow for proper joint functioning as indicated by patients Functional Deficits. 3. Patient will demonstrate an increase in postural awareness and control to allow for proper functional mobility as indicated by patients Functional Deficits. 4. Patient will demonstrate an increase in Strength to at least 4+/5 in B LE to allow for proper functional mobility as indicated by patients Functional Deficits. 5. Patient will return to functional activities including work and daily activities without increased symptoms or restriction.    6. Pt will report normal use of body so she can complete normal daily activities like exercises and work (patient specific functional goal)            (cut and paste from eval)    ASSESSMENT:  See eval    Treatment/Activity Tolerance:  [x] Patient tolerated treatment well [] Patient limited by fatique  [] Patient limited by pain  [] Patient limited by other medical complications  [] Other:     Overall Progression Towards Functional goals/ Treatment Progress Update:  [] Patient is progressing as expected towards functional goals listed. [x] Progression is slowed due to complexities/Impairments listed. [x] Progression has been slowed due to co-morbidities. [] Plan just implemented, too soon to assess goals progression <30days   [] Goals require adjustment due to lack of progress  [] Patient is not progressing as expected and requires additional follow up with physician  [] Other    Prognosis for POC: [x] Good [] Fair  [] Poor    Patient requires continued skilled intervention: [x] Yes  [] No        PLAN:   [x] Continue per plan of care [] Alter current plan (see comments)  [] Plan of care initiated [] Hold pending MD visit [] Discharge    Electronically signed by: Marlene Velazquez PT    Note: If patient does not return for scheduled/recommended follow up visits, this note will serve as a discharge from care along with the most recent update on progress.

## 2021-08-24 NOTE — PROGRESS NOTES
Goal 2: Pt will improve fluent clear speech at simple sentence level in structured conversation in 70% of trials with mod verbal cues  · 7/26: Independent spontaneous fluent speech with proper intonation in simple structured conversation ~65% of trials without cues. · 7/28: heavy focus on fluency and intonation in conversational speech: mild to moderate decreased rate, mild decreased inflection. Overall 80% fluent without cues. · 8/12: fluent at sentence level in conversation >70% IND; approx 90% with pausing, cues for strategies  · 8/19: simple sentences in phone conversation with MD office (ie rescheduling MD appt while in speech session): mild dysfluency and hesitation at sentence onsets, with ~80% fluent IND. In office simple sentences in structured conversation: >75% with occ cues  · 8/24: scripting simple and complex sentences for job related phone calls ~80% fluent and easy onset; occasional cues      Assessment:   Speech Diagnosis: Variable mild to moderate motor speech impairment with dysarthric and dysfluent features, characterized by occasionally rushed strings of words, variable articulatory precision, mildly impaired onset/initiation of phrases and sentences. Impaired prosody/inflection at sentence level. · Pt reports abrupt onset ~3 weeks ago, and works as a . Pt desires to return to work but her current inability to fluently communicate impairs her ability to effectively perform her job duties.     Plan:   1-2x/wk    Timed Code Treatment: 0    Total Treatment Time:   45 speech therapy  [facility] [id number]    Signature:   Azam Rodgers MS, CCC-SLP #[id number]  Speech Language Pathologist

## 2021-08-25 ENCOUNTER — HOSPITAL ENCOUNTER (OUTPATIENT)
Dept: PHYSICAL THERAPY | Age: 61
Setting detail: THERAPIES SERIES
Discharge: HOME | End: 2021-08-25
Payer: COMMERCIAL

## 2021-08-25 ENCOUNTER — HOSPITAL ENCOUNTER (OUTPATIENT)
Dept: SPEECH THERAPY | Age: 61
Setting detail: THERAPIES SERIES
Discharge: HOME | End: 2021-08-25
Payer: COMMERCIAL

## 2021-08-25 PROCEDURE — 92507 TX SP LANG VOICE COMM INDIV: CPT

## 2021-08-25 PROCEDURE — 97116 GAIT TRAINING THERAPY: CPT

## 2021-08-25 PROCEDURE — 97110 THERAPEUTIC EXERCISES: CPT

## 2021-08-25 RX ORDER — AZELASTINE 1 MG/ML
SPRAY, METERED NASAL
Qty: 1 BOTTLE | Refills: 1 | Status: SHIPPED | OUTPATIENT
Start: 2021-08-25 | End: 2021-09-20

## 2021-08-25 NOTE — PROGRESS NOTES
Speech-Language Pathology  Daily Treatment Note    Date:  2021    Patient Name:  Felice Johnson    :  1960  MRN: [id number]  Diagnosis:    Dysarthria    -  Primary J87.4     Insurance/Certification information: R  Referring Physician:  JD Marquez  Plan of care signed (Y/N):  Y  Total visits:7    Progress Note: []  Yes  [x]  No  Next due by: Visit #10     Subjective: Pt cooperative and motivated. Pt reports mildly improved speech over past 1-2 weeks. 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 She acknowledges that 45 minutes of talking during a speech therapy session is fatiguing. Objective:   Goal 1: Pt will execute speech strategies in structured simple sentences with a model in >80% of trials  · : Training and model provided for easy onset, exhalation prior to phonation, and inflection at sentence level. Pt executes during structured sentence reading with occasional verbal cues/encouragement with mild-moderate telegraphic speech and pauses at onset and mid-sentence. · : reading sentences with easy onset, fluent speech and improved intonation without a model ~75% of trials; pt IND recalls strategies   · : reading/paraphrasing sentences in \"[place]\" after initial model with mild pausing and moderately slow rate of speech; hesitations minimal.  Will continue to address reading/paraphrasing at sentence level with medical topics to simulate work environment.   · : summary/readback of mock job-related calls (ie [employer], [place], etc) ~80% fluent; primary difficulty at onset of sentence/utterance, then fluent once she initiates  · : Structured conversation/script for job related phone calls: execution of easy onset, pausing, breathing and rephrasing to facilitate fluency with VC x2 (otherwise IND) with >85% success  · 8/25: GOAL MET; S/U to IND with execution of speech strategies without a model     Goal 2: Pt will improve fluent clear speech at simple sentence level in structured conversation in 70% of trials with mod verbal cues  · 7/26: Independent spontaneous fluent speech with proper intonation in simple structured conversation ~65% of trials without cues. · 7/28: heavy focus on fluency and intonation in conversational speech: mild to moderate decreased rate, mild decreased inflection. Overall 80% fluent without cues. · 8/12: fluent at sentence level in conversation >70% IND; approx 90% with pausing, cues for strategies  · 8/19: simple sentences in phone conversation with MD office (ie rescheduling MD appt while in speech session): mild dysfluency and hesitation at sentence onsets, with ~80% fluent IND. In office simple sentences in structured conversation: >75% with occ cues  · 8/24: scripting simple and complex sentences for job related phone calls ~80% fluent and easy onset; occasional cues   · 8/25: GOAL UPGRADED TO COMPLEX SENTENCES/CONVERSATION: fluency in general conversation ~90%; scripting at sentence level (simulated job calls) >85% w occasional cues    Assessment:   Speech Diagnosis: Variable mild to moderate motor speech impairment with dysarthric and dysfluent features, characterized by occasionally rushed strings of words, variable articulatory precision, mildly impaired onset/initiation of phrases and sentences. Impaired prosody/inflection at sentence level. · Pt reports abrupt onset ~3 weeks ago, and works as a . Pt desires to return to work but her current inability to fluently communicate impairs her ability to effectively perform her job duties.     Plan:   1x/wk    Timed Code Treatment: 0    Total Treatment Time:   45 speech therapy  CPT 55034    Signature:   Lori Mueller MS Ariel, CCC-SLP #[id number]  Speech Language Pathologist

## 2021-09-01 ENCOUNTER — APPOINTMENT (OUTPATIENT)
Dept: PHYSICAL THERAPY | Age: 61
End: 2021-09-01
Payer: COMMERCIAL

## 2021-09-01 ENCOUNTER — HOSPITAL ENCOUNTER (OUTPATIENT)
Dept: SPEECH THERAPY | Age: 61
Setting detail: THERAPIES SERIES
Discharge: HOME | End: 2021-09-01
Payer: COMMERCIAL

## 2021-09-01 ENCOUNTER — HOSPITAL ENCOUNTER (OUTPATIENT)
Dept: PHYSICAL THERAPY | Age: 61
Setting detail: THERAPIES SERIES
Discharge: HOME | End: 2021-09-01
Payer: COMMERCIAL

## 2021-09-01 PROCEDURE — 97530 THERAPEUTIC ACTIVITIES: CPT

## 2021-09-01 PROCEDURE — 97116 GAIT TRAINING THERAPY: CPT

## 2021-09-01 PROCEDURE — 92507 TX SP LANG VOICE COMM INDIV: CPT

## 2021-09-01 PROCEDURE — 97110 THERAPEUTIC EXERCISES: CPT

## 2021-09-01 NOTE — FLOWSHEET NOTE
[address] [facility] [place]. [address]  Phone: [phone]   Fax:     [phone]    Physical Therapy Treatment Note/ Progress Report:   Date:  2021    Patient Name:  Eloina Costa    :  1960  MRN: [id number]    Pertinent Medical History:   HTN, Asthma, Hernia    Medical/Treatment Diagnosis Information:  · Diagnosis: R20.2 (ICD-10-CM) - Paresthesia  · Treatment Diagnosis: Decreased left UE and LE strength. Pain  Insurance/Certification information:  PT Insurance Information: R- [place]  Physician Information:  Referring Practitioner: Dr. Sarah Gustafson of care signed (Y/N): []  Yes [x]  No     Date of Patient follow up with Physician:      Progress Report: []  Yes  [x]  No     Date Range for reporting period:  Beginnin2021  Ending:     Progress report due (10 Rx/or 30 days whichever is less):      Recertification due (POC duration/ or 90 days whichever is less):      Visit # Insurance Allowable Auth required? Date Range   11 12 [x]  Yes [x]  No      Latex Allergy:  [x]NO      []YES  Preferred Language for Healthcare:   [x]English       []other:    Functional Scale:      Date assessed: at eval  Test:  Score:    Pain level:  10/10     History of Injury:  Patient stated complaint:Pt states on  she woke up with numbness in her entire body, from her face to the hands and toes. States went to the ER 3x with the episodes and the ER has not been able to find anything. States she can't walk now and has burning in both feet and lower legs, feels like she is walking on glass. States she gets some burning pain in left upper arm too. States gabapentin relieves pain but if she does not take it then has a lot of pain. States she has double vision at times and gets bad migraines but states she has had a brain tumor since September of last year.  She states she is going to see a neurologist on 7/21/21. SUBJECTIVE:    8/4/21: Pt states she is doing better today, walked in the store after last session and needed to use the scooter some. 8/9/21: Pt states she is having a lot of pain today in both her feet. States pain is 15/10  8/12/21: States she is doing ok, pain has still been bad.   8/24/21: Pt states she is doing better. 8/25/21: States both feet were burning after last session. 9/1/21: States she is doing a little better    OBJECTIVE:       Plan:   Gait  LE strengthening training and transfers      RESTRICTIONS/PRECAUTIONS: Neuropathy and LE weakness affecting gait and balance    Exercises/Interventions:     Therapeutic Exercises (66208) Min: Resistance/Reps Notes/Cues   Nu Step X 6 min, Level 4    // Bars Gait with RW x 50  feet, with SBA around clinic  x 2 laps in // bars with no UE support and CGA  X 1 lap side stepping with UE support  HR/TR  2 x 10 B  Mini Squat 2 x 10 for 10 sec. Hip Abduction x 10 B  Step 6 Inch  Fwd x 3 R, x 2 L,  // bars   LAQ X 10 B 1# on Right, 2# on left   Leg Press 30#  2 x 15 R   15# x 15 L    Incline 3 x 30\"                        Therapeutic Activities (93422) Min:                              Neuromuscular Re-ed (54713) Min:                                   Manual Intervention (44017) Min:                Modalities  Min:               HEP     Seated; LAQ, Marching, HR/TR  7/14   Standing Mini Squats, Hip Abd, HR/TR Hold for now           Other Therapeutic Activities:   8/9/21: Discussed with patient the possible causes of pain and importance of following up with neurologist and primary care MD to control pain so she can better tolerate therapy. Also discussed the benefits of aquatic therapy. Home Exercise Program:   Patient was instructed in the following for HEP: see above     . Patient verbalized/demonstrated understanding and was issued written handout.       Therapeutic Exercise and NMR EXR  [x] (30259) Provided verbal/tactile cueing for activities related to strengthening, flexibility, endurance, ROM for improvements in LE, proximal hip, and core control with self care, mobility, lifting, ambulation.  [] (17159) Provided verbal/tactile cueing for activities related to improving balance, coordination, kinesthetic sense, posture, motor skill, proprioception  to assist with LE, proximal hip, and core control in self care, mobility, lifting, ambulation and eccentric single leg control.  [address] and Therapeutic Activities:    [x] (85188 or 16046) Provided verbal/tactile cueing for activities related to improving balance, coordination, kinesthetic sense, posture, motor skill, proprioception and motor activation to allow for proper function of core, proximal hip and LE with self care and ADLs and functional mobility.   [] (87395) Gait Re-education- Provided training and instruction to the patient for proper LE, core and proximal hip recruitment and positioning and eccentric body weight control with ambulation re-education including up and down stairs     Home Exercise Program:    [x] (56588) Reviewed/Progressed HEP activities related to strengthening, flexibility, endurance, ROM of core, proximal hip and LE for functional self-care, mobility, lifting and ambulation/stair navigation   [] (93994)Reviewed/Progressed HEP activities related to improving balance, coordination, kinesthetic sense, posture, motor skill, proprioception of core, proximal hip and LE for self care, mobility, lifting, and ambulation/stair navigation      Manual Treatments:  PROM / STM / Oscillations-Mobs:  G-I, II, III, IV (PA's, Inf., Post.)  [x] (92996) Provided manual therapy to mobilize LE, proximal hip and/or LS spine soft tissue/joints for the purpose of modulating pain, promoting relaxation,  increasing ROM, reducing/eliminating soft tissue swelling/inflammation/restriction, improving soft tissue extensibility and allowing for proper ROM for normal function with self care, mobility, exercises and work (patient specific functional goal)            (cut and paste from eval)    ASSESSMENT:   Pt is showing improvement with transfers and gait, requiring SBA. Pt is limited in progress the past few weeks due to pain affecting ability to do exercises    Treatment/Activity Tolerance:  [x] Patient tolerated treatment well [] Patient limited by fatique  [] Patient limited by pain  [] Patient limited by other medical complications  [] Other:     Overall Progression Towards Functional goals/ Treatment Progress Update:  [] Patient is progressing as expected towards functional goals listed. [x] Progression is slowed due to complexities/Impairments listed. [x] Progression has been slowed due to co-morbidities. [] Plan just implemented, too soon to assess goals progression <30days   [] Goals require adjustment due to lack of progress  [] Patient is not progressing as expected and requires additional follow up with physician  [] Other    Prognosis for POC: [x] Good [] Fair  [] Poor    Patient requires continued skilled intervention: [x] Yes  [] No        PLAN:   [x] Continue per plan of care [] Alter current plan (see comments)  [] Plan of care initiated [] Hold pending MD visit [] Discharge    Electronically signed by: Emmie Sheikh, PT    Note: If patient does not return for scheduled/recommended follow up visits, this note will serve as a discharge from care along with the most recent update on progress.

## 2021-09-01 NOTE — PROGRESS NOTES
Speech-Language Pathology  Daily Treatment Note    Date:  2021    Patient Name:  Bassem Little    :  1960  MRN: [id number]  Diagnosis:    Dysarthria    -  Primary K09.7     Insurance/Certification information: R  Referring Physician:  JD Carranza  Plan of care signed (Y/N):  Y  Total visits:8    Progress Note: []  Yes  [x]  No  Next due by: Visit #10     Subjective: Pt cooperative and motivated. Pt reports frequent conversation with family members over phone and slowly improving speech fluency in conversation, but remains slow and stuttered at times; she is seriously reconsidering her ability to return to work due to concerns re: high paced speaking environment. SLP discussed progress with pt, including her ability to recognize speech dysfluency and execute speech strategies with minimal cueing; reviewed insurance guidelines and requirements. She reports strong desire to continue therapy: \"They don't want to make me mad. ..the gordon that be. I need (speech). \"  Pt indicated plan to continue skilled speech \"until it gets back to normal.\"    Objective:   Goal 1: Pt will execute speech strategies in structured simple sentences with a model in >80% of trials  · : Training and model provided for easy onset, exhalation prior to phonation, and inflection at sentence level. Pt executes during structured sentence reading with occasional verbal cues/encouragement with mild-moderate telegraphic speech and pauses at onset and mid-sentence. · : reading sentences with easy onset, fluent speech and improved intonation without a model ~75% of trials; pt IND recalls strategies   · : reading/paraphrasing sentences in \"[place]\" after initial model with mild pausing and moderately slow rate of speech; hesitations minimal.  Will continue to address reading/paraphrasing at sentence level with medical topics to simulate work environment.   · : summary/readback of mock job-related calls (ie Code Stroke, [place], etc) ~80% fluent; primary difficulty at onset of sentence/utterance, then fluent once she initiates  · 8/24: Structured conversation/script for job related phone calls: execution of easy onset, pausing, breathing and rephrasing to facilitate fluency with VC x2 (otherwise IND) with >85% success  · 8/25: GOAL MET; S/U to IND with execution of speech strategies without a model   · 9/1: CONTINUES TO MEET GOAL:  IND to modified IND for execution of speech strategies in conversation    Goal 2: Pt will improve fluent clear speech at simple sentence level in structured conversation in 70% of trials with mod verbal cues  · 7/26: Independent spontaneous fluent speech with proper intonation in simple structured conversation ~65% of trials without cues. · 7/28: heavy focus on fluency and intonation in conversational speech: mild to moderate decreased rate, mild decreased inflection. Overall 80% fluent without cues. · 8/12: fluent at sentence level in conversation >70% IND; approx 90% with pausing, cues for strategies  · 8/19: simple sentences in phone conversation with MD office (ie rescheduling MD appt while in speech session): mild dysfluency and hesitation at sentence onsets, with ~80% fluent IND. In office simple sentences in structured conversation: >75% with occ cues  · 8/24: scripting simple and complex sentences for job related phone calls ~80% fluent and easy onset; occasional cues   · 8/25: GOAL UPGRADED TO COMPLEX SENTENCES/CONVERSATION: fluency in general conversation ~90%; scripting at sentence level (simulated job calls) >85% w occasional cues  · 9/1: complex sentences and complex conversation for ~30 min: approx 90% fluency with no verbal cues in topic-directed conversation. Occasional slow deliberate speech which pt is able to recognize and repair. Functional articulation noted.      Assessment:   Initial Speech Diagnosis form 7/20/2021: Variable mild to moderate motor speech impairment with dysarthric and dysfluent features, characterized by occasionally rushed strings of words, variable articulatory precision, mildly impaired onset/initiation of phrases and sentences. Impaired prosody/inflection at sentence level. · Pt reports abrupt onset ~3 weeks ago, and works as a . Pt desires to return to work but her current inability to fluently communicate impairs her ability to effectively perform her job duties. 9/1 update:  Minimal to mild motor speech impairment with occasional to mild dysfluent features in lengthy sentences and complex conversation; resolved articulatory imprecision since initial assessment. Prolonged/hesitant initiation of sentences in ~10% of complex spontaneous utterances, with improved self-recognition and execution of speech strategies to achieve success.   Pt acknowledges slow steady improvement but reports she is not back to normal.     Plan:   1x/wk    Timed Code Treatment: 0    Total Treatment Time:   45 speech therapy  [facility], [place] [address]    Signature:   Melyssa Eddy MS, CCC-SLP #[id number]  Speech Language Pathologist

## 2021-09-02 ENCOUNTER — APPOINTMENT (OUTPATIENT)
Dept: PHYSICAL THERAPY | Age: 61
End: 2021-09-02
Payer: COMMERCIAL

## 2021-09-03 ENCOUNTER — TELEPHONE (OUTPATIENT)
Dept: FAMILY MEDICINE CLINIC | Age: 61
End: 2021-09-03

## 2021-09-03 ENCOUNTER — OFFICE VISIT (OUTPATIENT)
Dept: PRIMARY CARE CLINIC | Age: 61
End: 2021-09-03
Payer: COMMERCIAL

## 2021-09-03 VITALS
WEIGHT: 193 LBS | SYSTOLIC BLOOD PRESSURE: 111 MMHG | DIASTOLIC BLOOD PRESSURE: 78 MMHG | HEIGHT: 62 IN | TEMPERATURE: 97.2 F | HEART RATE: 76 BPM | OXYGEN SATURATION: 99 % | BODY MASS INDEX: 35.51 KG/M2

## 2021-09-03 DIAGNOSIS — G45.9 TIA (TRANSIENT ISCHEMIC ATTACK): Primary | ICD-10-CM

## 2021-09-03 DIAGNOSIS — R20.2 BILATERAL LEG PARESTHESIA: ICD-10-CM

## 2021-09-03 DIAGNOSIS — M79.601 BILATERAL ARM PAIN: ICD-10-CM

## 2021-09-03 DIAGNOSIS — M79.602 BILATERAL ARM PAIN: ICD-10-CM

## 2021-09-03 DIAGNOSIS — H90.3 SENSORINEURAL HEARING LOSS (SNHL) OF BOTH EARS: ICD-10-CM

## 2021-09-03 DIAGNOSIS — R29.898 BILATERAL LEG WEAKNESS: ICD-10-CM

## 2021-09-03 DIAGNOSIS — R47.81 SLURRED SPEECH: ICD-10-CM

## 2021-09-03 PROCEDURE — 99213 OFFICE O/P EST LOW 20 MIN: CPT | Performed by: FAMILY MEDICINE

## 2021-09-03 NOTE — PROGRESS NOTES
CC 1 month fu    hpi 65 y/o female who is here to fu  Following sxs  Slurred speech, left sided facial/body numbness    Bilateral pain/numbness of the lower extremities  Onset after mri head  6/29/21, worse since 7/5/2021    She is now having pain bilateral in the upper extremities  Onset after emg done 8/20/21    Current disability from July 5, 2021 - thru sept 6, 2021    Extent  thru 10/18/21 then re evaluate    She seems Dr Patric Espinoza who ordered referral to [facility]  For arm pain    She has also had recent hearing WNL sloping to  Mild SNHL significant to create hearing  Difficulty in most listening situations. She has had discussion with audio ent hearing loss, tinnitus, and hearing aids    DR Germaine Chong MD ent consultant         1. TIA (transient ischemic attack)  She has had TIA like sxs  But work up including, scans, emg, ekg  Have been non revealing  She is on statin  And anti platelet  She will continue with PT and OT        2. Slurred speech  Today she says her speech is  Improved  Wants to cont with speech therapy  She relates onset of some sxs  To imaging studies and emg testing  - [facility]    3. Bilateral leg weakness  Per history however  Not objectively noted today  etio unclear  Has had recent EMG per neurologist  Will get report    PT is helping  - [facility]    4. Bilateral leg paresthesia  ? Neuropathy  She is on gabapentin at this time  PT is helping  Will continue with this  [facility] - [place]    5. Bilateral arm pain  Unclear etio  Pt thinks it started with the EMG  She has seen Dr Patric Espinoza, who referred her to [address] [place] [address] will review her office notes    6.  Sensorineural hearing loss (SNHL) of both ears  She works in communication at [email or web address]  Per audiology comments mild SNL     Can affect ability to hear communication correctly

## 2021-09-05 DIAGNOSIS — R05.3 CHRONIC COUGH: ICD-10-CM

## 2021-09-07 RX ORDER — BENZONATATE 100 MG/1
CAPSULE ORAL
Qty: 90 CAPSULE | Refills: 3 | Status: SHIPPED | OUTPATIENT
Start: 2021-09-07 | End: 2021-11-18 | Stop reason: SDUPTHER

## 2021-09-08 ENCOUNTER — TELEPHONE (OUTPATIENT)
Dept: PRIMARY CARE CLINIC | Age: 61
End: 2021-09-08

## 2021-09-08 DIAGNOSIS — G89.29 OTHER CHRONIC PAIN: Primary | ICD-10-CM

## 2021-09-08 RX ORDER — ACETAMINOPHEN 500 MG
500 TABLET ORAL 4 TIMES DAILY PRN
Qty: 360 TABLET | Refills: 1 | Status: SHIPPED | OUTPATIENT
Start: 2021-09-08

## 2021-09-09 ENCOUNTER — TELEPHONE (OUTPATIENT)
Dept: PRIMARY CARE CLINIC | Age: 61
End: 2021-09-09

## 2021-09-09 ENCOUNTER — HOSPITAL ENCOUNTER (OUTPATIENT)
Dept: PHYSICAL THERAPY | Age: 61
Setting detail: THERAPIES SERIES
Discharge: HOME | End: 2021-09-09
Payer: COMMERCIAL

## 2021-09-09 ENCOUNTER — HOSPITAL ENCOUNTER (OUTPATIENT)
Dept: SPEECH THERAPY | Age: 61
Setting detail: THERAPIES SERIES
Discharge: HOME | End: 2021-09-09
Payer: COMMERCIAL

## 2021-09-09 PROCEDURE — 92507 TX SP LANG VOICE COMM INDIV: CPT

## 2021-09-09 RX ORDER — TOPIRAMATE 25 MG/1
25 TABLET ORAL DAILY
Qty: 30 TABLET | Refills: 2 | Status: SHIPPED | OUTPATIENT
Start: 2021-09-09 | End: 2021-09-13 | Stop reason: SDUPTHER

## 2021-09-09 NOTE — TELEPHONE ENCOUNTER
Patient stated that the acetaminophen (tylenol) 500 mg is OTC & she needs something stronger/greater to [facility] pharmacy

## 2021-09-09 NOTE — PROGRESS NOTES
Speech-Language Pathology  Daily Treatment Note    Date:  2021    Patient Name:  Bassem Little    :  1960  MRN: [id number]  Diagnosis:    Dysarthria    -  Primary Q60.9     Insurance/Certification information: R  Referring Physician:  JD Carranza  Plan of care signed (Y/N):  Y  Total visits:9    Progress Note: []  Yes  [x]  No  Next due by: Visit #10     Subjective: Pt reports generalized pain all over body which is poorly controlled since the weekend. Neuro reportedly adjusted/changed medication, but pt states it made it worse. Her primary MD spoke with her yesterday and recommended tylenol. Pt verbalizes that none of the doctors want to help and 'I think they just don't care; they just want me to die.'  Pt reportedly has appt with pain specialist on . SLP discussed option to hold therapy until seen by pain specialist in order to maximize participation and outcomes in therapy sessions; pt reports she needs to schedule for next week because she needs to keep coming. Pt's mood and affect were notably more dull and somber; reduced volume and eye contact. Teary at times. Participated in pt-driven topics of conversation, but was more reticent to respond to SLP-driven topics/questions and directed reading tasks. Participation in therapeutic tasks inhibited by tearfulness. Objective:   Goal 1: Pt will execute speech strategies in structured simple sentences with a model in >80% of trials  · : Training and model provided for easy onset, exhalation prior to phonation, and inflection at sentence level. Pt executes during structured sentence reading with occasional verbal cues/encouragement with mild-moderate telegraphic speech and pauses at onset and mid-sentence.   · : reading sentences with easy onset, fluent speech and improved intonation without a model ~75% of trials; pt IND recalls strategies   · : reading/paraphrasing sentences in \"[place]\" after initial model with mild pausing and moderately slow rate of speech; hesitations minimal.  Will continue to address reading/paraphrasing at sentence level with medical topics to simulate work environment. · 8/19: summary/readback of mock job-related calls (ie Code Stroke, [place], etc) ~80% fluent; primary difficulty at onset of sentence/utterance, then fluent once she initiates  · 8/24: Structured conversation/script for job related phone calls: execution of easy onset, pausing, breathing and rephrasing to facilitate fluency with VC x2 (otherwise IND) with >85% success  · 8/25: GOAL MET; S/U to IND with execution of speech strategies without a model   · 9/1: CONTINUES TO MEET GOAL:  IND to modified IND for execution of speech strategies in conversation  · 9/9: goal met in simple conversation; mild dysfluency noted in structured reading of sentences/short paragraphs in magazine. Dysfluencies appear exacerbated when pt focuses more on her speech deficit    Goal 2: Pt will improve fluent clear speech at simple sentence level in structured conversation in 70% of trials with mod verbal cues  · 7/26: Independent spontaneous fluent speech with proper intonation in simple structured conversation ~65% of trials without cues. · 7/28: heavy focus on fluency and intonation in conversational speech: mild to moderate decreased rate, mild decreased inflection. Overall 80% fluent without cues. · 8/12: fluent at sentence level in conversation >70% IND; approx 90% with pausing, cues for strategies  · 8/19: simple sentences in phone conversation with MD office (ie rescheduling MD appt while in speech session): mild dysfluency and hesitation at sentence onsets, with ~80% fluent IND.   In office simple sentences in structured conversation: >75% with occ cues  · 8/24: scripting simple and complex sentences for job related phone calls ~80% fluent and easy onset; occasional cues   · 8/25: GOAL UPGRADED TO COMPLEX SENTENCES/CONVERSATION: fluency in general conversation ~90%; scripting at sentence level (simulated job calls) >85% w occasional cues  · 9/1: complex sentences and complex conversation for ~30 min: approx 90% fluency with no verbal cues in topic-directed conversation. Occasional slow deliberate speech which pt is able to recognize and repair. Functional articulation noted. · 9/9: Goal continues to be met in pt-directed simple and complex conversation: >90% fluent. Assessment:   Initial Speech Diagnosis form 7/20/2021: Variable mild to moderate motor speech impairment with dysarthric and dysfluent features, characterized by occasionally rushed strings of words, variable articulatory precision, mildly impaired onset/initiation of phrases and sentences. Impaired prosody/inflection at sentence level. · Pt reports abrupt onset ~3 weeks ago, and works as a . Pt desires to return to work but her current inability to fluently communicate impairs her ability to effectively perform her job duties. 9/1 update:  Minimal to mild motor speech impairment with occasional to mild dysfluent features in lengthy sentences and complex conversation; resolved articulatory imprecision since initial assessment. Prolonged/hesitant initiation of sentences in ~10% of complex spontaneous utterances, with improved self-recognition and execution of speech strategies to achieve success. Pt acknowledges slow steady improvement but reports she is not back to normal.   9/9 update: Inconsistent trace to mild motor speech dysfluency primarily noted in structured verbal tasks (ie, oral reading and simulated verbal job-related scripts). Speech fluency and intelligibility in unstructured conversation nearly [facility] with only occasional hesitation. Pt noted to have improved volume, prosody and rate in natural setting and when no cueing or setup is provided.   Participation with therapeutic tasks is limited by pt's report of pain, tearfulness, and overall feeling unwell.         Plan:   1x/wk    Timed Code Treatment: 0    Total Treatment Time:   45 speech therapy  [facility] [address]    Signature:   Merly Boyce MS, CCC-SLP #[id number]  Speech Language Pathologist

## 2021-09-09 NOTE — PROGRESS NOTES
[address] [facility].  [place] [address]  Phone: [phone]   Fax:     [phone]    Physical Therapy  Cancellation/No-show Note  Patient Name:  Neri Garnica  :  1960   Date:  2021  Cancelled visits to date: 0  No-shows to date: 1    Patient status for today's appointment patient:  []  Cancelled  []  Rescheduled appointment  [x]  No-show     Reason given by patient:  []  Patient ill  []  Conflicting appointment  []  No transportation    []  Conflict with work  [x]  No reason given  []  Other:     Comments:      Phone call information:   []  Phone call made today to patient at _ time at number provided:      []  Patient answered, conversation as follows:    []  Patient did not answer, message left as follows:  []  Phone call not made today    Electronically signed by:  Alexandrea Hammond, PT

## 2021-09-10 ENCOUNTER — TELEPHONE (OUTPATIENT)
Dept: ENT CLINIC | Age: 61
End: 2021-09-10

## 2021-09-10 DIAGNOSIS — G89.4 CHRONIC PAIN SYNDROME: Primary | ICD-10-CM

## 2021-09-10 NOTE — TELEPHONE ENCOUNTER
Per Mayte Mcdowell, pt called yesterday and spoke with Mayte Mcdowell. Pt called stating that she used the sinus rinse bottle for the first time and a lot of mucous drained out and wanted to make sure that was normal. Pt stated that it only happened the first time she used the bottle. Pt was informed that this is normal and that this is the purpose of the sinus rinse bottle. Excessive mucous is expected during the first use, as well as any time pt is experiencing a condition in which the body produces excessive mucous, like during allergy season or a sinus infection. Pt voiced understanding.

## 2021-09-13 ENCOUNTER — VIRTUAL VISIT (OUTPATIENT)
Dept: NEUROLOGY | Age: 61
End: 2021-09-13
Payer: COMMERCIAL

## 2021-09-13 DIAGNOSIS — G47.33 OBSTRUCTIVE SLEEP APNEA: ICD-10-CM

## 2021-09-13 PROCEDURE — 99443 PR PHYS/QHP TELEPHONE EVALUATION 21-30 MIN: CPT | Performed by: PSYCHIATRY & NEUROLOGY

## 2021-09-13 RX ORDER — TOPIRAMATE 25 MG/1
25 TABLET ORAL 2 TIMES DAILY
Qty: 60 TABLET | Refills: 0 | Status: SHIPPED | OUTPATIENT
Start: 2021-09-13 | End: 2021-12-02

## 2021-09-13 NOTE — PROGRESS NOTES
TELEHEALTH EVALUATION -- Audio/telephone evaluation (During ILSBS-82 public health emergency)    Due to COVID 19 outbreak, patient's office visit was converted to a virtual visit. Patient was contacted and agreed to proceed with a virtual visit via   Telephone Visit   The risks and benefits of converting to a virtual visit were discussed in light of the current infectious disease epidemic. Patient also understood that insurance coverage and co-pays are up to their individual insurance plans. Srinivas Verdin   Neurology followup    Subjective:   CC/HP  History was obtained from the patient. I have not seen this patient since February 2021. Patient states that the headaches have been worse recently. Patient states that she has been increasing her Topamax dose by herself in  some days taking 2 tablets another days taking all the way up to 4 tablets. Patient states that she had an EMG study done at [facility] neurology for some leg pain and she feels she was electrocuted and since then she has had difficulty with speech and several other neurological symptoms. Patient states that she was seen at [facility] and they were unable to come up with a definitive diagnosis. She had a sleep study done which confirmed obstructive sleep apnea. Patient states that now she is using the CPAP machine  MRI dedicated pituitary imaging showed pituitary adenoma versus Rathke's pouch. She was seen by the neurosurgeon who is recommended endocrinology work-up which has been done all the labs were normal and they are planning to repeat the MRI pituitary in 9 months to a year.     REVIEW OF SYSTEMS    Constitutional:  []   Chills   []  Fatigue   []  Fevers   []  Malaise   []  Weight loss     [x] Denies all of the above    Respiratory:   []  Cough    []  Shortness of breath         [x] Denies all of the above      Cardiovascular:   []  Chest pain    []  Exertional chest pressure/discomfort           [] Palpitations    [] Syncope     [x] Denies all of the above        Past Medical History:   Diagnosis Date    Allergic rhinitis     Anxiety 2020    Arthritis     pt denies    Asthma 4/10/2012    Asthma-chronic obstructive pulmonary disease overlap syndrome (HCC)     pt denies    Bilateral carpal tunnel syndrome 2016    Depression     Dysmenorrhea     Fibroid     GERD (gastroesophageal reflux disease)     Headache(784.0) 4/10/2012    Hyperlipidemia     Hypertension     S/P gastric bypass     Status post coronary angiogram     Wears glasses      Family History   Problem Relation Age of Onset    Diabetes Father     Rheum Arthritis Neg Hx     Osteoarthritis Neg Hx     Asthma Neg Hx     Breast Cancer Neg Hx     Cancer Neg Hx     Heart Failure Neg Hx     High Cholesterol Neg Hx     Hypertension Neg Hx     Migraines Neg Hx     Ovarian Cancer Neg Hx     Rashes/Skin Problems Neg Hx     Seizures Neg Hx     Stroke Neg Hx     Thyroid Disease Neg Hx      Social History     Socioeconomic History    Marital status:      Spouse name: Not on file    Number of children: 1    Years of education: Not on file    Highest education level: Not on file   Occupational History    Occupation: /Cook   Tobacco Use    Smoking status: Former Smoker     Packs/day: 0.25     Years: 5.00     Pack years: 1.25     Types: Cigarettes     Quit date: 1980     Years since quittin.2    Smokeless tobacco: Former User     Quit date: 12/10/1994   Vaping Use    Vaping Use: Never used   Substance and Sexual Activity    Alcohol use:  Yes     Alcohol/week: 0.0 standard drinks     Comment: socially-wine    Drug use: No    Sexual activity: Yes     Partners: Male   Other Topics Concern    Not on file   Social History Narrative    Not on file     Social Determinants of Health     Financial Resource Strain: Low Risk     Difficulty of Paying Living Expenses: Not hard at all   Food Insecurity: No Food Insecurity    Worried About Running Out of Food in the Last Year: Never true    Joss of Food in the Last Year: Never true   Transportation Needs:     Lack of Transportation (Medical):  Lack of Transportation (Non-Medical):    Physical Activity:     Days of Exercise per Week:     Minutes of Exercise per Session:    Stress:     Feeling of Stress :    Social Connections:     Frequency of Communication with Friends and Family:     Frequency of Social Gatherings with Friends and Family:     Attends Episcopal Services:     Active Member of Clubs or Organizations:     Attends Club or Organization Meetings:     Marital Status:    Intimate Partner Violence:     Fear of Current or Ex-Partner:     Emotionally Abused:     Physically Abused:     Sexually Abused:         Objective:  Exam:  There were no vitals taken for this visit. Physical examination was not performed since this was a telephone visit      Data :  LABS:  General Labs:    CBC:   Lab Results   Component Value Date    WBC 7.6 07/06/2021    RBC 4.16 07/06/2021    HGB 12.6 07/06/2021    HCT 37.3 07/06/2021    MCV 89.8 07/06/2021    MCH 30.2 07/06/2021    MCHC 33.7 07/06/2021    RDW 14.6 07/06/2021     07/06/2021    MPV 7.3 07/06/2021     BMP:    Lab Results   Component Value Date     07/12/2021    K 3.6 07/12/2021    K 2.5 07/06/2021     07/12/2021    CO2 26 07/12/2021    BUN 16 07/12/2021    LABALBU 4.3 08/06/2021    CREATININE 0.8 07/12/2021    CALCIUM 9.4 07/12/2021    GFRAA >60 07/12/2021    GFRAA >60 03/26/2013    LABGLOM >60 07/12/2021    GLUCOSE 89 07/12/2021     RADIOLOGY REVIEW:  I have reviewed radiology report(s) of: MRI pituitary    Impression :  Migraine headache seems worse recently.   Obstructive sleep apnea, waiting to get CPAP  Incidental pituitary adenoma versus Rathke's pouch  Patient now with multiple new neurological symptoms including some speech problems after she states that she was \"electrocuted\" after she had an EMG done at [facility] neurology. Since then patient has been evaluated at the [facility] neurology with negative test results. I am very concerned that patient is not following my instructions on Topamax and has been taking it as an how she feels like taking it that particular day. Plan :  Discussed with patient  Explained to her that she has to take Topamax as I prescribe it and not change the dose by herself. She was agreeable to this. In that case I will increase her Topamax dose to 25 mg twice daily. I will see her back in a month for follow-up may be  through telephone visit  I have reviewed the records from [facility].  I told her that I would not be able to do much for her other symptoms. These were felt to be psychogenic after a thorough evaluation at [facility] neurology department. Time spent in the care of this patient today including reviewing records from other facilities was 21 to 30 minutes      Please note a portion of  this chart was generated using dragon dictation software. Although every effort was made to ensure the accuracy of this automated transcription, some errors in transcription may have occurred. Pursuant to the emergency declaration under the [address] waiver authority and the [employer] and [employer] Act, this Virtual  Visit was conducted, with patient's consent, to reduce the patient's risk of exposure to COVID-19 and provide continuity of care for an established patient.

## 2021-09-17 ENCOUNTER — HOSPITAL ENCOUNTER (OUTPATIENT)
Dept: SPEECH THERAPY | Age: 61
Setting detail: THERAPIES SERIES
Discharge: HOME | End: 2021-09-17
Payer: COMMERCIAL

## 2021-09-17 ENCOUNTER — HOSPITAL ENCOUNTER (OUTPATIENT)
Dept: PHYSICAL THERAPY | Age: 61
Setting detail: THERAPIES SERIES
Discharge: HOME | End: 2021-09-17
Payer: COMMERCIAL

## 2021-09-17 PROCEDURE — 97110 THERAPEUTIC EXERCISES: CPT

## 2021-09-17 PROCEDURE — 97530 THERAPEUTIC ACTIVITIES: CPT

## 2021-09-17 PROCEDURE — 97116 GAIT TRAINING THERAPY: CPT

## 2021-09-17 PROCEDURE — 92507 TX SP LANG VOICE COMM INDIV: CPT

## 2021-09-17 NOTE — FLOWSHEET NOTE
[address]  Phone: [phone]   Fax:     [phone]    Physical Therapy Treatment Note/ Progress Report:   Date:  2021    Patient Name:  Tyree Bhatt    :  1960  MRN: [id number]    Pertinent Medical History:   HTN, Asthma, Hernia    Medical/Treatment Diagnosis Information:  · Diagnosis: R20.2 (ICD-10-CM) - Paresthesia  · Treatment Diagnosis: Decreased left UE and LE strength. Pain  Insurance/Certification information:  PT Insurance Information: [employer] [place]  Physician Information:  Referring Practitioner: Dr. Rubén Betts of care signed (Y/N): []  Yes [x]  No     Date of Patient follow up with Physician:      Progress Report: []  Yes  [x]  No     Date Range for reporting period:  Beginnin2021  Ending:     Progress report due (10 Rx/or 30 days whichever is less):      Recertification due (POC duration/ or 90 days whichever is less):      Visit # Insurance Allowable Auth required? Date Range   12 12 [x]  Yes [x]  No      Latex Allergy:  [x]NO      []YES  Preferred Language for Healthcare:   [x]English       []other:    Functional Scale:      Date assessed: at eval  Test:  Score:    Pain level:  10/10     History of Injury:  Patient stated complaint:Pt states on  she woke up with numbness in her entire body, from her face to the hands and toes. States went to the ER 3x with the episodes and the ER has not been able to find anything. States she can't walk now and has burning in both feet and lower legs, feels like she is walking on glass. States she gets some burning pain in left upper arm too. States gabapentin relieves pain but if she does not take it then has a lot of pain. States she has double vision at times and gets bad migraines but states she has had a brain tumor since September of last year.  She states she is going to see a neurologist on 7/21/21. SUBJECTIVE:    8/4/21: Pt states she is doing better today, walked in the store after last session and needed to use the scooter some. 8/9/21: Pt states she is having a lot of pain today in both her feet. States pain is 15/10  8/12/21: States she is doing ok, pain has still been bad.   8/24/21: Pt states she is doing better. 8/25/21: States both feet were burning after last session. 9/1/21: States she is doing a little better  9/17/21: Pt states her right leg is feeling better, but the left leg is still bothering her a lot    OBJECTIVE:       Plan:   Gait  LE strengthening training and transfers      RESTRICTIONS/PRECAUTIONS: Neuropathy and LE weakness affecting gait and balance    Exercises/Interventions:     Therapeutic Exercises (86187) Min: Resistance/Reps Notes/Cues   Nu Step X 6 min, Level 4    // Bars   x 4 laps in // bars with no UE support and SBA  X 3 lap side stepping with UE support  HR/TR  2 x 10 B  Mini Squat 2 x 10 for 10 sec. Hip Abduction x 10 B   // bars   LAQ X 10 B    Leg Press 30#  2 x 20 R   15# 2 x 15 L    Incline 3 x 30\"                        Therapeutic Activities (67317) Min:                              Neuromuscular Re-ed (48437) Min:                                   Manual Intervention (27750) Min:                Modalities  Min:               HEP     Seated; LAQ, Marching, HR/TR  7/14   Standing Mini Squats, Hip Abd, HR/TR Hold for now           Other Therapeutic Activities:   8/9/21: Discussed with patient the possible causes of pain and importance of following up with neurologist and primary care MD to control pain so she can better tolerate therapy. Also discussed the benefits of aquatic therapy. Home Exercise Program:   Patient was instructed in the following for HEP: see above     . Patient verbalized/demonstrated understanding and was issued written handout.       Therapeutic Exercise and NMR EXR  [x] (70121) Provided verbal/tactile cueing for activities lifting and ambulation. If BWC Please Indicate Time In/Out  CPT Code Time in Time out                         Approval Dates:  CPT Code Units Approved Units Used  Date Updated:                     Charges:  Timed Code Treatment Minutes: 45   Total Treatment Minutes: 45      [] EVAL (LOW) 94986 (typically 20 minutes face-to-face)  [] EVAL (MOD) 95355 (typically 30 minutes face-to-face)  [] EVAL (HIGH) 49634 (typically 45 minutes face-to-face)  [] RE-EVAL     [x] VA(35106) x    [] Dry needle 1 or 2 Muscles (98171)  [] NMR (15204) x     [] Dry needle 3+ Muscles (83697)  [] Manual (52235) x     [] Ultrasound (94569) x  [x] TA (57229) x     [] Mech Traction (27188)  [] ES(attended) (08803)     [] ES (un) (67249):   [] Vasopump (93794)  [] Ionto (86418)   [x] Other:gait    GOALS:  Therapist goals for Patient:   Short Term Goals: To be achieved in: 2 weeks  1. Independent in HEP and progression per patient tolerance, in order to prevent re-injury. 2. Patient will have a decrease in pain to facilitate improvement in movement, function, and ADLs as indicated by Functional Deficits.     Long Term Goals: To be achieved in: 6 weeks  1. Disability index score of 25% or less for the (functional outcomes test) to assist with reaching prior level of function. 2. Patient will demonstrate increased AROM to [facility] to allow for proper joint functioning as indicated by patients Functional Deficits. 3. Patient will demonstrate an increase in postural awareness and control to allow for proper functional mobility as indicated by patients Functional Deficits. 4. Patient will demonstrate an increase in Strength to at least 4+/5 in B LE to allow for proper functional mobility as indicated by patients Functional Deficits. 5. Patient will return to functional activities including work and daily activities without increased symptoms or restriction.    6. Pt will report normal use of body so she can complete normal daily activities like exercises and work (patient specific functional goal)            (cut and paste from eval)    ASSESSMENT:   Pt is showing improvement with transfers and gait, requiring SBA. Pt is limited in progress the past few weeks due to pain affecting ability to do exercises    Treatment/Activity Tolerance:  [x] Patient tolerated treatment well [] Patient limited by fatique  [] Patient limited by pain  [] Patient limited by other medical complications  [] Other:     Overall Progression Towards Functional goals/ Treatment Progress Update:  [] Patient is progressing as expected towards functional goals listed. [x] Progression is slowed due to complexities/Impairments listed. [x] Progression has been slowed due to co-morbidities. [] Plan just implemented, too soon to assess goals progression <30days   [] Goals require adjustment due to lack of progress  [] Patient is not progressing as expected and requires additional follow up with physician  [] Other    Prognosis for POC: [x] Good [] Fair  [] Poor    Patient requires continued skilled intervention: [x] Yes  [] No        PLAN:   [x] Continue per plan of care [] Alter current plan (see comments)  [] Plan of care initiated [] Hold pending MD visit [] Discharge    Electronically signed by: Stephy Lake PT    Note: If patient does not return for scheduled/recommended follow up visits, this note will serve as a discharge from care along with the most recent update on progress.

## 2021-09-17 NOTE — PROGRESS NOTES
Speech-Language Pathology  Daily Treatment Note    Date:  2021    Patient Name:  Tacy Leventhal    :  1960  MRN: [id number]  Diagnosis:    Dysarthria    -  Primary A66.0     Insurance/Certification information: [facility]  Referring Physician:  Vladimir Hussein, JAYSON-CNP; Alvarez Trammell MD  Plan of care signed (Y/N):  Y  Total visits:10    Progress Note: [x]  Yes  []  No  Next due by: Visit #20     Subjective: Pt with brighter affect and with apparent better controlled pain compared to previous session. She reports seeing Chris Hu MD this week to address UE pain and reports some improvement. She is looking forward to upcoming consult with pain specialist: \"It should be a big difference once I get pain management. \"    Pt verbalized some concern re: SLP not documenting some of pt's reported concerns in previous sessions, including feelings of electrocution in an MRI. SLP reviewed portions of previous therapy notes and explained rationale for documenting information relevant to treatment plan, progress and variables in treatment. Pt indicated that SLP should write down everything pt says, and indicates she feels [place] [place] doesn't care about me. \"    SLP discussed progress with pt: both short term goals met, improved fluency in conversation but ongoing inconsistent difficulty with structured/scripted/reading tasks. Pt ed completed re: SLP targeting symptoms with good progress made, but unable to address underlying etiology; suggested that pt follow-up with PCP Cara and explore counseling or other options to target etiology. Pt requested not to ask Dr Marcos Benson and that she is considering searching for a new PCP. Pt also reports \"I forget the simple things; I struggle with my memory, I don't know if it's normal or not. \"  SLP informed pt that formal assessment of cognition could be completed if MD deemed indicated and provided order, but pt states she does not want to pursue at this time.     Pt in agreement with reducing frequency to 1x every other week. Objective:   Goal 1: Pt will execute speech strategies in structured simple sentences with a model in >80% of trials  · 7/26: Training and model provided for easy onset, exhalation prior to phonation, and inflection at sentence level. Pt executes during structured sentence reading with occasional verbal cues/encouragement with mild-moderate telegraphic speech and pauses at onset and mid-sentence. · 7/28: reading sentences with easy onset, fluent speech and improved intonation without a model ~75% of trials; pt IND recalls strategies   · 8/12: reading/paraphrasing sentences in \"[place]\" after initial model with mild pausing and moderately slow rate of speech; hesitations minimal.  Will continue to address reading/paraphrasing at sentence level with medical topics to simulate work environment. · 8/19: summary/readback of mock job-related calls (ie [employer], [place], etc) ~80% fluent; primary difficulty at onset of sentence/utterance, then fluent once she initiates  · 8/24: Structured conversation/script for job related phone calls: execution of easy onset, pausing, breathing and rephrasing to facilitate fluency with VC x2 (otherwise IND) with >85% success  · 8/25: GOAL MET; S/U to IND with execution of speech strategies without a model   · 9/1: CONTINUES TO MEET GOAL:  IND to modified IND for execution of speech strategies in conversation  · 9/9: goal met in simple conversation; mild dysfluency noted in structured reading of sentences/short paragraphs in magazine.   Dysfluencies appear exacerbated when pt focuses more on her speech deficit  · 9/17: GOAL MET; upgrade to \"pt will execute speech strategies in complex structured conversation >90% of opportunities with S/U cues\"    Goal 2: Pt will improve fluent clear speech at simple sentence level in structured conversation in 70% of trials with mod verbal cues  · 7/26: Independent spontaneous fluent duties. 9/1 update:  Minimal to mild motor speech impairment with occasional to mild dysfluent features in lengthy sentences and complex conversation; resolved articulatory imprecision since initial assessment. Prolonged/hesitant initiation of sentences in ~10% of complex spontaneous utterances, with improved self-recognition and execution of speech strategies to achieve success. Pt acknowledges slow steady improvement but reports she is not back to normal.   9/9 update: Inconsistent trace to mild motor speech dysfluency primarily noted in structured verbal tasks (ie, oral reading and simulated verbal job-related scripts). Speech fluency and intelligibility in unstructured conversation nearly [facility] with only occasional hesitation. Pt noted to have improved volume, prosody and rate in natural setting and when no cueing or setup is provided. Participation with therapeutic tasks is limited by pt's report of pain, tearfulness, and overall feeling unwell. 9/17 update: Slowly resolving trace to mild motor speech dysfluency primarily noted in structured verbal tasks (ie, oral reading and simulated verbal job-related scripts). Speech fluency and intelligibility in unstructured conversation with occasional hesitations. Pt noted to have improved volume, prosody and rate in natural setting and when no cueing or setup is provided. Participation with therapeutic tasks is variable from session to session and is impacted by pain, tearfulness at times.        Plan:   1x/ every other week x2-3 months    Timed Code Treatment: 0    Total Treatment Time:   50 speech therapy  CPT 00788    Signature:   Azam Rodgers MS, CCC-SLP #[id number]  Speech Language Pathologist

## 2021-09-17 NOTE — PROGRESS NOTES
Outpatient Speech Therapy  [] [facility]   Phone: [phone]   Fax: [phone]   [x] [facility]  Phone: [phone]   Fax: [phone]  [] [facility]   Phone: [phone]   Fax: [phone]     Speech Therapy Progress Note  Date: 2021        Patient Name:  Ivonne Ellis    :  1960  MRN: [id number]  Diagnosis:  Dysarthria    - Francetta Bright P90.4  Insurance/Certification information: [facility]  Referring Physician:  JAYSON Cote-JEFFERSON; Ten Gonzalez MD  Plan of care signed (Y/N):  Y  Total visits: 10  Pain level: variable across sessions; minimal to severe     Time Period for Report:  2021-2021  Cancels/No-shows to date: 0     Plan of Care/Treatment to date:  [x] Speech-Language Evaluation/Treatment    [] Dysphagia Evaluation/Treatment        [] Dysphagia Treatment via Neuromuscular Electrical Stimulation (NMES)   [] Modified Barium Swallowing Study   [] Cognitive-Linguistic Skills Development  [] Voice evaluation and Treatment      [] Evaluation, modification, and Training of Voice Prosthetic     [] Evaluation for Speech-Generating Augmentative and Alternative Communication Device   [] Therapeutic Services for the use of Speech-Generating Device. [] Other:     Significant Findings At Last Visit/Comments:    Subjective:  Pt generally motivated and agreeable to therapy, and verbalizes strong desire for speech to return to normal; pt reports she needs to continue coming to speech therapy until it has returned to normal.  Participation and response to structured treatment tasks and speech strategies is inconsistent and appears impacted by variable pain from session to session as well as tearfulness at times.        Objective:  Goal 1: Pt will execute speech strategies in structured simple sentences with a model in >80% of trials   · GOAL MET; IND to modified IND for execution of speech strategies in simple conversation   · upgrade to \"pt will execute speech strategies in complex structured conversation >90% of opportunities with S/U cues\"    Goal 2: Pt will improve fluent clear speech at simple and complex sentence level in structured conversation in 70% of trials with mod verbal cues   · GOAL MET; add new goal #3 \"Pt will demonstrate >90% speech fluency with structured paragraph reading with independence\"       Assessment:  Slowly resolving trace to mild motor speech dysfluency primarily noted in structured verbal tasks (ie, oral reading and simulated verbal job-related scripts). Speech fluency and intelligibility in unstructured conversation with occasional hesitations. Pt noted to have improved volume, prosody and rate in natural setting and when no cueing or setup is provided. Participation with therapeutic tasks is variable from session to session and is impacted by pain, tearfulness at times. Pt verbalizes motivation to continue therapy until her speech returns to 'normal.' Pt with good insight re: deficits and impact on function, especially regarding ability to return to vocational requirements. SLP and pt have discussed that continued progress is inhibited by lack of underlying etiology, resulting in unknown progress. SLP has recommended that pt discuss this with MD and consider counseling or another route to address potential underlying emotional factors impacting inconsistent speech fluency. Pt verbalized hesitation to discuss with PCP but is agreeable to continue speech every other week. Progress towards goals:  Short term goals met; Goals upgraded in complexity and with reduced cueing/assist    Current Frequency/Duration:  # Days per week: [x] 1 day # Weeks: [] 1 week [] 4 weeks      [] 2 days?    [] 2 weeks [] 5 weeks      [] 3 days   [] 3 weeks [x] 8 weeks     Rehab Potential: [] Excellent [x] Good [] Fair  [] Poor     Goal Status:  [x] Achieved; goals upgraded in complexity [] Partially Achieved  [] Not Achieved     Patient Status: [] Continue per initial plan of Care     [] Patient now discharged     [x] Additional visits requested, Please re-certify for additional visits:      Requested frequency/duration:  1X/ every other week for 8 weeks    Electronically signed by:  Charo Odonnell MS, CCC-SLP #[id number]  Speech Language Pathologist    If you have any questions or concerns, please don't hesitate to call.   Thank you for your referral.    Physician Signature:________________________________Date:__________________  By signing above, therapists plan is approved by physician

## 2021-09-20 RX ORDER — AZELASTINE 1 MG/ML
SPRAY, METERED NASAL
Qty: 30 ML | Refills: 1 | Status: SHIPPED | OUTPATIENT
Start: 2021-09-20 | End: 2021-10-13

## 2021-09-22 ENCOUNTER — HOSPITAL ENCOUNTER (OUTPATIENT)
Dept: PHYSICAL THERAPY | Age: 61
Setting detail: THERAPIES SERIES
Discharge: HOME | End: 2021-09-22
Payer: COMMERCIAL

## 2021-09-22 NOTE — PROGRESS NOTES
[facility].  [place] [address]  Phone: [phone]   Fax:     [phone]    Physical Therapy  Cancellation/No-show Note  Patient Name:  Oskar Amin  :  1960   Date:  2021  Cancelled visits to date: 1  No-shows to date: 1    Patient status for today's appointment patient:  [x]  Cancelled  []  Rescheduled appointment  []  No-show     Reason given by patient:  []  Patient ill  []  Conflicting appointment  []  No transportation    []  Conflict with work  [x]  No reason given  []  Other:     Comments:      Phone call information:   []  Phone call made today to patient at _ time at number provided:      []  Patient answered, conversation as follows:    []  Patient did not answer, message left as follows:  []  Phone call not made today    Electronically signed by:  Stephanie Blanco, PT

## 2021-09-28 ENCOUNTER — OFFICE VISIT (OUTPATIENT)
Dept: PRIMARY CARE CLINIC | Age: 61
End: 2021-09-28
Payer: COMMERCIAL

## 2021-09-28 VITALS
SYSTOLIC BLOOD PRESSURE: 103 MMHG | WEIGHT: 197.2 LBS | DIASTOLIC BLOOD PRESSURE: 67 MMHG | BODY MASS INDEX: 36.07 KG/M2 | HEART RATE: 82 BPM | OXYGEN SATURATION: 97 %

## 2021-09-28 DIAGNOSIS — Z23 NEEDS FLU SHOT: ICD-10-CM

## 2021-09-28 DIAGNOSIS — J45.40 MODERATE PERSISTENT ASTHMA WITHOUT COMPLICATION: ICD-10-CM

## 2021-09-28 DIAGNOSIS — G89.29 CHRONIC BACK PAIN, UNSPECIFIED BACK LOCATION, UNSPECIFIED BACK PAIN LATERALITY: ICD-10-CM

## 2021-09-28 DIAGNOSIS — M54.9 CHRONIC BACK PAIN, UNSPECIFIED BACK LOCATION, UNSPECIFIED BACK PAIN LATERALITY: ICD-10-CM

## 2021-09-28 DIAGNOSIS — G45.9 TIA (TRANSIENT ISCHEMIC ATTACK): Primary | ICD-10-CM

## 2021-09-28 DIAGNOSIS — G62.9 NEUROPATHY: ICD-10-CM

## 2021-09-28 PROCEDURE — 90471 IMMUNIZATION ADMIN: CPT | Performed by: FAMILY MEDICINE

## 2021-09-28 PROCEDURE — 99213 OFFICE O/P EST LOW 20 MIN: CPT | Performed by: FAMILY MEDICINE

## 2021-09-28 PROCEDURE — 90674 CCIIV4 VAC NO PRSV 0.5 ML IM: CPT | Performed by: FAMILY MEDICINE

## 2021-09-28 RX ORDER — BUDESONIDE 0.5 MG/2ML
500 INHALANT ORAL 2 TIMES DAILY PRN
Qty: 100 EACH | Refills: 2 | Status: SHIPPED | OUTPATIENT
Start: 2021-09-28 | End: 2021-10-23

## 2021-09-28 RX ORDER — TIZANIDINE 2 MG/1
1 TABLET ORAL EVERY 6 HOURS PRN
COMMUNITY
Start: 2021-09-21 | End: 2021-10-25 | Stop reason: ALTCHOICE

## 2021-09-28 NOTE — PROGRESS NOTES
CC she is follow up visit today    HPI 65 y/o female multiple c/o today  She has working of TIA  She is in pain clinic for neuropathy and pain of the legs  She is scheduled for lumbar nerve block thru pain clinic 10/25/21, 1027/21  depacon infusion planned also planned      She needs up date of her from    Tentative date return to work  11/28/21    At [facility] pain clinic  Under Dr Patience Presley MD    Forms  Completed for her

## 2021-09-28 NOTE — PROGRESS NOTES
Vaccine Information Sheet, \"Influenza - Inactivated\"  given to Srinivas Verdin, or parent/legal guardian of  Srinivas Verdin and verbalized understanding. Patient responses:    Have you ever had a reaction to a flu vaccine? No  Do you have any current illness? No  Have you ever had [place] Syndrome? No  Do you have a serious allergy to any of the follow: Neomycin, Polymyxin, Thimerosal, eggs or egg products? No    Flu vaccine given per order. Please see immunization tab. Risks and benefits explained. Current VIS given.

## 2021-09-30 ENCOUNTER — HOSPITAL ENCOUNTER (OUTPATIENT)
Dept: PHYSICAL THERAPY | Age: 61
Setting detail: THERAPIES SERIES
Discharge: HOME | End: 2021-09-30
Payer: COMMERCIAL

## 2021-09-30 ENCOUNTER — HOSPITAL ENCOUNTER (OUTPATIENT)
Dept: SPEECH THERAPY | Age: 61
Setting detail: THERAPIES SERIES
Discharge: HOME | End: 2021-09-30
Payer: COMMERCIAL

## 2021-09-30 PROCEDURE — 97110 THERAPEUTIC EXERCISES: CPT

## 2021-09-30 PROCEDURE — 97112 NEUROMUSCULAR REEDUCATION: CPT

## 2021-09-30 PROCEDURE — 92507 TX SP LANG VOICE COMM INDIV: CPT

## 2021-09-30 NOTE — FLOWSHEET NOTE
[address]  Phone: [phone]   Fax:     [phone]      Physical Therapy Re-Certification Plan of Care/MD UPDATE      Dear William Esquivel,    We had the pleasure of treating the following patient for physical therapy services at [facility]. A summary of our findings can be found in the updated assessment below. This includes our plan of care. If you have any questions or concerns regarding these findings, please do not hesitate to contact me at the office phone number checked above. Thank you for the referral.     Physician Signature:________________________________Date:__________________  By signing above (or electronic signature), therapists plan is approved by physician    Date Range Of Visits:   Total Visits to Date: 12  Overall Response to Treatment:   []Patient is responding well to treatment and improvement is noted with regards  to goals   []Patient should continue to improve in reasonable time if they continue HEP   []Patient has plateaued and is no longer responding to skilled PT intervention    []Patient is getting worse and would benefit from return to referring MD   []Patient unable to adhere to initial POC   []Other:       Recommendation:    [x]Continue PT 1x / wk for 6 weeks. []Hold PT, pending MD visit      Physical Therapy Treatment Note/ Progress Report:   Date:  2021    Patient Name:  Cristal Shen    :  1960  MRN: [id number]    Pertinent Medical History:   HTN, Asthma, Hernia    Medical/Treatment Diagnosis Information:  · Diagnosis: R20.2 (ICD-10-CM) - Paresthesia  · Treatment Diagnosis: Decreased left UE and LE strength.  Pain  Insurance/Certification information:  PT Insurance Information: [employer]  Physician Information:  Referring Practitioner: Dr. Frankie Valentin of care signed (Y/N): []  Yes [x] No     Date of Patient follow up with Physician:      Progress Report: []  Yes  [x]  No     Date Range for reporting period:  Beginnin2021  Ending:     Progress report due (10 Rx/or 30 days whichever is less):      Recertification due (POC duration/ or 90 days whichever is less):      Visit # Insurance Allowable Auth required? Date Range    18 [x]  Yes [x]  No      Latex Allergy:  [x]NO      []YES  Preferred Language for Healthcare:   [x]English       []other:    Functional Scale:      Date assessed: at eval  Test:  Score:    Pain level:  10/10     History of Injury:  Patient stated complaint:Pt states on  she woke up with numbness in her entire body, from her face to the hands and toes. States went to the ER 3x with the episodes and the ER has not been able to find anything. States she can't walk now and has burning in both feet and lower legs, feels like she is walking on glass. States she gets some burning pain in left upper arm too. States gabapentin relieves pain but if she does not take it then has a lot of pain. States she has double vision at times and gets bad migraines but states she has had a brain tumor since September of last year. She states she is going to see a neurologist on 21. SUBJECTIVE:    21: States both feet were burning after last session. 21: States she is doing a little better  21: Pt states her right leg is feeling better, but the left leg is still bothering her a lot  21: States things are about the same. She saw the pain specialist and they started her on a new medication for the pain. Also she is going to get a pain infusion on Monday and getting injections on both sides of her low back.      OBJECTIVE:       Plan:   Gait  LE strengthening training and transfers      RESTRICTIONS/PRECAUTIONS: Neuropathy and LE weakness affecting gait and balance    Exercises/Interventions:     Therapeutic Exercises (09161) Min: Resistance/Reps Notes/Cues Nu Step X 5 min, Level 4    // Bars Gait with RW x 70 feet with S around clinic    HR/TR  2 x 10 B  Mini Squat 2 x 10 for 10 sec. Hip Abduction x 10 B   // bars   LAQ X 10 B    Leg Press 40#  2 x 20 R   25# 2 x 15 L    Incline 3 x 30\"                        Therapeutic Activities (75037) Min:                              Neuromuscular Re-ed (97137) Min:                                   Manual Intervention (08749) Min:                Modalities  Min:               HEP     Seated; LAQ, Marching, HR/TR  7/14   Standing Mini Squats, Hip Abd, HR/TR Hold for now           Other Therapeutic Activities:   8/9/21: Discussed with patient the possible causes of pain and importance of following up with neurologist and primary care MD to control pain so she can better tolerate therapy. Also discussed the benefits of aquatic therapy. Home Exercise Program:   Patient was instructed in the following for HEP: see above     . Patient verbalized/demonstrated understanding and was issued written handout. Therapeutic Exercise and NMR EXR  [x] (72674) Provided verbal/tactile cueing for activities related to strengthening, flexibility, endurance, ROM for improvements in LE, proximal hip, and core control with self care, mobility, lifting, ambulation.  [] (91027) Provided verbal/tactile cueing for activities related to improving balance, coordination, kinesthetic sense, posture, motor skill, proprioception  to assist with LE, proximal hip, and core control in self care, mobility, lifting, ambulation and eccentric single leg control.  [address] [facility] and Therapeutic Activities:    [x] (10390 or 73807) Provided verbal/tactile cueing for activities related to improving balance, coordination, kinesthetic sense, posture, motor skill, proprioception and motor activation to allow for proper function of core, proximal hip and LE with self care and ADLs and functional mobility.   [] (85817) Gait Re-education- Provided training and instruction to the patient for proper LE, core and proximal hip recruitment and positioning and eccentric body weight control with ambulation re-education including up and down stairs     Home Exercise Program:    [x] (74526) Reviewed/Progressed HEP activities related to strengthening, flexibility, endurance, ROM of core, proximal hip and LE for functional self-care, mobility, lifting and ambulation/stair navigation   [] (19300)Reviewed/Progressed HEP activities related to improving balance, coordination, kinesthetic sense, posture, motor skill, proprioception of core, proximal hip and LE for self care, mobility, lifting, and ambulation/stair navigation      Manual Treatments:  PROM / STM / Oscillations-Mobs:  G-I, II, III, IV (PA's, Inf., Post.)  [x] (74260) Provided manual therapy to mobilize LE, proximal hip and/or LS spine soft tissue/joints for the purpose of modulating pain, promoting relaxation,  increasing ROM, reducing/eliminating soft tissue swelling/inflammation/restriction, improving soft tissue extensibility and allowing for proper ROM for normal function with self care, mobility, lifting and ambulation.      If [facility] Please Indicate Time In/Out  CPT Code Time in Time out                         Approval Dates:  CPT Code Units Approved Units Used  Date Updated:                     Charges:  Timed Code Treatment Minutes: 45   Total Treatment Minutes: 45      [] EVAL (LOW) 70561 (typically 20 minutes face-to-face)  [] EVAL (MOD) 50223 (typically 30 minutes face-to-face)  [] EVAL (HIGH) 47929 (typically 45 minutes face-to-face)  [] RE-EVAL     [x] ET(99339) x 2    [] Dry needle 1 or 2 Muscles (29173)  [x] NMR (85366) x     [] Dry needle 3+ Muscles (66432)  [] Manual (80071) x     [] Ultrasound (78971) x  [] TA (49524) x     [] Mech Traction (12823)  [] ES(attended) (99952)     [] ES (un) (57865):   [] Vasopump (62593)  [] Ionto (86648)   [] Other:gait    GOALS:  Therapist goals for Patient:   Short Term Goals: To be achieved in: 2 weeks  1. Independent in HEP and progression per patient tolerance, in order to prevent re-injury. 2. Patient will have a decrease in pain to facilitate improvement in movement, function, and ADLs as indicated by Functional Deficits.     Long Term Goals: To be achieved in: 6 weeks  1. Disability index score of 25% or less for the (functional outcomes test) to assist with reaching prior level of function. 2. Patient will demonstrate increased AROM to [facility] to allow for proper joint functioning as indicated by patients Functional Deficits. 3. Patient will demonstrate an increase in postural awareness and control to allow for proper functional mobility as indicated by patients Functional Deficits. 4. Patient will demonstrate an increase in Strength to at least 4+/5 in B LE to allow for proper functional mobility as indicated by patients Functional Deficits. 5. Patient will return to functional activities including work and daily activities without increased symptoms or restriction. 6. Pt will report normal use of body so she can complete normal daily activities like exercises and work (patient specific functional goal)            (cut and paste from eval)    ASSESSMENT:   Pt is showing improvement with transfers and gait, requiring SBA. Pt is limited in progress the past few weeks due to pain affecting ability to do exercises    Treatment/Activity Tolerance:  [x] Patient tolerated treatment well [] Patient limited by fatique  [] Patient limited by pain  [] Patient limited by other medical complications  [] Other:     Overall Progression Towards Functional goals/ Treatment Progress Update:  [] Patient is progressing as expected towards functional goals listed. [x] Progression is slowed due to complexities/Impairments listed. [x] Progression has been slowed due to co-morbidities.   [] Plan just implemented, too soon to assess goals progression <30days   [] Goals require adjustment

## 2021-09-30 NOTE — PROGRESS NOTES
Speech-Language Pathology  Daily Treatment Note    Date:  2021    Patient Name:  Wendy Ibanez    :  1960  MRN: [id number]  Diagnosis:    Dysarthria    -  Primary M82.9     Insurance/Certification information: [facility]  Referring Physician:  JAYSON Packer-JEFFERSON; Saulo Christy MD  Plan of care signed (Y/N):  Y  Total visits:11    Progress Note: [x]  Yes  []  No  Next due by: Visit #20     Subjective: Pt in bright spirits, bright affect, cooperative and motivated in complex conversation. She reports upcoming pain infusion on 10/4 and nerve blocks at end of October. She reports her speech has been 'stable' and 'it has leveled out pretty much.' She reports that she felt a medication change last week may have made her speech 'wobbly' and she has discontinued the med. SLP reviewed progress note and updated goals with pt; she is in agreement with plan. Objective:   Goal 1A: Pt will execute speech strategies in complex structured conversation >90% of opportunities with S/U cues  · : variable % fluent; achieves 100% spontaneous without verbal setup; occasional dysfluency/hesitation. Goal 3: Pt will demonstrate >90% speech fluency with structured paragraph reading with independence  · : slow rate but ~85% fluent, with approx 15% hesitations at sentence onset; clear speech; mild-mod prosodic features. Improved as reading progressed. Previously Met Goals:  Goal 2: Pt will improve fluent clear speech at simple sentence level in structured conversation in 70% of trials with mod verbal cues  · Met     Assessment:   Initial Speech Diagnosis from 2021: Variable mild to moderate motor speech impairment with dysarthric and dysfluent features, characterized by occasionally rushed strings of words, variable articulatory precision, mildly impaired onset/initiation of phrases and sentences. Impaired prosody/inflection at sentence level.      · Pt reports abrupt onset ~3 weeks ago, and works as a . Pt desires to return to work but her current inability to fluently communicate impairs her ability to effectively perform her job duties. 9/1 update:  Minimal to mild motor speech impairment with occasional to mild dysfluent features in lengthy sentences and complex conversation; resolved articulatory imprecision since initial assessment. Prolonged/hesitant initiation of sentences in ~10% of complex spontaneous utterances, with improved self-recognition and execution of speech strategies to achieve success. Pt acknowledges slow steady improvement but reports she is not back to normal.   9/9 update: Inconsistent trace to mild motor speech dysfluency primarily noted in structured verbal tasks (ie, oral reading and simulated verbal job-related scripts). Speech fluency and intelligibility in unstructured conversation nearly [facility] with only occasional hesitation. Pt noted to have improved volume, prosody and rate in natural setting and when no cueing or setup is provided. Participation with therapeutic tasks is limited by pt's report of pain, tearfulness, and overall feeling unwell. 9/17 update: Slowly resolving trace to mild motor speech dysfluency primarily noted in structured verbal tasks (ie, oral reading and simulated verbal job-related scripts). Speech fluency and intelligibility in unstructured conversation with occasional hesitations. Pt noted to have improved volume, prosody and rate in natural setting and when no cueing or setup is provided. Participation with therapeutic tasks is variable from session to session and is impacted by pain, tearfulness at times.        Plan:   1x/ every other week x2-3 months    Timed Code Treatment: 0    Total Treatment Time:   45 speech therapy  [facility] [id number]    Signature:   Soo Caraballo MS, CCC-SLP #[id number]  Speech Language Pathologist

## 2021-10-05 ENCOUNTER — TELEPHONE (OUTPATIENT)
Dept: PRIMARY CARE CLINIC | Age: 61
End: 2021-10-05

## 2021-10-05 NOTE — TELEPHONE ENCOUNTER
----- Message from Troy Jewell sent at 10/5/2021  9:03 AM EDT -----  Subject: Message to Provider    QUESTIONS  Information for Provider? Patient need for Dr Ifeoma Davis to correct the [facility]   paperwork for her job. Patient stated that disability is asking why her   ears and mini stroke but didn't mention her legs. Dr Ifeoma Davis that long   term disability will be for her legs and also since it put down she had   earing loss that need paper from ENT doctor and also need no return to   work date need to be blank. Need refax to [address] [facility] Fax   [phone] and Phone [phone]. [phone] Please call to confirm with   patient when it fax.   ---------------------------------------------------------------------------  --------------  [address]  What is the best way for the office to contact you? OK to leave message on   voicemail  Preferred Call Back Phone Number? [phone]  ---------------------------------------------------------------------------  --------------  SCRIPT ANSWERS  Relationship to Patient?  Self

## 2021-10-07 ENCOUNTER — HOSPITAL ENCOUNTER (OUTPATIENT)
Dept: PHYSICAL THERAPY | Age: 61
Setting detail: THERAPIES SERIES
Discharge: HOME | End: 2021-10-07
Payer: COMMERCIAL

## 2021-10-07 PROCEDURE — 97110 THERAPEUTIC EXERCISES: CPT

## 2021-10-07 PROCEDURE — 97112 NEUROMUSCULAR REEDUCATION: CPT

## 2021-10-07 NOTE — FLOWSHEET NOTE
[address] [facility]. [address]  Phone: [phone]   Fax:     [phone]     Physical Therapy Treatment Note/ Progress Report:   Date:  10/7/2021    Patient Name:  Clifford Mobley    :  1960  MRN: [id number]    Pertinent Medical History:   HTN, Asthma, Hernia    Medical/Treatment Diagnosis Information:  · Diagnosis: R20.2 (ICD-10-CM) - Paresthesia  · Treatment Diagnosis: Decreased left UE and LE strength. Pain  Insurance/Certification information:  PT Insurance Information: [place]  Physician Information:  Referring Practitioner: Dr. Rex Noguera of care signed (Y/N): []  Yes [x]  No     Date of Patient follow up with Physician:      Progress Report: []  Yes  [x]  No     Date Range for reporting period:  Beginnin2021  Ending:     Progress report due (10 Rx/or 30 days whichever is less):      Recertification due (POC duration/ or 90 days whichever is less):      Visit # Insurance Allowable Auth required? Date Range    [x]  Yes [x]  No      Latex Allergy:  [x]NO      []YES  Preferred Language for Healthcare:   [x]English       []other:    Functional Scale:      Date assessed: at eval  Test:  Score:    Pain level:  10/10     History of Injury:  Patient stated complaint:Pt states on  she woke up with numbness in her entire body, from her face to the hands and toes. States went to the ER 3x with the episodes and the ER has not been able to find anything. States she can't walk now and has burning in both feet and lower legs, feels like she is walking on glass. States she gets some burning pain in left upper arm too. States gabapentin relieves pain but if she does not take it then has a lot of pain. States she has double vision at times and gets bad migraines but states she has had a brain tumor since September of last year.  She states she is going to see a neurologist on 7/21/21. SUBJECTIVE:    8/25/21: States both feet were burning after last session. 9/1/21: States she is doing a little better  9/17/21: Pt states her right leg is feeling better, but the left leg is still bothering her a lot  9/30/21: States things are about the same. She saw the pain specialist and they started her on a new medication for the pain. Also she is going to get a pain infusion on Monday and getting injections on both sides of her low back. 10/7/21: Pt states the infusion did not help at all. Still having a lot of pain in her legs. OBJECTIVE:       Plan:   Gait  LE strengthening training and transfers      RESTRICTIONS/PRECAUTIONS: Neuropathy and LE weakness affecting gait and balance    Exercises/Interventions:     Therapeutic Exercises (08339) Min: Resistance/Reps Notes/Cues   Nu Step X 5 min, Level 4    // Bars Gait with RW x 90 feet with S around clinic    HR/TR  2 x 10 B       Mini Squat 2 x 10 for 10 sec. Hip Abduction x 10 B   // bars   LAQ X 10 B    Leg Press 30#  2 x 20 R   20# 2 x 15 L    Incline 3 x 30\"                        Therapeutic Activities (95396) Min:                              Neuromuscular Re-ed (74233) Min:                                   Manual Intervention (91850) Min:                Modalities  Min:               HEP     Seated; LAQ, Marching, HR/TR  7/14   Standing Mini Squats, Hip Abd, HR/TR Hold for now           Other Therapeutic Activities:   8/9/21: Discussed with patient the possible causes of pain and importance of following up with neurologist and primary care MD to control pain so she can better tolerate therapy. Also discussed the benefits of aquatic therapy. Home Exercise Program:   Patient was instructed in the following for HEP: see above     . Patient verbalized/demonstrated understanding and was issued written handout.       Therapeutic Exercise and NMR EXR  [x] (51052) Provided verbal/tactile cueing for activities related to strengthening, flexibility, endurance, ROM for improvements in LE, proximal hip, and core control with self care, mobility, lifting, ambulation.  [] (17236) Provided verbal/tactile cueing for activities related to improving balance, coordination, kinesthetic sense, posture, motor skill, proprioception  to assist with LE, proximal hip, and core control in self care, mobility, lifting, ambulation and eccentric single leg control. [address] and Therapeutic Activities:    [x] (70151 or 31774) Provided verbal/tactile cueing for activities related to improving balance, coordination, kinesthetic sense, posture, motor skill, proprioception and motor activation to allow for proper function of core, proximal hip and LE with self care and ADLs and functional mobility.   [] (14089) Gait Re-education- Provided training and instruction to the patient for proper LE, core and proximal hip recruitment and positioning and eccentric body weight control with ambulation re-education including up and down stairs     Home Exercise Program:    [x] (17537) Reviewed/Progressed HEP activities related to strengthening, flexibility, endurance, ROM of core, proximal hip and LE for functional self-care, mobility, lifting and ambulation/stair navigation   [] (17144)Reviewed/Progressed HEP activities related to improving balance, coordination, kinesthetic sense, posture, motor skill, proprioception of core, proximal hip and LE for self care, mobility, lifting, and ambulation/stair navigation      Manual Treatments:  PROM / STM / Oscillations-Mobs:  G-I, II, III, IV (PA's, Inf., Post.)  [x] (77143) Provided manual therapy to mobilize LE, proximal hip and/or LS spine soft tissue/joints for the purpose of modulating pain, promoting relaxation,  increasing ROM, reducing/eliminating soft tissue swelling/inflammation/restriction, improving soft tissue extensibility and allowing for proper ROM for normal function with self care, mobility, lifting and ambulation. If [facility] Please Indicate Time In/Out  CPT Code Time in Time out                         Approval Dates:  CPT Code Units Approved Units Used  Date Updated:                     Charges:  Timed Code Treatment Minutes: 45   Total Treatment Minutes: 45      [] EVAL (LOW) 75475 (typically 20 minutes face-to-face)  [] EVAL (MOD) 99443 (typically 30 minutes face-to-face)  [] EVAL (HIGH) 06230 (typically 45 minutes face-to-face)  [] RE-EVAL     [x] UR(61548) x 2    [] Dry needle 1 or 2 Muscles (11120)  [x] NMR (84162) x     [] Dry needle 3+ Muscles (68207)  [] Manual (24759) x     [] Ultrasound (90654) x  [] TA (00045) x     [] Mech Traction (03134)  [] ES(attended) (55303)     [] ES (un) (57772):   [] Vasopump (53192)  [] Ionto (26454)   [] Other:gait    GOALS:  Therapist goals for Patient:   Short Term Goals: To be achieved in: 2 weeks  1. Independent in HEP and progression per patient tolerance, in order to prevent re-injury. 2. Patient will have a decrease in pain to facilitate improvement in movement, function, and ADLs as indicated by Functional Deficits.     Long Term Goals: To be achieved in: 6 weeks  1. Disability index score of 25% or less for the (functional outcomes test) to assist with reaching prior level of function. 2. Patient will demonstrate increased AROM to [facility] to allow for proper joint functioning as indicated by patients Functional Deficits. 3. Patient will demonstrate an increase in postural awareness and control to allow for proper functional mobility as indicated by patients Functional Deficits. 4. Patient will demonstrate an increase in Strength to at least 4+/5 in B LE to allow for proper functional mobility as indicated by patients Functional Deficits. 5. Patient will return to functional activities including work and daily activities without increased symptoms or restriction.    6. Pt will report normal use of body so she can complete normal daily activities like exercises and work

## 2021-10-13 RX ORDER — AZELASTINE 1 MG/ML
SPRAY, METERED NASAL
Qty: 30 ML | Refills: 1 | Status: SHIPPED | OUTPATIENT
Start: 2021-10-13 | End: 2021-11-08

## 2021-10-14 ENCOUNTER — HOSPITAL ENCOUNTER (OUTPATIENT)
Dept: SPEECH THERAPY | Age: 61
Setting detail: THERAPIES SERIES
Discharge: HOME | End: 2021-10-14
Payer: COMMERCIAL

## 2021-10-14 ENCOUNTER — TELEPHONE (OUTPATIENT)
Dept: AUDIOLOGY | Age: 61
End: 2021-10-14

## 2021-10-14 ENCOUNTER — HOSPITAL ENCOUNTER (OUTPATIENT)
Dept: PHYSICAL THERAPY | Age: 61
Setting detail: THERAPIES SERIES
Discharge: HOME | End: 2021-10-14
Payer: COMMERCIAL

## 2021-10-14 PROCEDURE — 92507 TX SP LANG VOICE COMM INDIV: CPT

## 2021-10-14 PROCEDURE — 97112 NEUROMUSCULAR REEDUCATION: CPT

## 2021-10-14 PROCEDURE — 97110 THERAPEUTIC EXERCISES: CPT

## 2021-10-14 NOTE — PROGRESS NOTES
Speech-Language Pathology  Daily Treatment Note    Date:  10/14/2021    Patient Name:  Mynor Shah    :  1960  MRN: [id number]  Diagnosis:    Dysarthria    -  Primary D74.8     Insurance/Certification information: [facility]  Referring Physician:  JD Akhtar; Jonel Barrios MD  Plan of care signed (Y/N):  Y  Total visits:12    Progress Note: [x]  Yes  []  No  Next due by: Visit #20     Subjective: Pt remains in bright spirits, bright affect, cooperative and motivated in complex conversation. She reports upcoming nerve blocks at end of October. She reports her speech has improved but she is still getting 'caught on words' and stuttering, and she has a 'little ways to go' to get back to normal.  She reports sometimes getting caught on words in conversation and on phone calls. Less compensatory motor movements. She feels the medication may have been partially affecting her speech. Objective:   Goal 1A: Pt will execute speech strategies in complex structured conversation >90% of opportunities with S/U cues  · : variable % fluent; achieves 100% spontaneous without verbal setup; occasional dysfluency/hesitation. · 10/14: 100% fluent in >85% of conversation trials; minimal to mild hesitations and initial prolongations with pt able to IND correct      Goal 3: Pt will demonstrate >90% speech fluency with structured paragraph reading with independence  · : slow rate but ~85% fluent, with approx 15% hesitations at sentence onset; clear speech; mild-mod prosodic features. Improved as reading progressed. · 10/14: lengthy Bible passage reading (~10 minutes) >90% fluent IND; nearly 100% with initial half of reading; min-mild dysfluency and monotone prosodic features as pt fatigues.   More steady rate of speech with reading; less rushes noted and pt better able to manage/control her dysfluent hesitations/pauses    Previously Met Goals:  Goal 2: Pt will improve fluent clear speech at simple sentence level in structured conversation in 70% of trials with mod verbal cues  · Met 9/17    Assessment:   Initial Speech Diagnosis from 7/20/2021: Variable mild to moderate motor speech impairment with dysarthric and dysfluent features, characterized by occasionally rushed strings of words, variable articulatory precision, mildly impaired onset/initiation of phrases and sentences. Impaired prosody/inflection at sentence level. · Pt reports abrupt onset ~3 weeks ago, and works as a . Pt desires to return to work but her current inability to fluently communicate impairs her ability to effectively perform her job duties. 9/1 update:  Minimal to mild motor speech impairment with occasional to mild dysfluent features in lengthy sentences and complex conversation; resolved articulatory imprecision since initial assessment. Prolonged/hesitant initiation of sentences in ~10% of complex spontaneous utterances, with improved self-recognition and execution of speech strategies to achieve success. Pt acknowledges slow steady improvement but reports she is not back to normal.   9/9 update: Inconsistent trace to mild motor speech dysfluency primarily noted in structured verbal tasks (ie, oral reading and simulated verbal job-related scripts). Speech fluency and intelligibility in unstructured conversation nearly [facility] with only occasional hesitation. Pt noted to have improved volume, prosody and rate in natural setting and when no cueing or setup is provided. Participation with therapeutic tasks is limited by pt's report of pain, tearfulness, and overall feeling unwell. 9/17 update: Slowly resolving trace to mild motor speech dysfluency primarily noted in structured verbal tasks (ie, oral reading and simulated verbal job-related scripts). Speech fluency and intelligibility in unstructured conversation with occasional hesitations.   Pt noted to have improved volume, prosody and rate in natural setting and when no cueing or setup is provided. Participation with therapeutic tasks is variable from session to session and is impacted by pain, tearfulness at times.        Plan:   1x/ every other week x2-3 months    Timed Code Treatment: 0    Total Treatment Time:   45 speech therapy  [facility] [id number]    Signature:   Chintan Head MS, CCC-SLP #[id number]  Speech Language Pathologist

## 2021-10-14 NOTE — TELEPHONE ENCOUNTER
Pt scheduled HAE 10/21/21; instructed to call insurance prior to visit to get information about hearing aid coverage.

## 2021-10-14 NOTE — FLOWSHEET NOTE
on 7/21/21. SUBJECTIVE:    8/25/21: States both feet were burning after last session. 9/1/21: States she is doing a little better  9/17/21: Pt states her right leg is feeling better, but the left leg is still bothering her a lot  9/30/21: States things are about the same. She saw the pain specialist and they started her on a new medication for the pain. Also she is going to get a pain infusion on Monday and getting injections on both sides of her low back. 10/7/21: Pt states the infusion did not help at all. Still having a lot of pain in her legs. 10/14/21: Patient reports her left leg swells up and her right leg tends to give out on her. Stats pain and burning is about the same. OBJECTIVE:       Plan:   Gait  LE strengthening training and transfers      RESTRICTIONS/PRECAUTIONS: Neuropathy and LE weakness affecting gait and balance    Exercises/Interventions:     Therapeutic Exercises (09640) Min: Resistance/Reps Notes/Cues   Nu Step X 5 min, Level 4    // Bars Gait with RW x 90 feet with S around clinic    HR/TR  2 x 10 B       Mini Squat 2 x 10 for 10 sec. Hip Abduction x 10 B   // bars   LAQ X 10 B    Leg Press 30#  2 x 20 R   20# 2 x 15 L    Incline 3 x 30\"                        Therapeutic Activities (80230) Min:                              Neuromuscular Re-ed (96080) Min:                                   Manual Intervention (75015) Min:                Modalities  Min:               HEP     Seated; LAQ, Marching, HR/TR  7/14   Standing Mini Squats, Hip Abd, HR/TR Hold for now           Other Therapeutic Activities:   8/9/21: Discussed with patient the possible causes of pain and importance of following up with neurologist and primary care MD to control pain so she can better tolerate therapy. Also discussed the benefits of aquatic therapy. Home Exercise Program:   Patient was instructed in the following for HEP: see above     .  Patient verbalized/demonstrated understanding and was issued written handout. Therapeutic Exercise and NMR EXR  [x] (05738) Provided verbal/tactile cueing for activities related to strengthening, flexibility, endurance, ROM for improvements in LE, proximal hip, and core control with self care, mobility, lifting, ambulation.  [] (90732) Provided verbal/tactile cueing for activities related to improving balance, coordination, kinesthetic sense, posture, motor skill, proprioception  to assist with LE, proximal hip, and core control in self care, mobility, lifting, ambulation and eccentric single leg control.  [address] and Therapeutic Activities:    [x] (45065 or 97132) Provided verbal/tactile cueing for activities related to improving balance, coordination, kinesthetic sense, posture, motor skill, proprioception and motor activation to allow for proper function of core, proximal hip and LE with self care and ADLs and functional mobility.   [] (69489) Gait Re-education- Provided training and instruction to the patient for proper LE, core and proximal hip recruitment and positioning and eccentric body weight control with ambulation re-education including up and down stairs     Home Exercise Program:    [x] (46810) Reviewed/Progressed HEP activities related to strengthening, flexibility, endurance, ROM of core, proximal hip and LE for functional self-care, mobility, lifting and ambulation/stair navigation   [] (96569)Reviewed/Progressed HEP activities related to improving balance, coordination, kinesthetic sense, posture, motor skill, proprioception of core, proximal hip and LE for self care, mobility, lifting, and ambulation/stair navigation      Manual Treatments:  PROM / STM / Oscillations-Mobs:  G-I, II, III, IV (PA's, Inf., Post.)  [x] (31231) Provided manual therapy to mobilize LE, proximal hip and/or LS spine soft tissue/joints for the purpose of modulating pain, promoting relaxation,  increasing ROM, reducing/eliminating soft tissue swelling/inflammation/restriction, improving soft tissue extensibility and allowing for proper ROM for normal function with self care, mobility, lifting and ambulation. If [facility] Please Indicate Time In/Out  CPT Code Time in Time out                         Approval Dates:  CPT Code Units Approved Units Used  Date Updated:                     Charges:  Timed Code Treatment Minutes: 45   Total Treatment Minutes: 45      [] EVAL (LOW) 98366 (typically 20 minutes face-to-face)  [] EVAL (MOD) 57059 (typically 30 minutes face-to-face)  [] EVAL (HIGH) 87435 (typically 45 minutes face-to-face)  [] RE-EVAL     [x] UJ(28454) x 2    [] Dry needle 1 or 2 Muscles (29758)  [x] NMR (02137) x     [] Dry needle 3+ Muscles (71267)  [] Manual (60478) x     [] Ultrasound (20734) x  [] TA (53590) x     [] Mech Traction (42199)  [] ES(attended) (34790)     [] ES (un) (07647):   [] Vasopump (12933)  [] Ionto (65269)   [] Other:gait    GOALS:  Therapist goals for Patient:   Short Term Goals: To be achieved in: 2 weeks  1. Independent in HEP and progression per patient tolerance, in order to prevent re-injury. 2. Patient will have a decrease in pain to facilitate improvement in movement, function, and ADLs as indicated by Functional Deficits.     Long Term Goals: To be achieved in: 6 weeks  1. Disability index score of 25% or less for the (functional outcomes test) to assist with reaching prior level of function. 2. Patient will demonstrate [facility] to allow for proper joint functioning as indicated by patients Functional Deficits. 3. Patient will demonstrate an increase in postural awareness and control to allow for proper functional mobility as indicated by patients Functional Deficits. 4. Patient will demonstrate an increase in Strength to at least 4+/5 in B LE to allow for proper functional mobility as indicated by patients Functional Deficits.    5. Patient will return to functional activities including work and daily activities without increased symptoms or restriction. 6. Pt will report normal use of body so she can complete normal daily activities like exercises and work (patient specific functional goal)            (cut and paste from eval)    ASSESSMENT:   Pt is showing improvement with transfers and gait, requiring SBA. Pt is limited in progress the past few weeks due to pain affecting ability to do exercises    Treatment/Activity Tolerance:  [x] Patient tolerated treatment well [] Patient limited by fatique  [] Patient limited by pain  [] Patient limited by other medical complications  [] Other:     Overall Progression Towards Functional goals/ Treatment Progress Update:  [] Patient is progressing as expected towards functional goals listed. [x] Progression is slowed due to complexities/Impairments listed. [x] Progression has been slowed due to co-morbidities. [] Plan just implemented, too soon to assess goals progression <30days   [] Goals require adjustment due to lack of progress  [] Patient is not progressing as expected and requires additional follow up with physician  [] Other    Prognosis for POC: [x] Good [] Fair  [] Poor    Patient requires continued skilled intervention: [x] Yes  [] No        PLAN:   [x] Continue per plan of care [] Alter current plan (see comments)  [] Plan of care initiated [] Hold pending MD visit [] Discharge    Electronically signed by: uXan Ambrocio PTA    Note: If patient does not return for scheduled/recommended follow up visits, this note will serve as a discharge from care along with the most recent update on progress.

## 2021-10-18 ENCOUNTER — TELEPHONE (OUTPATIENT)
Dept: PRIMARY CARE CLINIC | Age: 61
End: 2021-10-18

## 2021-10-18 NOTE — PROGRESS NOTES
Kailey Fontanez  1960.60 y.o. female   [id number]      HEARING [address] was seen today, 10/21/2021 , for a Hearing Aid Evaluation following audiologic evaluation on 7/22/21. Patient has no prior history of hearing aid use. Patient does not have an insurance benefit for hearing aids at [facility] ([facility]). Patient is responsible for 100% of the purchasing price at the time of fitting. Hearing aid benefit worksheet scanned into media tab. DATE: 10/21/2021     Ms. Tc Tracey began today's appointment stating that she is not interested in pursuing hearing aids at this time. She notes that her hearing loss has not been bothersome and her tinnitus symptoms have improved significantly since changing her medications. 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 I explained to the patient that this hearing loss could enhance her tinnitus symptoms. I discussed insurance coverage with the patient today. She currently does not have hearing aid coverage through her insurance and she would be responsible for 100% of the purchasing price out of pocket. The cost of hearing aids can range from about $2000.00 to $6000.00 in this office. Ms. Tc Tracey has elected not to pursue amplification at this time. Patient understands that should her tinnitus increase, or if she should notice a change in her hearing, she should contact our office to schedule a follow-up hearing evaluation. PATIENT EDUCATION:      - Verbally reviewed benefits and limitations of devices and available features in hearing aids.     - Verbally discussed realistic expectations of hearing aid use     RECOMMENDATIONS:      - [address] should hearing or tinnitus symptoms worsen for follow up hearing evaluation      Betty Santoyo  Audiologist      NO CHARGE

## 2021-10-20 PROBLEM — N95.1 SYMPTOMATIC MENOPAUSAL OR FEMALE CLIMACTERIC STATES: Status: ACTIVE | Noted: 2021-02-26

## 2021-10-20 PROBLEM — N94.9 VAGINAL BURNING: Status: ACTIVE | Noted: 2021-02-26

## 2021-10-20 PROBLEM — B37.9 INFECTION DUE TO YEAST: Status: ACTIVE | Noted: 2020-12-21

## 2021-10-20 PROBLEM — N94.89 VAGINAL BURNING: Status: ACTIVE | Noted: 2021-02-26

## 2021-10-20 PROBLEM — K58.0 IRRITABLE BOWEL SYNDROME WITH DIARRHEA: Status: ACTIVE | Noted: 2020-12-21

## 2021-10-20 PROBLEM — R47.01 APHASIA: Status: ACTIVE | Noted: 2021-07-12

## 2021-10-20 PROBLEM — H52.4 PRESBYOPIA: Status: ACTIVE | Noted: 2020-10-13

## 2021-10-20 PROBLEM — R20.2 PARESTHESIAS: Status: ACTIVE | Noted: 2021-07-12

## 2021-10-20 PROBLEM — D33.2 BENIGN NEOPLASM OF BRAIN (HCC): Status: ACTIVE | Noted: 2021-07-12

## 2021-10-21 ENCOUNTER — PROCEDURE VISIT (OUTPATIENT)
Dept: AUDIOLOGY | Age: 61
End: 2021-10-21

## 2021-10-21 ENCOUNTER — HOSPITAL ENCOUNTER (OUTPATIENT)
Dept: PHYSICAL THERAPY | Age: 61
Setting detail: THERAPIES SERIES
Discharge: HOME | End: 2021-10-21
Payer: COMMERCIAL

## 2021-10-21 DIAGNOSIS — J45.40 MODERATE PERSISTENT ASTHMA WITHOUT COMPLICATION: ICD-10-CM

## 2021-10-21 DIAGNOSIS — H90.3 SENSORINEURAL HEARING LOSS (SNHL) OF BOTH EARS: Primary | ICD-10-CM

## 2021-10-21 DIAGNOSIS — I10 ESSENTIAL HYPERTENSION: ICD-10-CM

## 2021-10-21 PROCEDURE — 99999 PR OFFICE/OUTPT VISIT,PROCEDURE ONLY: CPT | Performed by: AUDIOLOGIST

## 2021-10-21 PROCEDURE — 97110 THERAPEUTIC EXERCISES: CPT

## 2021-10-21 PROCEDURE — 97112 NEUROMUSCULAR REEDUCATION: CPT

## 2021-10-21 NOTE — FLOWSHEET NOTE
7/21/21. SUBJECTIVE:    8/25/21: States both feet were burning after last session. 9/1/21: States she is doing a little better  9/17/21: Pt states her right leg is feeling better, but the left leg is still bothering her a lot  9/30/21: States things are about the same. She saw the pain specialist and they started her on a new medication for the pain. Also she is going to get a pain infusion on Monday and getting injections on both sides of her low back. 10/7/21: Pt states the infusion did not help at all. Still having a lot of pain in her legs. 10/14/21: Patient reports her left leg swells up and her right leg tends to give out on her. Stats pain and burning is about the same. 10/21/21: Patient reports she saw chiropractor which helped her back,she feels like she is able to walk a little straighter. OBJECTIVE:       Plan:   Gait  LE strengthening training and transfers      RESTRICTIONS/PRECAUTIONS: Neuropathy and LE weakness affecting gait and balance    Exercises/Interventions:     Therapeutic Exercises (72059) Min: Resistance/Reps Notes/Cues   Nu Step X 5 min, Level 4    // Bars Gait with RW x 90 feet with S around clinic    HR/TR  2 x 10 B       . Hip Abduction x 10 B   // bars   LAQ  1 # 2X 10 B    Leg Press 30#  2 x 20 R   20# 2 x 15 L    Incline 3 x 30\"                        Therapeutic Activities (76948) Min:                              Neuromuscular Re-ed (81532) Min:                                   Manual Intervention (96132) Min:                Modalities  Min:               HEP     Seated; LAQ, Marching, HR/TR  7/14   Standing Mini Squats, Hip Abd, HR/TR Hold for now           Other Therapeutic Activities:   8/9/21: Discussed with patient the possible causes of pain and importance of following up with neurologist and primary care MD to control pain so she can better tolerate therapy. Also discussed the benefits of aquatic therapy.      Home Exercise Program:   Patient was instructed in promoting relaxation,  increasing ROM, reducing/eliminating soft tissue swelling/inflammation/restriction, improving soft tissue extensibility and allowing for proper ROM for normal function with self care, mobility, lifting and ambulation. If [facility] Please Indicate Time In/Out  CPT Code Time in Time out                         Approval Dates:  CPT Code Units Approved Units Used  Date Updated:                     Charges:  Timed Code Treatment Minutes: 40   Total Treatment Minutes: 40      [] EVAL (LOW) 53782 (typically 20 minutes face-to-face)  [] EVAL (MOD) 48188 (typically 30 minutes face-to-face)  [] EVAL (HIGH) 93526 (typically 45 minutes face-to-face)  [] RE-EVAL     [x] VH(14165) x 2    [] Dry needle 1 or 2 Muscles (76400)  [x] NMR (66584) x     [] Dry needle 3+ Muscles (09419)  [] Manual (07057) x     [] Ultrasound (51764) x  [] TA (15840) x     [] Mech Traction (22833)  [] ES(attended) (72381)     [] ES (un) (82052):   [] Vasopump (64003)  [] Ionto (67812)   [] Other:gait    GOALS:  Therapist goals for Patient:   Short Term Goals: To be achieved in: 2 weeks  1. Independent in HEP and progression per patient tolerance, in order to prevent re-injury. 2. Patient will have a decrease in pain to facilitate improvement in movement, function, and ADLs as indicated by Functional Deficits.     Long Term Goals: To be achieved in: 6 weeks  1. Disability index score of 25% or less for the (functional outcomes test) to assist with reaching prior level of function. 2. Patient will demonstrate increased AROM to [facility] to allow for proper joint functioning as indicated by patients Functional Deficits. 3. Patient will demonstrate an increase in postural awareness and control to allow for proper functional mobility as indicated by patients Functional Deficits.    4. Patient will demonstrate an increase in Strength to at least 4+/5 in B LE to allow for proper functional mobility as indicated by patients Functional Deficits. 5. Patient will return to functional activities including work and daily activities without increased symptoms or restriction. 6. Pt will report normal use of body so she can complete normal daily activities like exercises and work (patient specific functional goal)            (cut and paste from eval)    ASSESSMENT:   Pt is showing improvement with gait. Treatment/Activity Tolerance:  [x] Patient tolerated treatment well [] Patient limited by fatique  [] Patient limited by pain  [] Patient limited by other medical complications  [] Other:     Overall Progression Towards Functional goals/ Treatment Progress Update:  [] Patient is progressing as expected towards functional goals listed. [x] Progression is slowed due to complexities/Impairments listed. [x] Progression has been slowed due to co-morbidities. [] Plan just implemented, too soon to assess goals progression <30days   [] Goals require adjustment due to lack of progress  [] Patient is not progressing as expected and requires additional follow up with physician  [] Other    Prognosis for POC: [x] Good [] Fair  [] Poor    Patient requires continued skilled intervention: [x] Yes  [] No        PLAN:   [x] Continue per plan of care [] Alter current plan (see comments)  [] Plan of care initiated [] Hold pending MD visit [] Discharge    Electronically signed by: Anna Moreno PTA    Note: If patient does not return for scheduled/recommended follow up visits, this note will serve as a discharge from care along with the most recent update on progress.

## 2021-10-21 NOTE — TELEPHONE ENCOUNTER
Patient called stated that her FMLA paperwork from [facility] [employer] has be updated by provider (Dr Ann Conti) for her to be off work until 11/28/21, but the FMLA paperwork from [address] [place] [address] is needed to be updated to match the [facility] paperwork; patient will come and dropped off paperwork from [address] [place] [address] for provider to fill out.

## 2021-10-21 NOTE — TELEPHONE ENCOUNTER
Medication:   Requested Prescriptions     Pending Prescriptions Disp Refills    budesonide (PULMICORT) 0.5 MG/2ML nebulizer suspension [Pharmacy Med Name: BUDESONIDE 0.5 MG/2 ML SUSP] 120 mL 2     Sig: TAKE 2 MLS BY NEBULIZATION 2 TIMES DAILY AS NEEDED (SOB)        Last Filled:      Patient Phone Number: [phone] (home) [phone] (work)    Last appt: 9/28/2021   Next appt: Visit date not found    Last OARRS:   RX Monitoring 8/17/2017   Attestation The Prescription Monitoring Report for this patient was reviewed today.

## 2021-10-21 NOTE — TELEPHONE ENCOUNTER
Medication:   Requested Prescriptions     Pending Prescriptions Disp Refills    amLODIPine (NORVASC) 10 MG tablet [Pharmacy Med Name: AMLODIPINE BESYLATE 10 MG TAB] 30 tablet 2     Sig: TAKE 1 TABLET BY MOUTH EVERY DAY        Last Filled:      Patient Phone Number: [phone] (home) [phone] (work)    Last appt: 9/28/2021   Next appt: Visit date not found    Last OARRS:   RX Monitoring 8/17/2017   Attestation The Prescription Monitoring Report for this patient was reviewed today.

## 2021-10-21 NOTE — PATIENT INSTRUCTIONS
Hearing Loss: Care Instructions  Your Care Instructions      Hearing loss is a sudden or slow decrease in how well you hear. It can range from mild to profound. Permanent hearing loss can occur with aging, and it can happen when you are exposed long-term to loud noise. Examples include listening to loud music, riding motorcycles, or being around other loud machines. Hearing loss can affect your work and home life. It can make you feel lonely or depressed. You may feel that you have lost your independence. But hearing aids and other devices can help you hear better and feel connected to others. Follow-up care is a key part of your treatment and safety. Be sure to make and go to all appointments, and call your doctor if you are having problems. It's also a good idea to know your test results and keep a list of the medicines you take. How can you care for yourself at home? · Avoid loud noises whenever possible. This helps keep your hearing from getting worse. Always wear hearing protection around loud noises. · If appropriate, wear hearing aid(s) as directed. It is recommended that hearing aids are worn during all waking hours to keep your brain active and give it access to the sounds it is missing. · If you are beginning your process with hearing aid(s), schedule a \"Hearing Aid Evaluation\" with an audiologist to discuss your lifestyle, features of hearing aid technology, and styles of hearing aids available. It is recommended that you contact your insurance company to determine if you have a hearing aid benefit, as this may dictate who you can see for these services. · Have hearing tests as your doctor suggests. They can show whether your hearing has changed. Your hearing aid may need to be adjusted. · Use other assistive devices as needed. These may include:  ? Telephone amplifiers and hearing aids that can connect to a television, stereo, radio, or microphone. ?  Devices that use lights or vibrations. These alert you to the doorbell, a ringing telephone, or a baby monitor. ? Television closed-captioning. This shows the words at the bottom of the screen. Most new TVs can do this. ? TTY (text telephone). This lets you type messages back and forth on the telephone instead of talking or listening. These devices are also called TDD. When messages are typed on the keyboard, they are sent over the phone line to a receiving TTY. The message is shown on a monitor. · Use pagers, fax machines, text, and email if it is hard for you to communicate by telephone. · Try to learn a listening technique called speech-reading. It is not lip-reading. You pay attention to people's gestures, expressions, posture, and tone of voice. These clues can help you understand what a person is saying. Face the person you are talking to, and have him or her face you. Make sure the lighting is good. You need to see the other person's face clearly. · Think about counseling if you need help to adjust to your hearing loss. When should you call for help? Watch closely for changes in your health, and be sure to contact your doctor if:    · You think your hearing is getting worse. · You have new symptoms, such as dizziness or nausea. Tinnitus: Overview and Management Strategies          Many people have some ringing sounds in their ears once in a while. You may hear a roar, a hiss, a tinkle, or a buzz. The sound usually lasts only a few minutes. If it goes on all the time, you may have tinnitus. Tinnitus is usually caused by long-term exposure to loud noise. This damages the nerves in the inner ear. It can occur with all types of hearing loss. It may be a symptom of almost any ear problem. Tinnitus may be caused by a buildup of earwax. Or, it may be caused by ear infections or certain medicines (especially antibiotics or large amounts of aspirin).  You can also hear noises in your ears because of an injury to the stop taking aspirin and similar products such as ibuprofen or naproxen. · Get exercise often. It can help improve blood flow to the ear. Hearing Aids and Other Devices  A hearing aid may help your tinnitus if you have a hearing loss. An audiologist can help you find and use the best hearing aid for you. Tinnitus maskers look like hearing aids. They make a sound that masks, or covers up, the tinnitus. The masking sound distracts you from the ringing in your ears. You may be able to use a masker and a hearing aid at the same time. Sound machines can be useful at night or during quiet times. There are machines you can buy at the store. Or, you can find apps on your phone that make sounds, like the ocean or rainfall. Fish tanks, fans, quiet music, and indoor waterfalls can help, as well. Ways to manage/cope with tinnitus  Some tinnitus may last a long time. To manage your tinnitus, try to:  · Avoid noises that you think caused your tinnitus. If you can't avoid loud noises, wear earplugs or earmuffs. · Ignore the sound by paying attention to other things. Keeping your brain busy with other tasks or background noise can help your brain not focus on the tinnitus. · Try to not give the tinnitus an emotional reaction. Do your best to ignore the sound and not let it bother you. Relax using biofeedback, meditation, or yoga. Feeling stressed and being tired can make tinnitus worse. · Play music or white noise to help you sleep. Background noise may cover up the noise that you hear in your ears. You can buy a tabletop machine or a device that sits under your pillow to play soothing sounds, like ocean waves. · Smart phones have free apps, such as Whist, Relax Melodies, ReSound Relief, and White Noise Lite. These apps have different types of sounds/noise, some of which you can blend together to find sounds that are most soothing to you.   · Hearing aid technology, especially when there is some hearing loss, may help reduce tinnitus symptoms by giving your brain better access to the sounds it is missing. There are some hearing aids with built-in noise generator programs, which may help when amplification alone is not enough. Additional resources may be found through the [employer] at www.eleazar.org    When should you call for help? Call 911 anytime you think you may need emergency care. For example, call if:    · You have symptoms of a stroke. These may include:  ? Sudden numbness, tingling, weakness, or loss of movement in your face, arm, or leg, especially on only one side of your body. ? Sudden vision changes. ? Sudden trouble speaking. ? Sudden confusion or trouble understanding simple statements. ? Sudden problems with walking or balance. ? A sudden, severe headache that is different from past headaches. Call your doctor now or seek immediate medical care if:    · You develop other symptoms. These may include hearing loss (or worse hearing loss), balance problems, dizziness, nausea, or vomiting. Watch closely for changes in your health, and be sure to contact your doctor if:    · Your tinnitus moves from both ears to one ear. · Your hearing loss gets worse within 1 day after an ear injury. · Your tinnitus or hearing loss does not get better within 1 week after an ear injury. · Your tinnitus bothers you enough that you want to take medicines to help you cope with it. If you notice changes in your tinnitus and/or your hearing, it is recommended that you have your hearing tested by your audiologist and to follow-up with your physician that manages your hearing loss (such as your ENT or Primary Care doctor).

## 2021-10-23 RX ORDER — BUDESONIDE 0.5 MG/2ML
500 INHALANT ORAL 2 TIMES DAILY PRN
Qty: 120 ML | Refills: 2 | Status: SHIPPED | OUTPATIENT
Start: 2021-10-23 | End: 2021-12-15

## 2021-10-23 RX ORDER — AMLODIPINE BESYLATE 10 MG/1
TABLET ORAL
Qty: 30 TABLET | Refills: 2 | Status: SHIPPED | OUTPATIENT
Start: 2021-10-23 | End: 2021-11-02 | Stop reason: SDUPTHER

## 2021-10-24 DIAGNOSIS — G44.229 CHRONIC TENSION-TYPE HEADACHE, NOT INTRACTABLE: ICD-10-CM

## 2021-10-24 RX ORDER — BUTALBITAL, ACETAMINOPHEN AND CAFFEINE 50; 325; 40 MG/1; MG/1; MG/1
TABLET ORAL
Qty: 30 TABLET | Refills: 1 | Status: SHIPPED | OUTPATIENT
Start: 2021-10-24 | End: 2021-11-18 | Stop reason: SDUPTHER

## 2021-10-24 NOTE — PROGRESS NOTES
Severe throbbing headache after receiving a Moderna Covid vaccine. No relief from Topiramate. Exam on the phone. Slurred speech but the patient says that this has improved since her TIA in July. Reviewed the CT of her head then and it was unremarkable. A-- severe headache after Moderna Covid vaccine probably triggered a migraine  Plan: Renewed Fioricet. Advised to go to the ER if no improvement in her headache after taking Fioricet.

## 2021-10-25 ENCOUNTER — VIRTUAL VISIT (OUTPATIENT)
Dept: ENDOCRINOLOGY | Age: 61
End: 2021-10-25
Payer: COMMERCIAL

## 2021-10-25 DIAGNOSIS — D35.2 PITUITARY ADENOMA (HCC): Primary | ICD-10-CM

## 2021-10-25 PROCEDURE — 99442 PR PHYS/QHP TELEPHONE EVALUATION 11-20 MIN: CPT | Performed by: INTERNAL MEDICINE

## 2021-10-25 RX ORDER — ASCORBIC ACID 125 MG
TABLET,CHEWABLE ORAL
COMMUNITY

## 2021-10-25 NOTE — PROGRESS NOTES
Patient ID:   Mynor Shah is a 64 y.o. female     Chief Complaint:   Mynor Shah is seen for an evaluation of pituitary microadenoma. Pursuant to the emergency declaration under the [facility] waiver authority and the [employer] Act this Telephone Visit was insisted, with patient's consent, to reduce the patient's risk of exposure to COVID-19 and provide continuity of care for an established patient. Services were provided through a synchronous discussion over a telephone to substitute for in-person clinic visit. Neurosurgeon: Dr. Laura Mancuso MD     Subjective: Interval history:     TIA in June 2021 with slurred speech. It started during MRI imaging on June 29th. A week later she was readmitted due to leg weakness. Imaging did not show a stroke. Visit Jan 2021:     MRI in Dec 2020 showed 6 mm pituitary adenoma.      Dec 2020: MRI pituitary protocol showed a nonenhancing/hypoenhancing lesion along the left sella measuring 6 x 8 x 5 mm (AP x TRANS x CC)- pituitary adenoma vs Rathke's cyst. There is slight rightward deviation of the infundibulum.  No suprasellar mass.  There is slight elevation of the left aspect of the optic chiasm due to the left internal carotid artery.         She gave birth to one child    Denies breast tenderness, had reduction surgery in her 20's due to cysts   Menopause around 2016   No changes in ring size, shoe size   Denies easy bruisability or bleeding   She has daily headaches      Not on antipsychotics, SSRIs, estrogen supplements, antiemetics, Ca channel blockers, methyldopa, opioids  Denies drugs (heroine)      No family h/o endocrine tumors         The following portions of the patient's history were reviewed and updated as appropriate:      Family History   Problem Relation Age of Onset    Diabetes Father     Diabetes Mother     No Known Problems Sister     No Known Problems Maternal Grandmother     No Known Problems Paternal Grandmother     No Known Problems Paternal Grandfather     No Known Problems Sister     No Known Problems Sister     Rheum Arthritis Neg Hx     Osteoarthritis Neg Hx     Asthma Neg Hx     Breast Cancer Neg Hx     Cancer Neg Hx     Heart Failure Neg Hx     High Cholesterol Neg Hx     Hypertension Neg Hx     Migraines Neg Hx     Ovarian Cancer Neg Hx     Rashes/Skin Problems Neg Hx     Seizures Neg Hx     Stroke Neg Hx     Thyroid Disease Neg Hx          Social History     Socioeconomic History    Marital status:      Spouse name: Not on file    Number of children: 1    Years of education: Not on file    Highest education level: Not on file   Occupational History    Occupation: /[employer]   Tobacco Use    Smoking status: Former Smoker     Packs/day: 0.25     Years: 5.00     Pack years: 1.25     Types: Cigarettes     Quit date: 1980     Years since quittin.3    Smokeless tobacco: Former User     Quit date: 12/10/1994   Vaping Use    Vaping Use: Never used   Substance and Sexual Activity    Alcohol use: Not Currently     Alcohol/week: 0.0 standard drinks     Comment: socially-wine    Drug use: No    Sexual activity: Yes     Partners: Male   Other Topics Concern    Not on file   Social History Narrative    Not on file     Social Determinants of Health     Financial Resource Strain: Low Risk     Difficulty of Paying Living Expenses: Not hard at all   Food Insecurity: No Food Insecurity    Worried About Running Out of Food in the Last Year: Never true    Joss of Food in the Last Year: Never true   Transportation Needs:     Lack of Transportation (Medical):      Lack of Transportation (Non-Medical):    Physical Activity:     Days of Exercise per Week:     Minutes of Exercise per Session:    Stress:     Feeling of Stress :    Social Connections:     Frequency of Communication with Friends and Family:     Frequency of Social Gatherings with Friends and Family:     Attends Catholic Services:     Active Member of Clubs or Organizations:     Attends Club or Organization Meetings:     Marital Status:    Intimate Partner Violence:     Fear of Current or Ex-Partner:     Emotionally Abused:     Physically Abused:     Sexually Abused:          Past Medical History:   Diagnosis Date    Allergic rhinitis     Anxiety 2020    Arthritis     pt denies    Asthma 4/10/2012    Asthma-chronic obstructive pulmonary disease overlap syndrome ([address]     pt denies    Bilateral carpal tunnel syndrome 2016    Depression     Dysmenorrhea     Fibroid     GERD (gastroesophageal reflux disease)     Headache(784.0) 4/10/2012    Hyperlipidemia     Hypertension     S/P gastric bypass     Status post coronary angiogram     TIA (transient ischemic attack)     Wears glasses        Procedure Laterality Date    BREAST SURGERY      Breast reduction    CARPAL TUNNEL RELEASE Left 2016     Left carpal tunnel release      CARPAL TUNNEL RELEASE Right 10/06/2016     SECTION      x1    COLONOSCOPY      HYSTERECTOMY      complete-ovaries out    SALIVARY GLAND SURGERY Left 2021    EXCISION OF SALIVARY STONE IN THE LEFT SUBMANDIBULAR DUCT performed by Tom Terrell MD at [place] [address]   Past Surgical History:   Procedure Laterality Date    BREAST SURGERY      Breast reduction    CARPAL TUNNEL RELEASE Left 2016     Left carpal tunnel release      CARPAL TUNNEL RELEASE Right 10/06/2016     SECTION      x1    COLONOSCOPY      HYSTERECTOMY      complete-ovaries out      Allergen Reactions    Shellfish Allergy Anaphylaxis    Shellfish-Derived Products Shortness Of Breath and Palpitations    Lisinopril      Dry cough, no lip swelling or resp. sxs         Current Outpatient Medications   Medication Sig Dispense Refill    ZINC PO Take by mouth      Alpha-Lipoic Acid 600 MG TABS Take 600 mg by mouth 2 times daily      Omega-3 Fatty Acids (OMEGA 3 500 PO) Take by mouth      TURMERIC PO Take by mouth      Cyanocobalamin (B-12) 5000 MCG CAPS Take by mouth      butalbital-acetaminophen-caffeine (FIORICET, ESGIC) -40 MG per tablet Take one tablet by mouth every 4 hours as needed for your headache.  30 tablet 1    budesonide (PULMICORT) 0.5 MG/2ML nebulizer suspension TAKE 2 MLS BY NEBULIZATION 2 TIMES DAILY AS NEEDED (SOB) 120 mL 2    amLODIPine (NORVASC) 10 MG tablet TAKE 1 TABLET BY MOUTH EVERY DAY 30 tablet 2    azelastine (ASTELIN) 0.1 % nasal spray INSTILL 1 SPRAY IN EACH NOSTRIL 2 TIMES DAILY AS DIRECTED 30 mL 1    topiramate (TOPAMAX) 25 MG tablet Take 1 tablet by mouth 2 times daily (Dose change) 60 tablet 0    acetaminophen (TYLENOL) 500 MG tablet Take 1 tablet by mouth 4 times daily as needed for Pain 360 tablet 1    benzonatate (TESSALON) 100 MG capsule TAKE 1 CAPSULE BY MOUTH 3 TIMES A DAY AS NEEDED FOR COUGH 90 capsule 3    fluticasone (FLONASE) 50 MCG/ACT nasal spray INHALE 1 SPRAY NASALLY EVERY DAY 1 Bottle 5    KLOR-CON M20 20 MEQ extended release tablet TAKE 1 TABLET BY MOUTH 2 TIMES DAILY 60 tablet 5    aspirin 81 MG EC tablet Take 1 tablet by mouth daily 30 tablet 3    atorvastatin (LIPITOR) 40 MG tablet Take 1 tablet by mouth nightly 30 tablet 3    hydroCHLOROthiazide (HYDRODIURIL) 25 MG tablet TAKE 1 TABLET BY MOUTH EVERY DAY 30 tablet 5    montelukast (SINGULAIR) 10 MG tablet TAKE 1 TABLET BY MOUTH EVERY DAY 30 tablet 5    omeprazole (PRILOSEC) 40 MG delayed release capsule TAKE 1 CAPSULE BY MOUTH EVERY DAY 30 capsule 5    hydrALAZINE (APRESOLINE) 50 MG tablet Take 1 tablet by mouth 2 times daily 180 tablet 3    clotrimazole-betamethasone (LOTRISONE) 1-0.05 % cream APPLY TO AFFECTED AREA TWICE A DAY 15 g 1    ondansetron (ZOFRAN) 4 MG tablet TAKE 1 TABLET BY MOUTH EVERY 12 HOURS AS NEEDED or nausea or vomiting 30 tablet 12    vitamin D (ERGOCALCIFEROL) 1.25 MG (43710 UT) CAPS capsule TAKE 1 CAPSULE BY MOUTH EVERY 30 DAYS 4 capsule 3    Chlorhexidine Gluconate 2 % SOLN Apply to skin once daily prn 250 mL 1    albuterol sulfate HFA (PROAIR HFA) 108 (90 Base) MCG/ACT inhaler Inhale 2 puffs into the lungs every 6 hours as needed for Wheezing 1 Inhaler 5    EPINEPHrine (EPIPEN) 0.3 MG/0.3ML DELPHINE injection Use as directed for allergic reaction 2 Device 3    nicotine polacrilex (NICORETTE) 2 MG gum Use up to 6 sticks of gum a day for smoking (Patient not taking: Reported on 10/25/2021) 110 each 3     No current facility-administered medications for this visit. Review of Systems:    Constitutional: Negative for fever, chills, and unexpected weight change. HENT: Negative for congestion, ear pain, rhinorrhea,  sore throat and trouble swallowing. Eyes: Negative for photophobia, redness, itching. Respiratory: Negative for cough, shortness of breath and sputum. Cardiovascular: Negative for chest pain, palpitations and leg swelling. Gastrointestinal: Negative for nausea, vomiting, abdominal pain, diarrhea, constipation. Endocrine: Negative for cold intolerance, heat intolerance, polydipsia, polyphagia and polyuria. Genitourinary: Negative for dysuria, urgency, frequency, hematuria and flank pain. Musculoskeletal: Negative for myalgias, back pain, arthralgias and neck pain. Skin/Nail: Negative for rash, itching. Normal nails. Neurological: Negative for seizures, weakness, light-headedness, numbness and headaches. Hematological/ Lymph nodes: Negative for adenopathy. Does not bruise/bleed easily. Psychiatric/Behavioral: Negative for suicidal ideas, depression, anxiety, sleep disturbance and decreased concentration. Objective:   Physical Exam:    There were no vitals taken for this visit.       Lab Review:      Orders Only on 08/06/2021   Component Date Value Ref Range Status    Prealbumin 08/06/2021 32.3 Final    Atrial Rate 07/05/2021 87  BPM Final    P-R Interval 07/05/2021 156  ms Final    QRS Duration 07/05/2021 82  ms Final    Q-T Interval 07/05/2021 400  ms Final    QTc Calculation (Bazett) 07/05/2021 481  ms Final    P Axis 07/05/2021 48  degrees Final    R Axis 07/05/2021 58  degrees Final    T Axis 07/05/2021 67  degrees Final    Diagnosis 07/05/2021 Sinus rhythm with occasional Premature ventricular complexesProlonged QTAbnormal ECGWhen compared with ECG of 16-OCT-2018 08:16,Premature ventricular complexes are now PresentConfirmed by Chalino Lopez MD, Lopez Mejía ([id number]) on 7/6/2021 6:14:04 PM   Final    Glucose 07/05/2021 112  mg/dL Final    WBC 07/05/2021 6.4  4.0 - 11.0 K/uL Final    RBC 07/05/2021 4.02  4.00 - 5.20 M/uL Final    Hemoglobin 07/05/2021 12.3  12.0 - 16.0 g/dL Final    Hematocrit 07/05/2021 36.2  36.0 - 48.0 % Final    MCV 07/05/2021 89.9  80.0 - 100.0 fL Final    MCH 07/05/2021 30.6  26.0 - 34.0 pg Final    MCHC 07/05/2021 34.0  31.0 - 36.0 g/dL Final    RDW 07/05/2021 14.3  12.4 - 15.4 % Final    Platelets 27/08/4869 248  135 - 450 K/uL Final    MPV 07/05/2021 7.2  5.0 - 10.5 fL Final    Neutrophils % 07/05/2021 40.5  % Final    Lymphocytes % 07/05/2021 51.0  % Final    Monocytes % 07/05/2021 6.0  % Final    Eosinophils % 07/05/2021 1.4  % Final    Basophils % 07/05/2021 1.1  % Final    Neutrophils Absolute 07/05/2021 2.6  1.7 - 7.7 K/uL Final    Lymphocytes Absolute 07/05/2021 3.2  1.0 - 5.1 K/uL Final    Monocytes Absolute 07/05/2021 0.4  0.0 - 1.3 K/uL Final    Eosinophils Absolute 07/05/2021 0.1  0.0 - 0.6 K/uL Final    Basophils Absolute 07/05/2021 0.1  0.0 - 0.2 K/uL Final    Sodium 07/05/2021 140  136 - 145 mmol/L Final    Potassium reflex Magnesium 07/05/2021 2.8* 3.5 - 5.1 mmol/L Final    Chloride 07/05/2021 105  99 - 110 mmol/L Final    CO2 07/05/2021 22  21 - 32 mmol/L Final    Anion Gap 07/05/2021 13  3 - 16 Final    Glucose 07/05/2021 122* 70 - 99 mg/dL Final    BUN 07/05/2021 8  7 - 20 mg/dL Final    CREATININE 07/05/2021 0.7  0.6 - 1.1 mg/dL Final    GFR Non- 07/05/2021 >60  >60 Final    Comment: >60 mL/min/1.73m2 EGFR, calc. for ages 25 and older using the  MDRD formula (not corrected for weight), is valid for stable  renal function.  GFR  07/05/2021 >60  >60 Final    Comment: Chronic Kidney Disease: less than 60 ml/min/1.73 sq.m. Kidney Failure: less than 15 ml/min/1.73 sq.m. Results valid for patients 18 years and older.  Calcium 07/05/2021 8.0* 8.3 - 10.6 mg/dL Final    Total Protein 07/05/2021 5.7* 6.4 - 8.2 g/dL Final    Albumin 07/05/2021 3.4  3.4 - 5.0 g/dL Final    Albumin/Globulin Ratio 07/05/2021 1.5  1.1 - 2.2 Final    Total Bilirubin 07/05/2021 0.3  0.0 - 1.0 mg/dL Final    Alkaline Phosphatase 07/05/2021 98  40 - 129 U/L Final    ALT 07/05/2021 32  10 - 40 U/L Final    AST 07/05/2021 38* 15 - 37 U/L Final    Globulin 07/05/2021 2.3  g/dL Final    Troponin 07/05/2021 <0.01  <0.01 ng/mL Final    Methodology by Troponin T    Protime 07/05/2021 11.0  9.9 - 12.7 sec Final    Comment: Effective 6-30-21 09:00am EST  Please note reference ranges have  changed for PT and INR Testing.  INR 07/05/2021 0.97  0.88 - 1.12 Final    Comment: Effective 6/30/21 at 09:00am EST    Normal: 0.88 - 1.12  Therapeutic: 2.0 - 3.0  Pros.  Valve: 2.5 - 3.5  AMI: 2.0 - 3.0      Lactic Acid 07/05/2021 3.1* 0.4 - 2.0 mmol/L Final    Color, UA 07/05/2021 YELLOW  Straw/Yellow Final    Clarity, UA 07/05/2021 Clear  Clear Final    Glucose, Ur 07/05/2021 Negative  Negative mg/dL Final    Bilirubin Urine 07/05/2021 Negative  Negative Final    Ketones, Urine 07/05/2021 Negative  Negative mg/dL Final    Specific [place], UA 07/05/2021 >1.030  1.005 - 1.030 Final    Blood, Urine 07/05/2021 Negative  Negative Final    pH, UA 07/05/2021 7.0  5.0 - 8.0 Final    Protein, UA 07/05/2021 Negative  Negative mg/dL Final    Urobilinogen, Urine 07/05/2021 0.2  <2.0 E.U./dL Final    Nitrite, Urine 07/05/2021 Negative  Negative Final    Leukocyte Esterase, Urine 07/05/2021 Negative  Negative Final    Microscopic Examination 07/05/2021 Not Indicated   Final    Urine Type 07/05/2021 NotGiven   Final    Urine Reflex to Culture 07/05/2021 Not Indicated   Final    POC Glucose 07/05/2021 112* 70 - 99 mg/dl Final    Performed on 07/05/2021 ACCU-CHEK   Final    Magnesium 07/05/2021 1.80  1.80 - 2.40 mg/dL Final    WBC 07/06/2021 7.6  4.0 - 11.0 K/uL Final    RBC 07/06/2021 4.16  4.00 - 5.20 M/uL Final    Hemoglobin 07/06/2021 12.6  12.0 - 16.0 g/dL Final    Hematocrit 07/06/2021 37.3  36.0 - 48.0 % Final    MCV 07/06/2021 89.8  80.0 - 100.0 fL Final    MCH 07/06/2021 30.2  26.0 - 34.0 pg Final    MCHC 07/06/2021 33.7  31.0 - 36.0 g/dL Final    RDW 07/06/2021 14.6  12.4 - 15.4 % Final    Platelets 82/59/1084 237  135 - 450 K/uL Final    MPV 07/06/2021 7.3  5.0 - 10.5 fL Final    Sodium 07/06/2021 140  136 - 145 mmol/L Final    Potassium reflex Magnesium 07/06/2021 2.5* 3.5 - 5.1 mmol/L Final    Chloride 07/06/2021 104  99 - 110 mmol/L Final    CO2 07/06/2021 21  21 - 32 mmol/L Final    Anion Gap 07/06/2021 15  3 - 16 Final    Glucose 07/06/2021 167* 70 - 99 mg/dL Final    BUN 07/06/2021 6* 7 - 20 mg/dL Final    CREATININE 07/06/2021 0.7  0.6 - 1.1 mg/dL Final    GFR Non- 07/06/2021 >60  >60 Final    Comment: >60 mL/min/1.73m2 EGFR, calc. for ages 25 and older using the  MDRD formula (not corrected for weight), is valid for stable  renal function.  GFR  07/06/2021 >60  >60 Final    Comment: Chronic Kidney Disease: less than 60 ml/min/1.73 sq.m. Kidney Failure: less than 15 ml/min/1.73 sq.m. Results valid for patients 18 years and older.       Calcium 07/06/2021 8.4  8.3 - 10.6 mg/dL Final    Magnesium 07/06/2021 1.80  1.80 - 2.40 mg/dL Final    Vitamin B-12 07/05/2021 1185* 211 - 911 pg/mL Final    Folate 07/05/2021 >20.00  4.78 - 24.20 ng/mL Final    Comment: Effective 11-15-16 10:00am EST  Please note reference ranges have  changed for Folate.  KEIRY 07/06/2021 Negative  Negative Final    Comment: KEIRY specimens are screened using multiplex bead immunoassay  methodology. All results reported as positive are further tested  by indirect fluorescent assay (IFA) using Hep-2 substrate with  an IgG-specific conjugate. The KEIRY screen is designed to detect  antibodies against dsDNA, SS-A (Ro), SS-B (La), Palm (Sm), SmRNP,  RNP, Scl-70, Charissa-1, Centromere, Chromatin, and Ribosomal P.      Total Protein 07/06/2021 5.6* 6.4 - 8.2 g/dL Final    Albumin 07/06/2021 3.0* 3.1 - 4.9 g/dL Final    Alpha-1-Globulin 07/06/2021 0.1* 0.2 - 0.4 g/dL Final    Alpha-2-Globulin 07/06/2021 1.0  0.4 - 1.1 g/dL Final    Beta Globulin 07/06/2021 1.0  0.9 - 1.6 g/dL Final    Gamma Globulin 07/06/2021 0.5* 0.6 - 1.8 g/dL Final    TSH Reflex FT4 07/06/2021 1.90  0.27 - 4.20 uIU/mL Final    Lactic Acid 07/06/2021 1.8  0.4 - 2.0 mmol/L Final    Hemoglobin A1C 07/07/2021 5.8  See comment % Final    Comment: Comment:  Diagnosis of Diabetes: > or = 6.5%  Increased risk of diabetes (Prediabetes): 5.7-6.4%  Glycemic Control: Nonpregnant Adults: <7.0%                    Pregnant: <6.0%        eAG 07/07/2021 119.8  mg/dL Final    Potassium 07/07/2021 3.1* 3.5 - 5.1 mmol/L Final    Potassium 07/07/2021 3.2* 3.5 - 5.1 mmol/L Final    SPE/CONCEPCION Interpretation 07/06/2021 REVIEWED   Final    Comment: No monoclonal band is identified on serum protein electrophoresis. Gamma  globulins are decreased on serum protein electrophoresis. Suggest quantitation of the immunoglobulins. If light chain  disease or primary amyloidosis is part of the differential  diagnosis, suggest serum free light chain assay or collection of a 24-hr  urine specimen for electrophoresis and immunofixation.     According to the Pituitary microadenoma   Non functional   Likely benign      Hormonal work up normal Dec 2020      Saliva cortisol x 3 contaminated - Jan 2021   24 hour urine cortisol - normal Feb 2021     MRI June 2021: T2 hyperintense, nonenhancing lesion in the left pituitary is unchanged in size and appearance in the interval.  Again, this could represent a Rathke's cleft cyst or pituitary micro adenoma. There is persistent rightward deviation of the infundibulum. RTC in 12 months   Will do MRI if not ordered by Neurosurgery   If the size start to increase , will do hormonal evaluation     A total of 15 minutes was spent conversing with the patient and over half of that time was spent counseling the patient on endocrine related medical issues.      NOV in person     Electronically signed by Trinidad Clarke MD on  10/25/21 at 8:41 AM

## 2021-10-26 ENCOUNTER — OFFICE VISIT (OUTPATIENT)
Dept: NEUROLOGY | Age: 61
End: 2021-10-26
Payer: COMMERCIAL

## 2021-10-26 VITALS
DIASTOLIC BLOOD PRESSURE: 70 MMHG | SYSTOLIC BLOOD PRESSURE: 103 MMHG | HEART RATE: 90 BPM | BODY MASS INDEX: 36.25 KG/M2 | HEIGHT: 62 IN | WEIGHT: 197 LBS

## 2021-10-26 DIAGNOSIS — G43.019 INTRACTABLE MIGRAINE WITHOUT AURA AND WITHOUT STATUS MIGRAINOSUS: Primary | ICD-10-CM

## 2021-10-26 DIAGNOSIS — G47.33 OBSTRUCTIVE SLEEP APNEA: ICD-10-CM

## 2021-10-26 DIAGNOSIS — D35.2 PITUITARY ADENOMA (HCC): ICD-10-CM

## 2021-10-26 DIAGNOSIS — Z98.84 S/P GASTRIC BYPASS: ICD-10-CM

## 2021-10-26 PROCEDURE — 99214 OFFICE O/P EST MOD 30 MIN: CPT | Performed by: PSYCHIATRY & NEUROLOGY

## 2021-10-26 NOTE — PROGRESS NOTES
Zainab Bernabe   Neurology followup    Subjective:   CC/HP  History was obtained from the patient. Patient continues to have headaches. She feels like Topamax is not helping and Topamax is too many side effects for her and she would like to start with. We talked about Depakote and the amitriptyline and she would not like to take any medicine that has a potential chance of weight gain. She would like something like allergic to see if it will help for her periodic headaches. Patient continues to have her other neurological symptoms with speech difficulty that comes and goes. She is also noticed some leg swelling and edema. Other history:  Patient states that she had an MRI done and later EMG study done at [facility] neurology for some leg pain and she feels she was electrocuted and since then she has had difficulty with speech and several other neurological symptoms. Patient states that she was seen at [facility] and they were unable to come up with a definitive diagnosis. She had a sleep study done which confirmed obstructive sleep apnea. Patient states that now she is using the CPAP machine  MRI dedicated pituitary imaging showed pituitary adenoma versus Rathke's pouch. She was seen by the neurosurgeon who is recommended endocrinology work-up which has been done all the labs were normal and they are planning to repeat the MRI pituitary in 9 months to a year.       REVIEW OF SYSTEMS    Constitutional:  []   Chills   []  Fatigue   []  Fevers   []  Malaise   []  Weight loss     [x] Denies all of the above    Respiratory:   []  Cough    []  Shortness of breath         [x] Denies all of the above     Cardiovascular:   []  Chest pain    []  Exertional chest pressure/discomfort           [] Palpitations    []  Syncope     [x] Denies all of the above        Past Medical History:   Diagnosis Date    Allergic rhinitis     Anxiety 1/16/2020    Arthritis     pt denies    Asthma 4/10/2012    Asthma-chronic obstructive pulmonary disease overlap syndrome (HCC)     pt denies    Bilateral carpal tunnel syndrome 2016    Depression     Dysmenorrhea     Fibroid     GERD (gastroesophageal reflux disease)     Headache(784.0) 4/10/2012    Hyperlipidemia     Hypertension     S/P gastric bypass     Status post coronary angiogram     TIA (transient ischemic attack)     Wears glasses      Family History   Problem Relation Age of Onset    Diabetes Father     Diabetes Mother     No Known Problems Sister     No Known Problems Maternal Grandmother     No Known Problems Paternal Grandmother     No Known Problems Paternal Grandfather     No Known Problems Sister     No Known Problems Sister     Rheum Arthritis Neg Hx     Osteoarthritis Neg Hx     Asthma Neg Hx     Breast Cancer Neg Hx     Cancer Neg Hx     Heart Failure Neg Hx     High Cholesterol Neg Hx     Hypertension Neg Hx     Migraines Neg Hx     Ovarian Cancer Neg Hx     Rashes/Skin Problems Neg Hx     Seizures Neg Hx     Stroke Neg Hx     Thyroid Disease Neg Hx      Social History     Socioeconomic History    Marital status:      Spouse name: Not on file    Number of children: 1    Years of education: Not on file    Highest education level: Not on file   Occupational History    Occupation: /Cook   Tobacco Use    Smoking status: Former Smoker     Packs/day: 0.25     Years: 5.00     Pack years: 1.25     Types: Cigarettes     Quit date: 1980     Years since quittin.3    Smokeless tobacco: Former User     Quit date: 12/10/1994   Vaping Use    Vaping Use: Never used   Substance and Sexual Activity    Alcohol use: Not Currently     Alcohol/week: 0.0 standard drinks     Comment: socially-wine    Drug use: No    Sexual activity: Yes     Partners: Male   Other Topics Concern    Not on file   Social History Narrative    Not on file     Social Determinants of Health     Financial Resource Strain: Low Risk     Difficulty of Paying Living Expenses: Not hard at all   Food Insecurity: No Food Insecurity    Worried About Running Out of Food in the Last Year: Never true    Joss of Food in the Last Year: Never true   Transportation Needs:     Lack of Transportation (Medical):  Lack of Transportation (Non-Medical):    Physical Activity:     Days of Exercise per Week:     Minutes of Exercise per Session:    Stress:     Feeling of Stress :    Social Connections:     Frequency of Communication with Friends and Family:     Frequency of Social Gatherings with Friends and Family:     Attends Hinduism Services:     Active Member of Clubs or Organizations:     Attends Club or Organization Meetings:     Marital Status:    Intimate Partner Violence:     Fear of Current or Ex-Partner:     Emotionally Abused:     Physically Abused:     Sexually Abused:         Objective:  Exam:  /70   Pulse 90   Ht 5' 2\" (1.575 m)   Wt 197 lb (89.4 kg)   BMI 36.03 kg/m²   This is a well-nourished patient in no acute distress  Patient is awake, alert and oriented x3. Episodic stuttering which comes and goes. .  Pupils are equal round reacting to light. Extraocular movements intact. Face symmetrical. Tongue midline. Motor exam shows normal symmetrical strength. Deep tendon reflexes normal. Plantar reflexes downgoing. Sensory exam normal. Coordination normal. Gait normal. No carotid bruit. No neck stiffness.       Data :  LABS:  General Labs:    CBC:   Lab Results   Component Value Date    WBC 7.6 07/06/2021    RBC 4.16 07/06/2021    HGB 12.6 07/06/2021    HCT 37.3 07/06/2021    MCV 89.8 07/06/2021    MCH 30.2 07/06/2021    MCHC 33.7 07/06/2021    RDW 14.6 07/06/2021     07/06/2021    MPV 7.3 07/06/2021     BMP:    Lab Results   Component Value Date     07/12/2021    K 3.6 07/12/2021    K 2.5 07/06/2021     07/12/2021    CO2 26 07/12/2021    BUN 16 07/12/2021    LABALBU 4.3 08/06/2021 CREATININE 0.8 07/12/2021    CALCIUM 9.4 07/12/2021    GFRAA >60 07/12/2021    GFRAA >60 03/26/2013    LABGLOM >60 07/12/2021    GLUCOSE 89 07/12/2021       Impression :  Migraine headaches, not well controlled  Patient feels the Topamax is side effect and if not helping  Amitriptyline and Depakote are not an option since patient is very concerned about any possibility of weight gain      Plan :  Discussed with patient  Given a prescription for Nurtec ODT 10 tablets to be taken sparingly for intractable breakthrough headaches. Patient has not been satisfied with the diagnosis given at the [facility] for her other neurological symptoms including speech difficulty. We talked about referral to [facility] or some other tertiary care institution to get their opinion. I will give her a referral for that. I have asked her to check with her insurance company to make sure it is covered before she makes the appointment. Please note a portion of  this chart was generated using dragon dictation software. Although every effort was made to ensure the accuracy of this automated transcription, some errors in transcription may have occurred.

## 2021-10-28 ENCOUNTER — HOSPITAL ENCOUNTER (OUTPATIENT)
Dept: PHYSICAL THERAPY | Age: 61
Setting detail: THERAPIES SERIES
Discharge: HOME | End: 2021-10-28
Payer: COMMERCIAL

## 2021-10-28 ENCOUNTER — APPOINTMENT (OUTPATIENT)
Dept: PHYSICAL THERAPY | Age: 61
End: 2021-10-28
Payer: COMMERCIAL

## 2021-10-28 PROCEDURE — 97110 THERAPEUTIC EXERCISES: CPT

## 2021-10-28 PROCEDURE — 97530 THERAPEUTIC ACTIVITIES: CPT

## 2021-10-28 NOTE — FLOWSHEET NOTE
[address] [facility] [address]  Phone: [phone]   Fax:     [phone]     Physical Therapy Treatment Note/ Progress Report:   Date:  10/28/2021    Patient Name:  Bev Pool    :  1960  MRN: [id number]    Pertinent Medical History:   HTN, Asthma, Hernia    Medical/Treatment Diagnosis Information:  · Diagnosis: R20.2 (ICD-10-CM) - Paresthesia  · Treatment Diagnosis: Decreased left UE and LE strength. Pain  Insurance/Certification information:  PT Insurance Information: [facility]- [place]  Physician Information:  Referring Practitioner: Dr. Keri Mantilla of care signed (Y/N): []  Yes [x]  No     Date of Patient follow up with Physician:      Progress Report: []  Yes  [x]  No     Date Range for reporting period:  Beginnin2021  Ending:     Progress report due (10 Rx/or 30 days whichever is less):      Recertification due (POC duration/ or 90 days whichever is less):      Visit # Insurance Allowable Auth required? Date Range    [x]  Yes [x]  No      Latex Allergy:  [x]NO      []YES  Preferred Language for Healthcare:   [x]English       []other:    Functional Scale:      Date assessed: at eval  Test:  Score:    Pain level:  6/10     History of Injury:  Patient stated complaint:Pt states on  she woke up with numbness in her entire body, from her face to the hands and toes. States went to the ER 3x with the episodes and the ER has not been able to find anything. States she can't walk now and has burning in both feet and lower legs, feels like she is walking on glass. States she gets some burning pain in left upper arm too. States gabapentin relieves pain but if she does not take it then has a lot of pain. States she has double vision at times and gets bad migraines but states she has had a brain tumor since September of last year.  She states she is going to see a neurologist on 7/21/21. SUBJECTIVE:    10/28/21: States she is walking better. Pain is a lot better. OBJECTIVE:       Plan:   Gait  LE strengthening training and transfers      RESTRICTIONS/PRECAUTIONS: Neuropathy and LE weakness affecting gait and balance    Exercises/Interventions:     Therapeutic Exercises (99661) Min: Resistance/Reps Notes/Cues   Nu Step X 5 min, Level 4    // Bars Gait with RW x 70 feet with MI around clinic    HR/TR  2 x 10 B       . Hip Abduction and Extension x 10 B   // bars   LAQ  1 # 2X 10 B    Leg Press 30#  2 x 20 R   20# 2 x 15 L    Incline 3 x 30\"                        Therapeutic Activities (57313) Min:                              Neuromuscular Re-ed (14678) Min:                                   Manual Intervention (10351) Min:                Modalities  Min:               HEP     Seated; LAQ, Marching, HR/TR  7/14   Standing Mini Squats, Hip Abd, HR/TR Hold for now           Other Therapeutic Activities:   8/9/21: Discussed with patient the possible causes of pain and importance of following up with neurologist and primary care MD to control pain so she can better tolerate therapy. Also discussed the benefits of aquatic therapy. Home Exercise Program:       Therapeutic Exercise and NMR EXR  [x] (39297) Provided verbal/tactile cueing for activities related to strengthening, flexibility, endurance, ROM for improvements in LE, proximal hip, and core control with self care, mobility, lifting, ambulation.  [] (67996) Provided verbal/tactile cueing for activities related to improving balance, coordination, kinesthetic sense, posture, motor skill, proprioception  to assist with LE, proximal hip, and core control in self care, mobility, lifting, ambulation and eccentric single leg control.  [facility] and Therapeutic Activities:    [x] (45716 or 54853) Provided verbal/tactile cueing for activities related to improving balance, coordination, kinesthetic sense, posture, motor skill, proprioception and motor activation to allow for proper function of core, proximal hip and LE with self care and ADLs and functional mobility.   [] (87445) Gait Re-education- Provided training and instruction to the patient for proper LE, core and proximal hip recruitment and positioning and eccentric body weight control with ambulation re-education including up and down stairs     Home Exercise Program:    [x] (00069) Reviewed/Progressed HEP activities related to strengthening, flexibility, endurance, ROM of core, proximal hip and LE for functional self-care, mobility, lifting and ambulation/stair navigation   [] (08298)Reviewed/Progressed HEP activities related to improving balance, coordination, kinesthetic sense, posture, motor skill, proprioception of core, proximal hip and LE for self care, mobility, lifting, and ambulation/stair navigation      Manual Treatments:  PROM / STM / Oscillations-Mobs:  G-I, II, III, IV (PA's, Inf., Post.)  [x] (42370) Provided manual therapy to mobilize LE, proximal hip and/or LS spine soft tissue/joints for the purpose of modulating pain, promoting relaxation,  increasing ROM, reducing/eliminating soft tissue swelling/inflammation/restriction, improving soft tissue extensibility and allowing for proper ROM for normal function with self care, mobility, lifting and ambulation.      If [facility] Please Indicate Time In/Out  CPT Code Time in Time out                         Approval Dates:  CPT Code Units Approved Units Used  Date Updated:                     Charges:  Timed Code Treatment Minutes: 40   Total Treatment Minutes: 40      [] EVAL (LOW) 36222 (typically 20 minutes face-to-face)  [] EVAL (MOD) 72339 (typically 30 minutes face-to-face)  [] EVAL (HIGH) 83785 (typically 45 minutes face-to-face)  [] RE-EVAL     [x] AX(90020) x 2    [] Dry needle 1 or 2 Muscles (93550)  [] NMR (98046) x     [] Dry needle 3+ Muscles (02584)  [] Manual (00823) x     [] Ultrasound (11035) x  [x] TA (19817) x     [] [facility] Traction (08465)  [] ES(attended) (30430)     [] ES (un) (26768):   [] Vasopump (06245)  [] Atilio Rutherford (47813)   [] Other:gait    GOALS:  Therapist goals for Patient:   Short Term Goals: To be achieved in: 2 weeks  1. Independent in HEP and progression per patient tolerance, in order to prevent re-injury. 2. Patient will have a decrease in pain to facilitate improvement in movement, function, and ADLs as indicated by Functional Deficits.     Long Term Goals: To be achieved in: 6 weeks  1. Disability index score of 25% or less for the (functional outcomes test) to assist with reaching prior level of function. 2. Patient will demonstrate increased AROM to LE[facility] to allow for proper joint functioning as indicated by patients Functional Deficits. 3. Patient will demonstrate an increase in postural awareness and control to allow for proper functional mobility as indicated by patients Functional Deficits. 4. Patient will demonstrate an increase in Strength to at least 4+/5 in B LE to allow for proper functional mobility as indicated by patients Functional Deficits. 5. Patient will return to functional activities including work and daily activities without increased symptoms or restriction. 6. Pt will report normal use of body so she can complete normal daily activities like exercises and work (patient specific functional goal)            (cut and paste from eval)    ASSESSMENT:   Pt is showing improvement with gait and reporting better pain levels. Pt able to SLR without UE support. Treatment/Activity Tolerance:  [x] Patient tolerated treatment well [] Patient limited by fatique  [] Patient limited by pain  [] Patient limited by other medical complications  [] Other:     Overall Progression Towards Functional goals/ Treatment Progress Update:  [] Patient is progressing as expected towards functional goals listed. [x] Progression is slowed due to complexities/Impairments listed.   [x]

## 2021-11-02 ENCOUNTER — OFFICE VISIT (OUTPATIENT)
Dept: PRIMARY CARE CLINIC | Age: 61
End: 2021-11-02
Payer: COMMERCIAL

## 2021-11-02 VITALS
DIASTOLIC BLOOD PRESSURE: 73 MMHG | TEMPERATURE: 98.2 F | HEIGHT: 62 IN | HEART RATE: 97 BPM | OXYGEN SATURATION: 96 % | SYSTOLIC BLOOD PRESSURE: 102 MMHG | BODY MASS INDEX: 35.15 KG/M2 | WEIGHT: 191 LBS

## 2021-11-02 DIAGNOSIS — I10 ESSENTIAL HYPERTENSION: ICD-10-CM

## 2021-11-02 DIAGNOSIS — M51.36 DDD (DEGENERATIVE DISC DISEASE), LUMBAR: ICD-10-CM

## 2021-11-02 DIAGNOSIS — G45.9 TIA (TRANSIENT ISCHEMIC ATTACK): Primary | ICD-10-CM

## 2021-11-02 DIAGNOSIS — R47.81 SLURRED SPEECH: ICD-10-CM

## 2021-11-02 DIAGNOSIS — G62.9 SENSORY NEUROPATHY: ICD-10-CM

## 2021-11-02 PROCEDURE — 99203 OFFICE O/P NEW LOW 30 MIN: CPT | Performed by: FAMILY MEDICINE

## 2021-11-02 RX ORDER — AMLODIPINE BESYLATE 10 MG/1
TABLET ORAL
Qty: 30 TABLET | Refills: 2 | Status: SHIPPED | OUTPATIENT
Start: 2021-11-02 | End: 2022-01-07

## 2021-11-02 NOTE — PROGRESS NOTES
CC fu for disability papers    HPI 63 yo female known previous TIA, bilateral  Leg numbness, bilateral leg paresthesias , bilateal upper extremities pain, slurred speech    She has recently seen [facility]oped[facility]   For new onset low back pain  X rays  Show multi-level DDD spondylolisthesis at 4/5. Spondylosis as well. Noted that PT  had failed in past    [facility]  Rick Chandoke  EMG of legs suggests  Generalized sensory demyelinating peripheral neuropathy affecting the left lower limb. No evidence of right or left lumbosacral  Radiculopathy or plexopathy or myopathy or anterior horn cell disorder  Nor focal  Entrapment neuropathy      Physical Exam  Constitutional:       General: She is not in acute distress. Appearance: She is well-developed. She is not diaphoretic. HENT:      Head: Normocephalic and atraumatic. Right Ear: External ear normal.      Left Ear: External ear normal.      Nose: Nose normal.      Mouth/Throat:      Pharynx: No oropharyngeal exudate. Eyes:      General: No scleral icterus. Left eye: No discharge. Conjunctiva/sclera: Conjunctivae normal.      Pupils: Pupils are equal, round, and reactive to light. Neck:      Thyroid: No thyromegaly. Vascular: No JVD. Trachea: No tracheal deviation. Cardiovascular:      Rate and Rhythm: Normal rate and regular rhythm. Heart sounds: Normal heart sounds. No murmur heard. No friction rub. No gallop. Pulmonary:      Effort: Pulmonary effort is normal. No respiratory distress. Breath sounds: Normal breath sounds. No stridor. No wheezing or rales. Chest:      Chest wall: No tenderness. Abdominal:      General: Bowel sounds are normal. There is no distension. Palpations: Abdomen is soft. There is no mass. Tenderness: There is no abdominal tenderness. There is no guarding or rebound. Musculoskeletal:         General: No tenderness. Normal range of motion.       Cervical back: Normal range of motion and neck supple. Comments: LEFT LEG WEAKNESS    TENDERNESS LUMBAR SPINE   Lymphadenopathy:      Cervical: No cervical adenopathy. Skin:     General: Skin is warm and dry. Coloration: Skin is not pale. Findings: No erythema or rash. Neurological:      Mental Status: She is alert and oriented to person, place, and time. Cranial Nerves: No cranial nerve deficit. Sensory: Sensory deficit present. Coordination: Coordination normal.      Deep Tendon Reflexes: Reflexes normal.   Psychiatric:         Behavior: Behavior normal.         Thought Content: Thought content normal.         Judgment: Judgment normal.       Wt Readings from Last 3 Encounters:   11/02/21 191 lb (86.6 kg)   10/26/21 197 lb (89.4 kg)   09/28/21 197 lb 3.2 oz (89.4 kg)         1. TIA (transient ischemic attack)  CONT ASA  CONT STATIN    2. Sensory neuropathy  WORK UP PER ORTHO AND NEUROLOGY  CONT WITH THEM  3. DDD (degenerative disc disease), lumbar  WORK UP PER ORTHO    4. Essential hypertension  BP AT [facility]  - amLODIPine (NORVASC) 10 MG tablet; TAKE 1 TABLET BY MOUTH EVERY DAY  Dispense: 30 tablet; Refill: 2    5.  Slurred speech  CONT SPEECH THERAPY          11/22/21    Notes reviewed from Inez Pizano MD  Pain specialist at [facility] referred per Dr Michi Galeas    Assess/plans  Idiopathic progressive neuropathy   Cervical disc displacement  Cervical radiculopathy  Increase gabapentin 8  300 mg tabs day/then titrate, add tizanidine ,  intrvenous Depacon infusion of 500 mg over 45 minutes  Right sided sympathetic block, left sided lumbar sympathetic block,

## 2021-11-04 ENCOUNTER — HOSPITAL ENCOUNTER (OUTPATIENT)
Dept: SPEECH THERAPY | Age: 61
Setting detail: THERAPIES SERIES
Discharge: HOME | End: 2021-11-04
Payer: COMMERCIAL

## 2021-11-04 ENCOUNTER — HOSPITAL ENCOUNTER (OUTPATIENT)
Dept: PHYSICAL THERAPY | Age: 61
Setting detail: THERAPIES SERIES
Discharge: HOME | End: 2021-11-04
Payer: COMMERCIAL

## 2021-11-04 ENCOUNTER — TELEPHONE (OUTPATIENT)
Dept: PRIMARY CARE CLINIC | Age: 61
End: 2021-11-04

## 2021-11-04 DIAGNOSIS — K21.9 GASTROESOPHAGEAL REFLUX DISEASE WITHOUT ESOPHAGITIS: ICD-10-CM

## 2021-11-04 PROCEDURE — 97530 THERAPEUTIC ACTIVITIES: CPT

## 2021-11-04 PROCEDURE — 97110 THERAPEUTIC EXERCISES: CPT

## 2021-11-04 PROCEDURE — 92507 TX SP LANG VOICE COMM INDIV: CPT

## 2021-11-04 RX ORDER — OMEPRAZOLE 40 MG/1
CAPSULE, DELAYED RELEASE ORAL
Qty: 30 CAPSULE | Refills: 5 | Status: SHIPPED | OUTPATIENT
Start: 2021-11-04 | End: 2022-05-09

## 2021-11-04 NOTE — TELEPHONE ENCOUNTER
Medication:   Requested Prescriptions     Pending Prescriptions Disp Refills    omeprazole (PRILOSEC) 40 MG delayed release capsule [Pharmacy Med Name: OMEPRAZOLE DR 40 MG CAPSULE] 30 capsule 5     Sig: TAKE 1 CAPSULE BY MOUTH EVERY DAY        Last Filled:      Patient Phone Number: [phone] (home) [phone] (work)    Last appt: 11/2/2021   Next appt: 1/3/2022    Last OARRS:   RX Monitoring 8/17/2017   Attestation The Prescription Monitoring Report for this patient was reviewed today.

## 2021-11-04 NOTE — PROGRESS NOTES
Speech-Language Pathology  Daily Treatment Note    Date:  2021    Patient Name:  Ne Sahu    :  1960  MRN: [id number]  Diagnosis:    Dysarthria    -  Primary S76.7     Insurance/Certification information: [facility]  Referring Physician:  JAYSON Lind-JEFFERSON; Murali Vazquez MD  Plan of care signed (Y/N):  Y  Total visits:13    Progress Note: [x]  Yes  []  No  Next due by: Visit #20     Subjective: Pt remains in bright spirits, bright affect, cooperative and motivated in complex conversation. She reports progress with back/leg pain with workup completed at [facility]; plans for epidural Monday. She reports her speech is stable; still getting 'caught on words' and stuttering, and she has a 'little ways to go' to get back to normal.   She continues to report concern that the medication may have been partially affecting her speech. Objective:   Goal 1A: Pt will execute speech strategies in complex structured conversation >90% of opportunities with S/U cues  · : variable % fluent; achieves 100% spontaneous without verbal setup; occasional dysfluency/hesitation. · 10/14: 100% fluent in >85% of conversation trials; minimal to mild hesitations and initial prolongations with pt able to IND correct  · /4: complex structured conversation: 85% IND; more noted hesitation with deliberate speech and/or increased frustration/emotion; at ease with informal conversation, when engaged in pleasant/jovial conversation      Goal 3: Pt will demonstrate >90% speech fluency with structured paragraph reading with independence  · : slow rate but ~85% fluent, with approx 15% hesitations at sentence onset; clear speech; mild-mod prosodic features. Improved as reading progressed. · 10/14: lengthy Bible passage reading (~10 minutes) >90% fluent IND; nearly 100% with initial half of reading; min-mild dysfluency and monotone prosodic features as pt fatigues.   More steady rate of speech with reading; less rushes noted and pt better able to manage/control her dysfluent hesitations/pauses  · 11/4: unfamiliar multiparagraph reading 75% fluent IND; mildly dysfluent and monotone    Previously Met Goals:  Goal 2: Pt will improve fluent clear speech at simple sentence level in structured conversation in 70% of trials with mod verbal cues  · Met 9/17    Assessment:   Initial Speech Diagnosis from 7/20/2021: Variable mild to moderate motor speech impairment with dysarthric and dysfluent features, characterized by occasionally rushed strings of words, variable articulatory precision, mildly impaired onset/initiation of phrases and sentences. Impaired prosody/inflection at sentence level. · Pt reports abrupt onset ~3 weeks ago, and works as a . Pt desires to return to work but her current inability to fluently communicate impairs her ability to effectively perform her job duties. 9/1 update:  Minimal to mild motor speech impairment with occasional to mild dysfluent features in lengthy sentences and complex conversation; resolved articulatory imprecision since initial assessment. Prolonged/hesitant initiation of sentences in ~10% of complex spontaneous utterances, with improved self-recognition and execution of speech strategies to achieve success. Pt acknowledges slow steady improvement but reports she is not back to normal.   9/9 update: Inconsistent trace to mild motor speech dysfluency primarily noted in structured verbal tasks (ie, oral reading and simulated verbal job-related scripts). Speech fluency and intelligibility in unstructured conversation nearly [facility] with only occasional hesitation. Pt noted to have improved volume, prosody and rate in natural setting and when no cueing or setup is provided. Participation with therapeutic tasks is limited by pt's report of pain, tearfulness, and overall feeling unwell.   9/17 update: Slowly resolving trace to mild motor speech dysfluency primarily noted in structured verbal tasks (ie, oral reading and simulated verbal job-related scripts). Speech fluency and intelligibility in unstructured conversation with occasional hesitations. Pt noted to have improved volume, prosody and rate in natural setting and when no cueing or setup is provided. Participation with therapeutic tasks is variable from session to session and is impacted by pain, tearfulness at times.        Plan:   1x/ every other week x2-3 months    Timed Code Treatment: 0    Total Treatment Time:   45 speech therapy  CPT 42056    Signature:   Kimberly Atwood MS, CCC-SLP #[id number]  Speech Language Pathologist

## 2021-11-04 NOTE — FLOWSHEET NOTE
[address]  Phone: [phone]   Fax:     [phone]     Physical Therapy Treatment Note/ Progress Report:   Date:  2021    Patient Name:  Jocelin Marcelino    :  1960  MRN: [id number]    Pertinent Medical History:   HTN, Asthma, Hernia  Medical/Treatment Diagnosis Information:  · Diagnosis: R20.2 (ICD-10-CM) - Paresthesia  · Treatment Diagnosis: Decreased left UE and LE strength. Pain  Insurance/Certification information:  PT Insurance Information: [place]- [place]  Physician Information:  Referring Practitioner: Dr. Noe Hernandez of care signed (Y/N): []  Yes [x]  No     Date of Patient follow up with Physician:      Progress Report: []  Yes  [x]  No     Date Range for reporting period:  Beginnin2021  Ending:     Progress report due (10 Rx/or 30 days whichever is less):      Recertification due (POC duration/ or 90 days whichever is less):      Visit # Insurance Allowable Auth required? Date Range   18 18 [x]  Yes [x]  No      Latex Allergy:  [x]NO      []YES  Preferred Language for Healthcare:   [x]English       []other:    Functional Scale:      Date assessed: at eval  Test:  Score:    Pain level:  6/10     History of Injury:  Patient stated complaint:Pt states on  she woke up with numbness in her entire body, from her face to the hands and toes. States went to the ER 3x with the episodes and the ER has not been able to find anything. States she can't walk now and has burning in both feet and lower legs, feels like she is walking on glass. States she gets some burning pain in left upper arm too. States gabapentin relieves pain but if she does not take it then has a lot of pain. States she has double vision at times and gets bad migraines but states she has had a brain tumor since September of last year.  She states she is going to see a neurologist on for activities related to improving balance, coordination, kinesthetic sense, posture, motor skill, proprioception and motor activation to allow for proper function of core, proximal hip and LE with self care and ADLs and functional mobility.   [] (74924) Gait Re-education- Provided training and instruction to the patient for proper LE, core and proximal hip recruitment and positioning and eccentric body weight control with ambulation re-education including up and down stairs     Home Exercise Program:    [x] (56821) Reviewed/Progressed HEP activities related to strengthening, flexibility, endurance, ROM of core, proximal hip and LE for functional self-care, mobility, lifting and ambulation/stair navigation   [] (07121)Reviewed/Progressed HEP activities related to improving balance, coordination, kinesthetic sense, posture, motor skill, proprioception of core, proximal hip and LE for self care, mobility, lifting, and ambulation/stair navigation      Manual Treatments:  PROM / STM / Oscillations-Mobs:  G-I, II, III, IV (PA's, Inf., Post.)  [x] (06438) Provided manual therapy to mobilize LE, proximal hip and/or LS spine soft tissue/joints for the purpose of modulating pain, promoting relaxation,  increasing ROM, reducing/eliminating soft tissue swelling/inflammation/restriction, improving soft tissue extensibility and allowing for proper ROM for normal function with self care, mobility, lifting and ambulation.      If [facility] Please Indicate Time In/Out  CPT Code Time in Time out                         Approval Dates:  CPT Code Units Approved Units Used  Date Updated:                     Charges:  Timed Code Treatment Minutes: 41   Total Treatment Minutes: 41      [] EVAL (LOW) 01750 (typically 20 minutes face-to-face)  [] EVAL (MOD) 46447 (typically 30 minutes face-to-face)  [] EVAL (HIGH) 74698 (typically 45 minutes face-to-face)  [] RE-EVAL     [x] MI(76736) x 2    [] Dry needle 1 or 2 Muscles (44643)  [] NMR (02647) x [] Dry needle 3+ Muscles (22482)  [] Manual (33418) x     [] Ultrasound (24661) x  [x] TA (03132) x     [] Mech Traction (93796)  [] ES(attended) (93906)     [] ES (un) (66321):   [] Vasopump (47477)  [] Ionto (80621)   [] Other:gait    GOALS:  Therapist goals for Patient:   Short Term Goals: To be achieved in: 2 weeks  1. Independent in HEP and progression per patient tolerance, in order to prevent re-injury. 2. Patient will have a decrease in pain to facilitate improvement in movement, function, and ADLs as indicated by Functional Deficits.     Long Term Goals: To be achieved in: 6 weeks  1. Disability index score of 25% or less for the (functional outcomes test) to assist with reaching prior level of function. 2. Patient will demonstrate increased AROM to [facility] to allow for proper joint functioning as indicated by patients Functional Deficits. 3. Patient will demonstrate an increase in postural awareness and control to allow for proper functional mobility as indicated by patients Functional Deficits. 4. Patient will demonstrate an increase in Strength to at least 4+/5 in B LE to allow for proper functional mobility as indicated by patients Functional Deficits. 5. Patient will return to functional activities including work and daily activities without increased symptoms or restriction. 6. Pt will report normal use of body so she can complete normal daily activities like exercises and work (patient specific functional goal)            (cut and paste from eval)    ASSESSMENT:   Pt is showing improvement with gait and reporting better pain levels. Pt able to SLR without UE support.     Treatment/Activity Tolerance:  [x] Patient tolerated treatment well [] Patient limited by fatique  [] Patient limited by pain  [] Patient limited by other medical complications  [] Other:     Overall Progression Towards Functional goals/ Treatment Progress Update:  [] Patient is progressing as expected towards functional goals listed. [x] Progression is slowed due to complexities/Impairments listed. [x] Progression has been slowed due to co-morbidities. [] Plan just implemented, too soon to assess goals progression <30days   [] Goals require adjustment due to lack of progress  [] Patient is not progressing as expected and requires additional follow up with physician  [] Other    Prognosis for POC: [x] Good [] Fair  [] Poor    Patient requires continued skilled intervention: [x] Yes  [] No        PLAN:   [x] Continue per plan of care [] Alter current plan (see comments)  [] Plan of care initiated [] Hold pending MD visit [] Discharge    Electronically signed by: Gerry Thorpe PT    Gerry Thorpe PT, DPT, OMT-C    Note: If patient does not return for scheduled/recommended follow up visits, this note will serve as a discharge from care along with the most recent update on progress.

## 2021-11-08 RX ORDER — AZELASTINE 1 MG/ML
SPRAY, METERED NASAL
Qty: 30 ML | Refills: 1 | Status: SHIPPED | OUTPATIENT
Start: 2021-11-08 | End: 2021-12-08

## 2021-11-16 NOTE — PROGRESS NOTES
Speech Language Pathology    Called pt on 11/12 to reschedule appointment on 11/18 due to therapist conflict; left voicemail. Again called pt and left voicemail on 11/16 at 2pm to cancel/reschedule. Was able to get ahold of pt's spouse Pedro Lutz to inform him of cancel/need to reschedule for 11/18. Pedro Lutz reports he will inform pt.     Allison Castro MS, CCC-SLP #[id number]  Speech Language Pathologist

## 2021-11-17 RX ORDER — ERGOCALCIFEROL 1.25 MG/1
CAPSULE ORAL
Qty: 1 CAPSULE | Refills: 15 | Status: SHIPPED | OUTPATIENT
Start: 2021-11-17 | End: 2021-12-15

## 2021-11-18 ENCOUNTER — APPOINTMENT (OUTPATIENT)
Dept: SPEECH THERAPY | Age: 61
End: 2021-11-18
Payer: COMMERCIAL

## 2021-11-18 DIAGNOSIS — G43.019 INTRACTABLE MIGRAINE WITHOUT AURA AND WITHOUT STATUS MIGRAINOSUS: Primary | ICD-10-CM

## 2021-11-18 DIAGNOSIS — R05.3 CHRONIC COUGH: ICD-10-CM

## 2021-11-18 DIAGNOSIS — G44.229 CHRONIC TENSION-TYPE HEADACHE, NOT INTRACTABLE: ICD-10-CM

## 2021-11-18 NOTE — TELEPHONE ENCOUNTER
Medication:   Requested Prescriptions     Pending Prescriptions Disp Refills    EPINEPHrine (EPIPEN) 0.3 MG/0.3ML DELPHINE injection       Sig: Use as directed for allergic reaction    benzonatate (TESSALON) 100 MG capsule 90 capsule 3        Last Filled:      Patient Phone Number: [phone] (home) [phone] (work)    Last appt: Visit date not found   Next appt: Visit date not found    Last OARRS:   RX Monitoring 8/17/2017   Attestation The Prescription Monitoring Report for this patient was reviewed today.

## 2021-11-19 RX ORDER — BUTALBITAL, ACETAMINOPHEN AND CAFFEINE 50; 325; 40 MG/1; MG/1; MG/1
TABLET ORAL
Qty: 30 TABLET | Refills: 1 | Status: SHIPPED | OUTPATIENT
Start: 2021-11-19 | End: 2022-03-30 | Stop reason: SDUPTHER

## 2021-11-19 RX ORDER — BENZONATATE 100 MG/1
100 CAPSULE ORAL 3 TIMES DAILY PRN
Qty: 90 CAPSULE | Refills: 3 | Status: SHIPPED | OUTPATIENT
Start: 2021-11-19 | End: 2021-12-16

## 2021-12-02 ENCOUNTER — HOSPITAL ENCOUNTER (OUTPATIENT)
Dept: SPEECH THERAPY | Age: 61
Setting detail: THERAPIES SERIES
Discharge: HOME | End: 2021-12-02
Payer: COMMERCIAL

## 2021-12-02 DIAGNOSIS — G43.709 CHRONIC MIGRAINE WITHOUT AURA, NOT INTRACTABLE, WITHOUT STATUS MIGRAINOSUS: ICD-10-CM

## 2021-12-02 DIAGNOSIS — J30.9 ALLERGIC RHINITIS, UNSPECIFIED: ICD-10-CM

## 2021-12-02 DIAGNOSIS — I10 ESSENTIAL HYPERTENSION: ICD-10-CM

## 2021-12-02 PROCEDURE — 92507 TX SP LANG VOICE COMM INDIV: CPT

## 2021-12-02 RX ORDER — TOPIRAMATE 25 MG/1
25 TABLET ORAL 2 TIMES DAILY
Qty: 60 TABLET | Refills: 2 | Status: SHIPPED | OUTPATIENT
Start: 2021-12-02 | End: 2022-03-30

## 2021-12-02 NOTE — PROGRESS NOTES
Speech-Language Pathology  Daily Treatment Note    Date:  2021    Patient Name:  Aidee Herrera    :  1960  MRN: [id number]  Diagnosis:    Dysarthria    -  Primary X96.1     Insurance/Certification information: [facility]  Referring Physician:  JAYSON Salinas-JEFFERSON; Morna Soulier, MD  Plan of care signed (Y/N):  Y  Total visits:14    Progress Note: []  Yes  [x]  No  Next due by: Visit #20     Subjective: Pt cooperative but in pain. She reports no relief from epidural and is planning to see a new specialist on . C/O burning in her feet. However, she was able to focus and participate effectively in therapy. She reports her speech is stable; continues with dysfluencies. Objective:   Goal 1A: Pt will execute speech strategies in complex structured conversation >90% of opportunities with S/U cues  · : variable % fluent; achieves 100% spontaneous without verbal setup; occasional dysfluency/hesitation. · 10/14: 100% fluent in >85% of conversation trials; minimal to mild hesitations and initial prolongations with pt able to IND correct  · 11/4: complex structured conversation: 85% IND; more noted hesitation with deliberate speech and/or increased frustration/emotion; at ease with informal conversation, when engaged in pleasant/jovial conversation   · 12/2:  complex structured conversation: 85-90% IND; more noted hesitation with deliberate speech, reduced volume at times impacts intelligibility; at ease with informal conversation, when engaged in pleasant/jovial conversation     Goal 3: Pt will demonstrate >90% speech fluency with structured paragraph reading with independence  · : slow rate but ~85% fluent, with approx 15% hesitations at sentence onset; clear speech; mild-mod prosodic features. Improved as reading progressed.     · 10/14: lengthy Bible passage reading (~10 minutes) >90% fluent IND; nearly 100% with initial half of reading; min-mild dysfluency and monotone prosodic features as pt fatigues. More steady rate of speech with reading; less rushes noted and pt better able to manage/control her dysfluent hesitations/pauses  · 11/4: unfamiliar multiparagraph reading 75% fluent IND; mildly dysfluent and monotone   · 12/2: unfamiliar multiparagraph reading 75% fluent IND; mildly dysfluent and monotone, reduced volume. When reading same paragraph 2nd time: 90% fluent, mildly improved volume    Previously Met Goals:  Goal 2: Pt will improve fluent clear speech at simple sentence level in structured conversation in 70% of trials with mod verbal cues  · Met 9/17    Assessment:   Initial Speech Diagnosis from 7/20/2021: Variable mild to moderate motor speech impairment with dysarthric and dysfluent features, characterized by occasionally rushed strings of words, variable articulatory precision, mildly impaired onset/initiation of phrases and sentences. Impaired prosody/inflection at sentence level. · Pt reports abrupt onset ~3 weeks ago, and works as a . Pt desires to return to work but her current inability to fluently communicate impairs her ability to effectively perform her job duties. 9/1 update:  Minimal to mild motor speech impairment with occasional to mild dysfluent features in lengthy sentences and complex conversation; resolved articulatory imprecision since initial assessment. Prolonged/hesitant initiation of sentences in ~10% of complex spontaneous utterances, with improved self-recognition and execution of speech strategies to achieve success. Pt acknowledges slow steady improvement but reports she is not back to normal.   9/9 update: Inconsistent trace to mild motor speech dysfluency primarily noted in structured verbal tasks (ie, oral reading and simulated verbal job-related scripts). Speech fluency and intelligibility in unstructured conversation nearly [facility] with only occasional hesitation.   Pt noted to have improved volume, prosody and rate in natural setting and when no cueing or setup is provided. Participation with therapeutic tasks is limited by pt's report of pain, tearfulness, and overall feeling unwell. 9/17 update: Slowly resolving trace to mild motor speech dysfluency primarily noted in structured verbal tasks (ie, oral reading and simulated verbal job-related scripts). Speech fluency and intelligibility in unstructured conversation with occasional hesitations. Pt noted to have improved volume, prosody and rate in natural setting and when no cueing or setup is provided. Participation with therapeutic tasks is variable from session to session and is impacted by pain, tearfulness at times.        Plan:   1x/ every other week x2-3 months    Timed Code Treatment: 0    Total Treatment Time:   45 speech therapy  [facility] [id number]    Signature:   Soo Caraballo MS, CCC-SLP #[id number]  Speech Language Pathologist complains of pain/discomfort

## 2021-12-05 RX ORDER — HYDROCHLOROTHIAZIDE 25 MG/1
TABLET ORAL
Qty: 30 TABLET | Refills: 5 | Status: SHIPPED | OUTPATIENT
Start: 2021-12-05 | End: 2022-05-08

## 2021-12-05 RX ORDER — FLUTICASONE PROPIONATE 50 MCG
SPRAY, SUSPENSION (ML) NASAL
Qty: 16 G | Refills: 5 | Status: SHIPPED | OUTPATIENT
Start: 2021-12-05 | End: 2022-06-13

## 2021-12-08 RX ORDER — AZELASTINE 1 MG/ML
SPRAY, METERED NASAL
Qty: 30 ML | Refills: 1 | Status: SHIPPED | OUTPATIENT
Start: 2021-12-08 | End: 2022-01-12

## 2021-12-13 ENCOUNTER — HOSPITAL ENCOUNTER (OUTPATIENT)
Age: 61
Discharge: HOME | End: 2021-12-13
Payer: COMMERCIAL

## 2021-12-13 ENCOUNTER — OFFICE VISIT (OUTPATIENT)
Dept: PRIMARY CARE CLINIC | Age: 61
End: 2021-12-13
Payer: COMMERCIAL

## 2021-12-13 ENCOUNTER — HOSPITAL ENCOUNTER (OUTPATIENT)
Dept: GENERAL RADIOLOGY | Age: 61
Discharge: HOME | End: 2021-12-13
Payer: COMMERCIAL

## 2021-12-13 VITALS
TEMPERATURE: 97.5 F | SYSTOLIC BLOOD PRESSURE: 88 MMHG | OXYGEN SATURATION: 88 % | DIASTOLIC BLOOD PRESSURE: 60 MMHG | HEART RATE: 88 BPM | WEIGHT: 187 LBS | BODY MASS INDEX: 34.2 KG/M2

## 2021-12-13 DIAGNOSIS — M51.36 DDD (DEGENERATIVE DISC DISEASE), LUMBAR: ICD-10-CM

## 2021-12-13 DIAGNOSIS — M51.16 LUMBAR DISC DISEASE WITH RADICULOPATHY: Primary | ICD-10-CM

## 2021-12-13 DIAGNOSIS — J20.9 ACUTE BRONCHITIS, UNSPECIFIED ORGANISM: ICD-10-CM

## 2021-12-13 DIAGNOSIS — E78.2 MIXED HYPERLIPIDEMIA: ICD-10-CM

## 2021-12-13 DIAGNOSIS — Z12.31 ENCOUNTER FOR MAMMOGRAM TO ESTABLISH BASELINE MAMMOGRAM: ICD-10-CM

## 2021-12-13 DIAGNOSIS — M48.061 FORAMINAL STENOSIS OF LUMBAR REGION: ICD-10-CM

## 2021-12-13 DIAGNOSIS — M16.0 PRIMARY OSTEOARTHRITIS OF BOTH HIPS: ICD-10-CM

## 2021-12-13 PROCEDURE — 71046 X-RAY EXAM CHEST 2 VIEWS: CPT

## 2021-12-13 PROCEDURE — G8417 CALC BMI ABV UP PARAM F/U: HCPCS | Performed by: FAMILY MEDICINE

## 2021-12-13 PROCEDURE — G8427 DOCREV CUR MEDS BY ELIG CLIN: HCPCS | Performed by: FAMILY MEDICINE

## 2021-12-13 PROCEDURE — G8482 FLU IMMUNIZE ORDER/ADMIN: HCPCS | Performed by: FAMILY MEDICINE

## 2021-12-13 PROCEDURE — 99214 OFFICE O/P EST MOD 30 MIN: CPT | Performed by: FAMILY MEDICINE

## 2021-12-13 PROCEDURE — 1036F TOBACCO NON-USER: CPT | Performed by: FAMILY MEDICINE

## 2021-12-13 PROCEDURE — 3017F COLORECTAL CA SCREEN DOC REV: CPT | Performed by: FAMILY MEDICINE

## 2021-12-13 RX ORDER — AZITHROMYCIN 250 MG/1
TABLET, FILM COATED ORAL
Qty: 1 PACKET | Refills: 0 | Status: SHIPPED | OUTPATIENT
Start: 2021-12-13 | End: 2021-12-16

## 2021-12-13 RX ORDER — ATORVASTATIN CALCIUM 40 MG/1
40 TABLET, FILM COATED ORAL NIGHTLY
Qty: 30 TABLET | Refills: 3 | Status: SHIPPED | OUTPATIENT
Start: 2021-12-13 | End: 2022-03-02

## 2021-12-13 RX ORDER — METHYLPREDNISOLONE 4 MG/1
TABLET ORAL
Qty: 21 TABLET | Refills: 0 | Status: SHIPPED | OUTPATIENT
Start: 2021-12-13 | End: 2021-12-19

## 2021-12-13 NOTE — PROGRESS NOTES
CC she       HPI 64 y/o female fu visit  She did see ortho Dr Mikey Willson 12/6/21, bilat hip & leg pain onset June of this year. She was eval at [facility] ortho(AMANDA Medrano, pain specialist ) lumbar ddd. subsequewntly  She say Dr Mikey Willson  For  The back and leg pain since she had  Had a Epidural injection which  Caused feet  To burn. She did receive injections Ketorolac buttocks at office visit of Dr Mikey Willson      She does have some recurrent cough  Prod , blood tinged for about 3 days  No sob  No fever      Physical Exam  Constitutional:       General: She is not in acute distress. Appearance: She is well-developed. She is not diaphoretic. HENT:      Head: Normocephalic and atraumatic. Right Ear: External ear normal.      Left Ear: External ear normal.      Nose: Nose normal.      Mouth/Throat:      Pharynx: No oropharyngeal exudate. Eyes:      General: No scleral icterus. Left eye: No discharge. Conjunctiva/sclera: Conjunctivae normal.      Pupils: Pupils are equal, round, and reactive to light. Neck:      Thyroid: No thyromegaly. Vascular: No JVD. Trachea: No tracheal deviation. Cardiovascular:      Rate and Rhythm: Normal rate and regular rhythm. Heart sounds: Normal heart sounds. No murmur heard. No friction rub. No gallop. Pulmonary:      Effort: Pulmonary effort is normal. No respiratory distress. Breath sounds: No stridor. Wheezing (few wheezes) present. No rales. Chest:      Chest wall: No tenderness. Abdominal:      General: Bowel sounds are normal. There is no distension. Palpations: Abdomen is soft. There is no mass. Tenderness: There is no abdominal tenderness. There is no guarding or rebound. Musculoskeletal:         General: No tenderness. Normal range of motion. Cervical back: Normal range of motion and neck supple. Lymphadenopathy:      Cervical: No cervical adenopathy. Skin:     General: Skin is warm and dry.       Coloration: Skin is not pale. Findings: No erythema or rash. Neurological:      Mental Status: She is alert and oriented to person, place, and time. Cranial Nerves: No cranial nerve deficit. Coordination: Coordination normal.      Deep Tendon Reflexes: Reflexes are normal and symmetric. Reflexes normal.   Psychiatric:         Behavior: Behavior normal.         Thought Content: Thought content normal.         Judgment: Judgment normal.         Lab Results   Component Value Date    CHOL 185 02/19/2020    CHOL 202 (H) 03/26/2013     Lab Results   Component Value Date    TRIG 207 (H) 02/19/2020    TRIG 149 03/26/2013     Lab Results   Component Value Date    HDL 66 (H) 11/30/2020    HDL 73 (H) 02/19/2020    HDL 95 (H) 03/26/2013     Lab Results   Component Value Date    LDLCALC 77 11/30/2020    LDLCALC 71 02/19/2020    LDLCALC 77 03/26/2013     Lab Results   Component Value Date    LABVLDL 41 11/30/2020    LABVLDL 41 02/19/2020    LABVLDL 30 03/26/2013     No results found for: [facility] was seen today for other. Diagnoses and all orders for this visit:    Lumbar disc disease with radiculopathy  She continues with low back pain with rad  To legs/burning  Her mri consistent  With lumbar radiculopathy  Has been evaluated by ortho Dr Bia Stapleton she has been in previous pain clinic  One LEANA was done, no real improvement. He suggests ref to pain clinic in [place]. Consider another LEANA  At some point may need neurosurgeon referral  Foraminal stenosis of lumbar region  Noted on MRI  See above notes  DDD (degenerative disc disease), lumbar  Noted on MRI   See above notes  Primary osteoarthritis of both hips  Did receive ketorolac injections  In buttocks with some partial relief  Of sxs  Mixed hyperlipidemia  Most recent lipids noted  Will refill  -     atorvastatin (LIPITOR) 40 MG tablet;  Take 1 tablet by mouth nightly      Needs ref to pain clinic Dr Raad Isaac camilo gloria hannah  Medrol dose hannah  cxr

## 2021-12-15 DIAGNOSIS — I10 ESSENTIAL HYPERTENSION: ICD-10-CM

## 2021-12-15 DIAGNOSIS — J45.40 MODERATE PERSISTENT ASTHMA WITHOUT COMPLICATION: ICD-10-CM

## 2021-12-15 DIAGNOSIS — E87.6 HYPOKALEMIA: ICD-10-CM

## 2021-12-15 RX ORDER — ERGOCALCIFEROL 1.25 MG/1
CAPSULE ORAL
Qty: 1 CAPSULE | Refills: 15 | Status: SHIPPED | OUTPATIENT
Start: 2021-12-15

## 2021-12-15 RX ORDER — BUDESONIDE 0.5 MG/2ML
INHALANT ORAL
Qty: 120 ML | Refills: 2 | Status: SHIPPED | OUTPATIENT
Start: 2021-12-15 | End: 2022-06-14 | Stop reason: SDUPTHER

## 2021-12-15 RX ORDER — POTASSIUM CHLORIDE 1500 MG/1
TABLET, EXTENDED RELEASE ORAL
Qty: 60 TABLET | Refills: 5 | Status: SHIPPED | OUTPATIENT
Start: 2021-12-15 | End: 2022-03-30 | Stop reason: SDUPTHER

## 2021-12-16 ENCOUNTER — TELEPHONE (OUTPATIENT)
Dept: PRIMARY CARE CLINIC | Age: 61
End: 2021-12-16

## 2021-12-16 DIAGNOSIS — J18.9 PNEUMONIA DUE TO INFECTIOUS ORGANISM, UNSPECIFIED LATERALITY, UNSPECIFIED PART OF LUNG: Primary | ICD-10-CM

## 2021-12-16 DIAGNOSIS — R05.3 CHRONIC COUGH: ICD-10-CM

## 2021-12-16 DIAGNOSIS — J45.41 MODERATE PERSISTENT ASTHMA WITH ACUTE EXACERBATION: ICD-10-CM

## 2021-12-16 DIAGNOSIS — R06.02 SOB (SHORTNESS OF BREATH): ICD-10-CM

## 2021-12-16 RX ORDER — DEXAMETHASONE 6 MG/1
TABLET ORAL
Qty: 5 TABLET | Refills: 0 | Status: SHIPPED | OUTPATIENT
Start: 2021-12-16 | End: 2022-07-07

## 2021-12-16 RX ORDER — AZITHROMYCIN 250 MG/1
TABLET, FILM COATED ORAL
Qty: 1 PACKET | Refills: 0 | Status: SHIPPED | OUTPATIENT
Start: 2021-12-16 | End: 2022-07-07

## 2021-12-16 RX ORDER — BENZONATATE 100 MG/1
100 CAPSULE ORAL 3 TIMES DAILY PRN
Qty: 30 CAPSULE | Refills: 0 | Status: SHIPPED | OUTPATIENT
Start: 2021-12-16 | End: 2021-12-23

## 2021-12-17 DIAGNOSIS — J45.41 MODERATE PERSISTENT ASTHMA WITH ACUTE EXACERBATION: ICD-10-CM

## 2021-12-17 DIAGNOSIS — R06.02 SOB (SHORTNESS OF BREATH): ICD-10-CM

## 2021-12-17 DIAGNOSIS — J18.9 PNEUMONIA DUE TO INFECTIOUS ORGANISM, UNSPECIFIED LATERALITY, UNSPECIFIED PART OF LUNG: ICD-10-CM

## 2021-12-17 DIAGNOSIS — R05.3 CHRONIC COUGH: ICD-10-CM

## 2021-12-17 NOTE — TELEPHONE ENCOUNTER
Spoke with patient  She thinks she may have a slight fever, will get thermometer  Mildly SOB  Cough    X ray early pneumonia  Repeat z pack  Tessalon perles  Decadron  She does have asthma  Has inhalers    Has had covid 19 vaccines  x 2 and a booster    Refer for repeat Covid testing to [facility] [address] quarantine until results are back    If increased SOB, she should go to ER    Taking fluids ok

## 2021-12-18 LAB — SARS-COV-2: NOT DETECTED

## 2021-12-20 ENCOUNTER — APPOINTMENT (OUTPATIENT)
Dept: SPEECH THERAPY | Age: 61
End: 2021-12-20
Payer: COMMERCIAL

## 2021-12-20 DIAGNOSIS — R05.3 CHRONIC COUGH: ICD-10-CM

## 2021-12-20 DIAGNOSIS — L25.9 CONTACT DERMATITIS, UNSPECIFIED CONTACT DERMATITIS TYPE, UNSPECIFIED TRIGGER: ICD-10-CM

## 2021-12-20 DIAGNOSIS — R11.0 NAUSEA: ICD-10-CM

## 2021-12-20 DIAGNOSIS — J45.40 MODERATE PERSISTENT ASTHMA WITHOUT COMPLICATION: ICD-10-CM

## 2021-12-23 RX ORDER — ONDANSETRON 4 MG/1
TABLET, FILM COATED ORAL
Qty: 30 TABLET | Refills: 0 | Status: SHIPPED | OUTPATIENT
Start: 2021-12-23 | End: 2022-01-14

## 2021-12-23 RX ORDER — ALBUTEROL SULFATE 90 UG/1
2 AEROSOL, METERED RESPIRATORY (INHALATION) EVERY 6 HOURS PRN
Qty: 8 G | Refills: 3 | Status: SHIPPED | OUTPATIENT
Start: 2021-12-23

## 2021-12-23 RX ORDER — CHLORHEXIDINE GLUCONATE 2% 2 G/100ML
SOLUTION TOPICAL
Qty: 250 ML | Refills: 1 | Status: SHIPPED | OUTPATIENT
Start: 2021-12-23

## 2021-12-29 ENCOUNTER — TELEPHONE (OUTPATIENT)
Dept: PRIMARY CARE CLINIC | Age: 61
End: 2021-12-29

## 2021-12-29 NOTE — TELEPHONE ENCOUNTER
----- Message from Farheen Del Rosario sent at 12/28/2021 11:10 AM EST -----  Subject: Referral Request    QUESTIONS   Reason for referral request? Pt calling in to request a referral to   [place] [id number]. Pt says she has some disks that are out of place   in her back that are affecting her legs and feet. Pt   Has the physician seen you for this condition before? No   Preferred Specialist (if applicable)? Do you already have an appointment scheduled? No  Additional Information for Provider? Pt calling in to request a referral   to [place] [id number]. Pt says she has some disks that are out of   place in her back that are affecting her legs and feet  ---------------------------------------------------------------------------  --------------  CALL BACK INFO  What is the best way for the office to contact you? OK to leave message on   voicemail  Preferred Call Back Phone Number?  [phone]

## 2021-12-30 ENCOUNTER — TELEPHONE (OUTPATIENT)
Dept: PRIMARY CARE CLINIC | Age: 61
End: 2021-12-30

## 2021-12-30 NOTE — TELEPHONE ENCOUNTER
MED REQUEST:   Pt is requesting that a new rx is sent in for the following:  ~Ondansetron 4 mg 1 tab po q12 h prn for N/V   ~Butalbital-acetaminophen-caffeine (Fioricet,Esgic) -40 mg 1 tab po q4 h prn for H/A   Pt ansley: [phone]  pls send to [facility]: [phone]     LAST OV: 12/13/21

## 2021-12-30 NOTE — TELEPHONE ENCOUNTER
Patient informed medication for butalbital-acetaminophen-caffeine (FIORICET, ESGIC) -40 MG per tablet was filled on 11/19/2021 with 1 additional refill which she has not picked up from pharmacy.   Ondansetron was filled on 12/23/2021 and was informed that it was to early for a refill

## 2022-01-03 DIAGNOSIS — M48.061 SPINAL STENOSIS OF LUMBAR REGION, UNSPECIFIED WHETHER NEUROGENIC CLAUDICATION PRESENT: ICD-10-CM

## 2022-01-03 DIAGNOSIS — M51.16 LUMBAR DISC DISEASE WITH RADICULOPATHY: Primary | ICD-10-CM

## 2022-01-12 RX ORDER — AZELASTINE 1 MG/ML
SPRAY, METERED NASAL
Qty: 1 EACH | Refills: 3 | Status: SHIPPED | OUTPATIENT
Start: 2022-01-12

## 2022-01-14 DIAGNOSIS — R11.0 NAUSEA: ICD-10-CM

## 2022-01-14 RX ORDER — ONDANSETRON 4 MG/1
TABLET, FILM COATED ORAL
Qty: 30 TABLET | Refills: 0 | Status: SHIPPED | OUTPATIENT
Start: 2022-01-14 | End: 2022-01-25

## 2022-01-20 ENCOUNTER — HOSPITAL ENCOUNTER (OUTPATIENT)
Dept: SPEECH THERAPY | Age: 62
Setting detail: THERAPIES SERIES
Discharge: HOME | End: 2022-01-20
Payer: COMMERCIAL

## 2022-01-20 PROCEDURE — 92507 TX SP LANG VOICE COMM INDIV: CPT

## 2022-01-20 NOTE — PROGRESS NOTES
Outpatient Speech Therapy  [] [facility]   Phone: [phone]   Fax: [phone]   [x] [facility]  Phone: [phone]   Fax: [phone]  [] [facility]   Phone: [phone]   Fax: [phone]     Speech Therapy Discharge Note  Date: 2022        Patient Name:  Isabel Santana    :  1960  MRN: [id number]    Diagnosis:  Dysarthria    - Huma Art R31.4    Insurance/Certification information:  [facility]  Referring Physician:  Hayley Bernabe  Plan of care signed (Y/N):  Y  Total visits: 15  Pain level: variable lower extremities     Time Period for Report:  2021-2022  Cancels/No-shows to date:  1    Plan of Care/Treatment to date:  [x] Speech-Language Evaluation/Treatment    [] Dysphagia Evaluation/Treatment        [] Dysphagia Treatment via Neuromuscular Electrical Stimulation (NMES)   [] Modified Barium Swallowing Study   [] Cognitive-Linguistic Skills Development  [] Voice evaluation and Treatment      [] Evaluation, modification, and Training of Voice Prosthetic     [] Evaluation for Speech-Generating Augmentative and Alternative Communication Device   [] Therapeutic Services for the use of Speech-Generating Device. [] Other:     Significant Findings At Last Visit/Comments:    Subjective:  Pt pleasant and cooperative, verbose and very conversant. She reports steady improvements in her speech, but continues with dysfluencies. Pt reports desire to increase therapy to once every 2 weeks. SLP and pt discussed rationale for less frequent therapy or option to discontinue; see below in assessment section. Objective:  Goal 1A:  Pt will execute speech strategies in complex structured conversation >90% of opportunities with S/U cues (GOAL MET : complex unstructured (pt topic driven) and structured (SLP topic driven) conversation >98% fluent without cues, minimal hesitations, functional volume throughout, mildly monotone prosodic features.)      Goal 2: Pt will improve fluent clear speech at simple sentence level in structured conversation in 70% of trials with mod verbal cues  (Met 9/17)     Goal 3: Pt will demonstrate >90% speech fluency with structured paragraph reading with independence (1/20 GOAL APPEARS MET: unfamiliar multiparagraph reading ~90% fluent IND with occ dysfluencies, mild prosodic features, functional volume. Same paragraphs 2nd time: only one dysfluent error noted; prosody remains stable with mild monotone.)       Assessment:  Minimal, nearly resolved, motor speech impairment characterized by minimal dysfluencies, mildly monotone/reduced prosodic features, and resolved volume and articulation during complex conversation; mild increase in severity of dysfluencies and monotone features in structured complex reading tasks. Hesitations in conversation are nearly resolved; volume has resolved; prosody is minimal to mild. Pt continues to report that 'speech is not 100%' and desires to continue therapy every 2 weeks until resolved. She is electing to transfer to speech therapy at a new location where she receives physical therapy; pt hopeful she can have more frequent speech therapy. This writer communicated to the pt that, while deficits have not fully resolved, it is also not anticipated that additional skilled therapy will assist in improving her speech and communication any further. This SLP did offer to schedule pt for another visit here in 3-4 weeks, but pt declined. Will DC at this time. Progress towards goals:    Pt has met all goals, including upgraded goals. Pt appears at max potential.     Current Frequency/Duration:  # Days per week: [] 1 day # Weeks: [] 1 week [] 4 weeks      [] 2 days?    [] 2 weeks [] 5 weeks      [] 3 days   [] 3 weeks [] 6 weeks     Rehab Potential: [] Excellent [x] Good [] Fair  [] Poor     Goal Status:  [x] Achieved [] Partially Achieved  [] Not Achieved     Patient Status: [] Continue per initial plan of Care     [x] Patient now discharged     [] Additional visits requested, Please re-certify for additional visits:          Electronically signed by:   Stone Ruiz MS, CCC-SLP #[id number]  Speech Language Pathologist    If you have any questions or concerns, please don't hesitate to call.   Thank you for your referral.    Physician Signature:________________________________Date:__________________  By signing above, therapists plan is approved by physician

## 2022-01-20 NOTE — PROGRESS NOTES
Speech-Language Pathology  Daily Treatment/Discharge Summary    Date:  2022    Patient Name:  Mae Canas    :  1960  MRN: [id number]  Diagnosis:    Dysarthria    -  Primary R85.3     Insurance/Certification information: [facility]  Referring Physician:  Terral Duverney, JAYSON-JEFFERSON; Sarthak Bhatt MD  Plan of care signed (Y/N):  Y  Total visits:15    Progress Note: [x]  Yes  []  No  Next due by: Visit #20     Subjective: Pt pleasant and cooperative, verbose and very conversant. She reports steady improvements in her speech, but continues with dysfluencies. Objective:   Goal 1A: Pt will execute speech strategies in complex structured conversation >90% of opportunities with S/U cues  · : variable % fluent; achieves 100% spontaneous without verbal setup; occasional dysfluency/hesitation. · 10/14: 100% fluent in >85% of conversation trials; minimal to mild hesitations and initial prolongations with pt able to IND correct  · 11/4: complex structured conversation: 85% IND; more noted hesitation with deliberate speech and/or increased frustration/emotion; at ease with informal conversation, when engaged in pleasant/jovial conversation   · 12/2:  complex structured conversation: 85-90% IND; more noted hesitation with deliberate speech, reduced volume at times impacts intelligibility; at ease with informal conversation, when engaged in pleasant/jovial conversation  · 2022: GOAL MET: complex unstructured (pt topic driven) and structured (SLP topic driven) conversation >98% fluent without cues, minimal hesitations, functional volume throughout, mildly monotone prosodic features. Goal 3: Pt will demonstrate >90% speech fluency with structured paragraph reading with independence  · : slow rate but ~85% fluent, with approx 15% hesitations at sentence onset; clear speech; mild-mod prosodic features. Improved as reading progressed.     · 10/14: lengthy Bible passage reading (~10 minutes) >90% normal.   9/9 update: Inconsistent trace to mild motor speech dysfluency primarily noted in structured verbal tasks (ie, oral reading and simulated verbal job-related scripts). Speech fluency and intelligibility in unstructured conversation nearly [facility] with only occasional hesitation. Pt noted to have improved volume, prosody and rate in natural setting and when no cueing or setup is provided. Participation with therapeutic tasks is limited by pt's report of pain, tearfulness, and overall feeling unwell. 9/17 update: Slowly resolving trace to mild motor speech dysfluency primarily noted in structured verbal tasks (ie, oral reading and simulated verbal job-related scripts). Speech fluency and intelligibility in unstructured conversation with occasional hesitations. Pt noted to have improved volume, prosody and rate in natural setting and when no cueing or setup is provided. Participation with therapeutic tasks is variable from session to session and is impacted by pain, tearfulness at times. 1/20/2022 Updated Impressions:  Minimal, nearly resolved, motor speech impairment characterized by minimal dysfluencies, mildly monotone/reduced prosodic features, and resolved volume and articulation during complex conversation; mild increase in severity of dysfluencies and monotone features in structured complex reading tasks. Hesitations in conversation are nearly resolved; volume has resolved; prosody is minimal to mild. Pt continues to report that 'speech is not 100%' and desires to continue therapy every 2 weeks until resolved. She is electing to transfer to speech therapy at a new location where she receives physical therapy; pt hopeful she can have more frequent speech therapy. This writer communicated to the pt that, while deficits have not fully resolved, it is also not anticipated that additional skilled therapy will assist in improving her speech and communication any further.   This SLP did offer to schedule pt for another visit here in 3-4 weeks, but pt declined. Will DC at this time.      Plan:   discharged    Timed Code Treatment: 0    Total Treatment Time:   50 speech therapy  CPT 70095    Signature:   Laura Tamayo MS, CCC-SLP #[id number]  Speech Language Pathologist

## 2022-01-21 ENCOUNTER — TELEPHONE (OUTPATIENT)
Dept: PRIMARY CARE CLINIC | Age: 62
End: 2022-01-21

## 2022-01-21 DIAGNOSIS — F51.01 PRIMARY INSOMNIA: Primary | ICD-10-CM

## 2022-01-21 DIAGNOSIS — F44.4 FUNCTIONAL NEUROLOGICAL SYMPTOM DISORDER WITH SPEECH SYMPTOMS: Primary | ICD-10-CM

## 2022-01-21 RX ORDER — TRAZODONE HYDROCHLORIDE 50 MG/1
TABLET ORAL
Qty: 60 TABLET | Refills: 3 | Status: SHIPPED | OUTPATIENT
Start: 2022-01-21 | End: 2022-02-14

## 2022-01-21 NOTE — TELEPHONE ENCOUNTER
----- Message from Romana Moats sent at 1/21/2022  8:53 AM EST -----  Subject: Message to Provider    QUESTIONS  Information for Provider? Patient has been having trouble sleeping the   past week and half only sleeping 2 hours. She had an injection and was   told that it could effect her sleep. has pills to help with sleeping but   they are not working. please advise. if new meds need to be sent send to   [facility] on [address]   ---------------------------------------------------------------------------  --------------  CALL BACK INFO  What is the best way for the office to contact you? OK to leave message on   voicemail  Preferred Call Back Phone Number? [phone]  ---------------------------------------------------------------------------  --------------  SCRIPT ANSWERS  Relationship to Patient?  Self

## 2022-01-21 NOTE — TELEPHONE ENCOUNTER
Concerns discussed  Needs referral to speech therapy at [facility]    Referral generated

## 2022-01-21 NOTE — TELEPHONE ENCOUNTER
Ref to [facility] AT [facility] speech generated  Other concerns discussed with patient

## 2022-01-25 DIAGNOSIS — R11.0 NAUSEA: ICD-10-CM

## 2022-01-25 RX ORDER — ONDANSETRON 4 MG/1
TABLET, FILM COATED ORAL
Qty: 30 TABLET | Refills: 0 | Status: SHIPPED | OUTPATIENT
Start: 2022-01-25 | End: 2022-03-30

## 2022-02-04 DIAGNOSIS — J45.40 MODERATE PERSISTENT ASTHMA WITHOUT COMPLICATION: ICD-10-CM

## 2022-02-06 RX ORDER — MONTELUKAST SODIUM 10 MG/1
10 TABLET ORAL DAILY
Qty: 90 TABLET | Refills: 1 | Status: SHIPPED | OUTPATIENT
Start: 2022-02-06 | End: 2022-09-29

## 2022-02-08 RX ORDER — ALBUTEROL SULFATE 90 UG/1
2 AEROSOL, METERED RESPIRATORY (INHALATION) EVERY 6 HOURS PRN
Qty: 18 G | Refills: 5 | Status: SHIPPED | OUTPATIENT
Start: 2022-02-08 | End: 2022-07-06

## 2022-02-13 DIAGNOSIS — F51.01 PRIMARY INSOMNIA: ICD-10-CM

## 2022-02-14 RX ORDER — TRAZODONE HYDROCHLORIDE 50 MG/1
TABLET ORAL
Qty: 60 TABLET | Refills: 3 | Status: SHIPPED | OUTPATIENT
Start: 2022-02-14 | End: 2022-05-16

## 2022-03-01 DIAGNOSIS — E78.2 MIXED HYPERLIPIDEMIA: ICD-10-CM

## 2022-03-02 RX ORDER — HYDRALAZINE HYDROCHLORIDE 50 MG/1
50 TABLET, FILM COATED ORAL 2 TIMES DAILY
Qty: 60 TABLET | Refills: 11 | Status: SHIPPED | OUTPATIENT
Start: 2022-03-02

## 2022-03-02 RX ORDER — ATORVASTATIN CALCIUM 40 MG/1
40 TABLET, FILM COATED ORAL NIGHTLY
Qty: 30 TABLET | Refills: 6 | Status: SHIPPED | OUTPATIENT
Start: 2022-03-02 | End: 2022-08-11

## 2022-03-25 DIAGNOSIS — G44.229 CHRONIC TENSION-TYPE HEADACHE, NOT INTRACTABLE: ICD-10-CM

## 2022-03-26 RX ORDER — BUTALBITAL, ACETAMINOPHEN AND CAFFEINE 50; 325; 40 MG/1; MG/1; MG/1
TABLET ORAL
Qty: 30 TABLET | Refills: 1 | OUTPATIENT
Start: 2022-03-26

## 2022-03-30 ENCOUNTER — OFFICE VISIT (OUTPATIENT)
Dept: PRIMARY CARE CLINIC | Age: 62
End: 2022-03-30
Payer: COMMERCIAL

## 2022-03-30 VITALS
HEART RATE: 72 BPM | WEIGHT: 196 LBS | HEIGHT: 62 IN | SYSTOLIC BLOOD PRESSURE: 135 MMHG | TEMPERATURE: 97.2 F | BODY MASS INDEX: 36.07 KG/M2 | DIASTOLIC BLOOD PRESSURE: 88 MMHG

## 2022-03-30 DIAGNOSIS — R10.33 PERIUMBILICAL ABDOMINAL PAIN: Primary | ICD-10-CM

## 2022-03-30 DIAGNOSIS — G44.229 CHRONIC TENSION-TYPE HEADACHE, NOT INTRACTABLE: ICD-10-CM

## 2022-03-30 DIAGNOSIS — I10 ESSENTIAL HYPERTENSION: ICD-10-CM

## 2022-03-30 DIAGNOSIS — E87.6 HYPOKALEMIA: ICD-10-CM

## 2022-03-30 DIAGNOSIS — G43.019 INTRACTABLE MIGRAINE WITHOUT AURA AND WITHOUT STATUS MIGRAINOSUS: ICD-10-CM

## 2022-03-30 DIAGNOSIS — R11.0 NAUSEA: ICD-10-CM

## 2022-03-30 PROCEDURE — G8427 DOCREV CUR MEDS BY ELIG CLIN: HCPCS | Performed by: FAMILY MEDICINE

## 2022-03-30 PROCEDURE — G8482 FLU IMMUNIZE ORDER/ADMIN: HCPCS | Performed by: FAMILY MEDICINE

## 2022-03-30 PROCEDURE — 1036F TOBACCO NON-USER: CPT | Performed by: FAMILY MEDICINE

## 2022-03-30 PROCEDURE — G8417 CALC BMI ABV UP PARAM F/U: HCPCS | Performed by: FAMILY MEDICINE

## 2022-03-30 PROCEDURE — 99214 OFFICE O/P EST MOD 30 MIN: CPT | Performed by: FAMILY MEDICINE

## 2022-03-30 PROCEDURE — 3017F COLORECTAL CA SCREEN DOC REV: CPT | Performed by: FAMILY MEDICINE

## 2022-03-30 RX ORDER — BUTALBITAL, ACETAMINOPHEN AND CAFFEINE 50; 325; 40 MG/1; MG/1; MG/1
TABLET ORAL
Qty: 30 TABLET | Refills: 1 | Status: SHIPPED | OUTPATIENT
Start: 2022-03-30

## 2022-03-30 RX ORDER — POTASSIUM CHLORIDE 20 MEQ/1
TABLET, EXTENDED RELEASE ORAL
Qty: 120 TABLET | Refills: 1 | Status: SHIPPED | OUTPATIENT
Start: 2022-03-30 | End: 2022-05-13

## 2022-03-30 RX ORDER — ONDANSETRON 4 MG/1
TABLET, FILM COATED ORAL
Qty: 30 TABLET | Refills: 2 | Status: SHIPPED | OUTPATIENT
Start: 2022-03-30 | End: 2022-07-07 | Stop reason: SDUPTHER

## 2022-03-30 RX ORDER — DICYCLOMINE HCL 20 MG
TABLET ORAL
Qty: 120 TABLET | Refills: 1 | Status: SHIPPED | OUTPATIENT
Start: 2022-03-30 | End: 2022-04-24

## 2022-03-30 RX ORDER — ONDANSETRON 4 MG/1
TABLET, FILM COATED ORAL
Qty: 30 TABLET | Refills: 0 | Status: CANCELLED | OUTPATIENT
Start: 2022-03-30

## 2022-03-30 ASSESSMENT — PATIENT HEALTH QUESTIONNAIRE - PHQ9
SUM OF ALL RESPONSES TO PHQ QUESTIONS 1-9: 0
SUM OF ALL RESPONSES TO PHQ QUESTIONS 1-9: 0
1. LITTLE INTEREST OR PLEASURE IN DOING THINGS: 0
SUM OF ALL RESPONSES TO PHQ9 QUESTIONS 1 & 2: 0
2. FEELING DOWN, DEPRESSED OR HOPELESS: 0
SUM OF ALL RESPONSES TO PHQ QUESTIONS 1-9: 0
SUM OF ALL RESPONSES TO PHQ QUESTIONS 1-9: 0

## 2022-03-30 NOTE — PROGRESS NOTES
CC she is follow up visit today  Recent er 3/21/2022 at [facility] for abdom  Pain    Also fu for headaches      HPI 62 y/o female multiple medical  Problems  Today she is fu for her headaches  She has h/o pit adenoma  Recent visit with Dr Christiano Manzanares neurosurgeon  And tumor sm & stable  No fu until one year    She has had migraine type headaches  Script nortec per neurologist dr Faiza Velasquez but no longer sees him  She uses limited amt of fioricet  Which  Helps some  Gets headaches usually 1 -2 times a week  She keeps a diary    Did have recent visit er abdominal pain  Neg work  Labs, imagining reviewed  Placed pm bentyl which helps  Wants refill        1. Periumbilical abdominal pain  Recent extensive evaluation in local  ER at [facility]  sxs have improved since started on  Dicyclomine possible functional bowel  Disorder(IBS)  - dicyclomine (BENTYL) 20 MG tablet; Take up to 3  Times a day  Dispense: 120 tablet; Refill: 1    2. Chronic tension-type headache, not intractable  She currently takes  Rimegepant  But would like fioricet which aborts  The headache also. Cautioned  Her do not use excessively as it  Can cause drug induced headaches  And other changes if taken in excess   - butalbital-acetaminophen-caffeine (FIORICET, ESGIC) -40 MG per tablet; Take one tablet by mouth every 4 hours as needed for your headache. Dispense: 30 tablet; Refill: 1  - Rimegepant Sulfate 75 MG TBDP; Dissolve 1 tablet under tongue for acute migraine. Max dose 1 tablet per 48 hrs  Dispense: 8 tablet; Refill: 1  - ondansetron (ZOFRAN) 4 MG tablet; One every 8 hrs as needed for nausea/vomiting  Dispense: 30 tablet; Refill: 2    3. Intractable migraine without aura and without status migrainosus  See 2 above  - butalbital-acetaminophen-caffeine (FIORICET, ESGIC) -40 MG per tablet; Take one tablet by mouth every 4 hours as needed for your headache. Dispense: 30 tablet;  Refill: 1  - Rimegepant Sulfate 75 MG TBDP; Dissolve 1 tablet under tongue for acute migraine. Max dose 1 tablet per 48 hrs  Dispense: 8 tablet; Refill: 1  - ondansetron (ZOFRAN) 4 MG tablet; One every 8 hrs as needed for nausea/vomiting  Dispense: 30 tablet; Refill: 2    4. Nausea  Maybe related to migraines  Cont prn   ondansetron (ZOFRAN) 4 MG tablet; One every 8 hrs as needed for nausea/vomiting  Dispense: 30 tablet; Refill: 2    5. Hypokalemia  K 3.4  No sxs  Dose of K increased by pt a week ago  Repeat in 2 weeks  - Basic Metabolic Panel; Future  - potassium chloride (KLOR-CON M20) 20 MEQ extended release tablet; Take two tablets twice a day  Dispense: 120 tablet; Refill: 1    6. Essential hypertension  bp at goal, cont with diuretics  And potassium replacement  - potassium chloride (KLOR-CON M20) 20 MEQ extended release tablet; Take two tablets twice a day  Dispense: 120 tablet;  Refill: 1

## 2022-04-01 ENCOUNTER — TELEPHONE (OUTPATIENT)
Dept: ADMINISTRATIVE | Age: 62
End: 2022-04-01

## 2022-04-01 NOTE — TELEPHONE ENCOUNTER
Submitted PA for Nurtec 75MG dispersible tablets Via CMM Key: [id number] STATUS: APPROVED effective  04/01/2022 to 10/01/2022    Please notify patient.  Thank you

## 2022-04-04 DIAGNOSIS — E87.6 HYPOKALEMIA: ICD-10-CM

## 2022-04-04 LAB
ANION GAP SERPL CALCULATED.3IONS-SCNC: 15 MMOL/L (ref 3–16)
BUN BLDV-MCNC: 16 MG/DL (ref 7–20)
CALCIUM SERPL-MCNC: 9.4 MG/DL (ref 8.3–10.6)
CHLORIDE BLD-SCNC: 108 MMOL/L (ref 99–110)
CO2: 23 MMOL/L (ref 21–32)
CREAT SERPL-MCNC: 0.7 MG/DL (ref 0.6–1.1)
GFR AFRICAN AMERICAN: >60
GFR NON-AFRICAN AMERICAN: >60
GLUCOSE BLD-MCNC: 133 MG/DL (ref 70–99)
POTASSIUM SERPL-SCNC: 3.8 MMOL/L (ref 3.5–5.1)
SODIUM BLD-SCNC: 146 MMOL/L (ref 136–145)

## 2022-04-08 DIAGNOSIS — R73.9 BLOOD GLUCOSE ELEVATED: Primary | ICD-10-CM

## 2022-04-08 DIAGNOSIS — E11.9 TYPE 2 DIABETES MELLITUS WITHOUT COMPLICATION, WITHOUT LONG-TERM CURRENT USE OF INSULIN (HCC): Primary | ICD-10-CM

## 2022-04-11 DIAGNOSIS — R73.9 BLOOD GLUCOSE ELEVATED: ICD-10-CM

## 2022-04-12 LAB
ESTIMATED AVERAGE GLUCOSE: 125.5 MG/DL
HBA1C MFR BLD: 6 %

## 2022-04-20 ENCOUNTER — TELEPHONE (OUTPATIENT)
Dept: NEUROLOGY | Age: 62
End: 2022-04-20

## 2022-04-21 ENCOUNTER — TELEPHONE (OUTPATIENT)
Dept: PRIMARY CARE CLINIC | Age: 62
End: 2022-04-21

## 2022-04-21 NOTE — TELEPHONE ENCOUNTER
Pt called asking if Zaida Ross can call her regarding her labs sent over from Neurology and the next steps.

## 2022-04-22 DIAGNOSIS — R10.33 PERIUMBILICAL ABDOMINAL PAIN: ICD-10-CM

## 2022-04-24 RX ORDER — DICYCLOMINE HCL 20 MG
TABLET ORAL
Qty: 90 TABLET | Refills: 2 | Status: SHIPPED | OUTPATIENT
Start: 2022-04-24

## 2022-05-05 DIAGNOSIS — I10 ESSENTIAL HYPERTENSION: ICD-10-CM

## 2022-05-08 DIAGNOSIS — K21.9 GASTROESOPHAGEAL REFLUX DISEASE WITHOUT ESOPHAGITIS: ICD-10-CM

## 2022-05-08 RX ORDER — HYDROCHLOROTHIAZIDE 25 MG/1
TABLET ORAL
Qty: 90 TABLET | Refills: 1 | Status: SHIPPED | OUTPATIENT
Start: 2022-05-08 | End: 2022-07-22 | Stop reason: SINTOL

## 2022-05-09 RX ORDER — OMEPRAZOLE 40 MG/1
CAPSULE, DELAYED RELEASE ORAL
Qty: 30 CAPSULE | Refills: 5 | Status: SHIPPED | OUTPATIENT
Start: 2022-05-09 | End: 2022-10-19 | Stop reason: SDUPTHER

## 2022-05-13 DIAGNOSIS — E87.6 HYPOKALEMIA: ICD-10-CM

## 2022-05-13 DIAGNOSIS — I10 ESSENTIAL HYPERTENSION: ICD-10-CM

## 2022-05-13 RX ORDER — POTASSIUM CHLORIDE 20 MEQ/1
TABLET, EXTENDED RELEASE ORAL
Qty: 180 TABLET | Refills: 1 | Status: SHIPPED | OUTPATIENT
Start: 2022-05-13 | End: 2022-05-24

## 2022-05-16 DIAGNOSIS — F51.01 PRIMARY INSOMNIA: ICD-10-CM

## 2022-05-16 RX ORDER — TRAZODONE HYDROCHLORIDE 50 MG/1
TABLET ORAL
Qty: 180 TABLET | Refills: 1 | Status: SHIPPED | OUTPATIENT
Start: 2022-05-16 | End: 2022-06-11

## 2022-05-24 DIAGNOSIS — I10 ESSENTIAL HYPERTENSION: ICD-10-CM

## 2022-05-24 DIAGNOSIS — E87.6 HYPOKALEMIA: ICD-10-CM

## 2022-05-24 RX ORDER — POTASSIUM CHLORIDE 20 MEQ/1
TABLET, EXTENDED RELEASE ORAL
Qty: 120 TABLET | Refills: 5 | Status: SHIPPED | OUTPATIENT
Start: 2022-05-24 | End: 2022-07-13 | Stop reason: SDUPTHER

## 2022-05-25 NOTE — PROGRESS NOTES
Reason for visit: Routine Prenatal Visit      HPI:   39 y.o., at 13w2d by Estimated Date of Delivery: 22    In with no c/o    - Contractions: denies  - Bleeding: denies  - Loss of fluid: denies  - Fetal movement: no  - Nausea: none  - Vomiting: none  - Headache: none      Reviewed:    Past medical, surgical, social, family, and obstetric history: Reviewed and updated in EMR.  Medications: Reviewed and updated in EMR.  Allergies: Patient has no known allergies.    Pregnancy dating, labs, ultrasound reports, prenatal testing, and problem list: Reviewed and updated in EMR.  Outside records: na  Independent interpretation of tests: na  Discussion with another healthcare professional: na      Vitals: /76   Wt 63 kg (138 lb 14.2 oz)   LMP 2022   BMI 23.83 kg/m²     Physical exam:  GENERAL: No acute distress  ABD: Gravid      Assessment and Plan:    Hx of preeclampsia, prior pregnancy, currently pregnant  -     aspirin 81 MG Chew; Take 1 tablet (81 mg total) by mouth once daily.  Dispense: 60 tablet; Refill: 4  -     US MFM Procedure ([facility]); Future; Expected date: 2022    Encounter for supervision of other normal pregnancy in second trimester         Consult with Dr. Ellsworth- in light of hx of GI ulcer caused by overuse of Goodys powder and not a GI issue feel safe to start ASA  81mg with pregnancy. Discussed with opt and , advised to notify if develops any GI issues.    Anatomy scan ordered     labor precautions given  Follow-up: 4 weeks      I spent a total of 20 minutes on the day of the visit. This includes face to face time and non-face to face time preparing to see the patient (eg, review of tests), Obtaining and/or reviewing separately obtained history, Documenting clinical information in the electronic or other health record, Independently interpreting results and communicating results to the patient/family/caregiver, or Care coordination.        H/p dictation id [id number]. Date of service 07/05/21. Panic attack. Htn. Obesity. Tobacco dependence. Chronic migraines. Copd.

## 2022-06-10 DIAGNOSIS — F51.01 PRIMARY INSOMNIA: ICD-10-CM

## 2022-06-10 NOTE — TELEPHONE ENCOUNTER
Medication:   Requested Prescriptions     Pending Prescriptions Disp Refills    traZODone (DESYREL) 50 MG tablet [Pharmacy Med Name: TRAZODONE 50 MG TABLET] 180 tablet 1     Sig: TAKE ONE OR TWO TABLETS BY MOUTH AT BEDTIME FOR SLEEP        Last Filled:      Patient Phone Number: [phone] (home) [phone] (work)    Last appt: 3/30/2022   Next appt: Visit date not found    Last OARRS:   RX Monitoring 8/17/2017   Attestation The Prescription Monitoring Report for this patient was reviewed today.

## 2022-06-11 DIAGNOSIS — J30.9 ALLERGIC RHINITIS, UNSPECIFIED: ICD-10-CM

## 2022-06-11 RX ORDER — TRAZODONE HYDROCHLORIDE 50 MG/1
TABLET ORAL
Qty: 180 TABLET | Refills: 1 | Status: SHIPPED | OUTPATIENT
Start: 2022-06-11

## 2022-06-13 RX ORDER — FLUTICASONE PROPIONATE 50 MCG
SPRAY, SUSPENSION (ML) NASAL
Qty: 16 G | Refills: 5 | Status: SHIPPED | OUTPATIENT
Start: 2022-06-13

## 2022-06-13 NOTE — TELEPHONE ENCOUNTER
Medication:   Requested Prescriptions     Pending Prescriptions Disp Refills    fluticasone (FLONASE) 50 MCG/ACT nasal spray [Pharmacy Med Name: FLUTICASONE PROP 50 MCG SPRAY]  5     Sig: INSTILL 1 SPRAY IN EACH NOSTRIL ONCE DAILY        Last Filled:      Patient Phone Number: [phone] (home) [phone] (work)    Last appt: 3/30/2022   Next appt: Visit date not found    Last OARRS:   RX Monitoring 8/17/2017   Attestation The Prescription Monitoring Report for this patient was reviewed today.

## 2022-06-14 DIAGNOSIS — J45.40 MODERATE PERSISTENT ASTHMA WITHOUT COMPLICATION: ICD-10-CM

## 2022-06-14 RX ORDER — BUDESONIDE 0.5 MG/2ML
INHALANT ORAL
Qty: 120 ML | Refills: 5 | Status: SHIPPED | OUTPATIENT
Start: 2022-06-14 | End: 2022-07-07

## 2022-06-15 ENCOUNTER — TELEPHONE (OUTPATIENT)
Dept: PRIMARY CARE CLINIC | Age: 62
End: 2022-06-15

## 2022-06-16 ENCOUNTER — TELEPHONE (OUTPATIENT)
Dept: ADMINISTRATIVE | Age: 62
End: 2022-06-16

## 2022-06-16 NOTE — TELEPHONE ENCOUNTER
Submitted PA for Budesonide 0.5MG/2ML suspension Via CMM Key: [id number] STATUS: DENIED. Please see Denial letter attached.

## 2022-07-07 ENCOUNTER — OFFICE VISIT (OUTPATIENT)
Dept: PRIMARY CARE CLINIC | Age: 62
End: 2022-07-07
Payer: COMMERCIAL

## 2022-07-07 ENCOUNTER — TELEPHONE (OUTPATIENT)
Dept: PRIMARY CARE CLINIC | Age: 62
End: 2022-07-07

## 2022-07-07 VITALS
BODY MASS INDEX: 34.93 KG/M2 | WEIGHT: 191 LBS | DIASTOLIC BLOOD PRESSURE: 83 MMHG | HEART RATE: 79 BPM | SYSTOLIC BLOOD PRESSURE: 130 MMHG | TEMPERATURE: 97 F | OXYGEN SATURATION: 96 %

## 2022-07-07 DIAGNOSIS — Z01.818 PRE-OP EVALUATION: Primary | ICD-10-CM

## 2022-07-07 DIAGNOSIS — G43.019 INTRACTABLE MIGRAINE WITHOUT AURA AND WITHOUT STATUS MIGRAINOSUS: ICD-10-CM

## 2022-07-07 DIAGNOSIS — R30.0 DYSURIA: ICD-10-CM

## 2022-07-07 DIAGNOSIS — J45.40 MODERATE PERSISTENT ASTHMA WITHOUT COMPLICATION: ICD-10-CM

## 2022-07-07 DIAGNOSIS — I10 ESSENTIAL HYPERTENSION: ICD-10-CM

## 2022-07-07 DIAGNOSIS — G44.229 CHRONIC TENSION-TYPE HEADACHE, NOT INTRACTABLE: ICD-10-CM

## 2022-07-07 DIAGNOSIS — Z01.818 PRE-OP EVALUATION: ICD-10-CM

## 2022-07-07 DIAGNOSIS — R11.0 NAUSEA: ICD-10-CM

## 2022-07-07 LAB
ALBUMIN SERPL-MCNC: 4.4 G/DL (ref 3.4–5)
ANION GAP SERPL CALCULATED.3IONS-SCNC: 12 MMOL/L (ref 3–16)
APTT: 28.3 SEC (ref 23–34.3)
BACTERIA: ABNORMAL /HPF
BASOPHILS ABSOLUTE: 0 K/UL (ref 0–0.2)
BASOPHILS RELATIVE PERCENT: 0.5 %
BILIRUBIN URINE: NEGATIVE
BLOOD, URINE: NEGATIVE
BUN BLDV-MCNC: 8 MG/DL (ref 7–20)
CALCIUM SERPL-MCNC: 9.3 MG/DL (ref 8.3–10.6)
CHLORIDE BLD-SCNC: 105 MMOL/L (ref 99–110)
CLARITY: CLEAR
CO2: 26 MMOL/L (ref 21–32)
COLOR: ABNORMAL
CREAT SERPL-MCNC: 0.6 MG/DL (ref 0.6–1.1)
EOSINOPHILS ABSOLUTE: 0.1 K/UL (ref 0–0.6)
EOSINOPHILS RELATIVE PERCENT: 1.3 %
EPITHELIAL CELLS, UA: 2 /HPF (ref 0–5)
GFR AFRICAN AMERICAN: >60
GFR NON-AFRICAN AMERICAN: >60
GLUCOSE BLD-MCNC: 93 MG/DL (ref 70–99)
GLUCOSE URINE: NEGATIVE MG/DL
HCT VFR BLD CALC: 40.5 % (ref 36–48)
HEMOGLOBIN: 13.5 G/DL (ref 12–16)
HYALINE CASTS: 0 /LPF (ref 0–8)
INR BLD: 1.01 (ref 0.87–1.14)
KETONES, URINE: ABNORMAL MG/DL
LEUKOCYTE ESTERASE, URINE: ABNORMAL
LYMPHOCYTES ABSOLUTE: 2.7 K/UL (ref 1–5.1)
LYMPHOCYTES RELATIVE PERCENT: 47.9 %
MCH RBC QN AUTO: 30.4 PG (ref 26–34)
MCHC RBC AUTO-ENTMCNC: 33.2 G/DL (ref 31–36)
MCV RBC AUTO: 91.4 FL (ref 80–100)
MICROSCOPIC EXAMINATION: YES
MONOCYTES ABSOLUTE: 0.3 K/UL (ref 0–1.3)
MONOCYTES RELATIVE PERCENT: 5.2 %
NEUTROPHILS ABSOLUTE: 2.6 K/UL (ref 1.7–7.7)
NEUTROPHILS RELATIVE PERCENT: 45.1 %
NITRITE, URINE: NEGATIVE
PDW BLD-RTO: 14.9 % (ref 12.4–15.4)
PH UA: 6 (ref 5–8)
PLATELET # BLD: 207 K/UL (ref 135–450)
PMV BLD AUTO: 8 FL (ref 5–10.5)
POTASSIUM SERPL-SCNC: 4.2 MMOL/L (ref 3.5–5.1)
PROTEIN UA: ABNORMAL MG/DL
PROTHROMBIN TIME: 13.2 SEC (ref 11.7–14.5)
RBC # BLD: 4.43 M/UL (ref 4–5.2)
RBC UA: 2 /HPF (ref 0–4)
SODIUM BLD-SCNC: 143 MMOL/L (ref 136–145)
SPECIFIC GRAVITY UA: 1.03 (ref 1–1.03)
URINE TYPE: ABNORMAL
UROBILINOGEN, URINE: 1 E.U./DL
WBC # BLD: 5.7 K/UL (ref 4–11)
WBC UA: 0 /HPF (ref 0–5)

## 2022-07-07 PROCEDURE — 99213 OFFICE O/P EST LOW 20 MIN: CPT | Performed by: INTERNAL MEDICINE

## 2022-07-07 PROCEDURE — 1036F TOBACCO NON-USER: CPT | Performed by: INTERNAL MEDICINE

## 2022-07-07 PROCEDURE — 3017F COLORECTAL CA SCREEN DOC REV: CPT | Performed by: INTERNAL MEDICINE

## 2022-07-07 PROCEDURE — G8417 CALC BMI ABV UP PARAM F/U: HCPCS | Performed by: INTERNAL MEDICINE

## 2022-07-07 PROCEDURE — 93000 ELECTROCARDIOGRAM COMPLETE: CPT | Performed by: INTERNAL MEDICINE

## 2022-07-07 PROCEDURE — G8427 DOCREV CUR MEDS BY ELIG CLIN: HCPCS | Performed by: INTERNAL MEDICINE

## 2022-07-07 RX ORDER — BUDESONIDE 1 MG/2ML
1 INHALANT ORAL 2 TIMES DAILY
Qty: 360 ML | Refills: 1 | Status: SHIPPED | OUTPATIENT
Start: 2022-07-07 | End: 2022-07-13 | Stop reason: SDUPTHER

## 2022-07-07 RX ORDER — ONDANSETRON 4 MG/1
TABLET, FILM COATED ORAL
Qty: 30 TABLET | Refills: 2 | Status: SHIPPED | OUTPATIENT
Start: 2022-07-07

## 2022-07-07 RX ORDER — AMLODIPINE BESYLATE 10 MG/1
TABLET ORAL
Qty: 90 TABLET | Refills: 1 | OUTPATIENT
Start: 2022-07-07

## 2022-07-07 SDOH — ECONOMIC STABILITY: FOOD INSECURITY: WITHIN THE PAST 12 MONTHS, THE FOOD YOU BOUGHT JUST DIDN'T LAST AND YOU DIDN'T HAVE MONEY TO GET MORE.: NEVER TRUE

## 2022-07-07 SDOH — ECONOMIC STABILITY: FOOD INSECURITY: WITHIN THE PAST 12 MONTHS, YOU WORRIED THAT YOUR FOOD WOULD RUN OUT BEFORE YOU GOT MONEY TO BUY MORE.: NEVER TRUE

## 2022-07-07 ASSESSMENT — SOCIAL DETERMINANTS OF HEALTH (SDOH): HOW HARD IS IT FOR YOU TO PAY FOR THE VERY BASICS LIKE FOOD, HOUSING, MEDICAL CARE, AND HEATING?: NOT HARD AT ALL

## 2022-07-07 ASSESSMENT — PATIENT HEALTH QUESTIONNAIRE - PHQ9
SUM OF ALL RESPONSES TO PHQ QUESTIONS 1-9: 0
1. LITTLE INTEREST OR PLEASURE IN DOING THINGS: 0
SUM OF ALL RESPONSES TO PHQ QUESTIONS 1-9: 0
SUM OF ALL RESPONSES TO PHQ9 QUESTIONS 1 & 2: 0
2. FEELING DOWN, DEPRESSED OR HOPELESS: 0

## 2022-07-07 NOTE — TELEPHONE ENCOUNTER
Follow up:   Pt had an issue with getting the following med filled:  ~Budesonide susp 0.5MG/2ML  She left a letter at the office from [facility], which indicated that due to this medication not blanca;;y being suspensed to individuals above 9 y/o they need additional information to approve the use of the medication. pls see copy of scan from [place] also scanned today. pls advise pt of status. Thank you!     Pt ansley: [phone]

## 2022-07-07 NOTE — PROGRESS NOTES
2022     Geno Recio (:  1960) is a 63 y.o. female, here for evaluation of the following medical concerns:    No chief complaint on file. HPI      82-year-old -American female with nicotine dependence, ACOS, sleep apnea, hip osteoarthritis, complex migraine headache blurry vision dizzy spells pituitary mass detected 2020 radiographically stable on follow-up MRI 2021, surgically treated sialoadenitis who attends for preoperative evaluation for elective spinal surgery THORACIC LAMINECTOMY WITH PADDLE LEAD AND RECHARGEABLE BATTERY IN RIGHT HIP  by Dr Maria Antonia Carrera. Patient has allergies to shellfish and lisinopril. They are not an easy bleeder. They have not been on chronic cortisone. They have mild diabetes, non-insulin-requiring recent A1c 6%. They have no personal or family history of venous thromboembolism. There is no family history of malignant hyperthermia. Patient has tolerated general anesthesia for notable surgeries listed in the past surgical history below. Patient has no known history of hepatitis. They have never received a blood transfusion. They have known history of sleep apnea, CPAP compliant and will bring machine to surgery. The patient denies recent fevers, shaking chills, drenching sweats. The patient denies new headache, ear or sinus pressure/pain/drainage, sore throat, dental thermal sensitivity, productive cough, abdominal pain, diarrhea, new dyspnea, new chest pain, new pleuritic chest pain. The patient also denies new leg swelling, joint warmth/redness, and open skin sores. She endorses brief dysuria, UA pending today. She has fair functional capacity, stopped smoking 6 months ago, able to climb 2 flights of stairs slowly, pain limited but not dyspnea or chest pain limited. No claudication. No recent escalation in inhaler use.     Review of Systems   Constitutional: Negative for activity change, appetite change, fatigue and unexpected weight change. HENT: Negative for dental problem, sinus pain, sore throat and trouble swallowing. Eyes: Negative for pain and visual disturbance. Respiratory: Negative for apnea, cough, chest tightness, shortness of breath and wheezing. Cardiovascular: Negative for chest pain and palpitations. Gastrointestinal: Negative for abdominal pain, blood in stool, constipation, diarrhea, nausea, rectal pain and vomiting. Endocrine: Negative for cold intolerance, heat intolerance, polydipsia, polyphagia and polyuria. Genitourinary: Negative for difficulty urinating, dysuria, flank pain, frequency, hematuria, pelvic pain, urgency, vaginal bleeding and vaginal discharge. Musculoskeletal: Negative for arthralgias, back pain, gait problem, joint swelling, myalgias, neck pain and neck stiffness. Skin: Negative for color change and rash. Neurological: Negative for dizziness, tremors, syncope, speech difficulty, weakness, light-headedness and headaches. Hematological: Negative for adenopathy. Does not bruise/bleed easily. Psychiatric/Behavioral: Negative for agitation, behavioral problems, decreased concentration, sleep disturbance and suicidal ideas. The patient is not nervous/anxious and is not hyperactive.       Active Ambulatory Problems     Diagnosis Date Noted    [facility] 04/10/2012    Headache 04/10/2012    S/P gastric bypass 04/10/2012    Hypertension 02/13/2013    Insomnia 12/10/2014    Carpal tunnel syndrome of left wrist 08/19/2016    Benign essential HTN 04/27/2015    Decreased potassium in the blood 04/27/2015    Chest pain 08/13/2018    Internal hernia 01/20/2017    Midsternal chest pain 04/27/2015    Tobacco abuse 04/27/2015    Chronic cough 09/24/2019    Moderate persistent asthma without complication 17/83/7018    Rash 01/16/2020    Anxiety 01/16/2020    GERD (gastroesophageal reflux disease) 10/05/2020  Gastric bypass status for obesity 10/05/2020    Obstructive sleep apnea     TIA (transient ischemic attack) 2021    Aphasia 2021    Benign neoplasm of brain ([facility] 2021    Infection due to yeast 2020    Irritable bowel syndrome with diarrhea 2020    Paresthesias 2021    Presbyopia 10/13/2020    Symptomatic menopausal or female climacteric states 2021    Vaginal burning 2021    Pituitary adenoma ([facility] 10/25/2021    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 2022     Resolved Ambulatory Problems     Diagnosis Date Noted    Regular check-up 2012    Physical exam 12/10/2014     Past Medical History:   Diagnosis Date    Allergic rhinitis     Arthritis     Asthma 4/10/2012    Bilateral carpal tunnel syndrome 2016    Depression     Dysmenorrhea     Fibroid     Headache(784.0) 4/10/2012    Hyperlipidemia     Status post coronary angiogram 2015    Wears glasses      Past Surgical History:   Procedure Laterality Date    BREAST SURGERY      Breast reduction    CARPAL TUNNEL RELEASE Left 2016     Left carpal tunnel release      CARPAL TUNNEL RELEASE Right 10/06/2016     SECTION      x1    COLONOSCOPY      HYSTERECTOMY      complete-ovaries out    SALIVARY GLAND SURGERY Left 2021    EXCISION OF SALIVARY STONE IN THE LEFT SUBMANDIBULAR DUCT performed by Tonia Ames MD at [place] [address]       Prior to Visit Medications    Medication Sig Taking?  Authorizing Provider   VENTOLIN  (90 Base) MCG/ACT inhaler TAKE 2 PUFFS BY MOUTH EVERY 6 HOURS AS NEEDED FOR WHEEZE  Claus Lee MD   budesonide (PULMICORT) 0.5 MG/2ML nebulizer suspension TAKE 2 MLS BY NEBULIZATION 2 TIMES DAILY AS NEEDED SHORTNESS OF BREATH  Claus Lee MD   fluticasone (FLONASE) 50 MCG/ACT nasal spray INSTILL 1 SPRAY IN EACH NOSTRIL ONCE DAILY  Claus Lee MD   traZODone (DESYREL) 50 MG tablet TAKE ONE OR TWO TABLETS vitamin D (ERGOCALCIFEROL) 1.25 MG (93088 UT) CAPS capsule TAKE 1 CAPSULE BY MOUTH EVERY 30 DAYS. Simran Ramos MD   EPINEPHrine [facility]) 0.3 MG/0.3ML DELPHINE injection Use as directed for allergic reaction  Simran Ramos MD   ZINC PO Take by mouth  Historical Provider, MD   Alpha-Lipoic Acid 600 MG TABS Take 600 mg by mouth 2 times daily  Historical Provider, MD   Omega-3 Fatty Acids (OMEGA 3 500 PO) Take by mouth  Historical Provider, MD   TURMERIC PO Take by mouth  Historical Provider, MD   Cyanocobalamin (B-12) 5000 MCG CAPS Take by mouth  Historical Provider, MD   acetaminophen (TYLENOL) 500 MG tablet Take 1 tablet by mouth 4 times daily as needed for Pain  Simran Ramos MD   nicotine polacrilex (NICORETTE) 2 MG gum Use up to 6 sticks of gum a day for smoking  Simran Ramos MD   aspirin 81 MG EC tablet Take 1 tablet by mouth daily  JAYSON Crespo - CNP   clotrimazole-betamethasone (Deana Milton) 1-0.05 % cream APPLY TO AFFECTED AREA TWICE A DAY  Simran Ramos MD        Social History     Tobacco Use    Smoking status: Former Smoker     Packs/day: 0.25     Years: 5.00     Pack years: 1.25     Types: Cigarettes     Quit date: 1980     Years since quittin.0    Smokeless tobacco: Former User     Quit date: 12/10/1994   Substance Use Topics    Alcohol use: Not Currently     Alcohol/week: 0.0 standard drinks     Comment: socially-wine        There were no vitals filed for this visit. Estimated body mass index is 35.85 kg/m² as calculated from the following:    Height as of 3/30/22: 5' 2\" (1.575 m). Weight as of 3/30/22: 196 lb (88.9 kg). PHYSICAL EXAM  GENERAL:  Pleasantappearing  female who looks her stated age, awake alert and oriented x3, no acute distress. HEENT:  Normocephalic atraumatic. Pupils equal round reactive light and accommodation, extra ocular muscles are intact. Oropharynx is clear moist without injection or exudate. Tongue and palate move normally. Turbinates appear normal, clear rhinorrhea only. .  Tympanic membranes appear clear without fluid level. NECK:  Supple nontender. No carotid bruits. Brisk carotid upstrokes, no JVD. No thyromegaly. LYMPH:  No supraclavicular cervical axillary or inguinal lymphadenopathy. LUNGS:  Clear to auscultation bilaterally. Very good not excellent air entry. No inspiratory crackles or expiratory wheezes. HEART:  Regular rate and rhythm without pathologic murmur rub gallop S3 or S4. ABDOMEN:  Soft, nontender, obese. Normal bowel sounds. No guarding. No masses. UROGENITAL:  Deferred  EXTREMITIES:  Warm and well perfused without clubbing cyanosis or edema. 2+ pulses in all 4 extremities. Capillary refill less than 2 seconds. NEURO:  Cranial nerves 2-12 grossly intact. Normal muscle bulk and tone. No resting tremor, cogwheeling, normal rapid alternating movements in the hands and feet. No stocking paresthesia. Abnormal gait and normal station. With a cane. MUSCULOSKELETAL:  Mild-moderate osteoarthritic changes. SKIN:  No worrisome lesions, skin a little dry. PSYCH:  No psychomotor retardation or agitation. Good eye contact. Unrestricted affect range. Mood congruent with affect. Linear thought. ASSESSMENT/PLAN:  1. Preoperative exam  Patient had brief dysuria, we will check a UA to exclude occult UTI. Otherwise no signs or symptoms of active infection no evidence of decompensated cardiopulmonary disease and has tolerated general anesthesia without complication in the past.    Instructed to take only her hydralazine and amlodipine and Prilosec on the morning of surgery at least an hour before scheduled report time with a small sip of water. Bring her CPAP to surgery. She has upper dentures, partial lower, and is up-to-date with tetanus. RCRI 0. Pending review of labs, she is optimized for the contemplated procedure.   7/8/2022 ADDENDUM: Reviewed labs including EKG, blood and urine tests which identified no concerns, pt optimized for surgery. Info shared with pt.    2.  Med refills. She needs a refill of her Zofran and adult prescription for Pulmicort which were sent to her pharmacy. It was a pleasure to visit with Ms. Anju Benitez today. Answered all questions as best I could. No follow-ups on file.     Thomas Silva MD   Time 25 minutes

## 2022-07-07 NOTE — PATIENT INSTRUCTIONS
1. Labs today  2. Bring cpap to your surgery  3. Day of surgery take only your prilosec amlodipine and hydralazine w small sip water at least an hour before scheduled report time. 4. Ask surgeon to coordinate covid testing  5.  Rx zofran and pulmicort sent to your pharmacy

## 2022-07-10 DIAGNOSIS — J45.40 MODERATE PERSISTENT ASTHMA, UNSPECIFIED WHETHER COMPLICATED: Primary | ICD-10-CM

## 2022-07-13 DIAGNOSIS — J45.40 MODERATE PERSISTENT ASTHMA WITHOUT COMPLICATION: ICD-10-CM

## 2022-07-13 DIAGNOSIS — I10 ESSENTIAL HYPERTENSION: ICD-10-CM

## 2022-07-13 DIAGNOSIS — E87.6 HYPOKALEMIA: ICD-10-CM

## 2022-07-13 RX ORDER — AMLODIPINE BESYLATE 10 MG/1
TABLET ORAL
Qty: 90 TABLET | Refills: 1 | OUTPATIENT
Start: 2022-07-13

## 2022-07-13 NOTE — PROGRESS NOTES
[facility] PRE-SURGICAL TESTING INSTRUCTIONS                                  PRIOR TO PROCEDURE DATE:        1. PLEASE FOLLOW ANY  GUIDELINES/ INSTRUCTIONS PRIOR TO YOUR PROCEDURE AS ADVISED BY YOUR SURGEON. 2. Arrange for someone to drive you home and be with you for the first 24 hours after discharge for your safety after your procedure for which you received sedation. Ensure it is someone we can share information with regarding your discharge. 3. You must contact your surgeon for instructions IF:   You are taking any blood thinners, aspirin, anti-inflammatory or vitamin E.   There is a change in your physical condition such as a cold, fever, rash, cuts, sores or any other infection, especially near your surgical site. 4. Do not drink alcohol the day before or day of your procedure. 5. A Pre-op History and Physical for surgery MUST be completed by your Physician or Urgent Care within 30 days of your procedure date. Please bring a copy with you on the day of your procedure and along with any other testing performed. THE DAY OF YOUR PROCEDURE:  1. Follow instructions for ARRIVAL TIME as DIRECTED BY YOUR SURGEON. 2. Enter the MAIN entrance from [address] and follow the signs to the free [employer] or [employer] parking (offered free of charge 6am-5pm). 3. Enter the Main Entrance of the hospital (do not enter from the lower level of the parking garage). Upon entrance, check in with the  at the main desk on your left. If no one is available at the desk, proceed into the [facility] Waiting Room and go through the door directly into the [facility]. There is a Check-in desk ACROSS from Room 5 (marked with a sign hanging from the ceiling). The phone number for the surgery center is [phone]. 4. Please call [phone] option #2 option #2 if you have not been preregistered yet.   On the day of your procedure bring your insurance card and bring a method of payment, i.e. Check or credit card, if you wish to pay your co-pay the day of surgery. 12. If you are to stay overnight, you may bring a bag with personal items. Please have any large items you may need brought in by your family after your arrival to your hospital room. 15. If you have a Living Will or Durable Power of , please bring a copy on the day of your procedure. 15. With your permission, one family member may accompany you while you are being prepared for surgery. Once you are ready, additional family members may join you. HOW WE KEEP YOU SAFE and WORK TO PREVENT SURGICAL SITE INFECTIONS:  1. Health care workers should always check your ID bracelet to verify your name and birth date. You will be asked many times to state your name, date of birth, and allergies. 2. Health care workers should always clean their hands with soap or alcohol gel before providing care to you. It is okay to ask anyone if they cleaned their hands before they touch you. 3. You will be actively involved in verifying the type of procedure you are having and ensuring the correct surgical site. This will be confirmed multiple times prior to your procedure. Do NOT mercedes your surgery site UNLESS instructed to by your surgeon. 4. Do not shave or wax for 72 hours prior to procedure near your operative site. Shaving with a razor can irritate your skin and make it easier to develop an infection. On the day of your procedure, any hair that needs to be removed near the surgical site will be clipped by a healthcare worker using a special clippers designed to avoid skin irritation. 5. When you are in the operating room, your surgical site will be cleansed with a special soap, and in most cases, you will be given an antibiotic before the surgery begins. What to expect AFTER YOUR PROCEDURE:  1.  Immediately following your procedure, your will be taken to the PACU for the first phase of your recovery. Your nurse will help you recover from any potential side effects of anesthesia, such as extreme drowsiness, changes in your vital signs or breathing patterns. Nausea, headache, muscle aches, or sore throat may also occur after anesthesia. Your nurse will help you manage these potential side effects. 2. For comfort and safety, arrange to have someone at home with you for the first 24 hours after discharge. 3. You and your family will be given written instructions about your diet, activity, dressing care, medications, and return visits. 4. Once at home, should issues with nausea, pain, or bleeding occur, or should you notice any signs of infection, you should call your surgeon. 5. Always clean your hands before and after caring for your wound. Do not let your family touch your surgery site without cleaning their hands. 6. Narcotic pain medications can cause significant constipation. You may want to add a stool softener to your postoperative medication schedule or speak to your surgeon on how best to manage this SIDE EFFECT. SPECIAL INSTRUCTIONS bring inhalers, bring CPAP machine patient acknowledges understanding of this along with pre op instructions. Thank you for allowing us to care for you. We strive to exceed your expectations in the delivery of care and service provided to you and your family. If you need to contact the [place] [address] staff for any reason, please call us at [phone]    Instructions reviewed with patient during preadmission testing phone interview.   Betsy Saab RN.7/13/2022 .12:49 PM      ADDITIONAL EDUCATIONAL INFORMATION REVIEWED PER PHONE WITH YOU AND/OR YOUR FAMILY:  Yes Hibiclens® Bathing Instructions   No Antibacterial Soap

## 2022-07-13 NOTE — PROGRESS NOTES
Place patient label inside box (if no patient label, complete below)  Name:  :  MR#:   Sasha Fofana / PROCEDURE  1. I (we), Raj Stratton (Patient Name) authorize Nadia Prado MD (Provider / Theresa Claire) and/or such assistants as may be selected by him/her, to perform the following operation/procedure(s): THORACIC LAMINECTOMY WITH PADDLE LEAD AND RECHARGEABLE BATTERY IN RIGHT HIP       Note: If unable to obtain consent prior to an emergent procedure, document the emergent reason in the medical record. This procedure has been explained to my (our) satisfaction and included in the explanation was:  A) The intended benefit, nature, and extent of the procedure to be performed;  B) The significant risks involved and the probability of success;  C) Alternative procedures and methods of treatment;  D) The dangers and probable consequences of such alternatives (including no procedure or treatment); E) The expected consequences of the procedure on my future health;  F) Whether other qualified individuals would be performing important surgical tasks and/or whether  would be present to advise or support the procedure. I (we) understand that there are other risks of infection and other serious complications in the pre-operative/procedural and postoperative/procedural stages of my (our) care. I (we) have asked all of the questions which I (we) thought were important in deciding whether or not to undergo treatment or diagnosis. These questions have been answered to my (our) satisfaction. I (we) understand that no assurance can be given that the procedure will be a success, and no guarantee or warranty of success has been given to me (us).     2. It has been explained to me (us) that during the course of the operation/procedure, unforeseen conditions may be revealed that necessitate extension of the original procedure(s) or different procedure(s) than those set forth in Paragraph 1. I (we) authorize and request that the above-named physician, his/her assistants or his/her designees, perform procedures as necessary and desirable if deemed to be in my (our) best interest.     Revised 8/2/2021                                                                          Page 1 of 2       3. I acknowledge that health care personnel may be observing this procedure for the purpose of medical education or other specified purposes as may be necessary as requested and/or approved by my (our) physician. 4. I (we) consent to the disposal by the hospital Pathologist of the removed tissue, parts or organs in accordance with hospital policy. 5. I do ____ do not ____ consent to the use of a local infiltration pain blocking agent that will be used by my provider/surgical provider to help alleviate pain during my procedure. 6. I do ____ do not ____ consent to an emergent blood transfusion in the case of a life-threatening situation that requires blood components to be administered. This consent is valid for 24 hours from the beginning of the procedure. 7. This patient does ____ or does not ____ currently have a DNR status/order. If DNR order is in place, obtain Addendum to the Surgical Consent for ALL Patients with a DNR Order to address tessy-operative status for limited intervention or DNR suspension.      8. I have read and fully understand the above Consent for Operation/Procedure and that all blanks were completed before I signed the consent.   _____________________________       _____________________      ____/____am/pm  Signature of Patient or legal representative      Printed Name / Relationship            Date / Time   ____________________________       _____________________      ____/____am/pm  Witness to Signature                                    Printed Name                    Date / Time     If patient is unable to sign or is a minor, complete the following)  Patient is a minor, ____ years of age, or unable to sign because:   ______________________________________________________________________________________________    [facility] If a phone consent is obtained, consent will be documented by using two health care professionals, each affirming that the consenting party has no questions and gives consent for the procedure discussed with the physician/provider.   _____________________          ____________________       _____/_____am/pm   2nd witness to phone consent        Printed name           Date / Time    Informed Consent:  I have provided the explanation described above in section 1 to the patient and/or legal representative.  I have provided the patient and/or legal representative with an opportunity to ask any questions about the proposed operation/procedure.   ___________________________          ____________________         ____/____am/pm  Provider / Proceduralist                            Printed name            Date / Time  Revised 8/2/2021                                                                      Page 2 of 2

## 2022-07-14 ENCOUNTER — PATIENT MESSAGE (OUTPATIENT)
Dept: PRIMARY CARE CLINIC | Age: 62
End: 2022-07-14

## 2022-07-14 DIAGNOSIS — I10 ESSENTIAL HYPERTENSION: ICD-10-CM

## 2022-07-14 DIAGNOSIS — E87.6 HYPOKALEMIA: ICD-10-CM

## 2022-07-14 RX ORDER — BUDESONIDE 1 MG/2ML
1 INHALANT ORAL 2 TIMES DAILY
Qty: 360 ML | Refills: 5 | Status: SHIPPED | OUTPATIENT
Start: 2022-07-14

## 2022-07-14 RX ORDER — HYDROCHLOROTHIAZIDE 25 MG/1
TABLET ORAL
Qty: 90 TABLET | Refills: 1 | Status: CANCELLED | OUTPATIENT
Start: 2022-07-14

## 2022-07-14 RX ORDER — POTASSIUM CHLORIDE 20 MEQ/1
TABLET, EXTENDED RELEASE ORAL
Qty: 120 TABLET | Refills: 5 | Status: SHIPPED | OUTPATIENT
Start: 2022-07-14 | End: 2022-10-19 | Stop reason: SDUPTHER

## 2022-07-14 RX ORDER — POTASSIUM CHLORIDE 20 MEQ/1
TABLET, EXTENDED RELEASE ORAL
Qty: 120 TABLET | Refills: 5 | Status: CANCELLED | OUTPATIENT
Start: 2022-07-14

## 2022-07-15 DIAGNOSIS — I10 ESSENTIAL HYPERTENSION: ICD-10-CM

## 2022-07-15 NOTE — TELEPHONE ENCOUNTER
----- Message from [employer] sent at 7/15/2022 10:49 AM EDT -----  Subject: Medication Problem    Medication: budesonide (PULMICORT) 1 MG/2ML nebulizer suspension  Dosage: as needed  Ordering Provider: undefined    Question/Problem: Pt states Needs prior authorization or a new script ASAP   fax to pharmacy [phone]     Pharmacy: [facility] [place] [phone]    ---------------------------------------------------------------------------  --------------  Gill Ramirez INFO  [phone]; OK to leave message on voicemail  ---------------------------------------------------------------------------  --------------    SCRIPT ANSWERS  Relationship to Patient: Self

## 2022-07-15 NOTE — TELEPHONE ENCOUNTER
From: Mynor Shha  To: Dr. Chisholm Deep: 7/14/2022 3:53 PM EDT  Subject: Handicap Place card    Tomorrow can I  the form at the front deskfor the place card?

## 2022-07-17 RX ORDER — AMLODIPINE BESYLATE 10 MG/1
TABLET ORAL
Qty: 90 TABLET | Refills: 1 | Status: SHIPPED | OUTPATIENT
Start: 2022-07-17 | End: 2022-10-19 | Stop reason: SDUPTHER

## 2022-07-18 ENCOUNTER — TELEPHONE (OUTPATIENT)
Dept: PRIMARY CARE CLINIC | Age: 62
End: 2022-07-18

## 2022-07-18 RX ORDER — AMLODIPINE BESYLATE 10 MG/1
TABLET ORAL
Qty: 90 TABLET | Refills: 1 | OUTPATIENT
Start: 2022-07-18

## 2022-07-18 NOTE — TELEPHONE ENCOUNTER
Spoke with pt, she plans on getting spinal cord stimulator tomorrow  And [place] issue will be addressed at later date.   She says she needs PA for budesonide nebulizer     Have PA department look into this if they are not doing so

## 2022-07-19 ENCOUNTER — HOSPITAL ENCOUNTER (OUTPATIENT)
Age: 62
Setting detail: OUTPATIENT SURGERY
Discharge: HOME | End: 2022-07-19
Attending: NEUROLOGICAL SURGERY | Admitting: NEUROLOGICAL SURGERY
Payer: COMMERCIAL

## 2022-07-19 ENCOUNTER — ANESTHESIA (OUTPATIENT)
Dept: OPERATING ROOM | Age: 62
End: 2022-07-19
Payer: COMMERCIAL

## 2022-07-19 ENCOUNTER — ANESTHESIA EVENT (OUTPATIENT)
Dept: OPERATING ROOM | Age: 62
End: 2022-07-19
Payer: COMMERCIAL

## 2022-07-19 ENCOUNTER — TELEPHONE (OUTPATIENT)
Dept: PRIMARY CARE CLINIC | Age: 62
End: 2022-07-19

## 2022-07-19 ENCOUNTER — APPOINTMENT (OUTPATIENT)
Dept: GENERAL RADIOLOGY | Age: 62
End: 2022-07-19
Attending: NEUROLOGICAL SURGERY
Payer: COMMERCIAL

## 2022-07-19 VITALS
OXYGEN SATURATION: 96 % | DIASTOLIC BLOOD PRESSURE: 80 MMHG | BODY MASS INDEX: 34.04 KG/M2 | TEMPERATURE: 96.9 F | HEIGHT: 62 IN | RESPIRATION RATE: 20 BRPM | HEART RATE: 86 BPM | SYSTOLIC BLOOD PRESSURE: 136 MMHG | WEIGHT: 185 LBS

## 2022-07-19 DIAGNOSIS — G90.521 COMPLEX REGIONAL PAIN SYNDROME TYPE 1 OF RIGHT LOWER EXTREMITY: Primary | ICD-10-CM

## 2022-07-19 DIAGNOSIS — F44.4 FUNCTIONAL NEUROLOGICAL SYMPTOM DISORDER WITH SPEECH SYMPTOMS: ICD-10-CM

## 2022-07-19 LAB
ABO/RH: NORMAL
ANTIBODY SCREEN: NORMAL
GLUCOSE BLD-MCNC: 103 MG/DL (ref 70–99)
GLUCOSE BLD-MCNC: 90 MG/DL (ref 70–99)
PERFORMED ON: ABNORMAL
PERFORMED ON: NORMAL

## 2022-07-19 PROCEDURE — 2580000003 HC RX 258: Performed by: NEUROLOGICAL SURGERY

## 2022-07-19 PROCEDURE — 3700000001 HC ADD 15 MINUTES (ANESTHESIA): Performed by: NEUROLOGICAL SURGERY

## 2022-07-19 PROCEDURE — 86901 BLOOD TYPING SEROLOGIC RH(D): CPT

## 2022-07-19 PROCEDURE — 6360000002 HC RX W HCPCS: Performed by: NURSE ANESTHETIST, CERTIFIED REGISTERED

## 2022-07-19 PROCEDURE — 7100000011 HC PHASE II RECOVERY - ADDTL 15 MIN: Performed by: NEUROLOGICAL SURGERY

## 2022-07-19 PROCEDURE — 6370000000 HC RX 637 (ALT 250 FOR IP): Performed by: FAMILY MEDICINE

## 2022-07-19 PROCEDURE — 7100000010 HC PHASE II RECOVERY - FIRST 15 MIN: Performed by: NEUROLOGICAL SURGERY

## 2022-07-19 PROCEDURE — 6360000002 HC RX W HCPCS: Performed by: NEUROLOGICAL SURGERY

## 2022-07-19 PROCEDURE — 86900 BLOOD TYPING SEROLOGIC ABO: CPT

## 2022-07-19 PROCEDURE — C1778 LEAD, NEUROSTIMULATOR: HCPCS | Performed by: NEUROLOGICAL SURGERY

## 2022-07-19 PROCEDURE — 2709999900 HC NON-CHARGEABLE SUPPLY: Performed by: NEUROLOGICAL SURGERY

## 2022-07-19 PROCEDURE — 3209999900 FLUORO FOR SURGICAL PROCEDURES

## 2022-07-19 PROCEDURE — 3700000000 HC ANESTHESIA ATTENDED CARE: Performed by: NEUROLOGICAL SURGERY

## 2022-07-19 PROCEDURE — 86850 RBC ANTIBODY SCREEN: CPT

## 2022-07-19 PROCEDURE — 2580000003 HC RX 258: Performed by: NURSE ANESTHETIST, CERTIFIED REGISTERED

## 2022-07-19 PROCEDURE — 2500000003 HC RX 250 WO HCPCS: Performed by: NURSE ANESTHETIST, CERTIFIED REGISTERED

## 2022-07-19 PROCEDURE — 6360000002 HC RX W HCPCS: Performed by: FAMILY MEDICINE

## 2022-07-19 PROCEDURE — 72070 X-RAY EXAM THORAC SPINE 2VWS: CPT

## 2022-07-19 PROCEDURE — 2580000003 HC RX 258: Performed by: ANESTHESIOLOGY

## 2022-07-19 PROCEDURE — 3600000004 HC SURGERY LEVEL 4 BASE: Performed by: NEUROLOGICAL SURGERY

## 2022-07-19 PROCEDURE — 2500000003 HC RX 250 WO HCPCS: Performed by: NEUROLOGICAL SURGERY

## 2022-07-19 PROCEDURE — 6370000000 HC RX 637 (ALT 250 FOR IP): Performed by: NEUROLOGICAL SURGERY

## 2022-07-19 PROCEDURE — C1820 GENERATOR NEURO RECHG BAT SY: HCPCS | Performed by: NEUROLOGICAL SURGERY

## 2022-07-19 PROCEDURE — 7100000001 HC PACU RECOVERY - ADDTL 15 MIN: Performed by: NEUROLOGICAL SURGERY

## 2022-07-19 PROCEDURE — 7100000000 HC PACU RECOVERY - FIRST 15 MIN: Performed by: NEUROLOGICAL SURGERY

## 2022-07-19 PROCEDURE — 2720000010 HC SURG SUPPLY STERILE: Performed by: NEUROLOGICAL SURGERY

## 2022-07-19 PROCEDURE — 3600000014 HC SURGERY LEVEL 4 ADDTL 15MIN: Performed by: NEUROLOGICAL SURGERY

## 2022-07-19 DEVICE — 50CM 4X8 SURGICAL LEAD KIT
Type: IMPLANTABLE DEVICE | Status: FUNCTIONAL
Brand: COVEREDGE™ 32

## 2022-07-19 DEVICE — OR CABLE 2X8 61CM AND EXTENSION: Type: IMPLANTABLE DEVICE | Status: FUNCTIONAL

## 2022-07-19 DEVICE — IMPLANTABLE DEVICE: Type: IMPLANTABLE DEVICE | Status: FUNCTIONAL

## 2022-07-19 DEVICE — STIMULATOR WAVEWRITER ALPHA IMPL PULSE GENRTR KIT: Type: IMPLANTABLE DEVICE | Status: FUNCTIONAL

## 2022-07-19 RX ORDER — SODIUM CHLORIDE 0.9 % (FLUSH) 0.9 %
5-40 SYRINGE (ML) INJECTION EVERY 12 HOURS SCHEDULED
Status: DISCONTINUED | OUTPATIENT
Start: 2022-07-19 | End: 2022-07-19 | Stop reason: HOSPADM

## 2022-07-19 RX ORDER — OXYCODONE AND ACETAMINOPHEN 7.5; 325 MG/1; MG/1
1-2 TABLET ORAL EVERY 6 HOURS PRN
Qty: 40 TABLET | Refills: 0 | Status: SHIPPED | OUTPATIENT
Start: 2022-07-19 | End: 2022-07-26

## 2022-07-19 RX ORDER — SODIUM CHLORIDE 0.9 % (FLUSH) 0.9 %
5-40 SYRINGE (ML) INJECTION PRN
Status: DISCONTINUED | OUTPATIENT
Start: 2022-07-19 | End: 2022-07-19 | Stop reason: HOSPADM

## 2022-07-19 RX ORDER — MEPERIDINE HYDROCHLORIDE 25 MG/ML
12.5 INJECTION INTRAMUSCULAR; INTRAVENOUS; SUBCUTANEOUS EVERY 5 MIN PRN
Status: DISCONTINUED | OUTPATIENT
Start: 2022-07-19 | End: 2022-07-19 | Stop reason: HOSPADM

## 2022-07-19 RX ORDER — LORAZEPAM 0.5 MG/1
0.5 TABLET ORAL
Status: DISCONTINUED | OUTPATIENT
Start: 2022-07-19 | End: 2022-07-19 | Stop reason: HOSPADM

## 2022-07-19 RX ORDER — DIPHENHYDRAMINE HYDROCHLORIDE 50 MG/ML
12.5 INJECTION INTRAMUSCULAR; INTRAVENOUS
Status: DISCONTINUED | OUTPATIENT
Start: 2022-07-19 | End: 2022-07-19 | Stop reason: HOSPADM

## 2022-07-19 RX ORDER — FENTANYL CITRATE 50 UG/ML
25 INJECTION, SOLUTION INTRAMUSCULAR; INTRAVENOUS EVERY 5 MIN PRN
Status: DISCONTINUED | OUTPATIENT
Start: 2022-07-19 | End: 2022-07-19 | Stop reason: HOSPADM

## 2022-07-19 RX ORDER — SODIUM CHLORIDE 9 MG/ML
INJECTION, SOLUTION INTRAVENOUS PRN
Status: DISCONTINUED | OUTPATIENT
Start: 2022-07-19 | End: 2022-07-19 | Stop reason: HOSPADM

## 2022-07-19 RX ORDER — LIDOCAINE HYDROCHLORIDE 10 MG/ML
1 INJECTION, SOLUTION EPIDURAL; INFILTRATION; INTRACAUDAL; PERINEURAL
Status: DISCONTINUED | OUTPATIENT
Start: 2022-07-19 | End: 2022-07-19 | Stop reason: HOSPADM

## 2022-07-19 RX ORDER — DIAZEPAM 5 MG/1
5 TABLET ORAL EVERY 8 HOURS PRN
Qty: 30 TABLET | Refills: 0 | Status: SHIPPED | OUTPATIENT
Start: 2022-07-19 | End: 2022-07-29

## 2022-07-19 RX ORDER — ONDANSETRON 2 MG/ML
INJECTION INTRAMUSCULAR; INTRAVENOUS PRN
Status: DISCONTINUED | OUTPATIENT
Start: 2022-07-19 | End: 2022-07-19 | Stop reason: SDUPTHER

## 2022-07-19 RX ORDER — FENTANYL CITRATE 50 UG/ML
INJECTION, SOLUTION INTRAMUSCULAR; INTRAVENOUS PRN
Status: DISCONTINUED | OUTPATIENT
Start: 2022-07-19 | End: 2022-07-19 | Stop reason: SDUPTHER

## 2022-07-19 RX ORDER — SUCCINYLCHOLINE/SOD CL,ISO/PF 200MG/10ML
SYRINGE (ML) INTRAVENOUS PRN
Status: DISCONTINUED | OUTPATIENT
Start: 2022-07-19 | End: 2022-07-19 | Stop reason: SDUPTHER

## 2022-07-19 RX ORDER — SODIUM CHLORIDE, SODIUM LACTATE, POTASSIUM CHLORIDE, CALCIUM CHLORIDE 600; 310; 30; 20 MG/100ML; MG/100ML; MG/100ML; MG/100ML
INJECTION, SOLUTION INTRAVENOUS CONTINUOUS
Status: DISCONTINUED | OUTPATIENT
Start: 2022-07-19 | End: 2022-07-19 | Stop reason: HOSPADM

## 2022-07-19 RX ORDER — ONDANSETRON 2 MG/ML
4 INJECTION INTRAMUSCULAR; INTRAVENOUS
Status: DISCONTINUED | OUTPATIENT
Start: 2022-07-19 | End: 2022-07-19 | Stop reason: HOSPADM

## 2022-07-19 RX ORDER — PROCHLORPERAZINE EDISYLATE 5 MG/ML
5 INJECTION INTRAMUSCULAR; INTRAVENOUS
Status: DISCONTINUED | OUTPATIENT
Start: 2022-07-19 | End: 2022-07-19 | Stop reason: HOSPADM

## 2022-07-19 RX ORDER — OXYCODONE HYDROCHLORIDE 5 MG/1
10 TABLET ORAL PRN
Status: COMPLETED | OUTPATIENT
Start: 2022-07-19 | End: 2022-07-19

## 2022-07-19 RX ORDER — MIDAZOLAM HYDROCHLORIDE 1 MG/ML
INJECTION INTRAMUSCULAR; INTRAVENOUS PRN
Status: DISCONTINUED | OUTPATIENT
Start: 2022-07-19 | End: 2022-07-19 | Stop reason: SDUPTHER

## 2022-07-19 RX ORDER — DIAZEPAM 5 MG/1
5 TABLET ORAL ONCE
Status: COMPLETED | OUTPATIENT
Start: 2022-07-19 | End: 2022-07-19

## 2022-07-19 RX ORDER — SODIUM CHLORIDE 9 MG/ML
25 INJECTION, SOLUTION INTRAVENOUS PRN
Status: DISCONTINUED | OUTPATIENT
Start: 2022-07-19 | End: 2022-07-19 | Stop reason: HOSPADM

## 2022-07-19 RX ORDER — OXYCODONE AND ACETAMINOPHEN 7.5; 325 MG/1; MG/1
1 TABLET ORAL ONCE
Status: DISCONTINUED | OUTPATIENT
Start: 2022-07-19 | End: 2022-07-19 | Stop reason: HOSPADM

## 2022-07-19 RX ORDER — GLYCOPYRROLATE 0.2 MG/ML
INJECTION INTRAMUSCULAR; INTRAVENOUS PRN
Status: DISCONTINUED | OUTPATIENT
Start: 2022-07-19 | End: 2022-07-19 | Stop reason: SDUPTHER

## 2022-07-19 RX ORDER — KETAMINE HCL IN NACL, ISO-OSM 20 MG/2 ML
SYRINGE (ML) INJECTION PRN
Status: DISCONTINUED | OUTPATIENT
Start: 2022-07-19 | End: 2022-07-19 | Stop reason: SDUPTHER

## 2022-07-19 RX ORDER — LORAZEPAM 2 MG/ML
0.5 INJECTION INTRAMUSCULAR
Status: DISCONTINUED | OUTPATIENT
Start: 2022-07-19 | End: 2022-07-19 | Stop reason: CLARIF

## 2022-07-19 RX ORDER — HYDROMORPHONE HCL 110MG/55ML
PATIENT CONTROLLED ANALGESIA SYRINGE INTRAVENOUS PRN
Status: DISCONTINUED | OUTPATIENT
Start: 2022-07-19 | End: 2022-07-19 | Stop reason: SDUPTHER

## 2022-07-19 RX ORDER — DEXAMETHASONE SODIUM PHOSPHATE 4 MG/ML
INJECTION, SOLUTION INTRA-ARTICULAR; INTRALESIONAL; INTRAMUSCULAR; INTRAVENOUS; SOFT TISSUE PRN
Status: DISCONTINUED | OUTPATIENT
Start: 2022-07-19 | End: 2022-07-19 | Stop reason: SDUPTHER

## 2022-07-19 RX ORDER — LABETALOL HYDROCHLORIDE 5 MG/ML
10 INJECTION, SOLUTION INTRAVENOUS
Status: DISCONTINUED | OUTPATIENT
Start: 2022-07-19 | End: 2022-07-19 | Stop reason: HOSPADM

## 2022-07-19 RX ORDER — SODIUM CHLORIDE 9 MG/ML
INJECTION, SOLUTION INTRAVENOUS CONTINUOUS
Status: DISCONTINUED | OUTPATIENT
Start: 2022-07-19 | End: 2022-07-19 | Stop reason: HOSPADM

## 2022-07-19 RX ORDER — OXYCODONE HYDROCHLORIDE 5 MG/1
5 TABLET ORAL PRN
Status: COMPLETED | OUTPATIENT
Start: 2022-07-19 | End: 2022-07-19

## 2022-07-19 RX ORDER — PROPOFOL 10 MG/ML
INJECTION, EMULSION INTRAVENOUS PRN
Status: DISCONTINUED | OUTPATIENT
Start: 2022-07-19 | End: 2022-07-19 | Stop reason: SDUPTHER

## 2022-07-19 RX ADMIN — ONDANSETRON 4 MG: 2 INJECTION INTRAMUSCULAR; INTRAVENOUS at 15:57

## 2022-07-19 RX ADMIN — Medication 120 MG: at 14:32

## 2022-07-19 RX ADMIN — OXYCODONE 10 MG: 5 TABLET ORAL at 17:32

## 2022-07-19 RX ADMIN — FENTANYL CITRATE 100 MCG: 50 INJECTION, SOLUTION INTRAMUSCULAR; INTRAVENOUS at 14:32

## 2022-07-19 RX ADMIN — Medication 20 MG: at 15:12

## 2022-07-19 RX ADMIN — Medication 100 MG: at 14:32

## 2022-07-19 RX ADMIN — PROPOFOL 100 MCG/KG/MIN: 10 INJECTION, EMULSION INTRAVENOUS at 14:36

## 2022-07-19 RX ADMIN — HYDROMORPHONE HYDROCHLORIDE 1 MG: 2 INJECTION, SOLUTION INTRAMUSCULAR; INTRAVENOUS; SUBCUTANEOUS at 15:42

## 2022-07-19 RX ADMIN — GLYCOPYRROLATE 0.2 MG: 0.2 INJECTION INTRAMUSCULAR; INTRAVENOUS at 14:01

## 2022-07-19 RX ADMIN — CEFAZOLIN 2000 MG: 2 INJECTION, POWDER, FOR SOLUTION INTRAMUSCULAR; INTRAVENOUS at 14:36

## 2022-07-19 RX ADMIN — PROPOFOL 250 MG: 10 INJECTION, EMULSION INTRAVENOUS at 14:32

## 2022-07-19 RX ADMIN — PHENYLEPHRINE HYDROCHLORIDE 25 MCG/MIN: 10 INJECTION, SOLUTION INTRAMUSCULAR; INTRAVENOUS; SUBCUTANEOUS at 14:36

## 2022-07-19 RX ADMIN — Medication 20 MG: at 15:43

## 2022-07-19 RX ADMIN — REMIFENTANIL HYDROCHLORIDE 0.3 MCG/KG/MIN: 1 INJECTION, POWDER, LYOPHILIZED, FOR SOLUTION INTRAVENOUS at 14:36

## 2022-07-19 RX ADMIN — SODIUM CHLORIDE, SODIUM LACTATE, POTASSIUM CHLORIDE, AND CALCIUM CHLORIDE: .6; .31; .03; .02 INJECTION, SOLUTION INTRAVENOUS at 15:51

## 2022-07-19 RX ADMIN — SODIUM CHLORIDE, SODIUM LACTATE, POTASSIUM CHLORIDE, AND CALCIUM CHLORIDE: .6; .31; .03; .02 INJECTION, SOLUTION INTRAVENOUS at 14:30

## 2022-07-19 RX ADMIN — DIAZEPAM 5 MG: 5 TABLET ORAL at 17:04

## 2022-07-19 RX ADMIN — MIDAZOLAM HYDROCHLORIDE 2 MG: 2 INJECTION, SOLUTION INTRAMUSCULAR; INTRAVENOUS at 14:24

## 2022-07-19 RX ADMIN — HYDROMORPHONE HYDROCHLORIDE 1 MG: 2 INJECTION, SOLUTION INTRAMUSCULAR; INTRAVENOUS; SUBCUTANEOUS at 15:56

## 2022-07-19 RX ADMIN — HYDROMORPHONE HYDROCHLORIDE 0.5 MG: 1 INJECTION, SOLUTION INTRAMUSCULAR; INTRAVENOUS; SUBCUTANEOUS at 17:04

## 2022-07-19 RX ADMIN — DIAZEPAM 5 MG: 5 TABLET ORAL at 17:50

## 2022-07-19 RX ADMIN — DEXAMETHASONE SODIUM PHOSPHATE 10 MG: 4 INJECTION, SOLUTION INTRAMUSCULAR; INTRAVENOUS at 15:12

## 2022-07-19 ASSESSMENT — PAIN DESCRIPTION - DESCRIPTORS
DESCRIPTORS: THROBBING
DESCRIPTORS: ACHING
DESCRIPTORS: BURNING;ACHING;STABBING

## 2022-07-19 ASSESSMENT — PAIN SCALES - GENERAL
PAINLEVEL_OUTOF10: 10
PAINLEVEL_OUTOF10: 10

## 2022-07-19 ASSESSMENT — PAIN - FUNCTIONAL ASSESSMENT: PAIN_FUNCTIONAL_ASSESSMENT: 0-10

## 2022-07-19 ASSESSMENT — PAIN DESCRIPTION - LOCATION: LOCATION: BACK

## 2022-07-19 ASSESSMENT — PAIN DESCRIPTION - ORIENTATION
ORIENTATION: LOWER
ORIENTATION: MID

## 2022-07-19 NOTE — TELEPHONE ENCOUNTER
Request PA for  budesonide nebulizer. . pt was denied PA for the budesonide solution earlier. .    Please advise

## 2022-07-19 NOTE — PROGRESS NOTES
Patient admitted to PACU # 10 from OR at [address] IN RIGHT HIP -Right per Dr. Aaron Maher. Attached to PACU monitoring system and report received from anesthesia provider. Patient was reported to be hemodynamically stable during procedure. Patient drowsy on admission and denied pain. Pt brought out on nonrebreather mask at 4 L. Pt NSR on monitor. Surgical incision on back x2 c/d/I with surgical glue. Blood glucose 103. Will continue to monitor.

## 2022-07-19 NOTE — DISCHARGE INSTRUCTIONS
Ok to shower tomorrow 7/20  No heavy lifting  See Dr. Haritha Wong at scheduled appointment    [address] [facility]    There are potential side effects of anesthesia or sedation you may experience for the first 24 hours. These side effects include:    Confusion or Memory loss, Dizziness, or Delayed Reaction Times   [x]A responsible person should be with you for the next 24 hours. Do not operate any vehicles (automobiles, bicycles, motorcycles) or power tools or machinery for 24 hours. Do not sign any legal documents or make any legal decisions for 24 hours. Do not drink alcohol for 24 hours or while taking narcotic pain medication. Nausea    [x]Start with light diet and progress to your normal diet as you feel like eating. However, if you experience nausea or repeated episodes of vomiting which persist beyond 12-24 hours, notify your physician. Once nausea has passed, remember to keep drinking fluids. Difficulty Passing Urine  [x]Drink extra amounts of fluid today. Notify your physician if you have not urinated within 8 hours after your procedure or you feel uncomfortable. Irritated Throat from a Breathing Tube  [x]Drink extra amounts of fluid today. Lozenges may help. Muscle Aches  [x]You may experience some generalized body aches as your muscles recover from medications used to relax them during surgery. These will gradually subside. MEDICATION INSTRUCTIONS:  [x]Prescription(S) x  2   sent with you. Use as directed. When taking pain medications, you may experience the side effect of dizziness or drowsiness. Do not drink alcohol or drive when taking these medications. [x]Give the list of your medications to your primary care physician on your next visit. Keep your med list updated and carry it with in case of emergencies. [x] Narcotic pain medications can cause the side effect of significant constipation.   You may want to add a stool softener to your postoperative medication schedule or speak to your surgeon on how best to manage this side effect. NARCOTIC SAFETY:  Your pain medicine is only for you to take. Safely store your medicines. Store pills up high and out of reach of children and pets. Ensure safety caps are snapped tightly  Keep track of how many pills you have left    Unused medication can be disposed of by taking them to a drop-off box or take-back program that is authorized by the [facility]. Access to a site near you can be found on the [facility] [employer] website ([address] [facility] bookletmobile. [facility]). If you have a CPAP machine, it is very important that you use it daily during all periods of sleep and daytime rest during your recovery at home. Surgery and Anesthesia place a significant amount of stress on your body. Using your CPAP will help keep you safe and lessen the negative effects of that stress. FOLLOW-UP RECOVERY CARE:  [x]Call the office at [phone] for follow-up appointment and problems    Watch for these possible complications, symptoms, or side effects of anesthesia. Call physician if they or any other problems occur:  Signs of INFECTION   > Fever over 101°     > Redness, swelling, hardness or warmth at the operative site   >Foul smelling or cloudy drainage at the operative site   Unrelieved PAIN  Unrelieved NAUSEA  Blood soaked dressing. (Some oozing may be normal)  Inability to urinate      Numb, pale, blue, cold or tingling extremity      Physician:  Dr. Arnold Riley    The above instructions were reviewed with patient/significant other.   The following additional patient specific information was reviewed with the patient/significant other:  [x]Procedure/physician specific instructions  [x]Medication information sheet(S) including potential side effects  []Analis egress test  []Pain Ball management  []FAQ Catheter associated blood stream infections  []FAQ Surgical Site Infections  []Other-    I have read and understand the instructions given to me: ____________________________________________   (Patient/S.O. Signature)            Date/time 7/19/2022 4:24 PM         PACU:  [phone]   M-F 700 AM - 7 PM      SAME DAY SERVICES:  [phone] M-F 7AM-6PM        If you smoke STOP. We care about your health! Bexarotene Counseling:  I discussed with the patient the risks of bexarotene including but not limited to hair loss, dry lips/skin/eyes, liver abnormalities, hyperlipidemia, pancreatitis, depression/suicidal ideation, photosensitivity, drug rash/allergic reactions, hypothyroidism, anemia, leukopenia, infection, cataracts, and teratogenicity.  Patient understands that they will need regular blood tests to check lipid profile, liver function tests, white blood cell count, thyroid function tests and pregnancy test if applicable.

## 2022-07-19 NOTE — PROGRESS NOTES
PACU Transfer to [facility]    Vitals:    07/19/22 1715   BP: 107/70   Pulse: 80   Resp: 19   Temp: 97 °F (36.1 °C)   SpO2: 93%         Intake/Output Summary (Last 24 hours) at 7/19/2022 1727  Last data filed at 7/19/2022 1720  Gross per 24 hour   Intake 1200 ml   Output 0 ml   Net 1200 ml       Pain assessment:  none  Pain Level:  (pt sleeping)    Patient transferred to care of [facility] RN.    7/19/2022 5:27 PM

## 2022-07-19 NOTE — ANESTHESIA POSTPROCEDURE EVALUATION
Department of Anesthesiology  Postprocedure Note    Patient: Zainab Bernabe  MRN: [id number]  YOB: 1960  Date of evaluation: 7/19/2022      Procedure Summary     Date: 07/19/22 Room / Location: [facility]    Anesthesia Start: 1339 Anesthesia Stop: 6385    Procedure: THORACIC LAMINECTOMY WITH PADDLE LEAD AND RECHARGEABLE BATTERY IN RIGHT HIP (Right: Back) Diagnosis:       Complex regional pain syndrome type 1 of right lower extremity      (Complex regional pain syndrome type 1 of right lower extremity [G90.521])    Surgeons: Italia Chowdhury MD Responsible Provider: Svetlana Rush MD    Anesthesia Type: general ASA Status: 3          Anesthesia Type: No value filed.     Carole Phase I: Carole Score: 9    Carole Phase II:        Anesthesia Post Evaluation    Patient location during evaluation: PACU  Patient participation: complete - patient participated  Level of consciousness: awake and alert  Pain score: 0  Airway patency: patent  Nausea & Vomiting: no nausea and no vomiting  Complications: no  Cardiovascular status: hemodynamically stable  Respiratory status: acceptable  Hydration status: euvolemic

## 2022-07-19 NOTE — ANESTHESIA PRE PROCEDURE
[facility] Department of Anesthesiology  Pre-Anesthesia Evaluation/Consultation       Name:  Haley Laura  : 1960  Age:  63 y.o.                                            MRN:  [id number]  Date: 2022           Surgeon: Surgeon(s):  Ko Parish MD    Procedure: Procedure(s):  THORACIC LAMINECTOMY WITH PADDLE LEAD AND RECHARGEABLE BATTERY IN RIGHT HIP     Allergies   Allergen Reactions    Shellfish Allergy Anaphylaxis    Shellfish-Derived Products Shortness Of Breath and Palpitations    Lisinopril      Dry cough, no lip swelling or resp. sxs     Patient Active Problem List   Diagnosis    Asthma-chronic obstructive pulmonary disease overlap syndrome (HCC)    Headache    S/P gastric bypass    Hypertension    Insomnia    Carpal tunnel syndrome of left wrist    Benign essential HTN    Decreased potassium in the blood    Chest pain    Internal hernia    Midsternal chest pain    Tobacco abuse    Chronic cough    Moderate persistent asthma without complication    Rash    Anxiety    GERD (gastroesophageal reflux disease)    Gastric bypass status for obesity    Obstructive sleep apnea    TIA (transient ischemic attack)    Aphasia    Benign neoplasm of brain ([address]    Infection due to yeast    Irritable bowel syndrome with diarrhea    Paresthesias    Presbyopia    Symptomatic menopausal or female climacteric states    Vaginal burning    Pituitary adenoma ([address]    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     Past Medical History:   Diagnosis Date    Allergic rhinitis     Anxiety 2020    Arthritis     pt denies    Asthma 4/10/2012    Asthma-chronic obstructive pulmonary disease overlap syndrome (HCC)     pt denies    Bilateral carpal tunnel syndrome 2016    Depression     Diabetes mellitus (HCC)     Dysmenorrhea     Fibroid     GERD (gastroesophageal reflux disease)     Headache(784.0) 4/10/2012    Hyperlipidemia     TABLET BY MOUTH EVERY DAY 90 tablet 1    benzonatate (TESSALON) 100 MG capsule Take 1 capsule by mouth 2 times daily as needed for Cough 20 capsule 0    potassium chloride (KLOR-CON M20) 20 MEQ extended release tablet TAKE 2 TABLETS BY MOUTH TWICE A  tablet 5    budesonide (PULMICORT) 1 MG/2ML nebulizer suspension Take 2 mLs by nebulization 2 times daily 360 mL 5    ondansetron (ZOFRAN) 4 MG tablet One every 8 hrs as needed for nausea/vomiting 30 tablet 2    VENTOLIN  (90 Base) MCG/ACT inhaler TAKE 2 PUFFS BY MOUTH EVERY 6 HOURS AS NEEDED FOR WHEEZE 18 each 11    fluticasone (FLONASE) 50 MCG/ACT nasal spray INSTILL 1 SPRAY IN EACH NOSTRIL ONCE DAILY 16 g 5    traZODone (DESYREL) 50 MG tablet TAKE ONE OR TWO TABLETS BY MOUTH AT BEDTIME FOR SLEEP (Patient not taking: Reported on 7/13/2022) 180 tablet 1    omeprazole (PRILOSEC) 40 MG delayed release capsule TAKE 1 CAPSULE BY MOUTH EVERY DAY 30 capsule 5    hydroCHLOROthiazide (HYDRODIURIL) 25 MG tablet TAKE 1 TABLET BY MOUTH EVERY DAY 90 tablet 1    dicyclomine (BENTYL) 20 MG tablet TAKE 1 TABLET BY MOUTH THREE TIMES A DAY AS NEEDED 90 tablet 2    metFORMIN (GLUCOPHAGE) 500 MG tablet Take 1 tablet by mouth 2 times daily (with meals) 180 tablet 1    butalbital-acetaminophen-caffeine (FIORICET, ESGIC) -40 MG per tablet Take one tablet by mouth every 4 hours as needed for your headache.  30 tablet 1    atorvastatin (LIPITOR) 40 MG tablet TAKE 1 TABLET BY MOUTH NIGHTLY 30 tablet 6    hydrALAZINE (APRESOLINE) 50 MG tablet TAKE 1 TABLET BY MOUTH 2 TIMES DAILY 60 tablet 11    montelukast (SINGULAIR) 10 MG tablet Take 1 tablet by mouth daily 90 tablet 1    azelastine (ASTELIN) 0.1 % nasal spray INSTILL 1 SPRAY IN EACH NOSTRIL 2 TIMES DAILY AS DIRECTED 1 each 3    Chlorhexidine Gluconate 2 % SOLN Apply to skin once daily prn 250 mL 1    albuterol sulfate HFA (PROAIR HFA) 108 (90 Base) MCG/ACT inhaler Inhale 2 puffs into the lungs every 6 hours as needed for Wheezing 8 g 3    vitamin D (ERGOCALCIFEROL) 1.25 MG (00392 UT) CAPS capsule TAKE 1 CAPSULE BY MOUTH EVERY 30 DAYS. (Patient not taking: Reported on 7/7/2022) 1 capsule 15    EPINEPHrine (EPIPEN) 0.3 MG/0.3ML DELPHINE injection Use as directed for allergic reaction 2 each 1    ZINC PO Take by mouth (Patient not taking: Reported on 7/7/2022)      Alpha-Lipoic Acid 600 MG TABS Take 600 mg by mouth 2 times daily (Patient not taking: Reported on 7/13/2022)      Omega-3 Fatty Acids (OMEGA 3 500 PO) Take by mouth (Patient not taking: Reported on 7/7/2022)      TURMERIC PO Take by mouth (Patient not taking: Reported on 7/7/2022)      Cyanocobalamin (B-12) 5000 MCG CAPS Take by mouth (Patient not taking: Reported on 7/7/2022)      acetaminophen (TYLENOL) 500 MG tablet Take 1 tablet by mouth 4 times daily as needed for Pain 360 tablet 1    nicotine polacrilex (NICORETTE) 2 MG gum Use up to 6 sticks of gum a day for smoking (Patient not taking: Reported on 7/13/2022) 110 each 3    aspirin 81 MG EC tablet Take 1 tablet by mouth daily 30 tablet 3    clotrimazole-betamethasone (LOTRISONE) 1-0.05 % cream APPLY TO AFFECTED AREA TWICE A DAY 15 g 1     No current facility-administered medications for this visit. No current outpatient medications on file.      Facility-Administered Medications Ordered in Other Visits   Medication Dose Route Frequency Provider Last Rate Last Admin    ceFAZolin (ANCEF) 2,000 mg in sodium chloride 0.9 % 50 mL IVPB (mini-bag)  2,000 mg IntraVENous Once Aislinn Ann MD        lidocaine PF 1 % injection 1 mL  1 mL IntraDERmal Once PRN Brianda Rios MD        lactated ringers infusion   IntraVENous Continuous Brianda Rios MD        sodium chloride flush 0.9 % injection 5-40 mL  5-40 mL IntraVENous 2 times per day Brianda Rios MD        sodium chloride flush 0.9 % injection 5-40 mL  5-40 mL IntraVENous PRN Brianda Rios MD        0.9 % sodium chloride infusion   IntraVENous PRN Kenia Montalvo MD         Vital Signs (Current)   There were no vitals filed for this visit. BP Readings from Last 3 Encounters:   22 124/89   22 130/83   22 135/88     Vital Signs Statistics (for past 48 hrs)     Temp  Av.9 °F (36.6 °C)  Min: 97.9 °F (36.6 °C)   Min taken time: 22 104  Max: 97.9 °F (36.6 °C)   Max taken time: 22 1046  Pulse  Av  Min: 88   Min taken time: 22 104  Max: 80   Max taken time: 22 1046  Resp  Avg: 15  Min: 13   Min taken time: 22 104  Max: 15   Max taken time: 22 1046  BP  Min: 124/89   Min taken time: 22 1046  Max: 124/89   Max taken time: 22 1046  MAP (mmHg)  Av  Min: 101   Min taken time: 22 104  Max: 101   Max taken time: 22 1046  SpO2  Av %  Min: 98 %   Min taken time: 22 1046  Max: 98 %   Max taken time: 22 1046  BP Readings from Last 3 Encounters:   22 124/89   22 130/83   22 135/88       BMI  There is no height or weight on file to calculate BMI. Estimated body mass index is 33.84 kg/m² as calculated from the following:    Height as of an earlier encounter on 22: 5' 2\" (1.575 m). Weight as of an earlier encounter on 22: 185 lb (83.9 kg).     CBC   Lab Results   Component Value Date/Time    WBC 5.7 2022 09:20 AM    RBC 4.43 2022 09:20 AM    HGB 13.5 2022 09:20 AM    HCT 40.5 2022 09:20 AM    MCV 91.4 2022 09:20 AM    RDW 14.9 2022 09:20 AM     2022 09:20 AM     CMP    Lab Results   Component Value Date/Time     2022 09:20 AM    K 4.2 2022 09:20 AM    K 2.5 2021 04:32 AM     2022 09:20 AM    CO2 26 2022 09:20 AM    BUN 8 2022 09:20 AM    CREATININE 0.6 2022 09:20 AM    GFRAA >60 2022 09:20 AM    GFRAA >60 2013 07:23 AM    AGRATIO 1.5 2021 06:00 PM LABGLOM >60 07/07/2022 09:20 AM    GLUCOSE 93 07/07/2022 09:20 AM    PROT 6.8 08/06/2021 08:33 AM    PROT 6.4 04/11/2012 02:35 PM    CALCIUM 9.3 07/07/2022 09:20 AM    BILITOT <0.2 08/06/2021 08:33 AM    ALKPHOS 116 08/06/2021 08:33 AM    AST 32 08/06/2021 08:33 AM    ALT 34 08/06/2021 08:33 AM     BMP    Lab Results   Component Value Date/Time     07/07/2022 09:20 AM    K 4.2 07/07/2022 09:20 AM    K 2.5 07/06/2021 04:32 AM     07/07/2022 09:20 AM    CO2 26 07/07/2022 09:20 AM    BUN 8 07/07/2022 09:20 AM    CREATININE 0.6 07/07/2022 09:20 AM    CALCIUM 9.3 07/07/2022 09:20 AM    GFRAA >60 07/07/2022 09:20 AM    GFRAA >60 03/26/2013 07:23 AM    LABGLOM >60 07/07/2022 09:20 AM    GLUCOSE 93 07/07/2022 09:20 AM     POCGlucose  No results for input(s): GLUCOSE in the last 72 hours. Coags    Lab Results   Component Value Date/Time    PROTIME 13.2 07/07/2022 09:20 AM    INR 1.01 07/07/2022 09:20 AM    APTT 28.3 07/07/2022 09:20 AM     HCG (If Applicable) No results found for: PREGTESTUR, PREGSERUM, HCG, HCGQUANT   ABGs No results found for: PHART, PO2ART, YCR9KEX, HCU0CYD, BEART, C0GVDLVA   Type & Screen (If Applicable)  No results found for: LABABO, LABRH                         BMI: Wt Readings from Last 3 Encounters:       NPO Status:                          Anesthesia Evaluation  Patient summary reviewed no history of anesthetic complications:   Airway: Mallampati: II          Dental:          Pulmonary:   (+) COPD:  sleep apnea:  asthma:                            Cardiovascular:    (+) hypertension:,                   Neuro/Psych:   (+) TIA, headaches:, psychiatric history:   (-) seizures           GI/Hepatic/Renal:   (+) GERD:,      (-) no renal disease and bowel prep       Endo/Other:        (-) diabetes mellitus, hypothyroidism, hyperthyroidism               Abdominal:   (+) obese,           Vascular: negative vascular ROS.          Other Findings:             Anesthesia Plan      general     ASA 3       Induction: intravenous. MIPS: Prophylactic antiemetics administered. Anesthetic plan and risks discussed with patient. Plan discussed with CRNA. Attending anesthesiologist reviewed and agrees with Pre Eval content                This pre-anesthesia assessment may be used as a history and physical.    DOS STAFF ADDENDUM:    Pt seen and examined, chart reviewed (including anesthesia, drug and allergy history). No interval changes to history and physical examination. Anesthetic plan, risks, benefits, alternatives, and personnel involved discussed with patient. Patient verbalized an understanding and agrees to proceed.       Se Quezada MD  July 19, 2022  1:07 PM

## 2022-07-19 NOTE — PROGRESS NOTES
Pt wincing and keeps stating, \"I'm in pain\". Incisions CDI, well approximated. Pt lying on right side, refuses to roll over or move or walk. Rates pain at 20/10, but since scale is out of ten rates 10/10. Pt  at bedside. Pre operative nurse Fidelia states pt was in same situation with pain before surgery. Called Dr. Adelaide Boeck and notified him of the above. Ordered to give another 5mg valium and the 10mg oxycodone and wait for an hour to monitor for side effects of the medications and if no side effects can discharge pt at that time. Pt and  informed of POC. Discharge instructions given to . Notified Dorene Fragoso in PACU of all the above. Pt moved back to PACU. Pt in [facility], contemplating whether to drop prescriptions off at pharmacy up the street and get after pt discharged or to wait and get them at home pharmacy.

## 2022-07-19 NOTE — FLOWSHEET NOTE
Pt arrived back to PACU at 1758. VS as charted. Pt resting in bed and states \"she feels better\". Will continue to monitor. Per SDS RN Valium was given at 1750 and at 1850 can be d/c'd as long as pt tolerates well.

## 2022-07-19 NOTE — FLOWSHEET NOTE
PACU Discharge Note      Pt meets criteria for discharge to home per Mary Anne Score and ASPAN standards. Discussed with patient and responsible individual receiving instructions how to measure pain per numerical scale and when to contact doctor if prescribed medications are not helping with post operative pain    Discharge instructions reviewed with patient and family. Both verbalized understanding of instructions. Gave patient and family opportunity to ask questions. All questions reviewed and answered. Documents signed and copy of discharge instructions given.        Vitals:    07/19/22 1830   BP:    Pulse: 86   Resp: 20   Temp: 96.9 °F (36.1 °C)   SpO2:       BP within 20% of pt's admitting BP per MARY ANNE SCORE      Intake/Output Summary (Last 24 hours) at 7/19/2022 1837  Last data filed at 7/19/2022 1830  Gross per 24 hour   Intake 1200 ml   Output 0 ml   Net 1200 ml         Pain assessment:  none  Pain level: 0        Patient discharged to home/self care via wheel chair by transporter/RN with a responsible individual.      7/19/2022 6:37 PM

## 2022-07-19 NOTE — H&P
Gerhard Santana    [id number]    [facility] Same Day Surgery Update H & P  Department of General Surgery   Surgical Service   Pre-operative History and Physical  Last H & P within the last 30 days. DIAGNOSIS:   Complex regional pain syndrome type 1 of right lower extremity [G90.521]    Procedure(s):  THORACIC LAMINECTOMY WITH PADDLE LEAD AND RECHARGEABLE BATTERY IN RIGHT HIP     History obtained from: Patient interview and EHR     HISTORY OF PRESENT ILLNESS:   The patient is a 63 y.o. female with c/o bilateral LE pain due to CRPS. Their symptoms have been recalcitrant to conservative treatment and the patient presents today for the above procedure. Illness Screening: Patient denies fever, chills, worsening cough, or close contact with sick individuals.        Past Medical History:        Diagnosis Date    Allergic rhinitis     Anxiety 2020    Arthritis     pt denies    Asthma 4/10/2012    Asthma-chronic obstructive pulmonary disease overlap syndrome (HCC)     pt denies    Bilateral carpal tunnel syndrome 2016    Depression     Diabetes mellitus ([facility] [address]     Dysmenorrhea     Fibroid     GERD (gastroesophageal reflux disease)     Headache(784.0) 4/10/2012    Hyperlipidemia     Hypertension     FEDERICO on CPAP     S/P gastric bypass     Status post coronary angiogram     TIA (transient ischemic attack)     Wears dentures     full upper and lower partial    Wears glasses      Past Surgical History:        Procedure Laterality Date    BREAST SURGERY      Breast reduction    CARPAL TUNNEL RELEASE Left 2016     Left carpal tunnel release      CARPAL TUNNEL RELEASE Right 10/06/2016     SECTION      x1    COLONOSCOPY      GASTRIC BYPASS SURGERY      HYSTERECTOMY (CERVIX STATUS UNKNOWN)      complete-ovaries out    SALIVARY GLAND SURGERY Left 2021    EXCISION OF SALIVARY STONE IN THE LEFT SUBMANDIBULAR DUCT [facility]           Medications Prior to Admission:      Prior to Admission medications    Medication Sig Start Date End Date Taking?  Authorizing Provider   amLODIPine (NORVASC) 10 MG tablet TAKE 1 TABLET BY MOUTH EVERY DAY 7/17/22   Diana Banda MD   benzonatate (TESSALON) 100 MG capsule Take 1 capsule by mouth 2 times daily as needed for Cough 7/17/22 7/24/22  Diana Banda MD   potassium chloride (KLOR-CON M20) 20 MEQ extended release tablet TAKE 2 TABLETS BY MOUTH TWICE A DAY 7/14/22   Diana Banda MD   budesonide (PULMICORT) 1 MG/2ML nebulizer suspension Take 2 mLs by nebulization 2 times daily 7/14/22   Diana Banda MD   ondansetron [facility]) 4 MG tablet One every 8 hrs as needed for nausea/vomiting 7/7/22   Danny Mirza MD   VENTOLIN  (12 Base) MCG/ACT inhaler TAKE 2 PUFFS BY MOUTH EVERY 6 HOURS AS NEEDED FOR WHEEZE 7/6/22   Diana Banda MD   fluticasone [facility]) 50 MCG/ACT nasal spray INSTILL 1 SPRAY IN EACH NOSTRIL ONCE DAILY 6/13/22   Diana Banda MD   traZODone (DESYREL) 50 MG tablet TAKE ONE OR TWO TABLETS BY MOUTH AT BEDTIME FOR SLEEP  Patient not taking: Reported on 7/13/2022 6/11/22   Diana Banda MD   omeprazole (PRILOSEC) 40 MG delayed release capsule TAKE 1 CAPSULE BY MOUTH EVERY DAY 5/9/22   Diana Banda MD   hydroCHLOROthiazide (HYDRODIURIL) 25 MG tablet TAKE 1 TABLET BY MOUTH EVERY DAY 5/8/22   Diana Banda MD   dicyclomine (BENTYL) 20 MG tablet TAKE 1 TABLET BY MOUTH THREE TIMES A DAY AS NEEDED 4/24/22   Diana Banda MD   metFORMIN (GLUCOPHAGE) 500 MG tablet Take 1 tablet by mouth 2 times daily (with meals) 4/8/22   Diana Banda MD   butalbital-acetaminophen-caffeine (FIORICET, [facility]) -22 MG per tablet Take one tablet by mouth every 4 hours as needed for your headache. 3/30/22   Diana Banda MD   atorvastatin (LIPITOR) 40 MG tablet TAKE 1 TABLET BY MOUTH NIGHTLY 3/2/22   Diana Banda MD   hydrALAZINE (APRESOLINE) 50 MG tablet TAKE 1 TABLET BY MOUTH 2 TIMES DAILY 3/2/22   Diana Banda MD montelukast (SINGULAIR) 10 MG tablet Take 1 tablet by mouth daily 2/6/22   Luis Fernando Paige MD   azelastine (ASTELIN) 0.1 % nasal spray INSTILL 1 SPRAY IN EACH NOSTRIL 2 TIMES DAILY AS DIRECTED 1/12/22   Samia Maher MD   Chlorhexidine Gluconate 2 % SOLN Apply to skin once daily prn 12/23/21   Luis Fernando Paige MD   albuterol sulfate HFA (PROAIR HFA) 108 (90 Base) MCG/ACT inhaler Inhale 2 puffs into the lungs every 6 hours as needed for Wheezing 12/23/21   Luis Fernando Paige MD   vitamin D (ERGOCALCIFEROL) 1.25 MG (76515 UT) CAPS capsule TAKE 1 CAPSULE BY MOUTH EVERY 30 DAYS.   Patient not taking: Reported on 7/7/2022 12/15/21   Luis Fernando Paige MD   EPINEPHrine [facility]) 0.3 MG/0.3ML DELPHINE injection Use as directed for allergic reaction 11/19/21   Luis Fernando Paige MD   ZINC PO Take by mouth  Patient not taking: Reported on 7/7/2022    Historical Provider, MD   Alpha-Lipoic Acid 600 MG TABS Take 600 mg by mouth 2 times daily  Patient not taking: Reported on 7/13/2022    Historical Provider, MD   Omega-3 Fatty Acids (OMEGA 3 500 PO) Take by mouth  Patient not taking: Reported on 7/7/2022    Historical Provider, MD   TURMERIC PO Take by mouth  Patient not taking: Reported on 7/7/2022    Historical Provider, MD   Cyanocobalamin (B-12) 5000 MCG CAPS Take by mouth  Patient not taking: Reported on 7/7/2022    Historical Provider, MD   acetaminophen (TYLENOL) 500 MG tablet Take 1 tablet by mouth 4 times daily as needed for Pain 9/8/21   Luis Fernando Paige MD   nicotine polacrilex (NICORETTE) 2 MG gum Use up to 6 sticks of gum a day for smoking  Patient not taking: Reported on 7/13/2022 7/9/21   Luis Fernando Paige MD   aspirin 81 MG EC tablet Take 1 tablet by mouth daily 7/8/21   JAYSON Desai - CNP   clotrimazole-betamethasone (LOTRISONE) 1-0.05 % cream APPLY TO AFFECTED AREA TWICE A DAY 3/2/21   Luis Fernando Paige MD         Allergies:  Shellfish allergy, Shellfish-derived products, and Lisinopril    PHYSICAL EXAM:      /89 Pulse 88   Temp 97.9 °F (36.6 °C) (Temporal)   Resp 15   Ht 5' 2\" (1.575 m)   Wt 185 lb (83.9 kg)   SpO2 98%   BMI 33.84 kg/m²      Airway:  Airway patent with no audible stridor    Heart:  Regular rate and rhythm, No murmur noted    Lungs:  No increased work of breathing, good air exchange, clear to auscultation bilaterally, no crackles or wheezing    Abdomen:  Soft, non-distended, non-tender, no masses palpated    ASSESSMENT AND PLAN    Patient is a 63 y.o. female with above specified procedure planned. 1.  The patients history and physical was obtained and signed off by the pre-admission testing department. Patient seen and focused exam done today- no new changes since last physical exam on 7/7/22    2. Access to ancillary services are available per request of the provider.     JAYSON Hinojosa - CNP     7/19/2022

## 2022-07-19 NOTE — PROGRESS NOTES
This writer prepared this pt for IV insertion. Pt stated that she would not allow me to do an IV stick in her hand, states wants only her AC used. This writer attempted IV insertion in the Right AC, this writer was unable to thread the IV, two tubes of blood able to be obtained for lab. Lucia VENTURA attempted in pt's Left AC, per Lucia VENTURA,  IV attempt unsuccessful, states unable to thread. Throughout IV attempts pt very tearful and actively moving her body. Per Lucia VENTURA, two attempts unsuccessful by Liam Hopper RN, one attempt unsuccessful by this writer, one attempt by Gallo Latham CRNA, and two attempts by Dr. Mi Shields. IV inserted with Ultrasound assistance by Dr. Mi Shields.

## 2022-07-19 NOTE — FLOWSHEET NOTE
Pt hypotensive. Pt stating she is in pain. Informed pt unable to treat d/t hypotension. IVF infusing.

## 2022-07-20 NOTE — OP NOTE
[address], [address]                                OPERATIVE REPORT    PATIENT NAME: Isis Daniel                  :        1960  MED REC NO:   [id number]                          ROOM:  ACCOUNT NO:   [de-identified]                           ADMIT DATE: 2022  PROVIDER:     Vicenta Trevino MD    DATE OF PROCEDURE:  2022    PREOPERATIVE DIAGNOSES:  Failed back syndrome, neuropathic lower  extremity pain, chronic regional pain syndrome, bilateral lower  extremities. POSTOPERATIVE DIAGNOSES:  Failed back syndrome, neuropathic lower  extremity pain, chronic regional pain syndrome, bilateral lower  extremities. OPERATIONS PERFORMED:  The patient had a thoracic laminectomy with  epidural paddle lead and right flank spinal cord stimulator battery  implant, intraoperative electronic analysis, intraoperative  electrophysiologic monitoring, intraoperative fluoroscopy. OPERATING SURGEON:  Vicenta Trevino MD    ANESTHESIA:  General endotracheal.    ESTIMATED BLOOD LOSS:  Less than 50 mL. INDICATION:  The patient is a 29-year-old [facility] American female with a  history of chronic regional pain syndrome, lower extremities, not a  surgical candidate; failed conservative management, underwent successful  spinal cord stimulator trial using [address] equipment. Understood the procedure risks and benefits and agreed to proceed in  this fashion. PROCEDURE IN DETAIL:  The patient was brought to the operating room. General endotracheal was induced. The patient was placed prone on the  Jose frame. Pressure points were padded. Evoked potentials were set  up and baseline potentials were obtained. No problems were noted there. The area of the back and right flank were prepped out and draped in  sterile fashion. Fluoroscopy was used to identify the T9-T10 level. This was marked.   The right 07/19/2022 20:53:28     ALMA [email or web address]  Job#: [id number]     Doc#: [id number]    CC: Not Applicable

## 2022-07-21 NOTE — TELEPHONE ENCOUNTER
Called pt and informed her to call her INS and find out which nebulizer solutions are covered and let us know so we can order it

## 2022-07-25 LAB
AVERAGE GLUCOSE: NORMAL
HBA1C MFR BLD: 5.7 %

## 2022-08-11 ENCOUNTER — TELEPHONE (OUTPATIENT)
Dept: PRIMARY CARE CLINIC | Age: 62
End: 2022-08-11

## 2022-08-11 DIAGNOSIS — E78.2 MIXED HYPERLIPIDEMIA: ICD-10-CM

## 2022-08-11 DIAGNOSIS — E78.00 PURE HYPERCHOLESTEROLEMIA: Primary | ICD-10-CM

## 2022-08-11 RX ORDER — ATORVASTATIN CALCIUM 40 MG/1
40 TABLET, FILM COATED ORAL NIGHTLY
Qty: 90 TABLET | Refills: 0 | Status: SHIPPED | OUTPATIENT
Start: 2022-08-11 | End: 2022-10-19 | Stop reason: SDUPTHER

## 2022-09-29 DIAGNOSIS — J45.40 MODERATE PERSISTENT ASTHMA WITHOUT COMPLICATION: ICD-10-CM

## 2022-09-29 RX ORDER — MONTELUKAST SODIUM 10 MG/1
TABLET ORAL
Qty: 90 TABLET | Refills: 1 | Status: SHIPPED | OUTPATIENT
Start: 2022-09-29

## 2022-10-03 DIAGNOSIS — E11.9 TYPE 2 DIABETES MELLITUS WITHOUT COMPLICATION, WITHOUT LONG-TERM CURRENT USE OF INSULIN (HCC): ICD-10-CM

## 2022-10-19 DIAGNOSIS — E78.2 MIXED HYPERLIPIDEMIA: ICD-10-CM

## 2022-10-19 DIAGNOSIS — K21.9 GASTROESOPHAGEAL REFLUX DISEASE WITHOUT ESOPHAGITIS: ICD-10-CM

## 2022-10-19 DIAGNOSIS — I10 ESSENTIAL HYPERTENSION: ICD-10-CM

## 2022-10-19 DIAGNOSIS — E87.6 HYPOKALEMIA: ICD-10-CM

## 2022-10-19 RX ORDER — POTASSIUM CHLORIDE 20 MEQ/1
TABLET, EXTENDED RELEASE ORAL
Qty: 120 TABLET | Refills: 5 | Status: SHIPPED | OUTPATIENT
Start: 2022-10-19

## 2022-10-19 RX ORDER — OMEPRAZOLE 40 MG/1
CAPSULE, DELAYED RELEASE ORAL
Qty: 30 CAPSULE | Refills: 5 | Status: SHIPPED | OUTPATIENT
Start: 2022-10-19

## 2022-10-19 RX ORDER — AMLODIPINE BESYLATE 10 MG/1
TABLET ORAL
Qty: 90 TABLET | Refills: 1 | Status: SHIPPED | OUTPATIENT
Start: 2022-10-19

## 2022-10-19 RX ORDER — BENZONATATE 100 MG/1
100 CAPSULE ORAL 2 TIMES DAILY
Qty: 20 CAPSULE | Refills: 1 | Status: SHIPPED | OUTPATIENT
Start: 2022-10-19 | End: 2022-10-26

## 2022-10-19 RX ORDER — ATORVASTATIN CALCIUM 40 MG/1
40 TABLET, FILM COATED ORAL NIGHTLY
Qty: 90 TABLET | Refills: 0 | Status: SHIPPED | OUTPATIENT
Start: 2022-10-19

## 2022-10-19 NOTE — TELEPHONE ENCOUNTER
Medication:   Requested Prescriptions     Pending Prescriptions Disp Refills    benzonatate (TESSALON) 100 MG capsule 20 capsule      Sig: Take 1 capsule by mouth in the morning and 1 capsule in the evening. Do all this for 7 days. Last Filled:      Patient Phone Number: [phone] (home) [phone] (work)    Last appt: 7/7/2022   Next appt: Visit date not found    Last OARRS:   RX Monitoring 8/17/2017   Attestation The Prescription Monitoring Report for this patient was reviewed today.

## 2022-10-19 NOTE — TELEPHONE ENCOUNTER
Medication:   Requested Prescriptions     Pending Prescriptions Disp Refills    benzonatate (TESSALON) 100 MG capsule 20 capsule      Sig: Take 1 capsule by mouth in the morning and 1 capsule in the evening. Do all this for 7 days. omeprazole (PRILOSEC) 40 MG delayed release capsule 30 capsule 5    amLODIPine (NORVASC) 10 MG tablet 90 tablet 1     Sig: TAKE 1 TABLET BY MOUTH EVERY DAY    atorvastatin (LIPITOR) 40 MG tablet 90 tablet 0     Sig: Take 1 tablet by mouth nightly    potassium chloride (KLOR-CON M20) 20 MEQ extended release tablet 120 tablet 5     Sig: TAKE 2 TABLETS BY MOUTH TWICE A DAY        Last Filled:      Patient Phone Number: [phone] (home) [phone] (work)    Last appt: 7/7/2022   Next appt: Visit date not found    Last OARRS:   RX Monitoring 8/17/2017   Attestation The Prescription Monitoring Report for this patient was reviewed today.

## 2022-10-21 ENCOUNTER — TELEPHONE (OUTPATIENT)
Dept: PRIMARY CARE CLINIC | Age: 62
End: 2022-10-21

## 2022-10-21 NOTE — TELEPHONE ENCOUNTER
Pt's  Augusto Gutierrez called in wanting to know if Dr. Tonio Medeiros could call in a cough medicine for her. He says that while she's been in [facility] only given her an OTC cough syrup that hasn't been working. Please send to the [facility] on [facility] if something is able to be called in.      Best call back number: [phone]

## 2022-10-27 DIAGNOSIS — K21.9 GASTROESOPHAGEAL REFLUX DISEASE WITHOUT ESOPHAGITIS: ICD-10-CM

## 2022-10-27 DIAGNOSIS — E78.2 MIXED HYPERLIPIDEMIA: ICD-10-CM

## 2022-10-27 RX ORDER — BENZONATATE 100 MG/1
100 CAPSULE ORAL 2 TIMES DAILY PRN
Qty: 20 CAPSULE | Refills: 0 | OUTPATIENT
Start: 2022-10-27 | End: 2022-11-03

## 2022-10-27 RX ORDER — ATORVASTATIN CALCIUM 40 MG/1
40 TABLET, FILM COATED ORAL NIGHTLY
Qty: 90 TABLET | Refills: 0 | OUTPATIENT
Start: 2022-10-27

## 2022-10-27 RX ORDER — OMEPRAZOLE 40 MG/1
CAPSULE, DELAYED RELEASE ORAL
Qty: 90 CAPSULE | Refills: 2 | OUTPATIENT
Start: 2022-10-27

## 2022-10-27 NOTE — TELEPHONE ENCOUNTER
Medication:   Requested Prescriptions     Pending Prescriptions Disp Refills    atorvastatin (LIPITOR) 40 MG tablet 90 tablet 0     Sig: Take 1 tablet by mouth nightly    omeprazole (PRILOSEC) 40 MG delayed release capsule 90 capsule 2     Sig: TAKE 1 CAPSULE BY MOUTH EVERY DAY    benzonatate (TESSALON) 100 MG capsule 20 capsule 0     Sig: Take 1 capsule by mouth 2 times daily as needed for Cough        Last Filled:      Patient Phone Number: [phone] (home) [phone] (work)    Last appt: 7/7/2022   Next appt: Visit date not found    Last OARRS:   RX Monitoring 8/17/2017   Attestation The Prescription Monitoring Report for this patient was reviewed today.

## 2022-12-07 ENCOUNTER — TELEPHONE (OUTPATIENT)
Dept: PRIMARY CARE CLINIC | Age: 62
End: 2022-12-07

## 2022-12-07 DIAGNOSIS — I26.92 ACUTE SADDLE PULMONARY EMBOLISM, UNSPECIFIED WHETHER ACUTE COR PULMONALE PRESENT (HCC): Primary | ICD-10-CM

## 2022-12-07 DIAGNOSIS — G93.1 ANOXIC BRAIN INJURY (HCC): ICD-10-CM

## 2022-12-07 NOTE — TELEPHONE ENCOUNTER
Recent hospitalizations  For acute pulmonary embolism    Anoxic brain injury 2/2 accidental OD  hops 7/25/22 thr 8/11/22   Hypoxic ischemic encephalopathy      Hosp 7/19/2022  dr Salvador Ellis for spinal stimulator  Given opiate and valium to help with pain control post op

## 2022-12-27 NOTE — TELEPHONE ENCOUNTER
Medication:   Requested Prescriptions     Pending Prescriptions Disp Refills    benzonatate (TESSALON) 100 MG capsule 20 capsule 0     Sig: Take 1 capsule by mouth 2 times daily as needed for Cough        Last Filled:      Patient Phone Number: [phone] (home) [phone] (work)    Last appt: 7/7/2022   Next appt: Visit date not found    Last OARRS:   RX Monitoring 8/17/2017   Attestation The Prescription Monitoring Report for this patient was reviewed today.

## 2022-12-29 RX ORDER — BENZONATATE 100 MG/1
100 CAPSULE ORAL 2 TIMES DAILY PRN
Qty: 20 CAPSULE | Refills: 0 | Status: SHIPPED | OUTPATIENT
Start: 2022-12-29 | End: 2023-01-05

## 2022-12-29 RX ORDER — BENZONATATE 100 MG/1
100 CAPSULE ORAL 2 TIMES DAILY PRN
Qty: 20 CAPSULE | Refills: 0 | OUTPATIENT
Start: 2022-12-29 | End: 2023-01-05

## 2022-12-29 RX ORDER — BENZONATATE 100 MG/1
100 CAPSULE ORAL 2 TIMES DAILY PRN
Qty: 20 CAPSULE | Refills: 0 | Status: CANCELLED | OUTPATIENT
Start: 2022-12-29 | End: 2023-01-05

## 2022-12-29 NOTE — TELEPHONE ENCOUNTER
Medication:   Requested Prescriptions     Pending Prescriptions Disp Refills    benzonatate (TESSALON) 100 MG capsule 20 capsule 0     Sig: Take 1 capsule by mouth 2 times daily as needed for Cough        Last Filled:      Patient Phone Number: [phone] (home) [phone] (work)    Last appt: 7/7/2022   Next appt: Visit date not found    Last OARRS:   RX Monitoring 8/17/2017   Attestation The Prescription Monitoring Report for this patient was reviewed today.

## 2023-01-17 RX ORDER — BENZONATATE 100 MG/1
100 CAPSULE ORAL 2 TIMES DAILY PRN
Qty: 20 CAPSULE | Refills: 1 | Status: SHIPPED | OUTPATIENT
Start: 2023-01-17 | End: 2023-01-24

## 2023-03-22 ENCOUNTER — OFFICE VISIT (OUTPATIENT)
Dept: PRIMARY CARE CLINIC | Age: 63
End: 2023-03-22
Payer: COMMERCIAL

## 2023-03-22 VITALS
WEIGHT: 185 LBS | HEART RATE: 68 BPM | DIASTOLIC BLOOD PRESSURE: 80 MMHG | HEIGHT: 62 IN | BODY MASS INDEX: 34.04 KG/M2 | OXYGEN SATURATION: 95 % | TEMPERATURE: 97.5 F | SYSTOLIC BLOOD PRESSURE: 110 MMHG

## 2023-03-22 DIAGNOSIS — M54.40 CHRONIC BILATERAL LOW BACK PAIN WITH SCIATICA, SCIATICA LATERALITY UNSPECIFIED: ICD-10-CM

## 2023-03-22 DIAGNOSIS — G89.29 CHRONIC BILATERAL LOW BACK PAIN WITH SCIATICA, SCIATICA LATERALITY UNSPECIFIED: ICD-10-CM

## 2023-03-22 DIAGNOSIS — G93.1 ANOXIC BRAIN DAMAGE (HCC): Primary | ICD-10-CM

## 2023-03-22 DIAGNOSIS — Z96.89 STATUS POST INSERTION OF SPINAL CORD STIMULATOR: ICD-10-CM

## 2023-03-22 DIAGNOSIS — D80.1 LOW GAMMAGLOBULIN LEVEL (HCC): ICD-10-CM

## 2023-03-22 DIAGNOSIS — D35.2 PITUITARY ADENOMA (HCC): ICD-10-CM

## 2023-03-22 DIAGNOSIS — R49.0 DYSPHONIA: ICD-10-CM

## 2023-03-22 DIAGNOSIS — E23.6 PITUITARY MASS (HCC): ICD-10-CM

## 2023-03-22 DIAGNOSIS — J44.9 CHRONIC OBSTRUCTIVE PULMONARY DISEASE, UNSPECIFIED COPD TYPE (HCC): ICD-10-CM

## 2023-03-22 DIAGNOSIS — G93.1 ANOXIC ENCEPHALOPATHY (HCC): ICD-10-CM

## 2023-03-22 PROCEDURE — G8427 DOCREV CUR MEDS BY ELIG CLIN: HCPCS | Performed by: FAMILY MEDICINE

## 2023-03-22 PROCEDURE — G8417 CALC BMI ABV UP PARAM F/U: HCPCS | Performed by: FAMILY MEDICINE

## 2023-03-22 PROCEDURE — 3079F DIAST BP 80-89 MM HG: CPT | Performed by: FAMILY MEDICINE

## 2023-03-22 PROCEDURE — 3017F COLORECTAL CA SCREEN DOC REV: CPT | Performed by: FAMILY MEDICINE

## 2023-03-22 PROCEDURE — 1036F TOBACCO NON-USER: CPT | Performed by: FAMILY MEDICINE

## 2023-03-22 PROCEDURE — 99214 OFFICE O/P EST MOD 30 MIN: CPT | Performed by: FAMILY MEDICINE

## 2023-03-22 PROCEDURE — 3074F SYST BP LT 130 MM HG: CPT | Performed by: FAMILY MEDICINE

## 2023-03-22 PROCEDURE — 3023F SPIROM DOC REV: CPT | Performed by: FAMILY MEDICINE

## 2023-03-22 PROCEDURE — G8484 FLU IMMUNIZE NO ADMIN: HCPCS | Performed by: FAMILY MEDICINE

## 2023-03-22 RX ORDER — POTASSIUM CHLORIDE 1500 MG/1
TABLET, FILM COATED, EXTENDED RELEASE ORAL
COMMUNITY
Start: 2023-02-01

## 2023-03-22 RX ORDER — MIRTAZAPINE 7.5 MG/1
TABLET, FILM COATED ORAL
COMMUNITY
Start: 2023-02-28

## 2023-03-22 RX ORDER — FUROSEMIDE 20 MG/1
TABLET ORAL
COMMUNITY
Start: 2023-02-28

## 2023-03-22 RX ORDER — IPRATROPIUM BROMIDE AND ALBUTEROL SULFATE 2.5; .5 MG/3ML; MG/3ML
3 SOLUTION RESPIRATORY (INHALATION) EVERY 4 HOURS PRN
COMMUNITY
Start: 2022-08-11

## 2023-03-22 RX ORDER — VIBEGRON 75 MG/1
TABLET, FILM COATED ORAL
COMMUNITY
Start: 2023-03-17

## 2023-03-22 RX ORDER — MODAFINIL 100 MG/1
TABLET ORAL
COMMUNITY
Start: 2023-03-21

## 2023-03-22 SDOH — ECONOMIC STABILITY: FOOD INSECURITY: WITHIN THE PAST 12 MONTHS, YOU WORRIED THAT YOUR FOOD WOULD RUN OUT BEFORE YOU GOT MONEY TO BUY MORE.: NEVER TRUE

## 2023-03-22 SDOH — ECONOMIC STABILITY: FOOD INSECURITY: WITHIN THE PAST 12 MONTHS, THE FOOD YOU BOUGHT JUST DIDN'T LAST AND YOU DIDN'T HAVE MONEY TO GET MORE.: NEVER TRUE

## 2023-03-22 SDOH — ECONOMIC STABILITY: HOUSING INSECURITY
IN THE LAST 12 MONTHS, WAS THERE A TIME WHEN YOU DID NOT HAVE A STEADY PLACE TO SLEEP OR SLEPT IN A SHELTER (INCLUDING NOW)?: NO

## 2023-03-22 SDOH — ECONOMIC STABILITY: INCOME INSECURITY: HOW HARD IS IT FOR YOU TO PAY FOR THE VERY BASICS LIKE FOOD, HOUSING, MEDICAL CARE, AND HEATING?: NOT HARD AT ALL

## 2023-03-22 ASSESSMENT — ENCOUNTER SYMPTOMS
BACK PAIN: 0
CHOKING: 0
CHEST TIGHTNESS: 0
COLOR CHANGE: 0
DIARRHEA: 0
APNEA: 0
BLOOD IN STOOL: 0
SORE THROAT: 0
EYE ITCHING: 0
RHINORRHEA: 0
WHEEZING: 0
PHOTOPHOBIA: 0
TROUBLE SWALLOWING: 0
EYE REDNESS: 0
VOMITING: 0
COUGH: 0
EYE PAIN: 0
RECTAL PAIN: 0
STRIDOR: 0
EYE DISCHARGE: 0
ABDOMINAL DISTENTION: 0
SHORTNESS OF BREATH: 0
SINUS PRESSURE: 0
CONSTIPATION: 0
NAUSEA: 0

## 2023-03-22 ASSESSMENT — PATIENT HEALTH QUESTIONNAIRE - PHQ9
SUM OF ALL RESPONSES TO PHQ9 QUESTIONS 1 & 2: 0
SUM OF ALL RESPONSES TO PHQ QUESTIONS 1-9: 0
1. LITTLE INTEREST OR PLEASURE IN DOING THINGS: 0
SUM OF ALL RESPONSES TO PHQ QUESTIONS 1-9: 0
2. FEELING DOWN, DEPRESSED OR HOPELESS: 0

## 2023-03-22 NOTE — PROGRESS NOTES
[address] (:  1960) is a 63 y.o. female,Established patient, here for evaluation of the following chief complaint(s):  Indio Sanchez was seen today for follow-up. Diagnoses and all orders for this visit:    Anoxic brain damage ([facility] [address]  See hpi notes  Currently in wheelchair resides at [facility]  I encourage  and patient to follow up  Closely at her nursing facility for any co ordination  Of care. A neurology consult seems in order. Also some concerns for continued PT  Low gammaglobulin level ([address]  Labs are drawn at NH where she resides  And they will fu with this    Anoxic encephalopathy ([facility] [address]  See notes above     Chronic bilateral low back pain with sciatica, sciatica laterality unspecified  Has cord stimulator  Needs to get unit turned on  Will fu at [facility] for this  Status post insertion of spinal cord stimulator  She above  Pituitary mass ([facility] [address]  Most recent MRI/CT scan   No mention of pituitary mass  Has seen endocrine in past  For pituitary mass    Chronic obstructive pulmonary disease, unspecified COPD type ([address]  No sob or wheezes today  Med list shows montelukast  Listed but no bronhodilators  This issue is addressed in the NH  Pituitary adenoma ([facility] [address]  Previous work up per endocrine  No current sxs  Most recent CT/MRI does not note pit mass  Dysphonia  Some speaking difficulties  Has had speech therapy which continues    No follow-ups on file. Subjective   SUBJECTIVE/OBJECTIVE:  HPI 63 y.o. female presents today in office with   Who basically has concerns about the patient. She is in a wheelchair. She resides in [address] [place] [address]. Unfortunately since last visit here health has drastically declined. She did suffer anoxic brain injury/ anoxic encephalopathy with  Multiple neurological issues including difficulty speaking,(dysphonia) weakness all over requiring extensive Physical Therapy and nursing home placement.  She has been followed at Neurology  At

## 2023-04-14 DIAGNOSIS — E11.9 TYPE 2 DIABETES MELLITUS WITHOUT COMPLICATION, WITHOUT LONG-TERM CURRENT USE OF INSULIN (HCC): ICD-10-CM

## 2023-07-23 DIAGNOSIS — R05.3 CHRONIC COUGH: ICD-10-CM

## 2023-07-23 DIAGNOSIS — J45.40 MODERATE PERSISTENT ASTHMA WITHOUT COMPLICATION: ICD-10-CM

## 2023-07-24 RX ORDER — ALBUTEROL SULFATE 90 UG/1
AEROSOL, METERED RESPIRATORY (INHALATION)
Qty: 18 EACH | Refills: 10 | Status: SHIPPED | OUTPATIENT
Start: 2023-07-24

## 2023-07-24 NOTE — TELEPHONE ENCOUNTER
Medication:   Requested Prescriptions     Pending Prescriptions Disp Refills    albuterol sulfate HFA (PROVENTIL;VENTOLIN;PROAIR) 108 (90 Base) MCG/ACT inhaler [Pharmacy Med Name: ALBUTEROL HFA (VENTOLIN) INH] 18 each 10     Sig: INHALE 2 PUFFS BY MOUTH EVERY 6 HOURS AS NEEDED FOR WHEEZE        Last Filled:      Patient Phone Number: [phone] (home) [phone] (work)    Last appt: 3/22/2023   Next appt: Visit date not found    Last OARRS:   RX Monitoring 8/17/2017   Attestation The Prescription Monitoring Report for this patient was reviewed today.

## 2023-07-28 DIAGNOSIS — J45.40 MODERATE PERSISTENT ASTHMA WITHOUT COMPLICATION: ICD-10-CM

## 2023-07-28 RX ORDER — BUDESONIDE 1 MG/2ML
1 INHALANT ORAL 2 TIMES DAILY
Qty: 360 ML | Refills: 5 | Status: SHIPPED | OUTPATIENT
Start: 2023-07-28

## 2023-07-28 NOTE — TELEPHONE ENCOUNTER
Medication:   Requested Prescriptions     Pending Prescriptions Disp Refills    budesonide (PULMICORT) 1 MG/2ML nebulizer suspension [Pharmacy Med Name: BUDESONIDE 1 MG/2 ML INH SUSP] 360 mL 5     Sig: TAKE 2 MLS BY NEBULIZATION 2 TIMES DAILY        Last Filled:      Patient Phone Number: [phone] (home) [phone] (work)    Last appt: 3/22/2023   Next appt: Visit date not found    Last OARRS:   RX Monitoring 8/17/2017   Attestation The Prescription Monitoring Report for this patient was reviewed today.

## 2023-12-17 NOTE — TELEPHONE ENCOUNTER
Monitor summary: SR 62-90, UT -0.16, QRS -0.10, QT -0.45, per strip from the monitor room.               pls see medication request

## 2024-02-15 NOTE — TELEPHONE ENCOUNTER
Last refill: 2/12/2022      LOV   3/30/22      NOV   0 Future appt Patient ID: Scott Morris is a 79 y.o. male Date of Birth 1944       Chief Complaint   Patient presents with    Follow Up Wound Care Visit     Pressure injury left and right elbow       Allergies:  Patient has no known allergies.    Diagnosis:   Diagnosis ICD-10-CM Associated Orders   1. Pressure injury of left elbow, stage 3 (HCC)  L89.023 Wound cleansing and dressings      2. Pressure injury of right elbow, stage 3 (HCC)  L89.013 Wound cleansing and dressings           Assessment and Plan :  Follow up evaluation of pressure ulcers on bilateral elbows are completely epithelialized.    Keep the area covered and protected for about another week   Follow up as needed or call with questions or concerns    Subjective:   12/28/23: 78 y/o M with PMHx of GERD, peptic ulcer, CAD, atrial fibrillation, CHF, hx of PE currently anticoagulated, CKD, recent ICD placement on 12/18/23, presents for evaluation of wounds on his bilateral elbows that have been ongoing for approximately four months now. He states that he is unsure of how these initially began. Has been using hydrogen peroxide on them. Was recently hospitalized for acute on chronic heart failure at which time his elbows were assessed in the hospital and he was prescribed Santyl which he has been using on the bilateral elbow wounds daily. He denies any significant pressure on his elbows during the day but does have difficulty moving around at night and will use his elbows in bed to reposition. Notes 6/10 soreness/pain when pressure is applied to his elbows but pain is not constant. Is currently residing at Mrs. Rojo's personal care home on the independent living side. Obtains protein in his diet. Has smoking history but quit multiple years ago. No hx of DM.     1/22/24: Presents for initial eval of bilateral stage 3 elbow pressure wounds. Pt states he has been feeling ill over the last 5 days with increasing pain, redness and swelling on Left elbow wound. Pt  admits weakness, lightheadedness, fatigue and lack of appetite.  Pt has been applying hydrocolloid on the wound beds.  No diabetes.  Smokes 2 cigarettes daily. No ETOH or drug use. Pt denies any sob, N/V, CP, fever or chills.    2/1: Presents for follow-up evaluation of bilateral stage 3 elbow pressure wounds. On last visit pt was sent to ER for suspicion of left elbow septic bursitis. Pt was evaluated, admitted and treated for septic bursitis. Wound cultures with 3+ Growth of Klebsiella pneumoniae. He is currently on keflex until 2/6/24 as per ID and scheduled to f/u with PCP. Pt has been applying hydrocolloid to bilateral elbows.  Pt denies any sob, fatigue, N/V, CP, fever or chills.    2/15: Presents for follow-up evaluation of bilateral stage 3 elbow pressure wounds. No issues. Has been leaving open to air as per Ortho.  Pt denies any fevers or chills.            The following portions of the patient's history were reviewed and updated as appropriate:   Patient Active Problem List   Diagnosis    CAD (coronary atherosclerotic disease)    Acute on chronic combined systolic and diastolic congestive heart failure (HCC)    Chronic back pain    GERD (gastroesophageal reflux disease)    Hypercholesterolemia    Hypertension    PAF (paroxysmal atrial fibrillation) (HCC)    Peptic ulcer disease    S/P MVR (mitral valve repair)    Polyp of colon    Abnormal RBC indices    Fatigue    Chronic anticoagulation    Pain in gums    Idiopathic chronic gout of multiple sites without tophus    Impaired fasting glucose    Anemia due to blood loss, acute    History of normocytic normochromic anemia    Ambulatory dysfunction    Vasomotor rhinitis    Acute pulmonary embolism without acute cor pulmonale (HCC)    CKD (chronic kidney disease)    Hypotension    Chronic ischemic right MCA stroke    Olecranon bursitis    Chronic systolic (congestive) heart failure (HCC)    Pressure injury of elbow, stage 3 (HCC)    Tobacco use    H/O mitral  valve replacement     Past Medical History:   Diagnosis Date    Anaplasmosis 2018    CAD (coronary artery disease)     Cancer (HCC)     prostate    Colon polyp     Coronary artery disease     GERD (gastroesophageal reflux disease)     H/O heart artery stent     x2    Hypercholesteremia     Hypertension     Keratotic lesion     Stroke (HCC)     10YRS AGO    Transient cerebral ischemia      Past Surgical History:   Procedure Laterality Date    BACK SURGERY      CARDIAC PACEMAKER PLACEMENT      CARDIAC SURGERY      mvp repair    CAROTID ENDARTERECTOMY Right 2020    CHOLECYSTECTOMY      COLONOSCOPY      ESOPHAGOGASTRODUODENOSCOPY N/A 2018    Procedure: ESOPHAGOGASTRODUODENOSCOPY (EGD);  Surgeon: Sunny Rodrigues III, MD;  Location: [facility];  Service: Gastroenterology    JOINT REPLACEMENT      L hip replacement    ORCHIECTOMY      PROSTATECTOMY      TOTAL HIP ARTHROPLASTY       Family History   Problem Relation Age of Onset    Heart disease Mother     Stroke Mother         CVA    Cancer Father     Hodgkin's lymphoma Father     Lung cancer Father      Social History     Socioeconomic History    Marital status:      Spouse name: None    Number of children: None    Years of education: None    Highest education level: None   Occupational History    Occupation: retired   Tobacco Use    Smoking status: Every Day     Current packs/day: 0.00     Types: Cigarettes     Last attempt to quit: 2018     Years since quittin.7    Smokeless tobacco: Never    Tobacco comments:     2 cig/day   Vaping Use    Vaping status: Never Used   Substance and Sexual Activity    Alcohol use: Never     Alcohol/week: 2.0 standard drinks of alcohol     Types: 2 Standard drinks or equivalent per week    Drug use: No    Sexual activity: Not Currently     Comment: no high risk sexual behavior   Other Topics Concern    None   Social History Narrative    None     Social Determinants of Health     Financial Resource Strain: Low  Risk  (12/19/2023)    Received from [facility]    Overall Financial Resource Strain (CARDIA)     Difficulty of Paying Living Expenses: Not hard at all   Food Insecurity: No Food Insecurity (1/23/2024)    Hunger Vital Sign     Worried About Running Out of Food in the Last Year: Never true     Ran Out of Food in the Last Year: Never true   Transportation Needs: No Transportation Needs (1/23/2024)    PRAPARE - Transportation     Lack of Transportation (Medical): No     Lack of Transportation (Non-Medical): No   Physical Activity: Not on file   Stress: Not on file   Social Connections: Not on file   Intimate Partner Violence: Patient Declined (12/12/2023)    Received from [facility]    Humiliation, Afraid, Rape, and Kick questionnaire     Fear of Current or Ex-Partner: Patient declined     Emotionally Abused: Patient declined     Physically Abused: Patient declined     Sexually Abused: Patient declined   Housing Stability: Low Risk  (1/23/2024)    Housing Stability Vital Sign     Unable to Pay for Housing in the Last Year: No     Number of Places Lived in the Last Year: 1     Unstable Housing in the Last Year: No   Recent Concern: Housing Stability - High Risk (12/19/2023)    Received from [facility]    Housing Stability Vital Sign     Unable to Pay for Housing in the Last Year: Yes     Number of Places Lived in the Last Year: 1     Unstable Housing in the Last Year: No       Current Outpatient Medications:     atorvastatin (LIPITOR) 40 mg tablet, Take 40 mg by mouth daily  , Disp: , Rfl:     Azelaic Acid 15 % cream, apply to 1 thin layer twice a day to face, Disp: , Rfl:     carvedilol (COREG) 6.25 mg tablet, Take 3.125 mg by mouth in the morning, Disp: , Rfl:     clindamycin (CLINDAGEL) 1 % gel, APPLY 1 THIN LAYER TOPICALLY TO FACE IN THE  MORNING, Disp: , Rfl:     clopidogrel (PLAVIX) 75 mg tablet, Take 75 mg by mouth daily, Disp: , Rfl:     digoxin (LANOXIN) 0.125 mg tablet, Take 125 mcg  by mouth daily, Disp: , Rfl:     Farxiga 10 MG TABS, Take 10 mg by mouth daily, Disp: , Rfl:     furosemide (LASIX) 40 mg tablet, Take 40 mg by mouth daily PT REPORTS : 1/2 TABLET EVERY OTHER DAY ([facility]), Disp: , Rfl:     Lumigan 0.01 % ophthalmic drops, instill 1 drop into both eyes once daily at bedtime, Disp: , Rfl:     midodrine (PROAMATINE) 2.5 mg tablet, Take 1 tablet (2.5 mg total) by mouth 3 (three) times a day before meals (Patient not taking: Reported on 2/6/2024), Disp: 90 tablet, Rfl: 0    midodrine (PROAMATINE) 5 mg tablet, Take 5 mg by mouth 3 (three) times a day before meals (Patient not taking: Reported on 2/6/2024), Disp: , Rfl:     Multiple Vitamins-Minerals (PX MENS MULTIVITAMINS) TABS, Take by mouth, Disp: , Rfl:     pantoprazole (PROTONIX) 40 mg tablet, Take 1 tablet (40 mg total) by mouth daily, Disp: 90 tablet, Rfl: 2    sacubitril-valsartan (ENTRESTO) 24-26 MG TABS, Take 1 tablet by mouth 2 (two) times a day (Patient taking differently: Take 0.5 tablets by mouth 2 (two) times a day), Disp: 60 tablet, Rfl: 0    spironolactone (ALDACTONE) 25 mg tablet, Take 12.5 mg by mouth daily 1/2 TABLET EVERY OTHER DAY (Tu, Th, Sa, Su), Disp: , Rfl:     triamcinolone (KENALOG) 0.5 % cream, Apply topically 3 (three) times a day, Disp: 30 g, Rfl: 1    warfarin (COUMADIN) 2.5 mg tablet, as directed take 1 tablet ON TUESDAY,WEDNESDAY,FRIDAY,SATURDAY,GARCIA...  (REFER TO PRESCRIPTION NOTES)., Disp: , Rfl:     warfarin (COUMADIN) 5 mg tablet, Take 1 tablet (5 mg total) by mouth daily Coumadin 5 mg p.o. Daily- Target INR 2-3 (Patient taking differently: Take 5 mg by mouth daily M & Thurs & Sat 5MG  Tues & Wed & Fri & Sun 2.5MG), Disp: 30 tablet, Rfl: 0    Review of Systems   Constitutional:  Negative for appetite change, chills, fatigue, fever and unexpected weight change.   HENT:  Negative for congestion, hearing loss and postnasal drip.    Respiratory:  Negative for cough and shortness of breath.    Cardiovascular:   Negative for leg swelling.   Skin:  Positive for wound (bilateral elbows). Negative for rash.   Neurological:  Negative for dizziness, light-headedness and numbness.   Hematological:  Does not bruise/bleed easily.   Psychiatric/Behavioral: Negative.           Objective:  BP 93/54   Pulse 91   Temp (!) 96.8 °F (36 °C)   Resp 18   Pain Score: 0-No pain     Physical Exam  Vitals reviewed.   Constitutional:       Appearance: He is normal weight.   Pulmonary:      Effort: Pulmonary effort is normal. No respiratory distress.   Musculoskeletal:      Right elbow: No tenderness.      Left elbow: Tenderness: moderate pain.   Skin:     Findings: Wound (bilateral elbows) present.             Comments: 1 & 2. healed   Neurological:      Mental Status: He is alert.   Psychiatric:         Mood and Affect: Mood normal.               Wound 12/28/23 Pressure Injury Elbow Posterior;Right (Active)   Wound Image Images linked 02/15/24 1034   Wound Description Eschar 02/15/24 1034   Stephani-wound Assessment Dry 02/15/24 1034   Wound Length (cm) 0.1 cm 02/15/24 1034   Wound Width (cm) 0.1 cm 02/15/24 1034   Wound Depth (cm) 0 cm 02/15/24 1034   Wound Surface Area (cm^2) 0.01 cm^2 02/15/24 1034   Wound Volume (cm^3) 0 cm^3 02/15/24 1034   Calculated Wound Volume (cm^3) 0 cm^3 02/15/24 1034   Change in Wound Size % 100 02/15/24 1034   Drainage Amount None 02/15/24 1034   Non-staged Wound Description Not applicable 02/15/24 1034       Wound 12/28/23 Pressure Injury Elbow Left;Posterior (Active)   Wound Image Images linked 02/15/24 1034   Wound Description Eschar 02/15/24 1034   Stephani-wound Assessment Fragile 02/15/24 1034   Wound Length (cm) 0.1 cm 02/15/24 1034   Wound Width (cm) 0.1 cm 02/15/24 1034   Wound Depth (cm) 0 cm 02/15/24 1034   Wound Surface Area (cm^2) 0.01 cm^2 02/15/24 1034   Wound Volume (cm^3) 0 cm^3 02/15/24 1034   Calculated Wound Volume (cm^3) 0 cm^3 02/15/24 1034   Change in Wound Size % 100 02/15/24 1034   Drainage  "Amount None 02/15/24 1034   Non-staged Wound Description Not applicable 02/15/24 1034   Dressing Status Intact 02/15/24 1034       Results from last 6 Months   Lab Units 01/22/24  1030   WOUND CULTURE  3+ Growth of Klebsiella pneumoniae*  3+ Growth of       Wound Instructions:  Orders Placed This Encounter   Procedures    Wound cleansing and dressings     Bilateral elbow wounds    Your wounds are healed    May shower    Wash with mild soap and water  Keep covered for a week to protect newly healed wound (applied silicone bordered foam dressing) Take off when you shower    Keep pressure off of elbow     Standing Status:   Future     Standing Expiration Date:   2/15/2025       Bonita Ayala PA-C, [facility]      Portions of the record may have been created with voice recognition software. Occasional wrong word or \"sound alike\" substitutions may have occurred due to the inherent limitations of voice recognition software. Read the chart carefully and recognize, using context, where substitutions have occurred.    "

## 2024-04-01 NOTE — TELEPHONE ENCOUNTER
----- Message from Puja Muller sent at 12/11/2020  9:36 AM EST -----  Subject: Message to Provider    QUESTIONS  Information for Provider? In regards to the last message pt would like to   talk to Dr. Maria L Roger  ---------------------------------------------------------------------------  --------------  Brianna ANGELES  What is the best way for the office to contact you? OK to leave message on   voicemail  Preferred Call Back Phone Number? [phone]  ---------------------------------------------------------------------------  --------------  SCRIPT ANSWERS  Relationship to Patient?  Self [id number]

## 2024-04-24 ENCOUNTER — TELEPHONE (OUTPATIENT)
Dept: PRIMARY CARE CLINIC | Age: 64
End: 2024-04-24

## 2024-04-24 NOTE — TELEPHONE ENCOUNTER
----- Message from Jayla Kapoor Maicolnayan sent at 4/24/2024  9:12 AM EDT -----  Regarding: ECC Appointment Request  ECC Appointment Request    Patient needs appointment for ECC Appointment Type: New to Provider.    Reason for Appointment Request: Requested Provider unavailable- the patient need to have a new primary care at [facility] as she is currently in nursing in the [facility] and her medication will last only for 7 days. As her previous provider is retired, .PLEASE CALL THE  BACK AS SOON AS POSSIBLE. As the patient will be out soon and need to have the appointment on 4/30/2024  --------------------------------------------------------------------------------------------------------------------------    Relationship to Patient: Spouse/Partner     Call Back Information: OK to leave message on voicemail  Preferred Call Back Number: Phone [phone]

## 2024-05-07 PROBLEM — N94.89 VAGINAL BURNING: Status: RESOLVED | Noted: 2021-02-26 | Resolved: 2024-05-07

## 2024-05-07 PROBLEM — E78.2 MIXED HYPERLIPIDEMIA: Status: ACTIVE | Noted: 2024-05-07

## 2024-05-07 PROBLEM — Z98.84 GASTRIC BYPASS STATUS FOR OBESITY: Status: RESOLVED | Noted: 2020-10-05 | Resolved: 2024-05-07

## 2024-05-07 PROBLEM — R21 RASH: Status: RESOLVED | Noted: 2020-01-16 | Resolved: 2024-05-07

## 2024-05-07 PROBLEM — E55.9 VITAMIN D DEFICIENCY: Status: ACTIVE | Noted: 2024-05-07

## 2024-05-07 PROBLEM — N95.1 SYMPTOMATIC MENOPAUSAL OR FEMALE CLIMACTERIC STATES: Status: RESOLVED | Noted: 2021-02-26 | Resolved: 2024-05-07

## 2024-05-07 PROBLEM — B37.9 INFECTION DUE TO YEAST: Status: RESOLVED | Noted: 2020-12-21 | Resolved: 2024-05-07

## 2024-05-07 PROBLEM — R07.9 CHEST PAIN: Status: RESOLVED | Noted: 2018-08-13 | Resolved: 2024-05-07

## 2024-05-07 PROBLEM — N94.9 VAGINAL BURNING: Status: RESOLVED | Noted: 2021-02-26 | Resolved: 2024-05-07

## 2024-07-05 ENCOUNTER — PROCEDURE VISIT (OUTPATIENT)
Dept: SPEECH THERAPY | Age: 64
End: 2024-07-05

## 2024-07-05 DIAGNOSIS — J38.3 GLOTTIC INSUFFICIENCY: ICD-10-CM

## 2024-07-05 DIAGNOSIS — R05.3 CHRONIC COUGH: ICD-10-CM

## 2024-07-05 DIAGNOSIS — R13.10 DYSPHAGIA, UNSPECIFIED TYPE: Primary | ICD-10-CM

## 2024-07-05 DIAGNOSIS — G93.1 ANOXIC BRAIN INJURY (HCC): ICD-10-CM

## 2024-07-05 NOTE — PROGRESS NOTES
[facility] Ear[facility] Nose, [facility] Th[facility]  SPEECH THERAPY  Fiberoptic Endoscopic Evaluation of Swallowing  (FEES)/Treatment      Name: Joelle Dutton  YOB: 1960  Gender: female  MRN: [id number]  Referring Provider: Sushila Jennings CNP  Diagnosis: Dysphagia, unspecified  Onset Date: ~2022  History of Present Illness/Injury:    Patient Active Problem List    Diagnosis Date Noted    Acute saddle pulmonary embolism (HCC) 12/07/2022    Anoxic brain injury (HCC) 12/07/2022    Hypoxic ischemic encephalopathy 12/07/2022    Complex regional pain syndrome type 1 of right lower extremity 07/19/2022    Vitamin D deficiency 05/07/2024    Mixed hyperlipidemia 05/07/2024    Low gammaglobulin level (HCC) 03/22/2023    Pituitary mass (HCC) 03/22/2023    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 01/21/2022    Pituitary adenoma (HCC) 10/25/2021    Aphasia 07/12/2021    Benign neoplasm of brain (HCC) 07/12/2021    Paresthesias 07/12/2021    TIA (transient ischemic attack) 07/05/2021    Obstructive sleep apnea     Irritable bowel syndrome with diarrhea 12/21/2020    Presbyopia 10/13/2020    GERD (gastroesophageal reflux disease) 10/05/2020    Moderate persistent asthma without complication 01/16/2020    Anxiety 01/16/2020    Chronic cough 09/24/2019    Internal hernia 01/20/2017    Carpal tunnel syndrome of left wrist 08/19/2016    Benign essential HTN 04/27/2015    Tobacco abuse 04/27/2015    Insomnia 12/10/2014    Asthma-chronic obstructive pulmonary disease overlap syndrome (HCC) 04/10/2012    S/P gastric bypass 04/10/2012     Date of Exam: 7/5/2024    Recent Chest X-ray (10/20/22)-  IMPRESSION:   1. No evidence of acute cardiac pulmonary process.     Previous SLP Evaluations-  NASRA Kaur) 8/23/22  Patient presents with oropharyngeal dysphagia secondary to the following deficits:   Perioral weakness, reduced oral awareness of bolus, delayed swallow initiation, reduced hyolaryngeal elevation, reduced

## 2024-07-12 ENCOUNTER — HOSPITAL ENCOUNTER (OUTPATIENT)
Dept: WOMENS IMAGING | Age: 64
Discharge: HOME | End: 2024-07-12
Payer: COMMERCIAL

## 2024-07-12 DIAGNOSIS — Z12.31 ENCOUNTER FOR SCREENING MAMMOGRAM FOR MALIGNANT NEOPLASM OF BREAST: ICD-10-CM

## 2024-07-12 PROCEDURE — 77063 BREAST TOMOSYNTHESIS BI: CPT

## 2024-07-26 ENCOUNTER — HOSPITAL ENCOUNTER (OUTPATIENT)
Dept: GENERAL RADIOLOGY | Age: 64
Discharge: HOME | End: 2024-07-26
Payer: COMMERCIAL

## 2024-07-26 ENCOUNTER — HOSPITAL ENCOUNTER (OUTPATIENT)
Age: 64
Discharge: HOME | End: 2024-07-26
Payer: COMMERCIAL

## 2024-07-26 DIAGNOSIS — R05.9 COUGH, UNSPECIFIED TYPE: ICD-10-CM

## 2024-07-26 PROCEDURE — 71046 X-RAY EXAM CHEST 2 VIEWS: CPT

## 2024-08-15 ENCOUNTER — OFFICE VISIT (OUTPATIENT)
Dept: PULMONOLOGY | Age: 64
End: 2024-08-15
Payer: COMMERCIAL

## 2024-08-15 VITALS
HEART RATE: 89 BPM | DIASTOLIC BLOOD PRESSURE: 78 MMHG | RESPIRATION RATE: 18 BRPM | TEMPERATURE: 97.5 F | HEIGHT: 62 IN | BODY MASS INDEX: 36.58 KG/M2 | SYSTOLIC BLOOD PRESSURE: 122 MMHG | OXYGEN SATURATION: 94 %

## 2024-08-15 DIAGNOSIS — K21.9 GASTROESOPHAGEAL REFLUX DISEASE WITHOUT ESOPHAGITIS: ICD-10-CM

## 2024-08-15 DIAGNOSIS — R05.3 CHRONIC COUGH: ICD-10-CM

## 2024-08-15 DIAGNOSIS — J45.50 SEVERE PERSISTENT ASTHMA WITHOUT COMPLICATION: Primary | ICD-10-CM

## 2024-08-15 PROCEDURE — 99204 OFFICE O/P NEW MOD 45 MIN: CPT | Performed by: INTERNAL MEDICINE

## 2024-08-15 PROCEDURE — 3074F SYST BP LT 130 MM HG: CPT | Performed by: INTERNAL MEDICINE

## 2024-08-15 PROCEDURE — 3078F DIAST BP <80 MM HG: CPT | Performed by: INTERNAL MEDICINE

## 2024-08-15 RX ORDER — OMEPRAZOLE 40 MG/1
40 CAPSULE, DELAYED RELEASE ORAL
Qty: 90 CAPSULE | Refills: 1 | Status: SHIPPED | OUTPATIENT
Start: 2024-08-15

## 2024-08-15 NOTE — PROGRESS NOTES
Genitourinary: Negative for hematuria, no dysuria    Musculoskeletal: Negative for arthralgias, no joint swelling   Skin: Negative for rash  LE: no edema   Neurological: General Lysed weakness.  Hematological: Negative for adenopathy, or bleeding   Psychiatric/Behavorial: Negative for anxiety,    Objective:   PHYSICAL EXAM:  Blood pressure 122/78, pulse 89, temperature 97.5 °F (36.4 °C), resp. rate 18, height 1.575 m (5' 2\"), SpO2 94%, not currently breastfeeding.'  Gen: No acute distress  Eyes: PERRL. No sclera icterus. No conjunctival injection.   ENT: No discharge. Pharynx clear. External appearance of ears and nose normal.  Neck: Trachea midline. No obvious mass.    Resp: No accessory muscle use. No crackles. No wheezes. No rhonchi.    CV: Regular rate. Regular rhythm. No murmur or rub. No edema.   GI: Non-tender. Non-distended. No hernia.   Skin: Warm, dry, normal texture and turgor. No nodule on exposed extremities.   Lymph: No cervical LAD.   M/S: No cyanosis. No clubbing. No joint deformity.    LE:  no edema   Psych: ntact judgement and insight.    Current Outpatient Medications   Medication Sig Dispense Refill    omeprazole (PRILOSEC) 40 MG delayed release capsule Take 1 capsule by mouth every morning (before breakfast) 90 capsule 1    atorvastatin (LIPITOR) 40 MG tablet TAKE 1 TABLET BY MOUTH EVERYDAY AT BEDTIME 90 tablet 1    Incontinence Supply Disposable (PROCARE ADULT BRIEFS X-LARGE) MISC 1 each by Does not apply route 5 times daily 150 each 11    guaiFENesin (MUCINEX) 600 MG extended release tablet Take 1 tablet by mouth 2 times daily 14 tablet 0    montelukast (SINGULAIR) 10 MG tablet Take 1 tablet by mouth daily 90 tablet 1    apixaban (ELIQUIS) 5 MG TABS tablet Take 1 tablet by mouth 2 times daily 60 tablet 0    famotidine (PEPCID) 20 MG tablet TAKE 1 TABLET BY MOUTH TWICE A  tablet 1    amLODIPine (NORVASC) 5 MG tablet TAKE 1 TABLET BY MOUTH EVERY DAY 90 tablet 1    budesonide (PULMICORT

## 2024-08-19 ENCOUNTER — TELEPHONE (OUTPATIENT)
Dept: PULMONOLOGY | Age: 64
End: 2024-08-19

## 2024-08-19 NOTE — TELEPHONE ENCOUNTER
[facility]  [address] [address]  Phone: [phone]  Fax: [phone]                  Lashanda Hale   2017 9:40 AM   Office Visit    Description:  Female : 1962   Provider:  Deshawn Mas MD   Department:  [facility]           Reason for Visit     Cardiomyopathy           Diagnoses this Visit        Comments    NICM (nonischemic cardiomyopathy)    -  Primary     LBBB (left bundle branch block)         VF (ventricular fibrillation)         Long QT interval         ICD (implantable cardioverter-defibrillator), single, in situ         Paroxysmal atrial fibrillation         On amiodarone therapy         Hypoxic brain injury         Cognitive deficits         Impairment of balance                To Do List           Future Appointments        Provider Department Dept Phone    2017 11:45 AM LAB, SAME DAY [facility] [phone]    1/10/2017 7:30 AM PULMONARY FUNCTION [facility] [phone]    3/14/2017 10:40 AM JON Rodriguez MD [place] [facility] [phone]    2017 8:20 AM PACEMAKER, ICD [facility] [phone]      Goals (5 Years of Data)     None      Follow-Up and Disposition     Return in about 6 months (around 2017).      [facility]     [facility] - 24/7 Assistance  Registered nurses in the [facility] provide clinical advisement, health education, appointment booking, and other advisory services.  Call for this free service at [phone].             Medications           Message regarding Medications     Verify the changes and/or additions to your medication regime listed below are the same as discussed with your clinician today.  If any of these changes or additions are incorrect, please notify your healthcare provider.             Verify that the below list of medications is an accurate representation of the  Carlos called to say the CT chest was approved.  Advised them I saw that on the fax machine.   "medications you are currently taking.  If none reported, the list may be blank. If incorrect, please contact your healthcare provider. Carry this list with you in case of emergency.           Current Medications     amiodarone (PACERONE) 200 MG Tab take 1 tablet by mouth once daily    carvedilol (COREG) 12.5 MG tablet take 1 tablet by mouth twice a day    lancets (ACCU-CHEK SOFTCLIX LANCETS) Misc 1 lancet by Misc.(Non-Drug; Combo Route) route 4 (four) times daily. Lancets for Accu-Chek Winter    lisinopril 10 MG tablet Take 2 tablets (20 mg total) by mouth once daily.    metformin (GLUCOPHAGE) 500 MG tablet Take 1 tablet (500 mg total) by mouth 2 (two) times daily with meals.    multivitamin-Ca-iron-minerals 27-0.4 mg Tab Take 1 tablet by mouth once daily.     pantoprazole (PROTONIX) 40 MG tablet Take 1 tablet (40 mg total) by mouth once daily.    pravastatin (PRAVACHOL) 40 MG tablet Take 40 mg by mouth once daily.    aspirin 81 MG Chew Take 1 tablet (81 mg total) by mouth once daily.           Clinical Reference Information           Vital Signs - Last Recorded  Most recent update: 1/9/2017 10:35 AM by Gayle Riggins MA    BP Pulse Ht Wt LMP BMI    138/78 (!) 57 5' 6" (1.676 m) 66.4 kg (146 lb 6.2 oz) 01/25/2016 (Approximate) 23.63 kg/m2      Blood Pressure          Most Recent Value    BP  138/78      Allergies as of 1/9/2017     Hydralazine Analogues      Immunizations Administered on Date of Encounter - 1/9/2017     None      Orders Placed During Today's Visit      Normal Orders This Visit    CPX Study     Future Labs/Procedures Expected by Expires    Complete PFT w/ bronchodilator  12/9/2017 1/9/2018    Hepatic function panel  12/9/2017 (Approximate) 1/9/2018    TSH  12/9/2017 1/9/2018      "

## 2024-08-21 RX ORDER — FLUTICASONE FUROATE, UMECLIDINIUM BROMIDE AND VILANTEROL TRIFENATATE 200; 62.5; 25 UG/1; UG/1; UG/1
1 POWDER RESPIRATORY (INHALATION) DAILY
Qty: 1 EACH | Refills: 5 | Status: SHIPPED | OUTPATIENT
Start: 2024-08-21

## 2024-08-21 RX ORDER — FLUTICASONE FUROATE, UMECLIDINIUM BROMIDE AND VILANTEROL TRIFENATATE 200; 62.5; 25 UG/1; UG/1; UG/1
1 POWDER RESPIRATORY (INHALATION) DAILY
Qty: 1 EACH | Refills: 0 | COMMUNITY
Start: 2024-08-21

## 2024-08-23 ENCOUNTER — TELEPHONE (OUTPATIENT)
Dept: PULMONOLOGY | Age: 64
End: 2024-08-23

## 2024-08-29 ENCOUNTER — HOSPITAL ENCOUNTER (OUTPATIENT)
Dept: CT IMAGING | Age: 64
Discharge: HOME | End: 2024-08-29
Attending: INTERNAL MEDICINE
Payer: COMMERCIAL

## 2024-08-29 DIAGNOSIS — R05.3 CHRONIC COUGH: ICD-10-CM

## 2024-08-29 PROCEDURE — 71250 CT THORAX DX C-: CPT

## 2024-10-16 ENCOUNTER — OFFICE VISIT (OUTPATIENT)
Dept: PULMONOLOGY | Age: 64
End: 2024-10-16
Payer: COMMERCIAL

## 2024-10-16 VITALS
DIASTOLIC BLOOD PRESSURE: 80 MMHG | RESPIRATION RATE: 18 BRPM | HEIGHT: 62 IN | WEIGHT: 200 LBS | SYSTOLIC BLOOD PRESSURE: 120 MMHG | OXYGEN SATURATION: 98 % | BODY MASS INDEX: 36.8 KG/M2 | HEART RATE: 94 BPM

## 2024-10-16 DIAGNOSIS — J45.40 MODERATE PERSISTENT ASTHMA WITHOUT COMPLICATION: ICD-10-CM

## 2024-10-16 DIAGNOSIS — J45.50 SEVERE PERSISTENT ASTHMA WITHOUT COMPLICATION: ICD-10-CM

## 2024-10-16 PROCEDURE — 3074F SYST BP LT 130 MM HG: CPT | Performed by: INTERNAL MEDICINE

## 2024-10-16 PROCEDURE — 99214 OFFICE O/P EST MOD 30 MIN: CPT | Performed by: INTERNAL MEDICINE

## 2024-10-16 PROCEDURE — 3079F DIAST BP 80-89 MM HG: CPT | Performed by: INTERNAL MEDICINE

## 2024-10-16 RX ORDER — ALBUTEROL SULFATE 0.83 MG/ML
2.5 SOLUTION RESPIRATORY (INHALATION) 4 TIMES DAILY PRN
Qty: 120 EACH | Refills: 3 | Status: SHIPPED | OUTPATIENT
Start: 2024-10-16

## 2024-10-16 RX ORDER — FLUTICASONE FUROATE, UMECLIDINIUM BROMIDE AND VILANTEROL TRIFENATATE 200; 62.5; 25 UG/1; UG/1; UG/1
1 POWDER RESPIRATORY (INHALATION) DAILY
Qty: 1 EACH | Refills: 5 | Status: SHIPPED | OUTPATIENT
Start: 2024-10-16

## 2024-10-16 NOTE — PROGRESS NOTES
200-62.5-25 MCG/ACT AEPB inhaler Inhale 1 puff into the lungs daily 1 each 5    omeprazole (PRILOSEC) 40 MG delayed release capsule Take 1 capsule by mouth every morning (before breakfast) 90 capsule 1    atorvastatin (LIPITOR) 40 MG tablet TAKE 1 TABLET BY MOUTH EVERYDAY AT BEDTIME 90 tablet 1    Incontinence Supply Disposable (PROCARE ADULT BRIEFS X-LARGE) MISC 1 each by Does not apply route 5 times daily 150 each 11    guaiFENesin (MUCINEX) 600 MG extended release tablet Take 1 tablet by mouth 2 times daily 14 tablet 0    montelukast (SINGULAIR) 10 MG tablet Take 1 tablet by mouth daily 90 tablet 1    famotidine (PEPCID) 20 MG tablet TAKE 1 TABLET BY MOUTH TWICE A  tablet 1    omeprazole (PRILOSEC) 20 MG delayed release capsule TAKE 1 CAPSULE BY MOUTH EVERY DAY IN THE MORNING BEFORE BREAKFAST 90 capsule 1    rimegepant sulfate 75 MG TBDP Take 75 mg by mouth every 48 hours 16 tablet 3     No current facility-administered medications for this visit.       Data Reviewed:   CBC and Renal reviewed  Last CBC  Lab Results   Component Value Date/Time    WBC 5.4 05/06/2024 11:19 AM    RBC 4.42 05/06/2024 11:19 AM    HGB 12.7 05/06/2024 11:19 AM    MCV 87.0 05/06/2024 11:19 AM     05/06/2024 11:19 AM     Last Renal  Lab Results   Component Value Date/Time     05/06/2024 11:19 AM    K 3.6 05/06/2024 11:19 AM    K 2.5 07/06/2021 04:32 AM     05/06/2024 11:19 AM    CO2 25 05/06/2024 11:19 AM    CO2 26 07/07/2022 09:20 AM    CO2 23 04/04/2022 09:46 AM    BUN 7 05/06/2024 11:19 AM    CREATININE 0.6 05/06/2024 11:19 AM    GLUCOSE 88 05/06/2024 11:19 AM    CALCIUM 8.9 05/06/2024 11:19 AM       Last ABG  POC Blood Gas: No results found for: \"POCPH\", \"POCPCO2\", \"POCPO2\", \"POCHCO3\", \"NBEA\", \"KQMN2YDS\"  No results for input(s): \"PH\", \"PCO2\", \"PO2\", \"HCO3\", \"BE\", \"O2SAT\" in the last 72 hours.      Radiology Review:  Pertinent images / reports were reviewed as a part of this visit.    CT Chest w/ contrast: No

## 2025-02-17 ENCOUNTER — OFFICE VISIT (OUTPATIENT)
Dept: PULMONOLOGY | Age: 65
End: 2025-02-17
Payer: MEDICARE

## 2025-02-17 VITALS
HEART RATE: 91 BPM | RESPIRATION RATE: 18 BRPM | HEIGHT: 62 IN | TEMPERATURE: 97.2 F | BODY MASS INDEX: 36.58 KG/M2 | DIASTOLIC BLOOD PRESSURE: 78 MMHG | SYSTOLIC BLOOD PRESSURE: 122 MMHG | OXYGEN SATURATION: 98 %

## 2025-02-17 DIAGNOSIS — R05.3 CHRONIC COUGH: ICD-10-CM

## 2025-02-17 DIAGNOSIS — J45.40 MODERATE PERSISTENT ASTHMA WITHOUT COMPLICATION: Primary | ICD-10-CM

## 2025-02-17 PROCEDURE — 3078F DIAST BP <80 MM HG: CPT | Performed by: INTERNAL MEDICINE

## 2025-02-17 PROCEDURE — 3017F COLORECTAL CA SCREEN DOC REV: CPT | Performed by: INTERNAL MEDICINE

## 2025-02-17 PROCEDURE — 1036F TOBACCO NON-USER: CPT | Performed by: INTERNAL MEDICINE

## 2025-02-17 PROCEDURE — 3074F SYST BP LT 130 MM HG: CPT | Performed by: INTERNAL MEDICINE

## 2025-02-17 PROCEDURE — 99214 OFFICE O/P EST MOD 30 MIN: CPT | Performed by: INTERNAL MEDICINE

## 2025-02-17 PROCEDURE — G8417 CALC BMI ABV UP PARAM F/U: HCPCS | Performed by: INTERNAL MEDICINE

## 2025-02-17 PROCEDURE — G2211 COMPLEX E/M VISIT ADD ON: HCPCS | Performed by: INTERNAL MEDICINE

## 2025-02-17 PROCEDURE — G8427 DOCREV CUR MEDS BY ELIG CLIN: HCPCS | Performed by: INTERNAL MEDICINE

## 2025-02-17 RX ORDER — OMEPRAZOLE 40 MG/1
40 CAPSULE, DELAYED RELEASE ORAL
Qty: 90 CAPSULE | Refills: 1 | Status: SHIPPED | OUTPATIENT
Start: 2025-02-17

## 2025-02-20 NOTE — PROGRESS NOTES
Pulmonary progress note           REASON FOR CONSULTATION:  Chief Complaint   Patient presents with    Follow-up    Asthma        Consult at request of Sushila Jennings APRN - CNP     PCP: Sushila Jennings APRN - CNP        Assessment and Plan:   Diagnosis Orders   1. Moderate persistent asthma without complication        2. Chronic cough                Plan:  Continue Trelegy daily  Nebulized albuterol as needed  I suspect intermittent aspiration  Was encouraged to follow aspiration precautions and continue with speech therapy  omeprazole dose to 40 mg given her GERD symptoms  We will order sleep study at a later point        HISTORY OF PRESENT ILLNESS: Joelle Dutton is very pleasant 64 y.o. year old lady with medical history stated below significant for former smoker history of obstructive sleep apnea, history of severe asthma, history of gastric bypass, obesity was admitted to [facility] on July 23, 2022 when she presented unresponsive and intubated on admission. She had extensive work-up. MRI revealed multiple areas of ischemia. She underwent tracheostomy and PEG tube placement and discharged to [facility]     Patient was having significant issues with dysphagia at that time    Was referred to us for persistent severe cough especially with eating  She had recent swallow evaluation that showed some pooling with no issac aspiration      Underwent vocal cord surgery at .  Complaining of hoarseness of voice    Has been compliant with Trelegy  Cough has improved  In the past she was diagnosed with mild sleep apnea but she never started CPAP therapy    Past Medical History:   Diagnosis Date    Allergic rhinitis     Anxiety 01/16/2020    Arthritis     pt denies    Asthma 04/10/2012    Asthma-chronic obstructive pulmonary disease overlap syndrome (HCC)     pt denies    Bilateral carpal tunnel syndrome 08/19/2016    CVA (cerebral vascular

## 2025-03-17 DIAGNOSIS — J45.40 MODERATE PERSISTENT ASTHMA WITHOUT COMPLICATION: ICD-10-CM

## 2025-03-17 RX ORDER — ALBUTEROL SULFATE 0.83 MG/ML
2.5 SOLUTION RESPIRATORY (INHALATION) 4 TIMES DAILY PRN
Qty: 180 EACH | Refills: 5 | Status: SHIPPED | COMMUNITY
Start: 2025-03-17

## 2025-03-17 NOTE — TELEPHONE ENCOUNTER
Patient  calling and stated the john current insurance will not pay for nebulizer solutions. Her new insurance does not kick in until MAY 1. They are asking for samples to get them by until new insurance starts. Her  gives her approx two treatments a day and some days she doesn't need at all.    Please call back when we have samples ready for . Call  Ynes [phone]

## 2025-03-18 ENCOUNTER — TELEPHONE (OUTPATIENT)
Dept: ADMINISTRATIVE | Age: 65
End: 2025-03-18

## 2025-03-18 NOTE — TELEPHONE ENCOUNTER
Submitted PA for Albuterol Sulfate (2.5 MG/3ML)0.083% nebulizer solution   Via CMM Key: [id number]  STATUS: NOT SENT    Patient has medicare coverage. Nebulizer and nebulizer medications will need to be billed under the medical portion (part B) at the pharmacy or through DME company.     If this requires a response please respond to the pool ( P MHCX PSC MEDICATION PRE-AUTH).      Thank you please advise patient.      Follow up done daily; if no decision with in three days we will refax.  If another three days goes by with no decision will call the insurance for status.

## 2025-03-20 PROBLEM — N39.41 URGE INCONTINENCE OF URINE: Status: ACTIVE | Noted: 2025-03-20

## 2025-05-01 ENCOUNTER — TELEPHONE (OUTPATIENT)
Dept: PULMONOLOGY | Age: 65
End: 2025-05-01

## 2025-05-01 RX ORDER — ALBUTEROL SULFATE 90 UG/1
2 INHALANT RESPIRATORY (INHALATION) 4 TIMES DAILY PRN
Qty: 18 G | Refills: 0 | Status: SHIPPED | OUTPATIENT
Start: 2025-05-01

## 2025-05-02 ENCOUNTER — TELEPHONE (OUTPATIENT)
Dept: PULMONOLOGY | Age: 65
End: 2025-05-02

## 2025-05-02 DIAGNOSIS — J45.40 MODERATE PERSISTENT ASTHMA WITHOUT COMPLICATION: ICD-10-CM

## 2025-05-05 RX ORDER — ALBUTEROL SULFATE 0.83 MG/ML
2.5 SOLUTION RESPIRATORY (INHALATION) 4 TIMES DAILY PRN
Qty: 180 EACH | Refills: 5 | Status: SHIPPED | OUTPATIENT
Start: 2025-05-05

## 2025-05-23 RX ORDER — ALBUTEROL SULFATE 90 UG/1
2 INHALANT RESPIRATORY (INHALATION) 4 TIMES DAILY PRN
Qty: 18 EACH | OUTPATIENT
Start: 2025-05-23

## 2025-06-18 ENCOUNTER — TELEPHONE (OUTPATIENT)
Dept: PULMONOLOGY | Age: 65
End: 2025-06-18

## 2025-06-18 RX ORDER — BENZONATATE 100 MG/1
100 CAPSULE ORAL 3 TIMES DAILY PRN
Qty: 30 CAPSULE | Refills: 0 | Status: SHIPPED | OUTPATIENT
Start: 2025-06-18

## 2025-07-03 ENCOUNTER — APPOINTMENT (OUTPATIENT)
Dept: CT IMAGING | Age: 65
End: 2025-07-03
Payer: MEDICAID

## 2025-07-03 ENCOUNTER — HOSPITAL ENCOUNTER (EMERGENCY)
Age: 65
Discharge: HOME | End: 2025-07-03
Attending: STUDENT IN AN ORGANIZED HEALTH CARE EDUCATION/TRAINING PROGRAM
Payer: MEDICAID

## 2025-07-03 VITALS
RESPIRATION RATE: 18 BRPM | SYSTOLIC BLOOD PRESSURE: 124 MMHG | DIASTOLIC BLOOD PRESSURE: 90 MMHG | HEART RATE: 85 BPM | TEMPERATURE: 98.1 F | OXYGEN SATURATION: 100 %

## 2025-07-03 DIAGNOSIS — L03.213 PRESEPTAL CELLULITIS OF RIGHT EYE: Primary | ICD-10-CM

## 2025-07-03 LAB
ALBUMIN SERPL-MCNC: 2.8 G/DL (ref 3.4–5)
ALBUMIN/GLOB SERPL: 1 {RATIO} (ref 1.1–2.2)
ALP SERPL-CCNC: 122 U/L (ref 40–129)
ALT SERPL-CCNC: 44 U/L (ref 10–40)
ANION GAP SERPL CALCULATED.3IONS-SCNC: 10 MMOL/L (ref 3–16)
AST SERPL-CCNC: 72 U/L (ref 15–37)
BASOPHILS # BLD: 0 K/UL (ref 0–0.2)
BASOPHILS NFR BLD: 0.5 %
BILIRUB SERPL-MCNC: 0.3 MG/DL (ref 0–1)
BUN SERPL-MCNC: 6 MG/DL (ref 7–20)
CALCIUM SERPL-MCNC: 8.3 MG/DL (ref 8.3–10.6)
CHLORIDE SERPL-SCNC: 113 MMOL/L (ref 99–110)
CO2 SERPL-SCNC: 23 MMOL/L (ref 21–32)
CREAT SERPL-MCNC: 0.8 MG/DL (ref 0.6–1.2)
DEPRECATED RDW RBC AUTO: 17.7 % (ref 12.4–15.4)
EOSINOPHIL # BLD: 0 K/UL (ref 0–0.6)
EOSINOPHIL NFR BLD: 0.4 %
GFR SERPLBLD CREATININE-BSD FMLA CKD-EPI: 83 ML/MIN/{1.73_M2}
GLUCOSE SERPL-MCNC: 109 MG/DL (ref 70–99)
HCT VFR BLD AUTO: 41 % (ref 36–48)
HGB BLD-MCNC: 13.3 G/DL (ref 12–16)
LYMPHOCYTES # BLD: 2.5 K/UL (ref 1–5.1)
LYMPHOCYTES NFR BLD: 36.9 %
MAGNESIUM SERPL-MCNC: 1.98 MG/DL (ref 1.8–2.4)
MCH RBC QN AUTO: 27.9 PG (ref 26–34)
MCHC RBC AUTO-ENTMCNC: 32.3 G/DL (ref 31–36)
MCV RBC AUTO: 86.5 FL (ref 80–100)
MONOCYTES # BLD: 0.5 K/UL (ref 0–1.3)
MONOCYTES NFR BLD: 6.7 %
NEUTROPHILS # BLD: 3.7 K/UL (ref 1.7–7.7)
NEUTROPHILS NFR BLD: 55.5 %
PLATELET # BLD AUTO: 287 K/UL (ref 135–450)
PMV BLD AUTO: 8.7 FL (ref 5–10.5)
POTASSIUM SERPL-SCNC: 3.4 MMOL/L (ref 3.5–5.1)
PROT SERPL-MCNC: 5.6 G/DL (ref 6.4–8.2)
RBC # BLD AUTO: 4.74 M/UL (ref 4–5.2)
SODIUM SERPL-SCNC: 146 MMOL/L (ref 136–145)
WBC # BLD AUTO: 6.8 K/UL (ref 4–11)

## 2025-07-03 PROCEDURE — 83735 ASSAY OF MAGNESIUM: CPT

## 2025-07-03 PROCEDURE — 80053 COMPREHEN METABOLIC PANEL: CPT

## 2025-07-03 PROCEDURE — 6360000004 HC RX CONTRAST MEDICATION: Performed by: PHYSICIAN ASSISTANT

## 2025-07-03 PROCEDURE — 6370000000 HC RX 637 (ALT 250 FOR IP): Performed by: PHYSICIAN ASSISTANT

## 2025-07-03 PROCEDURE — 99285 EMERGENCY DEPT VISIT HI MDM: CPT

## 2025-07-03 PROCEDURE — 6370000000 HC RX 637 (ALT 250 FOR IP): Performed by: STUDENT IN AN ORGANIZED HEALTH CARE EDUCATION/TRAINING PROGRAM

## 2025-07-03 PROCEDURE — 36415 COLL VENOUS BLD VENIPUNCTURE: CPT

## 2025-07-03 PROCEDURE — 70481 CT ORBIT/EAR/FOSSA W/DYE: CPT

## 2025-07-03 PROCEDURE — 85025 COMPLETE CBC W/AUTO DIFF WBC: CPT

## 2025-07-03 RX ORDER — TETRACAINE HYDROCHLORIDE 5 MG/ML
2 SOLUTION OPHTHALMIC ONCE
Status: COMPLETED | OUTPATIENT
Start: 2025-07-03 | End: 2025-07-03

## 2025-07-03 RX ORDER — IOPAMIDOL 755 MG/ML
75 INJECTION, SOLUTION INTRAVASCULAR
Status: COMPLETED | OUTPATIENT
Start: 2025-07-03 | End: 2025-07-03

## 2025-07-03 RX ADMIN — AMOXICILLIN AND CLAVULANATE POTASSIUM 1 TABLET: 875; 125 TABLET, FILM COATED ORAL at 21:08

## 2025-07-03 RX ADMIN — IOPAMIDOL 75 ML: 755 INJECTION, SOLUTION INTRAVENOUS at 18:52

## 2025-07-03 RX ADMIN — TETRACAINE HYDROCHLORIDE 2 DROP: 5 SOLUTION OPHTHALMIC at 20:56

## 2025-07-03 ASSESSMENT — PAIN - FUNCTIONAL ASSESSMENT: PAIN_FUNCTIONAL_ASSESSMENT: NONE - DENIES PAIN

## 2025-07-03 ASSESSMENT — ENCOUNTER SYMPTOMS: EYE PAIN: 1

## 2025-07-03 ASSESSMENT — VISUAL ACUITY
OS: 20/30
OD: 20/40

## 2025-07-03 ASSESSMENT — TONOMETRY
OD_IOP_MMHG: 14
IOP_HANDHELD: 1
OS_IOP_MMHG: 16

## 2025-07-03 NOTE — ED PROVIDER NOTES
Pupils are equal round reactive to light.  Armando-Pen was used to measure eye pressure here today her right eye with a pressure of 14 mmHg left eye of 21 mmHg.  Visual acuity right eye 20/40 left eye 20/30    Due to her pain with the drop of muscle movements and elevated pressure CT orbits with contrast was ordered  IMPRESSION:  Mild hyperenhancement of the right conjunctiva and mild right periorbital  soft tissue swelling/fat stranding, which may represent conjunctivitis/mild  preseptal periorbital cellulitis in the appropriate clinical setting.  No  evidence of postseptal orbital cellulitis or retrobulbar abscess.           Exam Ended: 07/03/25 18:52 EDT       Laboratory testing with CBC and CMP was grossly unremarkable does show a sodium of 146.    Patient will discharge on oral antibiotics and to follow-up with [facility] or to return the emergency room if she has any worsening symptoms.      I am the Primary Clinician of Record.  FINAL IMPRESSION      1. Preseptal cellulitis of right eye          DISPOSITION/PLAN     DISPOSITION Decision To Discharge 07/03/2025 08:25:56 PM   DISPOSITION CONDITION Stable           PATIENT REFERRED TO:  Sushila Jennings, APRN - CNP  [address]  [facility] [id number] [phone]    Schedule an appointment as soon as possible for a visit in 1 week  recheck    [facility]  [address] [id number] [phone]    Schedule an appointment as soon as possible for a visit in 1 week  recheck      DISCHARGE MEDICATIONS:  New Prescriptions    AMOXICILLIN-CLAVULANATE (AUGMENTIN) 875-125 MG PER TABLET    Take 1 tablet by mouth 2 times daily for 10 days       DISCONTINUED MEDICATIONS:  Discontinued Medications    No medications on file              (Please note that portions of this note were completed with a voice recognition program.  Efforts were made to edit the dictations but occasionally words are mis-transcribed.)    HAILEY Horton

## 2025-07-03 NOTE — ED TRIAGE NOTES
Pt presents to ED for right eye swelling that her spouse noticed on Tuesday. Pt states it is painful.

## 2025-07-07 RX ORDER — OMEPRAZOLE 40 MG/1
40 CAPSULE, DELAYED RELEASE ORAL
Qty: 90 CAPSULE | Refills: 1 | Status: SHIPPED | OUTPATIENT
Start: 2025-07-07

## 2025-07-07 RX ORDER — ALBUTEROL SULFATE 90 UG/1
2 INHALANT RESPIRATORY (INHALATION) 4 TIMES DAILY PRN
Qty: 18 EACH | Refills: 4 | Status: SHIPPED | OUTPATIENT
Start: 2025-07-07

## 2025-07-07 RX ORDER — BENZONATATE 100 MG/1
CAPSULE ORAL
Qty: 30 CAPSULE | Refills: 0 | Status: SHIPPED | OUTPATIENT
Start: 2025-07-07

## 2025-07-14 ENCOUNTER — HOSPITAL ENCOUNTER (OUTPATIENT)
Dept: WOMENS IMAGING | Age: 65
Discharge: HOME | End: 2025-07-14
Payer: MEDICAID

## 2025-07-14 VITALS — BODY MASS INDEX: 36.8 KG/M2 | WEIGHT: 200 LBS | HEIGHT: 62 IN

## 2025-07-14 DIAGNOSIS — Z12.31 BREAST CANCER SCREENING BY MAMMOGRAM: ICD-10-CM

## 2025-07-14 PROCEDURE — 77063 BREAST TOMOSYNTHESIS BI: CPT

## 2025-07-30 RX ORDER — BENZONATATE 100 MG/1
CAPSULE ORAL
Qty: 30 CAPSULE | Refills: 0 | Status: SHIPPED | OUTPATIENT
Start: 2025-07-30

## 2025-08-09 DIAGNOSIS — J45.50 SEVERE PERSISTENT ASTHMA WITHOUT COMPLICATION (HCC): ICD-10-CM

## 2025-08-11 RX ORDER — FLUTICASONE FUROATE, UMECLIDINIUM BROMIDE AND VILANTEROL TRIFENATATE 200; 62.5; 25 UG/1; UG/1; UG/1
POWDER RESPIRATORY (INHALATION)
Qty: 60 EACH | Refills: 5 | Status: SHIPPED | OUTPATIENT
Start: 2025-08-11

## (undated) DEVICE — SUTURE ETHLN SZ 4-0 L18IN NONABSORBABLE BLK L19MM FS-2 3/8 662G

## (undated) DEVICE — Z DISCONTINUED BY MEDLINE USE 2711682 TRAY SKIN PREP DRY W/ PREM GLV

## (undated) DEVICE — BLANKET WRM W29.9XL79.1IN UP BODY FORC AIR MISTRAL-AIR

## (undated) DEVICE — 8FR FRAZIER SUCTION HANDLE: Brand: CARDINAL HEALTH

## (undated) DEVICE — NEEDLE HYPO 27GA L15IN REG BVL W O SFTY FOR SYR DISPOSABLE

## (undated) DEVICE — SYRINGE,CONTROL,LL,FINGER,GRIP: Brand: MEDLINE INDUSTRIES, INC.

## (undated) DEVICE — TOWEL,STOP FLAG GOLD N-W: Brand: MEDLINE

## (undated) DEVICE — STRIP,CLOSURE,WOUND,MEDI-STRIP,1/2X4: Brand: MEDLINE

## (undated) DEVICE — ELECTRODE PT RET AD L9FT HI MOIST COND ADH HYDRGEL CORDED

## (undated) DEVICE — MERCY HEALTH WEST TURNOVER: Brand: MEDLINE INDUSTRIES, INC.

## (undated) DEVICE — STAPLER SKIN H3.9MM WIRE DIA0.58MM CRWN 6.9MM 35 STPL ROT

## (undated) DEVICE — BLADE ES ELASTOMERIC COAT INSUL DURABLE BEND UPTO 90DEG

## (undated) DEVICE — GLOVE ORANGE PI 7 1/2   MSG9075

## (undated) DEVICE — SOLUTION PREP POVIDONE IOD FOR SKIN MUCOUS MEM PRIOR TO

## (undated) DEVICE — SYRINGE,EAR/ULCER, 2 OZ, STERILE: Brand: MEDLINE

## (undated) DEVICE — GLOVE ORTHO 8   MSG9480

## (undated) DEVICE — PENCIL ES L3M BTTN SWCH S STL HEX LOK BLDE ELECTRD HOLSTER

## (undated) DEVICE — KIT POS PT CARE KT W/ GENTLE TCH WILSON +

## (undated) DEVICE — DRAPE THYROID

## (undated) DEVICE — SOLUTION IV 1000ML 0.9% SOD CHL

## (undated) DEVICE — SOLUTION IV IRRIG 250ML ST LF 0.9% SODIUM 2F7122

## (undated) DEVICE — CODMAN® SURGICAL PATTIES 1/2" X 1/2" (1.27CM X 1.27CM): Brand: CODMAN®

## (undated) DEVICE — DRAIN SURG L18IN DIA025IN 100% SIL RADPQ FOR CLS WND DRNAGE

## (undated) DEVICE — TOWEL,OR,DSP,ST,BLUE,STD,4/PK,20PK/CS: Brand: MEDLINE

## (undated) DEVICE — SUTURE VCRL SZ 2-0 L18IN ABSRB UD CT-1 L36MM 1/2 CIR J839D

## (undated) DEVICE — SUTURE VCRL SZ 3-0 L18IN ABSRB UD L26MM SH 1/2 CIR J864D

## (undated) DEVICE — POSITIONER,HEAD,RING CUSHION,9IN,32CS: Brand: MEDLINE

## (undated) DEVICE — SPONGE GZ W4XL4IN COT 12 PLY TYP VII WVN C FLD DSGN

## (undated) DEVICE — COVER LT HNDL BLU PLAS

## (undated) DEVICE — SUTURE PERMA-HAND SZ 2-0 L30IN NONABSORBABLE BLK L26MM SH K833H

## (undated) DEVICE — COVER LT HNDL CAM BLU DISP W/ SURG CTRL

## (undated) DEVICE — SUTURE PROL SZ 6-0 L18IN NONABSORBABLE BLU L36MM P-1 3/8 8697G

## (undated) DEVICE — TUNNELER NEUROSTIMULATOR L35CM LNG FOR SPNL CRD STIM SYS

## (undated) DEVICE — AGENT HEMOSTATIC SURGIFLOW MATRIX KIT W/THROMBIN

## (undated) DEVICE — SPONGE,DISSECTOR,K,XRAY,9/16"X1/4",STRL: Brand: MEDLINE

## (undated) DEVICE — SHEET,DRAPE,53X77,STERILE: Brand: MEDLINE

## (undated) DEVICE — SOLUTION SCRB 4OZ 10% POVIDONE IOD ANTIMIC BTL

## (undated) DEVICE — SHEET, ORTHO, SPLIT, STERILE: Brand: MEDLINE

## (undated) DEVICE — ADHESIVE SKIN CLSR 0.7ML TOP DERMBND ADV

## (undated) DEVICE — SUTURE MCRYL SZ 4-0 L27IN ABSRB UD L19MM PS-2 1/2 CIR PRIM Y426H

## (undated) DEVICE — GOWN SIRUS NONREIN XL W/TWL: Brand: MEDLINE INDUSTRIES, INC.

## (undated) DEVICE — MASTISOL ADHESIVE LIQ 2/3ML

## (undated) DEVICE — ENT I-LF: Brand: MEDLINE INDUSTRIES, INC.

## (undated) DEVICE — LAMINECTOMY: Brand: MEDLINE INDUSTRIES, INC.

## (undated) DEVICE — ELEVATOR NEUROSTIMULATOR SPNL PASS FOR SPNL CRD STIM SYS

## (undated) DEVICE — GAUZE FLUFF 2 PLY: Brand: DEROYAL

## (undated) DEVICE — E-Z CLEAN, NON-STICK, PTFE COATED, ELECTROSURGICAL BLADE ELECTRODE, 4 INCH (10.2 CM): Brand: MEGADYNE

## (undated) DEVICE — 3M™ TEGADERM™ TRANSPARENT FILM DRESSING FRAME STYLE, 1624W, 2-3/8 IN X 2-3/4 IN (6 CM X 7 CM), 100/CT 4CT/CASE: Brand: 3M™ TEGADERM™

## (undated) DEVICE — SUTURE ABSORBABLE MONOFILAMENT 4-0 PS2 27 IN UD MONOCRYL + SXMP1B119